# Patient Record
Sex: MALE | Race: ASIAN | NOT HISPANIC OR LATINO
[De-identification: names, ages, dates, MRNs, and addresses within clinical notes are randomized per-mention and may not be internally consistent; named-entity substitution may affect disease eponyms.]

---
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
## 2023-01-20 ENCOUNTER — OFFICE (OUTPATIENT)
Dept: URBAN - METROPOLITAN AREA CLINIC 109 | Facility: CLINIC | Age: 18
Setting detail: OPHTHALMOLOGY
End: 2023-01-20
Payer: COMMERCIAL

## 2023-01-20 DIAGNOSIS — H52.13: ICD-10-CM

## 2023-01-20 DIAGNOSIS — H52.213: ICD-10-CM

## 2023-01-20 DIAGNOSIS — H35.52: ICD-10-CM

## 2023-01-20 PROCEDURE — 92004 COMPRE OPH EXAM NEW PT 1/>: CPT | Performed by: OPHTHALMOLOGY

## 2023-01-20 PROCEDURE — 92015 DETERMINE REFRACTIVE STATE: CPT | Performed by: OPHTHALMOLOGY

## 2023-01-20 PROCEDURE — 92250 FUNDUS PHOTOGRAPHY W/I&R: CPT | Performed by: OPHTHALMOLOGY

## 2023-01-20 PROCEDURE — 92025 CPTRIZED CORNEAL TOPOGRAPHY: CPT | Performed by: OPHTHALMOLOGY

## 2023-01-20 ASSESSMENT — VISUAL ACUITY
OD_BCVA: 20/25-2
OS_BCVA: 20/70

## 2023-01-20 ASSESSMENT — REFRACTION_MANIFEST
OD_AXIS: 6
OD_CYLINDER: -2.25
OS_AXIS: 156
OS_SPHERE: -0.50
OD_SPHERE: -3.50
OS_VA1: 20/25-
OS_CYLINDER: -2.50
OD_VA1: 20/25-

## 2023-01-20 ASSESSMENT — CONFRONTATIONAL VISUAL FIELD TEST (CVF)
OD_FINDINGS: FULL
OS_FINDINGS: FULL

## 2023-01-20 ASSESSMENT — SPHEQUIV_DERIVED
OS_SPHEQUIV: -1.75
OD_SPHEQUIV: -7
OD_SPHEQUIV: -4.625
OS_SPHEQUIV: -3.75

## 2023-01-20 ASSESSMENT — REFRACTION_AUTOREFRACTION
OD_SPHERE: -5.75
OS_SPHERE: -2.50
OS_CYLINDER: -2.50
OD_CYLINDER: -2.50
OS_AXIS: 156
OD_AXIS: 006

## 2023-01-20 ASSESSMENT — TONOMETRY
OD_IOP_MMHG: 16
OS_IOP_MMHG: 16

## 2023-01-24 ENCOUNTER — INPATIENT (INPATIENT)
Facility: HOSPITAL | Age: 18
LOS: 5 days | Discharge: HOME | DRG: 479 | End: 2023-01-30
Attending: FAMILY MEDICINE | Admitting: FAMILY MEDICINE
Payer: MEDICAID

## 2023-01-24 VITALS
TEMPERATURE: 100 F | HEART RATE: 118 BPM | DIASTOLIC BLOOD PRESSURE: 66 MMHG | SYSTOLIC BLOOD PRESSURE: 118 MMHG | WEIGHT: 119.93 LBS | OXYGEN SATURATION: 99 % | HEIGHT: 69 IN | RESPIRATION RATE: 18 BRPM

## 2023-01-24 DIAGNOSIS — Z29.9 ENCOUNTER FOR PROPHYLACTIC MEASURES, UNSPECIFIED: ICD-10-CM

## 2023-01-24 DIAGNOSIS — L03.031 CELLULITIS OF RIGHT TOE: ICD-10-CM

## 2023-01-24 DIAGNOSIS — L03.90 CELLULITIS, UNSPECIFIED: ICD-10-CM

## 2023-01-24 DIAGNOSIS — M86.9 OSTEOMYELITIS, UNSPECIFIED: ICD-10-CM

## 2023-01-24 LAB
ALBUMIN SERPL ELPH-MCNC: 3.8 G/DL — SIGNIFICANT CHANGE UP (ref 3.3–5)
ALP SERPL-CCNC: 165 U/L — HIGH (ref 40–120)
ALT FLD-CCNC: 23 U/L — SIGNIFICANT CHANGE UP (ref 12–78)
ANION GAP SERPL CALC-SCNC: 4 MMOL/L — LOW (ref 5–17)
APTT BLD: 30.9 SEC — SIGNIFICANT CHANGE UP (ref 27.5–35.5)
AST SERPL-CCNC: 18 U/L — SIGNIFICANT CHANGE UP (ref 15–37)
BASOPHILS # BLD AUTO: 0.02 K/UL — SIGNIFICANT CHANGE UP (ref 0–0.2)
BASOPHILS NFR BLD AUTO: 0.3 % — SIGNIFICANT CHANGE UP (ref 0–2)
BILIRUB SERPL-MCNC: 0.4 MG/DL — SIGNIFICANT CHANGE UP (ref 0.2–1.2)
BUN SERPL-MCNC: 17 MG/DL — SIGNIFICANT CHANGE UP (ref 7–23)
CALCIUM SERPL-MCNC: 9.4 MG/DL — SIGNIFICANT CHANGE UP (ref 8.5–10.1)
CHLORIDE SERPL-SCNC: 107 MMOL/L — SIGNIFICANT CHANGE UP (ref 96–108)
CO2 SERPL-SCNC: 29 MMOL/L — SIGNIFICANT CHANGE UP (ref 22–31)
CREAT SERPL-MCNC: 0.8 MG/DL — SIGNIFICANT CHANGE UP (ref 0.5–1.3)
EGFR: 131 ML/MIN/1.73M2 — SIGNIFICANT CHANGE UP
EOSINOPHIL # BLD AUTO: 0.08 K/UL — SIGNIFICANT CHANGE UP (ref 0–0.5)
EOSINOPHIL NFR BLD AUTO: 1.2 % — SIGNIFICANT CHANGE UP (ref 0–6)
ERYTHROCYTE [SEDIMENTATION RATE] IN BLOOD: 13 MM/HR — SIGNIFICANT CHANGE UP (ref 0–15)
GLUCOSE SERPL-MCNC: 83 MG/DL — SIGNIFICANT CHANGE UP (ref 70–99)
HCT VFR BLD CALC: 47.4 % — SIGNIFICANT CHANGE UP (ref 39–50)
HGB BLD-MCNC: 15.6 G/DL — SIGNIFICANT CHANGE UP (ref 13–17)
IMM GRANULOCYTES NFR BLD AUTO: 0.6 % — SIGNIFICANT CHANGE UP (ref 0–0.9)
INR BLD: 1.04 RATIO — SIGNIFICANT CHANGE UP (ref 0.88–1.16)
LACTATE SERPL-SCNC: 1.3 MMOL/L — SIGNIFICANT CHANGE UP (ref 0.7–2)
LYMPHOCYTES # BLD AUTO: 1.14 K/UL — SIGNIFICANT CHANGE UP (ref 1–3.3)
LYMPHOCYTES # BLD AUTO: 17.8 % — SIGNIFICANT CHANGE UP (ref 13–44)
MCHC RBC-ENTMCNC: 29.4 PG — SIGNIFICANT CHANGE UP (ref 27–34)
MCHC RBC-ENTMCNC: 32.9 GM/DL — SIGNIFICANT CHANGE UP (ref 32–36)
MCV RBC AUTO: 89.4 FL — SIGNIFICANT CHANGE UP (ref 80–100)
MONOCYTES # BLD AUTO: 0.56 K/UL — SIGNIFICANT CHANGE UP (ref 0–0.9)
MONOCYTES NFR BLD AUTO: 8.7 % — SIGNIFICANT CHANGE UP (ref 2–14)
NEUTROPHILS # BLD AUTO: 4.57 K/UL — SIGNIFICANT CHANGE UP (ref 1.8–7.4)
NEUTROPHILS NFR BLD AUTO: 71.4 % — SIGNIFICANT CHANGE UP (ref 43–77)
NRBC # BLD: 0 /100 WBCS — SIGNIFICANT CHANGE UP (ref 0–0)
PLATELET # BLD AUTO: 318 K/UL — SIGNIFICANT CHANGE UP (ref 150–400)
POTASSIUM SERPL-MCNC: 3.9 MMOL/L — SIGNIFICANT CHANGE UP (ref 3.5–5.3)
POTASSIUM SERPL-SCNC: 3.9 MMOL/L — SIGNIFICANT CHANGE UP (ref 3.5–5.3)
PROCALCITONIN SERPL-MCNC: 0.06 NG/ML — SIGNIFICANT CHANGE UP
PROT SERPL-MCNC: 8.3 G/DL — SIGNIFICANT CHANGE UP (ref 6–8.3)
PROTHROM AB SERPL-ACNC: 12.2 SEC — SIGNIFICANT CHANGE UP (ref 10.5–13.4)
RBC # BLD: 5.3 M/UL — SIGNIFICANT CHANGE UP (ref 4.2–5.8)
RBC # FLD: 13.1 % — SIGNIFICANT CHANGE UP (ref 10.3–14.5)
SARS-COV-2 RNA SPEC QL NAA+PROBE: SIGNIFICANT CHANGE UP
SODIUM SERPL-SCNC: 140 MMOL/L — SIGNIFICANT CHANGE UP (ref 135–145)
WBC # BLD: 6.41 K/UL — SIGNIFICANT CHANGE UP (ref 3.8–10.5)
WBC # FLD AUTO: 6.41 K/UL — SIGNIFICANT CHANGE UP (ref 3.8–10.5)

## 2023-01-24 PROCEDURE — 93010 ELECTROCARDIOGRAM REPORT: CPT

## 2023-01-24 PROCEDURE — 99221 1ST HOSP IP/OBS SF/LOW 40: CPT

## 2023-01-24 PROCEDURE — 93926 LOWER EXTREMITY STUDY: CPT | Mod: 26,RT

## 2023-01-24 PROCEDURE — 73630 X-RAY EXAM OF FOOT: CPT | Mod: 26,RT

## 2023-01-24 PROCEDURE — 99285 EMERGENCY DEPT VISIT HI MDM: CPT

## 2023-01-24 PROCEDURE — 71045 X-RAY EXAM CHEST 1 VIEW: CPT | Mod: 26

## 2023-01-24 RX ORDER — ACETAMINOPHEN 500 MG
650 TABLET ORAL ONCE
Refills: 0 | Status: COMPLETED | OUTPATIENT
Start: 2023-01-24 | End: 2023-01-24

## 2023-01-24 RX ORDER — VANCOMYCIN HCL 1 G
750 VIAL (EA) INTRAVENOUS ONCE
Refills: 0 | Status: COMPLETED | OUTPATIENT
Start: 2023-01-24 | End: 2023-01-24

## 2023-01-24 RX ORDER — LACTOBACILLUS ACIDOPHILUS 100MM CELL
1 CAPSULE ORAL DAILY
Refills: 0 | Status: DISCONTINUED | OUTPATIENT
Start: 2023-01-24 | End: 2023-01-26

## 2023-01-24 RX ORDER — PIPERACILLIN AND TAZOBACTAM 4; .5 G/20ML; G/20ML
3.38 INJECTION, POWDER, LYOPHILIZED, FOR SOLUTION INTRAVENOUS ONCE
Refills: 0 | Status: COMPLETED | OUTPATIENT
Start: 2023-01-24 | End: 2023-01-24

## 2023-01-24 RX ORDER — VANCOMYCIN HCL 1 G
750 VIAL (EA) INTRAVENOUS ONCE
Refills: 0 | Status: COMPLETED | OUTPATIENT
Start: 2023-01-25 | End: 2023-01-25

## 2023-01-24 RX ORDER — ACETAMINOPHEN 500 MG
650 TABLET ORAL EVERY 6 HOURS
Refills: 0 | Status: DISCONTINUED | OUTPATIENT
Start: 2023-01-24 | End: 2023-01-26

## 2023-01-24 RX ORDER — VANCOMYCIN HCL 1 G
VIAL (EA) INTRAVENOUS
Refills: 0 | Status: COMPLETED | OUTPATIENT
Start: 2023-01-24 | End: 2023-01-25

## 2023-01-24 RX ORDER — SODIUM CHLORIDE 9 MG/ML
1700 INJECTION INTRAMUSCULAR; INTRAVENOUS; SUBCUTANEOUS ONCE
Refills: 0 | Status: COMPLETED | OUTPATIENT
Start: 2023-01-24 | End: 2023-01-24

## 2023-01-24 RX ADMIN — SODIUM CHLORIDE 1700 MILLILITER(S): 9 INJECTION INTRAMUSCULAR; INTRAVENOUS; SUBCUTANEOUS at 15:34

## 2023-01-24 RX ADMIN — Medication 250 MILLIGRAM(S): at 15:44

## 2023-01-24 RX ADMIN — Medication 650 MILLIGRAM(S): at 14:18

## 2023-01-24 RX ADMIN — Medication 750 MILLIGRAM(S): at 17:00

## 2023-01-24 RX ADMIN — PIPERACILLIN AND TAZOBACTAM 200 GRAM(S): 4; .5 INJECTION, POWDER, LYOPHILIZED, FOR SOLUTION INTRAVENOUS at 14:18

## 2023-01-24 RX ADMIN — PIPERACILLIN AND TAZOBACTAM 25 GRAM(S): 4; .5 INJECTION, POWDER, LYOPHILIZED, FOR SOLUTION INTRAVENOUS at 19:21

## 2023-01-24 RX ADMIN — PIPERACILLIN AND TAZOBACTAM 3.38 GRAM(S): 4; .5 INJECTION, POWDER, LYOPHILIZED, FOR SOLUTION INTRAVENOUS at 14:48

## 2023-01-24 RX ADMIN — Medication 650 MILLIGRAM(S): at 14:48

## 2023-01-24 NOTE — CONSULT NOTE ADULT - SUBJECTIVE AND OBJECTIVE BOX
17y year old Male seen at [facility] 06 for right great toe wound. Patient is accompanied by his mother bed side. Patient states he does not recollect the length of time the as to when he first noticed the wound. Patient's mother states she noticed the wound about 2 months ago and was being treated with serial debridements and was advised to get an MRI, but due to insurance problems were unable to obtain an MRI until about 2 weeks ago and after visiting their podiatrist today were referred to the ER for bone infection to the right great toe. Patient also recently has had spinal surgery for scoliosis. Per mother patient has had multiple health issues more significant with delayed milestones in early years of development and also had history of frequent falls. Patient   Denies any fever, chills, nausea, vomiting, chest pain, shortness of breath, or calf pain at this time.    REVIEW OF SYSTEMS    PAST MEDICAL & SURGICAL HISTORY:  Scoliosis      Food allergy  egg, peanuts      No significant past surgical history          Allergies    eggs (Anaphylaxis)  No Known Drug Allergies  peanuts (Anaphylaxis)    Intolerances        MEDICATIONS  (STANDING):  lactobacillus acidophilus 1 Tablet(s) Oral daily  multivitamin 1 Tablet(s) Oral daily  piperacillin/tazobactam IVPB.. 3.375 Gram(s) IV Intermittent once    MEDICATIONS  (PRN):  acetaminophen     Tablet .. 650 milliGRAM(s) Oral every 6 hours PRN Temp greater or equal to 38C (100.4F)      Social History:  lives at home with family (24 Jan 2023 17:03)      FAMILY HISTORY:      Vital Signs Last 24 Hrs  T(C): 37.5 (24 Jan 2023 13:33), Max: 37.5 (24 Jan 2023 13:33)  T(F): 99.5 (24 Jan 2023 13:33), Max: 99.5 (24 Jan 2023 13:33)  HR: 118 (24 Jan 2023 13:33) (118 - 118)  BP: 118/66 (24 Jan 2023 13:33) (118/66 - 118/66)  BP(mean): --  RR: 18 (24 Jan 2023 13:33) (18 - 18)  SpO2: 99% (24 Jan 2023 13:33) (99% - 99%)    Parameters below as of 24 Jan 2023 13:33  Patient On (Oxygen Delivery Method): room air        PHYSICAL EXAM:  Vascular: DP & PT palpable bilaterally, Capillary refill 3 seconds, Digital hair present bilaterally  Neurological: Light touch sensation intact bilaterally  Musculoskeletal: 5/5 strength in all quadrants bilaterally, AJ & STJ ROM intact  Dermatological: 2.5cm x 1.0cm x (+) probe to bone ulceration noted  to the plantar distal hallux, granular wound bed, no periwound erythema, no fluctuance, no malodor, no proximal streaking at this time, hyperkeratotic tissue noted around the hallux, with onychomycotic nails to bilateral hallux with subungal debri and discoloration     CBC Full  -  ( 24 Jan 2023 14:10 )  WBC Count : 6.41 K/uL  RBC Count : 5.30 M/uL  Hemoglobin : 15.6 g/dL  Hematocrit : 47.4 %  Platelet Count - Automated : 318 K/uL  Mean Cell Volume : 89.4 fl  Mean Cell Hemoglobin : 29.4 pg  Mean Cell Hemoglobin Concentration : 32.9 gm/dL  Auto Neutrophil # : 4.57 K/uL  Auto Lymphocyte # : 1.14 K/uL  Auto Monocyte # : 0.56 K/uL  Auto Eosinophil # : 0.08 K/uL  Auto Basophil # : 0.02 K/uL  Auto Neutrophil % : 71.4 %  Auto Lymphocyte % : 17.8 %  Auto Monocyte % : 8.7 %  Auto Eosinophil % : 1.2 %  Auto Basophil % : 0.3 %      01-24    140  |  107  |  17  ----------------------------<  83  3.9   |  29  |  0.80    Ca    9.4      24 Jan 2023 14:10    TPro  8.3  /  Alb  3.8  /  TBili  0.4  /  DBili  x   /  AST  18  /  ALT  23  /  AlkPhos  165<H>  01-24                          15.6   6.41  )-----------( 318      ( 24 Jan 2023 14:10 )             47.4       PT/INR - ( 24 Jan 2023 14:10 )   PT: 12.2 sec;   INR: 1.04 ratio         PTT - ( 24 Jan 2023 14:10 )  PTT:30.9 sec        Imaging: ----------  Pending 
[facility]  INFECTIOUS DISEASES - [address], [place] [address]  Tel: [phone]     Fax: [phone]  =======================================================    [facility]-[id number]  YARITZA MERRILL     CC: Patient is a 17y old  Male who presents with a chief complaint of toe OM (24 Jan 2023 17:43)    HPI:  17 M with hx of scoliosis, who was sent to ED for osteomyelitis of distal phalanx of right first toe. Patient's mother at bedside provided additional information. She says she first noted bleeding on patient's socks 2 months ago, prompting her to bring him to urgent care. Since then he has been following Podiatry outpatient. He had a recent MRI showing evidence of osteomyelitis. Denies any prior antibiotic use. Mother says patient has not been compliant with boots that Podiatrist recommended. Patient denies any fevers, chills or pain to the foot. Does not recall any specific trauma. He is a student and also works part time at [employer].      PAST MEDICAL & SURGICAL HISTORY:  Scoliosis      Food allergy  egg, peanuts      No significant past surgical history          Social Hx:     FAMILY HISTORY:      Allergies    eggs (Anaphylaxis)  No Known Drug Allergies  peanuts (Anaphylaxis)    Intolerances        Antibiotics:  MEDICATIONS  (STANDING):  lactobacillus acidophilus 1 Tablet(s) Oral daily  multivitamin 1 Tablet(s) Oral daily  piperacillin/tazobactam IVPB.. 3.375 Gram(s) IV Intermittent once    MEDICATIONS  (PRN):  acetaminophen     Tablet .. 650 milliGRAM(s) Oral every 6 hours PRN Temp greater or equal to 38C (100.4F)       REVIEW OF SYSTEMS:  CONSTITUTIONAL:  No Fever or chills  HEENT:  No sore throat or runny nose.  CARDIOVASCULAR:  No chest pain or SOB.  RESPIRATORY:  No cough, shortness of breath  GASTROINTESTINAL:  No nausea, vomiting or diarrhea.  GENITOURINARY:  No dysuria, frequency or urgency  MUSCULOSKELETAL:  no joint aches, no muscle pain  SKIN: see history  NEUROLOGIC:  No headache, no dizziness  PSYCHIATRIC:  No disorder of thought or mood.      Physical Exam:  Vital Signs Last 24 Hrs  T(C): 37.5 (24 Jan 2023 13:33), Max: 37.5 (24 Jan 2023 13:33)  T(F): 99.5 (24 Jan 2023 13:33), Max: 99.5 (24 Jan 2023 13:33)  HR: 102 (24 Jan 2023 17:00) (102 - 118)  BP: 114/64 (24 Jan 2023 17:00) (114/64 - 118/66)  BP(mean): --  RR: 18 (24 Jan 2023 17:00) (18 - 18)  SpO2: 99% (24 Jan 2023 17:00) (99% - 99%)    Parameters below as of 24 Jan 2023 17:00  Patient On (Oxygen Delivery Method): room air      Height (cm): 175.3 (01-24 @ 13:33)  Weight (kg): 54.4 (01-24 @ 13:33)  BMI (kg/m2): 17.7 (01-24 @ 13:33)  BSA (m2): 1.66 (01-24 @ 13:33)  GEN: NAD  HEENT: normocephalic and atraumatic.    NECK: Supple.   LUNGS: Clear to auscultation.  HEART: Regular rate and rhythm  ABDOMEN: Soft, nontender, and nondistended.    EXTREMITIES: No leg edema. mild R hallux swelling and warmth  NEUROLOGIC: grossly intact.  PSYCHIATRIC: Appropriate affect .  SKIN: (+) probe to bone ulceration noted  to the plantar distal hallux, granular wound bed, no periwound erythema, no fluctuance, no malodor, darkended hyperkeratotic tissue noted around the hallux, with onychomycotic nails to bilateral hallux,     Labs:  01-24    140  |  107  |  17  ----------------------------<  83  3.9   |  29  |  0.80    Ca    9.4      24 Jan 2023 14:10    TPro  8.3  /  Alb  3.8  /  TBili  0.4  /  DBili  x   /  AST  18  /  ALT  23  /  AlkPhos  165<H>  01-24                          15.6   6.41  )-----------( 318      ( 24 Jan 2023 14:10 )             47.4     PT/INR - ( 24 Jan 2023 14:10 )   PT: 12.2 sec;   INR: 1.04 ratio         PTT - ( 24 Jan 2023 14:10 )  PTT:30.9 sec    LIVER FUNCTIONS - ( 24 Jan 2023 14:10 )  Alb: 3.8 g/dL / Pro: 8.3 g/dL / ALK PHOS: 165 U/L / ALT: 23 U/L / AST: 18 U/L / GGT: x                       COVID-19 PCR: NotDetec (01-24-23 @ 14:10)      RECENT CULTURES:        All imaging and other data have been reviewed.    CXR:  FINDINGS:  Heart/Vascular: The heart size, mediastinum, hilum and aorta are within   normal limits for projection.  Pulmonary: Midline trachea. There is no focal infiltrate, congestion or   effusion.    Bones: There is no fracture. The visualized spinal hardware is intact.  Lines and catheter: None    Impression:    No acute pulmonary disease.

## 2023-01-24 NOTE — H&P ADULT - NSHPPHYSICALEXAM_GEN_ALL_CORE
· CONSTITUTIONAL: Well appearing, awake, alert, oriented to person, place, time/situation and in no apparent distress.  · CARDIAC: - - -  · CARDIAC RHYTHM: regular  · CARDIAC RATE: TACHYCARDIC  · RESPIRATORY: Breath sounds clear and equal bilaterally.  · GASTROINTESTINAL: Abdomen soft, non-tender, no guarding.  · MUSCULOSKELETAL: from x 4  · NEUROLOGICAL: nonfocal  · SKIN: ulceration to palmar aspect of distal right great toe with cellulitis, +NVI, no drainage, no streaking

## 2023-01-24 NOTE — CONSULT NOTE ADULT - PROBLEM SELECTOR RECOMMENDATION 9
Patient was seen and evaluated   Applied silver alginate and sterile dressing   Ordered Right foot x- rays and MRI to assess the depth of the OM and any other osseous or soft tissue pathology   Recommend ID consult for IV antibiotics   Patient to stay partial weight bearing to the right foot in surgical shoe   Discussed with patient and family for surgical wound debridement with bone biopsy and possible long term IV antibiotics for the right hallux OM   Will schedule patient for surgery on Thursday 01/26/22.   Will follow patient

## 2023-01-24 NOTE — ED PROVIDER NOTE - CLINICAL SUMMARY MEDICAL DECISION MAKING FREE TEXT BOX
osteomyelitis of right 1st toe. nonhealing as outpatient, being followed by podiatry with tachycardia and low-grade fever. for admisison. labs, IV abx.

## 2023-01-24 NOTE — H&P ADULT - NSHPREVIEWOFSYSTEMS_GEN_ALL_CORE
· CONSTITUTIONAL: no fever and no chills.  · CARDIOVASCULAR: - - -  · Cardiovascular [-]: no chest pain  · RESPIRATORY: - - -  · Respiratory [-]: no shortness of breath  · GASTROINTESTINAL: - - -  · Gastrointestinal [-]: no nausea, no vomiting  · MUSCULOSKELETAL: - - -  · Musculoskeletal [+]: scoliosis  · SKIN: - - -  · Skin [+]: toe wound  · NEUROLOGICAL: - - -  · ROS STATEMENT: all other ROS negative except as per HPI

## 2023-01-24 NOTE — CONSULT NOTE ADULT - ASSESSMENT
17 M with hx of scoliosis, who was sent to ED for osteomyelitis of distal phalanx of right first toe. He has had nonhealing wound for at least 2 months now, with recent MRI showing evidence of osteomyelitis. Otherwise denies any fevers. No leukocytosis noted. Plan for debridement and bone biopsy on 1/26.    -continue Zosyn  -baseline ESR and CRP  -follow up R foot xray and MRI  -send bacterial, fungal and AFB cultures when he goes for Surgery  -consider Vascular evaluation  -discussed with mother at bedside    Thank you for courtesy of this consult.     I will be covered by Dr. Combs tomorrow, 1/25/23.  Discussed with the primary team.     Kadie Ospina MD  Division of Infectious Diseases   Cell [phone] between 8am and 6pm   After 6pm and weekends please call ID service at [phone].

## 2023-01-24 NOTE — H&P ADULT - HISTORY OF PRESENT ILLNESS
17 M sent to ED for osteomyelitis of distal phalanx of right first toe. Has been following with podiatrist Sunday Paulson for the past few months due to nonhealing wound to distal toe. Last week had MRI and today was told there is an infection in the bone and he needs to go to hospital for IV abx. No prior antibiotics for this wound. Denies f/c, cp, sob, vomiting, pain.    	MRI showed ulceration over the distal phalanx of the first toe with osteomyelitis cellulitis and no abscess. Mild arthrosis interphalangeal joint with no septic changes at the joint.  patient is being admitted for further work up and treatment

## 2023-01-24 NOTE — H&P ADULT - NSHPLABSRESULTS_GEN_ALL_CORE
01-24    140  |  107  |  17  ----------------------------<  83  3.9   |  29  |  0.80    Ca    9.4      24 Jan 2023 14:10    TPro  8.3  /  Alb  3.8  /  TBili  0.4  /  DBili  x   /  AST  18  /  ALT  23  /  AlkPhos  165<H>  01-24                            15.6   6.41  )-----------( 318      ( 24 Jan 2023 14:10 )             47.4             LIVER FUNCTIONS - ( 24 Jan 2023 14:10 )  Alb: 3.8 g/dL / Pro: 8.3 g/dL / ALK PHOS: 165 U/L / ALT: 23 U/L / AST: 18 U/L / GGT: x             PT/INR - ( 24 Jan 2023 14:10 )   PT: 12.2 sec;   INR: 1.04 ratio         PTT - ( 24 Jan 2023 14:10 )  PTT:30.9 sec

## 2023-01-24 NOTE — ED PROVIDER NOTE - ATTENDING APP SHARED VISIT CONTRIBUTION OF CARE
Patient came into the ED sent by podiatry for evaluation of osteomyelitis of right 1st toe. patient is being followed by podiatry for the last 3 months and has had multiple scrapings of the wound in the office. infection initially started as a small scratch. has not been on abx. no fever.     A&Ox3, NAD. good dorsalis pedal pulse. +swelling/erythema to right 1st toe. +ulceration dorsum of distal toe with surrounding discolored soft-tissue of distal phalynx on dorsum. decreased sensation at distal toe. FROM toe and foot.

## 2023-01-24 NOTE — ED ADULT TRIAGE NOTE - CHIEF COMPLAINT QUOTE
Patient A/Ox4 with clear speech and a steady gait. Accompanied by his mother. Comes in for IV ABX for right first toe infection. Patient states that he has had a non-healing cut on his foot. Podiatrist took MRI showing bone infection and told patient to go to ED.

## 2023-01-24 NOTE — ED PROVIDER NOTE - OBJECTIVE STATEMENT
17 M sent to ED for osteomyelitis of distal phalanx of right first toe. Has been following with podiatrist Sunday Paulson for the past few months due to nonhealing wound to distal toe. Last week had MRI and today was told there is an infection in the bone and he needs to go to hospital for IV abx. No prior antibiotics for this wound. Denies f/c, cp, sob, vomiting, pain.    MRI showed ulceration over the distal phalanx of the first toe with osteomyelitis cellulitis and no abscess. Mild arthrosis interphalangeal joint with no septic changes at the joint    pmd elpidio ramírez

## 2023-01-25 ENCOUNTER — TRANSCRIPTION ENCOUNTER (OUTPATIENT)
Age: 18
End: 2023-01-25

## 2023-01-25 LAB
ALBUMIN SERPL ELPH-MCNC: 2.9 G/DL — LOW (ref 3.3–5)
ALP SERPL-CCNC: 122 U/L — HIGH (ref 40–120)
ALT FLD-CCNC: 20 U/L — SIGNIFICANT CHANGE UP (ref 12–78)
ANION GAP SERPL CALC-SCNC: 3 MMOL/L — LOW (ref 5–17)
AST SERPL-CCNC: 13 U/L — LOW (ref 15–37)
BILIRUB SERPL-MCNC: 0.5 MG/DL — SIGNIFICANT CHANGE UP (ref 0.2–1.2)
BUN SERPL-MCNC: 12 MG/DL — SIGNIFICANT CHANGE UP (ref 7–23)
CALCIUM SERPL-MCNC: 8.5 MG/DL — SIGNIFICANT CHANGE UP (ref 8.5–10.1)
CHLORIDE SERPL-SCNC: 107 MMOL/L — SIGNIFICANT CHANGE UP (ref 96–108)
CO2 SERPL-SCNC: 29 MMOL/L — SIGNIFICANT CHANGE UP (ref 22–31)
CREAT SERPL-MCNC: 0.64 MG/DL — SIGNIFICANT CHANGE UP (ref 0.5–1.3)
CRP SERPL-MCNC: 8 MG/L — HIGH
EGFR: 140 ML/MIN/1.73M2 — SIGNIFICANT CHANGE UP
GLUCOSE SERPL-MCNC: 97 MG/DL — SIGNIFICANT CHANGE UP (ref 70–99)
POTASSIUM SERPL-MCNC: 3.4 MMOL/L — LOW (ref 3.5–5.3)
POTASSIUM SERPL-SCNC: 3.4 MMOL/L — LOW (ref 3.5–5.3)
PROT SERPL-MCNC: 6.4 G/DL — SIGNIFICANT CHANGE UP (ref 6–8.3)
SODIUM SERPL-SCNC: 139 MMOL/L — SIGNIFICANT CHANGE UP (ref 135–145)

## 2023-01-25 PROCEDURE — 99232 SBSQ HOSP IP/OBS MODERATE 35: CPT | Mod: 57

## 2023-01-25 PROCEDURE — 73718 MRI LOWER EXTREMITY W/O DYE: CPT | Mod: 26,RT

## 2023-01-25 RX ORDER — PIPERACILLIN AND TAZOBACTAM 4; .5 G/20ML; G/20ML
3.38 INJECTION, POWDER, LYOPHILIZED, FOR SOLUTION INTRAVENOUS EVERY 8 HOURS
Refills: 0 | Status: DISCONTINUED | OUTPATIENT
Start: 2023-01-25 | End: 2023-01-26

## 2023-01-25 RX ORDER — POTASSIUM CHLORIDE 20 MEQ
20 PACKET (EA) ORAL
Refills: 0 | Status: COMPLETED | OUTPATIENT
Start: 2023-01-25 | End: 2023-01-25

## 2023-01-25 RX ADMIN — Medication 20 MILLIEQUIVALENT(S): at 20:48

## 2023-01-25 RX ADMIN — Medication 250 MILLIGRAM(S): at 04:14

## 2023-01-25 RX ADMIN — Medication 20 MILLIEQUIVALENT(S): at 17:58

## 2023-01-25 RX ADMIN — PIPERACILLIN AND TAZOBACTAM 25 GRAM(S): 4; .5 INJECTION, POWDER, LYOPHILIZED, FOR SOLUTION INTRAVENOUS at 21:11

## 2023-01-25 RX ADMIN — Medication 1 TABLET(S): at 11:32

## 2023-01-25 RX ADMIN — PIPERACILLIN AND TAZOBACTAM 25 GRAM(S): 4; .5 INJECTION, POWDER, LYOPHILIZED, FOR SOLUTION INTRAVENOUS at 16:14

## 2023-01-25 NOTE — PATIENT PROFILE ADULT - FALL HARM RISK - UNIVERSAL INTERVENTIONS
Bed in lowest position, wheels locked, appropriate side rails in place/Call bell, personal items and telephone in reach/Instruct patient to call for assistance before getting out of bed or chair/Non-slip footwear when patient is out of bed/[place] to call system/Physically safe environment - no spills, clutter or unnecessary equipment/Purposeful Proactive Rounding/Room/bathroom lighting operational, light cord in reach

## 2023-01-25 NOTE — ASSESSMENT
Contacted Chris (patients mom and guardian) to discuss post op care for Shola. She stated he is doing well after surgery yesterday. I let her know that [facility] has accepted him and will be reaching out to schedule visits. He will see Rosa Maria Melton NP on Monday 1/30.       [FreeTextEntry1] : YARITZA MERRILL is a 14 year old male patient who presents to the clinic today with his mother s/p posterior spinal fusion at 4 weeks (July 17th, 2020). Patient is well balanced and able to bend forward/backward/laterally without pain or discomfort. Able to jump/squat and maintain tip toe/heel stand stance without pain or discomfort. At this time I recommend pt and home exercises. I am recommending daily back and core strengthening exercises. Home exercise regimen recommended, exercises demonstrated and reviewed in office, and patient and parents provided with a handout demonstrating the exercises. Patient should do additional exercises for back and core strengthening, such as Yoga, swimming, Pilates, planks, pull ups, etc. We have provided him with a prescription for physical therapy. They can continue activities as tolerated, with no restrictions. We have provided him a note for school, indicating that he will need to do remote learning for the first 2 months. Explained that he may have soreness for some time, and it is normal following surgery. Patient is doing well overall. All questions answered, patient and parents understand and agree to plan of care. Patient may follow up with x-rays in 4 months. \par \ketty MCKENNA, Chantelle Topete, have acted as a scribe and documented the above information for Dr. Harris on 08/20/2020.

## 2023-01-26 LAB
ANION GAP SERPL CALC-SCNC: 4 MMOL/L — LOW (ref 5–17)
BUN SERPL-MCNC: 16 MG/DL — SIGNIFICANT CHANGE UP (ref 7–23)
CALCIUM SERPL-MCNC: 8.9 MG/DL — SIGNIFICANT CHANGE UP (ref 8.5–10.1)
CHLORIDE SERPL-SCNC: 108 MMOL/L — SIGNIFICANT CHANGE UP (ref 96–108)
CO2 SERPL-SCNC: 28 MMOL/L — SIGNIFICANT CHANGE UP (ref 22–31)
CREAT SERPL-MCNC: 0.78 MG/DL — SIGNIFICANT CHANGE UP (ref 0.5–1.3)
EGFR: 132 ML/MIN/1.73M2 — SIGNIFICANT CHANGE UP
GLUCOSE SERPL-MCNC: 95 MG/DL — SIGNIFICANT CHANGE UP (ref 70–99)
HCT VFR BLD CALC: 46.5 % — SIGNIFICANT CHANGE UP (ref 39–50)
HGB BLD-MCNC: 15.3 G/DL — SIGNIFICANT CHANGE UP (ref 13–17)
MCHC RBC-ENTMCNC: 29.5 PG — SIGNIFICANT CHANGE UP (ref 27–34)
MCHC RBC-ENTMCNC: 32.9 GM/DL — SIGNIFICANT CHANGE UP (ref 32–36)
MCV RBC AUTO: 89.8 FL — SIGNIFICANT CHANGE UP (ref 80–100)
NRBC # BLD: 0 /100 WBCS — SIGNIFICANT CHANGE UP (ref 0–0)
PLATELET # BLD AUTO: 273 K/UL — SIGNIFICANT CHANGE UP (ref 150–400)
POTASSIUM SERPL-MCNC: 3.8 MMOL/L — SIGNIFICANT CHANGE UP (ref 3.5–5.3)
POTASSIUM SERPL-SCNC: 3.8 MMOL/L — SIGNIFICANT CHANGE UP (ref 3.5–5.3)
RBC # BLD: 5.18 M/UL — SIGNIFICANT CHANGE UP (ref 4.2–5.8)
RBC # FLD: 13.1 % — SIGNIFICANT CHANGE UP (ref 10.3–14.5)
SODIUM SERPL-SCNC: 140 MMOL/L — SIGNIFICANT CHANGE UP (ref 135–145)
WBC # BLD: 5.18 K/UL — SIGNIFICANT CHANGE UP (ref 3.8–10.5)
WBC # FLD AUTO: 5.18 K/UL — SIGNIFICANT CHANGE UP (ref 3.8–10.5)

## 2023-01-26 PROCEDURE — 88311 DECALCIFY TISSUE: CPT | Mod: 26

## 2023-01-26 PROCEDURE — 76937 US GUIDE VASCULAR ACCESS: CPT | Mod: 26

## 2023-01-26 PROCEDURE — 11044 DBRDMT BONE 1ST 20 SQ CM/<: CPT | Mod: T5

## 2023-01-26 PROCEDURE — 73620 X-RAY EXAM OF FOOT: CPT | Mod: 26,RT

## 2023-01-26 PROCEDURE — 36573 INSJ PICC RS&I 5 YR+: CPT

## 2023-01-26 PROCEDURE — 99233 SBSQ HOSP IP/OBS HIGH 50: CPT

## 2023-01-26 PROCEDURE — 88304 TISSUE EXAM BY PATHOLOGIST: CPT | Mod: 26

## 2023-01-26 DEVICE — SURGICEL NU-KNIT 6 X 9": Type: IMPLANTABLE DEVICE | Site: RIGHT | Status: FUNCTIONAL

## 2023-01-26 RX ORDER — HYDROMORPHONE HYDROCHLORIDE 2 MG/ML
0.5 INJECTION INTRAMUSCULAR; INTRAVENOUS; SUBCUTANEOUS
Refills: 0 | Status: DISCONTINUED | OUTPATIENT
Start: 2023-01-26 | End: 2023-01-26

## 2023-01-26 RX ORDER — LACTOBACILLUS ACIDOPHILUS 100MM CELL
1 CAPSULE ORAL DAILY
Refills: 0 | Status: DISCONTINUED | OUTPATIENT
Start: 2023-01-26 | End: 2023-01-30

## 2023-01-26 RX ORDER — ACETAMINOPHEN 500 MG
650 TABLET ORAL EVERY 6 HOURS
Refills: 0 | Status: DISCONTINUED | OUTPATIENT
Start: 2023-01-26 | End: 2023-01-30

## 2023-01-26 RX ORDER — OXYCODONE HYDROCHLORIDE 5 MG/1
5 TABLET ORAL ONCE
Refills: 0 | Status: DISCONTINUED | OUTPATIENT
Start: 2023-01-26 | End: 2023-01-26

## 2023-01-26 RX ORDER — CHLORHEXIDINE GLUCONATE 213 G/1000ML
1 SOLUTION TOPICAL
Refills: 0 | Status: DISCONTINUED | OUTPATIENT
Start: 2023-01-26 | End: 2023-01-26

## 2023-01-26 RX ORDER — SODIUM CHLORIDE 9 MG/ML
1000 INJECTION INTRAMUSCULAR; INTRAVENOUS; SUBCUTANEOUS
Refills: 0 | Status: DISCONTINUED | OUTPATIENT
Start: 2023-01-26 | End: 2023-01-30

## 2023-01-26 RX ORDER — PIPERACILLIN AND TAZOBACTAM 4; .5 G/20ML; G/20ML
3.38 INJECTION, POWDER, LYOPHILIZED, FOR SOLUTION INTRAVENOUS EVERY 8 HOURS
Refills: 0 | Status: DISCONTINUED | OUTPATIENT
Start: 2023-01-24 | End: 2023-01-30

## 2023-01-26 RX ORDER — SODIUM CHLORIDE 9 MG/ML
10 INJECTION INTRAMUSCULAR; INTRAVENOUS; SUBCUTANEOUS
Refills: 0 | Status: DISCONTINUED | OUTPATIENT
Start: 2023-01-26 | End: 2023-01-26

## 2023-01-26 RX ORDER — SODIUM CHLORIDE 9 MG/ML
1000 INJECTION, SOLUTION INTRAVENOUS
Refills: 0 | Status: DISCONTINUED | OUTPATIENT
Start: 2023-01-26 | End: 2023-01-26

## 2023-01-26 RX ORDER — ONDANSETRON 8 MG/1
4 TABLET, FILM COATED ORAL ONCE
Refills: 0 | Status: DISCONTINUED | OUTPATIENT
Start: 2023-01-26 | End: 2023-01-26

## 2023-01-26 RX ADMIN — PIPERACILLIN AND TAZOBACTAM 25 GRAM(S): 4; .5 INJECTION, POWDER, LYOPHILIZED, FOR SOLUTION INTRAVENOUS at 22:17

## 2023-01-26 RX ADMIN — Medication 1 TABLET(S): at 11:16

## 2023-01-26 RX ADMIN — SODIUM CHLORIDE 75 MILLILITER(S): 9 INJECTION, SOLUTION INTRAVENOUS at 20:00

## 2023-01-26 RX ADMIN — PIPERACILLIN AND TAZOBACTAM 25 GRAM(S): 4; .5 INJECTION, POWDER, LYOPHILIZED, FOR SOLUTION INTRAVENOUS at 13:42

## 2023-01-26 RX ADMIN — PIPERACILLIN AND TAZOBACTAM 25 GRAM(S): 4; .5 INJECTION, POWDER, LYOPHILIZED, FOR SOLUTION INTRAVENOUS at 05:53

## 2023-01-26 NOTE — CARE COORDINATION ASSESSMENT. - OTHER PERTINENT REFERRAL INFORMATION
Patient from home with OM of the toe. The patient will need a bone biopsy. The patient may need IV abx and PICC line for DC. Discussed home DC. CM role explained.

## 2023-01-26 NOTE — CARE COORDINATION ASSESSMENT. - NSCAREPROVIDERS_GEN_ALL_CORE_FT
CARE PROVIDERS:  Accepting Physician: Alexis Hahn  Access Services: [address]  Administration: Nayana Del Cid  Administration: Arpan Zamarripa  Admitting: Alexis Hahn  Attending: Alexis Hahn  Case Management: Aye Rivera  Consultant: Kadie Ospina  Consultant: Jennifer Irwin  Consultant: Louis Rand Team: Perlman, Daryl  ED ACP: Paulette Mariano  ED Attending: Crystal Gregorio ED Nurse: Radha Feliciano  Nurse: Odessa Guidry  Nurse: Adolfo Bray  Nurse: Deisy Fowler  Nurse: Brittany Moralez  Nurse: Dawn Haile  Nurse: Adolfo Marie  Nurse: Crystal Wills  Nurse: Agnes Zarate  Nurse: Prema Monaco  Ordered: ADM, User  Outpatient Provider: Candie Saldana  Override: Adolph Robertson  Override: Iggy Crow  PCA/Nursing Assistant: Quang Mcclellan  Primary Team: John Mendoza  Registered Dietitian: Nena Patel  Respiratory Therapy: Yonis Pastor  UR// Supp. Assoc.: [facility] [place]

## 2023-01-26 NOTE — CARE COORDINATION ASSESSMENT. - NSPASTMEDSURGHISTORY_GEN_ALL_CORE_FT
PAST MEDICAL & SURGICAL HISTORY:  Food allergy  egg, peanuts      Scoliosis      No significant past surgical history

## 2023-01-26 NOTE — BRIEF OPERATIVE NOTE - OPERATION/FINDINGS
Bone of distal phalanx was soft and the wound was down to the level of bone, discoloration noted to the distal aspect of the right hallux

## 2023-01-26 NOTE — BRIEF OPERATIVE NOTE - NSICDXBRIEFPROCEDURE_GEN_ALL_CORE_FT
PROCEDURES:  Open biopsy, bone, foot 26-Jan-2023 19:35:27  Jennifer Irwin  Debridement, bone 26-Jan-2023 19:36:39  Jennifer Irwin

## 2023-01-26 NOTE — BRIEF OPERATIVE NOTE - SPECIMENS
Right foot bone culture and pathology of proximal and distal phalanx, and fungal bone culture and microbacterial culture

## 2023-01-27 LAB
GRAM STN FLD: SIGNIFICANT CHANGE UP
SPECIMEN SOURCE: SIGNIFICANT CHANGE UP

## 2023-01-27 PROCEDURE — 99232 SBSQ HOSP IP/OBS MODERATE 35: CPT

## 2023-01-27 PROCEDURE — 99233 SBSQ HOSP IP/OBS HIGH 50: CPT

## 2023-01-27 RX ADMIN — PIPERACILLIN AND TAZOBACTAM 25 GRAM(S): 4; .5 INJECTION, POWDER, LYOPHILIZED, FOR SOLUTION INTRAVENOUS at 21:42

## 2023-01-27 RX ADMIN — PIPERACILLIN AND TAZOBACTAM 25 GRAM(S): 4; .5 INJECTION, POWDER, LYOPHILIZED, FOR SOLUTION INTRAVENOUS at 17:17

## 2023-01-27 RX ADMIN — Medication 1 TABLET(S): at 12:08

## 2023-01-27 RX ADMIN — PIPERACILLIN AND TAZOBACTAM 25 GRAM(S): 4; .5 INJECTION, POWDER, LYOPHILIZED, FOR SOLUTION INTRAVENOUS at 05:48

## 2023-01-27 NOTE — CASE MANAGEMENT PROGRESS NOTE - NSCMPROGRESSNOTE_GEN_ALL_CORE
Patient is s/p bone bx yesterday. PICC placed yest. HC infusion referral initiated and sent to [employer] to run insurance.  Anticipate DC on Mon after path resulted.  Currently on Zosyn IV Q8. If remains on Zosyn, pt will probably go home after 2pm dose on Mon as per father who will then give PM dose with HC VN.  Met with pt and dad at bedside who verb understanding . CM to follow on Mon.

## 2023-01-27 NOTE — ED ADULT NURSE NOTE - NSICDXPASTSURGICALHX_GEN_ALL_CORE_FT
[Joint Pain] : joint pain [Negative] : Heme/Lymph PAST SURGICAL HISTORY:  No significant past surgical history

## 2023-01-28 PROCEDURE — 99232 SBSQ HOSP IP/OBS MODERATE 35: CPT

## 2023-01-28 RX ADMIN — Medication 1 TABLET(S): at 12:39

## 2023-01-28 RX ADMIN — PIPERACILLIN AND TAZOBACTAM 25 GRAM(S): 4; .5 INJECTION, POWDER, LYOPHILIZED, FOR SOLUTION INTRAVENOUS at 14:55

## 2023-01-28 RX ADMIN — PIPERACILLIN AND TAZOBACTAM 25 GRAM(S): 4; .5 INJECTION, POWDER, LYOPHILIZED, FOR SOLUTION INTRAVENOUS at 21:20

## 2023-01-28 RX ADMIN — PIPERACILLIN AND TAZOBACTAM 25 GRAM(S): 4; .5 INJECTION, POWDER, LYOPHILIZED, FOR SOLUTION INTRAVENOUS at 05:25

## 2023-01-28 NOTE — CASE MANAGEMENT PROGRESS NOTE - NSCMPROGRESSNOTE_GEN_ALL_CORE
CM referral noted, home care referral sent to [facility] [phone]/ fax [phone] for d/c Monday.

## 2023-01-29 LAB
-  AMPICILLIN/SULBACTAM: SIGNIFICANT CHANGE UP
-  CEFAZOLIN: SIGNIFICANT CHANGE UP
-  CLINDAMYCIN: SIGNIFICANT CHANGE UP
-  ERYTHROMYCIN: SIGNIFICANT CHANGE UP
-  GENTAMICIN: SIGNIFICANT CHANGE UP
-  OXACILLIN: SIGNIFICANT CHANGE UP
-  PENICILLIN: SIGNIFICANT CHANGE UP
-  RIFAMPIN: SIGNIFICANT CHANGE UP
-  TETRACYCLINE: SIGNIFICANT CHANGE UP
-  TRIMETHOPRIM/SULFAMETHOXAZOLE: SIGNIFICANT CHANGE UP
-  VANCOMYCIN: SIGNIFICANT CHANGE UP
CULTURE RESULTS: SIGNIFICANT CHANGE UP
METHOD TYPE: SIGNIFICANT CHANGE UP
MRSA PCR RESULT.: SIGNIFICANT CHANGE UP
ORGANISM # SPEC MICROSCOPIC CNT: SIGNIFICANT CHANGE UP
ORGANISM # SPEC MICROSCOPIC CNT: SIGNIFICANT CHANGE UP
S AUREUS DNA NOSE QL NAA+PROBE: SIGNIFICANT CHANGE UP
SPECIMEN SOURCE: SIGNIFICANT CHANGE UP

## 2023-01-29 RX ADMIN — Medication 1 TABLET(S): at 11:06

## 2023-01-29 RX ADMIN — PIPERACILLIN AND TAZOBACTAM 25 GRAM(S): 4; .5 INJECTION, POWDER, LYOPHILIZED, FOR SOLUTION INTRAVENOUS at 05:54

## 2023-01-29 RX ADMIN — PIPERACILLIN AND TAZOBACTAM 25 GRAM(S): 4; .5 INJECTION, POWDER, LYOPHILIZED, FOR SOLUTION INTRAVENOUS at 14:31

## 2023-01-29 RX ADMIN — PIPERACILLIN AND TAZOBACTAM 25 GRAM(S): 4; .5 INJECTION, POWDER, LYOPHILIZED, FOR SOLUTION INTRAVENOUS at 21:40

## 2023-01-29 NOTE — DIETITIAN INITIAL EVALUATION ADULT - OTHER INFO
16 y/o male adm with osteomyelitis (toe). s/p OR 1/26 right great toe wound debridement with bone biopsy. PMH scoliosis (surgery), food allergies (egg/peanut). Pt visited at bedside this am with pt's mom present. Pt eating well, tolerating Regular diet. Menu system reviewed. Understanding verbalized. Pt offers no food preferences at this time. Noted to have egg and peanut allergy. Pt was always underwt and was given pediasure most of his life. As per pt's mom she was told that pt just had a "fast metabolism". Pt does not want any other liquid supplements. Since scoliosis surgery, pt's po intake and wt has improved. Pt's UBW at this time is 120#. Pt appears thin. Wt of 129.8# noted on 1/29 (?accurate).

## 2023-01-29 NOTE — PROGRESS NOTE ADULT - PROBLEM SELECTOR PLAN 3
SCDs for DVT prevention

## 2023-01-29 NOTE — PROGRESS NOTE ADULT - PROBLEM SELECTOR PROBLEM 2
Osteomyelitis

## 2023-01-29 NOTE — PROGRESS NOTE ADULT - PROBLEM SELECTOR PLAN 2
IV ABX - zosyn per ID for 4 to 6 weeks  ID eval appreciated- cont zosyn  S/P biopsy of right big toe
Patient examined and evaluated.  Surgical site dressed with adaptic, Aquacel and dry, sterile dressing.  Patient is to be partial weight bearing to the right  heel of the surgical lower extremity as tolerated in a surgical shoe.  Surgical pathology and cultures pending at this time.  Antibiotics as per ID recommendations.    Wound Care Instructions:  -Keep dressing clean, dry, and intact to the right foot  -Change dressing to the right foot every other day  - Apply silver alginate/ Aquacel and cover with sterile gauze, abd pad and shira   -Patient must remain partial weightbearing to the right heel in surgical shoe   -Monitor for any signs of infection including redness, swelling in the leg above the bandage, nausea/vomiting/fever/chills/chest pain/shortness of breath, if any are present patient must report to the emergency department immediately  -Patient is to follow up with Dr. Campos/Dr. Rashid/ Dr. Irwin  within 5 days after discharge at [facility] [place]. Please call to make an appointment [phone]
IV ABX - zosyn per ID for 4 to 6 weeks  ID eval appreciated- cont zosyn  S/P biopsy of right big toe  S/P PICC
IV ABX - zosyn  ID eval appreciated- cont zosyn  there are no medical contraindications to proposed surgical intervention
Patient examined and evaluated.  Surgical site dressed with adaptic, Aquacel and dry, sterile dressing.  Patient is to be partial weight bearing to the right  heel of the surgical lower extremity as tolerated in a surgical shoe.  Surgical pathology and cultures pending at this time.  Antibiotics as per ID recommendations.    Wound Care Instructions:  -Keep dressing clean, dry, and intact to the right foot  -Change dressing to the right foot every other aday  - Apply silver alginate/ Aquacel and cover with sterilce gauze, abd pad and shira   -Patient must remain partial weightbearing to the right heel in surgical shoe   -Monitor for any signs of infection including redness, swelling in the leg above the bandage, nausea/vomiting/fever/chills/chest pain/shortness of breath, if any are present patient must report to the emergency department immediately  -Patient is to follow up with Dr. Campos/Dr. Rashid/ Dr. Irwin  within 5 days after discharge at [facility] [place]. Please call to make an appointment [phone]
IV ABX - zosyn per ID  ID eval appreciated- cont zosyn  S/P biopsy of right big toe
Continue with Antibiotics per Infectious disease       Discussed MRI results with patient's mother (+) OM in the distal phalanx and head of the proximal phalanx, Discussed surgical intervention - bone biopsy with wound debridement, followed by possible long term IV antibiotics. Discussed with patient and family that there is still possibility of patient to undergo possible partial vs hallux amputation in the future. Also discussed that patient is at risk for developing more acute infections, sepsis, loss of limb and loss of life.   Procedure discussed at length with patient and family regarding risks, complications, benefits, as well as pre/intra/post-operative expectations. Patient's mother  verbally consents to procedure and understood discussion regarding procedure and all questions were answered to their satisfaction at this time.
IV ABX  ID eval appreciated- cont zosyn  there are no medical contraindications to proposed surgical intervention

## 2023-01-29 NOTE — DIETITIAN INITIAL EVALUATION ADULT - PHYSCIAL ASSESSMENT
thin young male/other (specify) pt appears thin  has since gained wt and improved po intake since scoliosis surgery.

## 2023-01-29 NOTE — DIETITIAN INITIAL EVALUATION ADULT - REASON
deferred  pt has always been thin; has been on supplements in the past (currently refusing); wt has been improving, eating well.

## 2023-01-29 NOTE — PROGRESS NOTE ADULT - PROBLEM SELECTOR PLAN 1
Continue IV antibiotics per Infectious disease  Applied silver alginate and DSD   Patient scheduled for wound debridement with bone biopsy for tomorrow 01/26/22.   Medical clearance appreciated
IV ABX   ID eval
IV ABX - zosyn per ID  ID eval with Dr. HAYWARD etal appreciated  improved
IV ABX - zosyn per ID  ID eval with Dr. YESY doll appreciated
Patient seen and evaluated   Continue with IV antibiotics per infectious disease
Patient seen and evaluated   Continue with IV antibiotics per infectious disease
IV ABX - zosyn  ID dante with Dr. CHO
IV ABX - zosyn per ID  ID eval with Dr. YESY hendricksl

## 2023-01-29 NOTE — PROGRESS NOTE ADULT - PROBLEM SELECTOR PROBLEM 1
Cellulitis
Cellulitis of great toe, right
Cellulitis
Cellulitis of great toe, right
Cellulitis of great toe, right
Cellulitis

## 2023-01-29 NOTE — PROGRESS NOTE ADULT - NUTRITIONAL ASSESSMENT
This patient has been assessed with a concern for Malnutrition and has been determined to have a diagnosis/diagnoses of Underweight (BMI < 19).    This patient is being managed with:   Diet Regular-  Entered: Jan 26 2023  7:32PM

## 2023-01-29 NOTE — DIETITIAN INITIAL EVALUATION ADULT - PERTINENT MEDS FT
MEDICATIONS  (STANDING):  lactobacillus acidophilus 1 Tablet(s) Oral daily  multivitamin 1 Tablet(s) Oral daily  piperacillin/tazobactam IVPB.. 3.375 Gram(s) IV Intermittent every 8 hours  sodium chloride 0.9%. 1000 milliLiter(s) (75 mL/Hr) IV Continuous <Continuous>    MEDICATIONS  (PRN):  acetaminophen     Tablet .. 650 milliGRAM(s) Oral every 6 hours PRN Temp greater or equal to 38C (100.4F)

## 2023-01-30 ENCOUNTER — TRANSCRIPTION ENCOUNTER (OUTPATIENT)
Age: 18
End: 2023-01-30

## 2023-01-30 VITALS
SYSTOLIC BLOOD PRESSURE: 109 MMHG | HEART RATE: 95 BPM | DIASTOLIC BLOOD PRESSURE: 74 MMHG | OXYGEN SATURATION: 96 % | RESPIRATION RATE: 18 BRPM | TEMPERATURE: 98 F

## 2023-01-30 LAB
CULTURE RESULTS: SIGNIFICANT CHANGE UP
CULTURE RESULTS: SIGNIFICANT CHANGE UP
SPECIMEN SOURCE: SIGNIFICANT CHANGE UP
SPECIMEN SOURCE: SIGNIFICANT CHANGE UP

## 2023-01-30 PROCEDURE — 87070 CULTURE OTHR SPECIMN AEROBIC: CPT

## 2023-01-30 PROCEDURE — 87102 FUNGUS ISOLATION CULTURE: CPT

## 2023-01-30 PROCEDURE — 80048 BASIC METABOLIC PNL TOTAL CA: CPT

## 2023-01-30 PROCEDURE — 87077 CULTURE AEROBIC IDENTIFY: CPT

## 2023-01-30 PROCEDURE — U0005: CPT

## 2023-01-30 PROCEDURE — 36415 COLL VENOUS BLD VENIPUNCTURE: CPT

## 2023-01-30 PROCEDURE — 84145 PROCALCITONIN (PCT): CPT

## 2023-01-30 PROCEDURE — 99285 EMERGENCY DEPT VISIT HI MDM: CPT

## 2023-01-30 PROCEDURE — 85610 PROTHROMBIN TIME: CPT

## 2023-01-30 PROCEDURE — 87040 BLOOD CULTURE FOR BACTERIA: CPT

## 2023-01-30 PROCEDURE — 85027 COMPLETE CBC AUTOMATED: CPT

## 2023-01-30 PROCEDURE — C1751: CPT

## 2023-01-30 PROCEDURE — 93005 ELECTROCARDIOGRAM TRACING: CPT

## 2023-01-30 PROCEDURE — 85730 THROMBOPLASTIN TIME PARTIAL: CPT

## 2023-01-30 PROCEDURE — 86140 C-REACTIVE PROTEIN: CPT

## 2023-01-30 PROCEDURE — 88304 TISSUE EXAM BY PATHOLOGIST: CPT

## 2023-01-30 PROCEDURE — 88311 DECALCIFY TISSUE: CPT

## 2023-01-30 PROCEDURE — 83605 ASSAY OF LACTIC ACID: CPT

## 2023-01-30 PROCEDURE — U0003: CPT

## 2023-01-30 PROCEDURE — 87640 STAPH A DNA AMP PROBE: CPT

## 2023-01-30 PROCEDURE — 85025 COMPLETE CBC W/AUTO DIFF WBC: CPT

## 2023-01-30 PROCEDURE — 73718 MRI LOWER EXTREMITY W/O DYE: CPT

## 2023-01-30 PROCEDURE — 85652 RBC SED RATE AUTOMATED: CPT

## 2023-01-30 PROCEDURE — 76937 US GUIDE VASCULAR ACCESS: CPT

## 2023-01-30 PROCEDURE — 96365 THER/PROPH/DIAG IV INF INIT: CPT

## 2023-01-30 PROCEDURE — 36573 INSJ PICC RS&I 5 YR+: CPT

## 2023-01-30 PROCEDURE — 87075 CULTR BACTERIA EXCEPT BLOOD: CPT

## 2023-01-30 PROCEDURE — 99233 SBSQ HOSP IP/OBS HIGH 50: CPT

## 2023-01-30 PROCEDURE — 73620 X-RAY EXAM OF FOOT: CPT

## 2023-01-30 PROCEDURE — 87641 MR-STAPH DNA AMP PROBE: CPT

## 2023-01-30 PROCEDURE — 73630 X-RAY EXAM OF FOOT: CPT

## 2023-01-30 PROCEDURE — 77001 FLUOROGUIDE FOR VEIN DEVICE: CPT

## 2023-01-30 PROCEDURE — 71045 X-RAY EXAM CHEST 1 VIEW: CPT

## 2023-01-30 PROCEDURE — 87186 SC STD MICRODIL/AGAR DIL: CPT

## 2023-01-30 PROCEDURE — 80053 COMPREHEN METABOLIC PANEL: CPT

## 2023-01-30 PROCEDURE — 93926 LOWER EXTREMITY STUDY: CPT

## 2023-01-30 RX ORDER — LACTOBACILLUS ACIDOPHILUS 100MM CELL
0 CAPSULE ORAL
Qty: 0 | Refills: 0 | DISCHARGE
Start: 2023-01-30

## 2023-01-30 RX ORDER — METRONIDAZOLE 500 MG
1 TABLET ORAL
Qty: 84 | Refills: 0
Start: 2023-01-30 | End: 2023-02-26

## 2023-01-30 RX ORDER — CEFAZOLIN SODIUM 1 G
2000 VIAL (EA) INJECTION ONCE
Refills: 0 | Status: COMPLETED | OUTPATIENT
Start: 2023-01-30 | End: 2023-01-30

## 2023-01-30 RX ORDER — CEFAZOLIN SODIUM 1 G
VIAL (EA) INJECTION
Refills: 0 | Status: DISCONTINUED | OUTPATIENT
Start: 2023-01-30 | End: 2023-01-30

## 2023-01-30 RX ORDER — ACETAMINOPHEN 500 MG
2 TABLET ORAL
Qty: 0 | Refills: 0 | DISCHARGE
Start: 2023-01-30

## 2023-01-30 RX ORDER — CEFAZOLIN SODIUM 1 G
0 VIAL (EA) INJECTION
Qty: 0 | Refills: 0 | DISCHARGE
Start: 2023-01-30

## 2023-01-30 RX ORDER — CEFAZOLIN SODIUM 1 G
2000 VIAL (EA) INJECTION EVERY 8 HOURS
Refills: 0 | Status: DISCONTINUED | OUTPATIENT
Start: 2023-01-30 | End: 2023-01-30

## 2023-01-30 RX ADMIN — PIPERACILLIN AND TAZOBACTAM 25 GRAM(S): 4; .5 INJECTION, POWDER, LYOPHILIZED, FOR SOLUTION INTRAVENOUS at 07:26

## 2023-01-30 RX ADMIN — Medication 1 TABLET(S): at 11:47

## 2023-01-30 RX ADMIN — Medication 100 MILLIGRAM(S): at 12:58

## 2023-01-30 RX ADMIN — Medication 100 MILLIGRAM(S): at 20:22

## 2023-01-30 NOTE — PROGRESS NOTE ADULT - ASSESSMENT
17 M with hx of scoliosis, who was sent to ED for nonhealing wound on distal phalanx of right first toe. MRI showed osteomyelitis of the first distal phalanx and head of the first proximal phalanx. Also with trace effusion at the first interphalangeal joint. Plan for debridement and bone biopsy today.     Patient will need PICC and IV antibiotics at least 6 weeks, although there is still chance he might need possible partial vs hallux amputation in the future. Otherwise no fever or leukocytosis. Blood cultures currently no growth.    -continue Zosyn pending OR cultures  -follow up OR cultures, path  -suggest PICC line placement, will also need to be set up for home infusion of IV antibiotics  -discussed with mother at bedside    Kadie Ospina MD  Division of Infectious Diseases   Cell [phone] between 8am and 6pm   After 6pm and weekends please call ID service at [phone].     
17 M with hx of scoliosis, who was sent to ED for nonhealing wound on distal phalanx of right first toe. MRI showed osteomyelitis of the first distal phalanx and head of the first proximal phalanx. Also with trace effusion at the first interphalangeal joint. S/p debridement and bone biopsy on 1/26.    Plan for IV antibiotics for at least 6 weeks. PICC has been placed. He has no fever or leukocytosis. Blood cultures remain no growth.    -continue Zosyn pending OR cultures  -follow up OR cultures, path  -discussed with father at bedside  -I will be covered by Dr. Nasreen Trent this weekend, 1/28-1/29/23    Kadie Ospina MD  Division of Infectious Diseases   Cell [phone] between 8am and 6pm   After 6pm and weekends please call ID service at [phone].       
17 M with hx of scoliosis, who was sent to ED for nonhealing wound on distal phalanx of right first toe. MRI showed osteomyelitis of the first distal phalanx and head of the first proximal phalanx. Also with trace effusion at the first interphalangeal joint. S/p debridement and bone biopsy on 1/26.     Bone culture growing staph lugdunensis and finegoldia magna. Sensitivities reviewed. 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 He has no fever and blood cultures no growth. He will need close follow up as there is possibility that he might need more surgical intervention in the future.    -suggest cefazolin 2g IV q8h until March 7, 2023  -would also add metronidazole 500mg PO q12h x 4 weeks  -discontinue Zosyn  -weekly CBC with diff, CMP, ESR, CRP  -needs outpatient ID follow up--follow [facility]  -discussed with father at bedside    Kadie Ospina MD  Division of Infectious Diseases   Cell [phone] between 8am and 6pm   After 6pm and weekends please call ID service at [phone].

## 2023-01-30 NOTE — DISCHARGE NOTE PROVIDER - NSDCCPCAREPLAN_GEN_ALL_CORE_FT
PRINCIPAL DISCHARGE DIAGNOSIS  Diagnosis: Toe osteomyelitis  Assessment and Plan of Treatment: follow up with podiatrist Dr. Irwin

## 2023-01-30 NOTE — DISCHARGE NOTE PROVIDER - HOSPITAL COURSE
admitted for Right big toe OM  ABX per ID  underwent biopsy of right big toe  had PICC line placed  DC after ID and podiatry clearance

## 2023-01-30 NOTE — DISCHARGE NOTE PROVIDER - DETAILS OF MALNUTRITION DIAGNOSIS/DIAGNOSES
This patient has been assessed with a concern for Malnutrition and was treated during this hospitalization for the following Nutrition diagnosis/diagnoses:     -  01/29/2023: Underweight (BMI < 19)

## 2023-01-30 NOTE — DISCHARGE NOTE PROVIDER - NSDCMRMEDTOKEN_GEN_ALL_CORE_FT
acetaminophen 325 mg oral tablet: 2 tab(s) orally every 6 hours, As needed, Temp greater or equal to 38C (100.4F)  ceFAZolin 1 g/50 mL-iso-osmotic dextrose intravenous solution:  intravenous every 8 hours  until 3/7/2023  lactobacillus acidophilus oral capsule: orally once a day  metroNIDAZOLE 500 mg oral tablet: 1 tab(s) orally 3 times a day   Multiple Vitamins oral tablet: 1 tab(s) orally once a day

## 2023-01-30 NOTE — PROGRESS NOTE ADULT - SUBJECTIVE AND OBJECTIVE BOX
17y year old Male seen at [facility] 2EAS 213 W1 s/p right great toe wound debridement with bone biopsy (DOS: 01/26/23). Patient was resting in bed with family bed side. Patient states he has been getting out of bed and has been putting partial weight to the right foot in surgical shoe.  Patient denies any pain to the right foot wound. Denies any fever, chills, nausea, vomiting, chest pain, shortness of breath, or calf pain at this time.    Allergies    eggs (Anaphylaxis)  No Known Drug Allergies  peanuts (Anaphylaxis)    Intolerances        MEDICATIONS  (STANDING):  lactobacillus acidophilus 1 Tablet(s) Oral daily  multivitamin 1 Tablet(s) Oral daily  piperacillin/tazobactam IVPB.. 3.375 Gram(s) IV Intermittent every 8 hours  sodium chloride 0.9%. 1000 milliLiter(s) (75 mL/Hr) IV Continuous <Continuous>    MEDICATIONS  (PRN):  acetaminophen     Tablet .. 650 milliGRAM(s) Oral every 6 hours PRN Temp greater or equal to 38C (100.4F)      Vital Signs Last 24 Hrs  T(C): 36.4 (28 Jan 2023 12:12), Max: 36.8 (27 Jan 2023 20:24)  T(F): 97.5 (28 Jan 2023 12:12), Max: 98.2 (27 Jan 2023 20:24)  HR: 85 (28 Jan 2023 12:12) (72 - 85)  BP: 98/67 (28 Jan 2023 12:12) (98/67 - 117/73)  BP(mean): --  RR: 17 (28 Jan 2023 12:12) (17 - 18)  SpO2: 97% (28 Jan 2023 12:12) (95% - 97%)    Parameters below as of 28 Jan 2023 12:12  Patient On (Oxygen Delivery Method): room air        PHYSICAL EXAM:  Vascular: DP & PT palpable bilaterally, Capillary refill 3 seconds, Digital hair present bilaterally  Neurological: Light touch sensation intact bilaterally  Musculoskeletal: 5/5 strength in all quadrants bilaterally, AJ & STJ ROM intact  Dermatological: Sutures to the biopsy site at the medial hallux and the retention sutures to the plantar hallux at the site intact with no dehiscence,  mild serosanguinous drainage noted on the post op dressing onychomycotic nails to bilateral hallux with subungal debri and discoloration     CBC Full  -  ( 26 Jan 2023 06:48 )  WBC Count : 5.18 K/uL  RBC Count : 5.18 M/uL  Hemoglobin : 15.3 g/dL  Hematocrit : 46.5 %  Platelet Count - Automated : 273 K/uL  Mean Cell Volume : 89.8 fl  Mean Cell Hemoglobin : 29.5 pg  Mean Cell Hemoglobin Concentration : 32.9 gm/dL  Auto Neutrophil # : x  Auto Lymphocyte # : x  Auto Monocyte # : x  Auto Eosinophil # : x  Auto Basophil # : x  Auto Neutrophil % : x  Auto Lymphocyte % : x  Auto Monocyte % : x  Auto Eosinophil % : x  Auto Basophil % : x                          15.3   5.18  )-----------( 273      ( 26 Jan 2023 06:48 )             46.5   01-26    140  |  108  |  16  ----------------------------<  95  3.8   |  28  |  0.78    Ca    8.9      26 Jan 2023 06:48          Culture - Tissue with Gram Stain (collected 26 Jan 2023 18:53)  Source: .Tissue Other, bone right great toe for micro  Gram Stain (27 Jan 2023 02:28):    Rare polymorphonuclear leukocytes seen per low power field    No organisms seen per oil power field    Culture - Tissue with Gram Stain (collected 26 Jan 2023 18:53)  Source: .Tissue Other, bone right foot proximal phaly  Gram Stain (27 Jan 2023 02:29):    Rare polymorphonuclear leukocytes seen per low power field    No organisms seen per oil power field    Culture - Tissue with Gram Stain (collected 26 Jan 2023 18:53)  Source: .Tissue Other, bone right foot distal phalynx  Gram Stain (27 Jan 2023 02:16):    No polymorphonuclear cells seen per low power field    No organisms seen per oil power field    Culture - Blood (collected 24 Jan 2023 18:40)  Source: .Blood Blood  Preliminary Report (26 Jan 2023 01:02):    No growth to date.    Culture - Blood (collected 24 Jan 2023 18:35)  Source: .Blood Blood  Preliminary Report (26 Jan 2023 01:02):    No growth to date.        Imaging: ----------  1 / 1 Parviz Juarez   Report date: 1/27/2023   View Order   (Report matches study selected on Patient History pane)           ACC: [id number] EXAM: XR FOOT 2 VIEWS RT ORDERED BY: SYDNIE CHAVEZ    PROCEDURE DATE: 01/26/2023        INTERPRETATION: XR FOOT 2 VIEWS RIGHT    Clinical History: Status post bone biopsy proximal and distal phalanx of first toe    FINDINGS: AP, lateral and oblique view of the right foot compared 1/24/2023      FINDINGS:    Swelling of the first toe. Ulceration again noted. Wedge-shaped deformity is seen distal portion of proximal phalanx of the first toe likely the biopsy site. No acute fracture, dislocation or radiopaque foreign body    IMPRESSION:  1. Post procedure changes first toe.    --- End of Report ---      
17y year old Male seen at [facility] 2EAS 213 W1 for right great to chronic non healing wound with underlying osteomyelitis on  MRI. Patient was resting bed comfortably and has mother bed side. Patient denies any pain to the right great toe.   Denies any fever, chills, nausea, vomiting, chest pain, shortness of breath, or calf pain at this time.    Allergies    eggs (Anaphylaxis)  No Known Drug Allergies  peanuts (Anaphylaxis)    Intolerances        MEDICATIONS  (STANDING):  lactobacillus acidophilus 1 Tablet(s) Oral daily  multivitamin 1 Tablet(s) Oral daily  piperacillin/tazobactam IVPB.. 3.375 Gram(s) IV Intermittent every 8 hours    MEDICATIONS  (PRN):  acetaminophen     Tablet .. 650 milliGRAM(s) Oral every 6 hours PRN Temp greater or equal to 38C (100.4F)      Vital Signs Last 24 Hrs  T(C): 37 (25 Jan 2023 20:58), Max: 37 (25 Jan 2023 20:58)  T(F): 98.6 (25 Jan 2023 20:58), Max: 98.6 (25 Jan 2023 20:58)  HR: 94 (25 Jan 2023 20:58) (83 - 94)  BP: 122/75 (25 Jan 2023 20:58) (106/71 - 122/75)  BP(mean): --  RR: 17 (25 Jan 2023 20:58) (16 - 17)  SpO2: 94% (25 Jan 2023 20:58) (94% - 96%)    Parameters below as of 25 Jan 2023 20:58  Patient On (Oxygen Delivery Method): room air      SICAL EXAM:  Vascular: DP & PT palpable bilaterally, Capillary refill 3 seconds, Digital hair present bilaterally  Neurological: Light touch sensation intact bilaterally  Musculoskeletal: 5/5 strength in all quadrants bilaterally, AJ & STJ ROM intact  Dermatological: 2.5cm x 1.0cm x (+) probe to bone ulceration noted  to the plantar distal hallux, granular wound bed, no periwound erythema, no fluctuance, no malodor, no proximal streaking at this time, hyperkeratotic tissue noted around the hallux, with onychomycotic nails to bilateral hallux with subungal debri and discoloration     CBC Full  -  ( 24 Jan 2023 14:10 )  WBC Count : 6.41 K/uL  RBC Count : 5.30 M/uL  Hemoglobin : 15.6 g/dL  Hematocrit : 47.4 %  Platelet Count - Automated : 318 K/uL  Mean Cell Volume : 89.4 fl  Mean Cell Hemoglobin : 29.4 pg  Mean Cell Hemoglobin Concentration : 32.9 gm/dL  Auto Neutrophil # : 4.57 K/uL  Auto Lymphocyte # : 1.14 K/uL  Auto Monocyte # : 0.56 K/uL  Auto Eosinophil # : 0.08 K/uL  Auto Basophil # : 0.02 K/uL  Auto Neutrophil % : 71.4 %  Auto Lymphocyte % : 17.8 %  Auto Monocyte % : 8.7 %  Auto Eosinophil % : 1.2 %  Auto Basophil % : 0.3 %    01-25    139  |  107  |  12  ----------------------------<  97  3.4<L>   |  29  |  0.64    Ca    8.5      25 Jan 2023 04:56    TPro  6.4  /  Alb  2.9<L>  /  TBili  0.5  /  DBili  x   /  AST  13<L>  /  ALT  20  /  AlkPhos  122<H>  01-25                          15.6   6.41  )-----------( 318      ( 24 Jan 2023 14:10 )             47.4       PT/INR - ( 24 Jan 2023 14:10 )   PT: 12.2 sec;   INR: 1.04 ratio         PTT - ( 24 Jan 2023 14:10 )  PTT:30.9 sec      Culture - Blood (collected 24 Jan 2023 14:05)  Source: .Blood Blood-Peripheral  Preliminary Report (25 Jan 2023 18:02):    No growth to date.    Culture - Blood (collected 24 Jan 2023 13:55)  Source: .Blood Blood-Peripheral  Preliminary Report (25 Jan 2023 18:02):    No growth to date.        Imaging: ----------  1 / 1 Gene Deutsch   Report date: 1/25/2023   View Order   (Report matches study selected on Patient History pane)           ACC: [id number] EXAM: MR FOOT RT ORDERED BY: SYDNIE CHAVEZ    PROCEDURE DATE: 01/25/2023        INTERPRETATION: RIGHT FOOT MRI    CLINICAL INFORMATION: Nonhealing plantar great toe wound, concern for osteomyelitis.  TECHNIQUE: Multiplanar, multisequence MRI was obtained of the RIGHT foot.  COMPARISON: Right foot radiographs dated 1/24/2023.    FINDINGS:    LIGAMENTS AND CAPSULAR STRUCTURES: Intact.  MUSCLES AND TENDONS: Visualized muscles and tendons of the foot and toes are intact.  CARTILAGE AND SUBCHONDRAL BONE: Intact.  SYNOVIUM/ JOINT FLUID: Trace effusion at the interphalangeal joint of the great toe.  OSSEOUS ALIGNMENT: Within normal limits.  BONE MARROW: Decreased T1 signal in the first distal phalanx and head of the first proximal phalanx with associated marrow edema in these areas consistent with acute osteomyelitis. Increased STIR signal without loss of T1 hyperintense signal in the medial cuneiform, incompletely evaluated on current study. No additional areas of marrow signal alteration.  WEB SPACES: Within normal limits.  PERIPHERAL SOFT TISSUES: A soft tissue defect is seen along the plantar aspect of the great toe with associated skin thickening and subcutaneous edema. No associated fluid collection or abscess.    IMPRESSION:  1. Soft tissue defect of the great toe with associated osteomyelitis of the first distal phalanx and head of the first proximal phalanx.  2. Trace effusion at the first interphalangeal joint may represent associated septic arthritis.  3. Incompletely evaluated marrow edema without loss of T1 hyperintense signal in the medial cuneiform. This may be reactive. Consider MR of the ankle for further evaluation.    --- End of Report ---          NAYE RAO MD; Resident Radiologist  This document has been electronically signed.  GENE DEUTSCH MD; Attending Radiologist  This document has been electronically signed. Jan 25 2023 11:02AM  
17y year old [facility] 2EAS 213 W1 s/p right great toe wound debridement with bone biopsy (DOS: 01/26/23). Patient was resting in bed with family bed side. Patient's mother states patient has been tolerating diet and also has had a bowel movement. Patient denies any pain to the right foot wound. Denies any fever, chills, nausea, vomiting, chest pain, shortness of breath, or calf pain at this time.    Allergies    eggs (Anaphylaxis)  No Known Drug Allergies  peanuts (Anaphylaxis)    Intolerances        MEDICATIONS  (STANDING):  lactobacillus acidophilus 1 Tablet(s) Oral daily  multivitamin 1 Tablet(s) Oral daily  piperacillin/tazobactam IVPB.. 3.375 Gram(s) IV Intermittent every 8 hours  sodium chloride 0.9%. 1000 milliLiter(s) (75 mL/Hr) IV Continuous <Continuous>    MEDICATIONS  (PRN):  acetaminophen     Tablet .. 650 milliGRAM(s) Oral every 6 hours PRN Temp greater or equal to 38C (100.4F)      Vital Signs Last 24 Hrs  T(C): 36.5 (27 Jan 2023 05:06), Max: 36.7 (26 Jan 2023 19:15)  T(F): 97.7 (27 Jan 2023 05:06), Max: 98.1 (26 Jan 2023 19:15)  HR: 77 (27 Jan 2023 05:06) (71 - 94)  BP: 108/67 (27 Jan 2023 05:06) (102/54 - 120/64)  BP(mean): --  RR: 18 (27 Jan 2023 05:06) (17 - 24)  SpO2: 95% (27 Jan 2023 05:06) (94% - 100%)    Parameters below as of 27 Jan 2023 05:06  Patient On (Oxygen Delivery Method): room air    PHYSICAL EXAM:  Vascular: DP & PT palpable bilaterally, Capillary refill 3 seconds, Digital hair present bilaterally  Neurological: Light touch sensation intact bilaterally  Musculoskeletal: 5/5 strength in all quadrants bilaterally, AJ & STJ ROM intact  Dermatological: Sutures to the biopsy site at the medial hallux and the retention sutures to the plantar hallux at the site intact with no dehiscence,  mild serosanguinous drainage noted on the post op dressing onychomycotic nails to bilateral hallux with subungal debri and discoloration     CBC Full  -  ( 26 Jan 2023 06:48 )  WBC Count : 5.18 K/uL  RBC Count : 5.18 M/uL  Hemoglobin : 15.3 g/dL  Hematocrit : 46.5 %  Platelet Count - Automated : 273 K/uL  Mean Cell Volume : 89.8 fl  Mean Cell Hemoglobin : 29.5 pg  Mean Cell Hemoglobin Concentration : 32.9 gm/dL  Auto Neutrophil # : x  Auto Lymphocyte # : x  Auto Monocyte # : x  Auto Eosinophil # : x  Auto Basophil # : x  Auto Neutrophil % : x  Auto Lymphocyte % : x  Auto Monocyte % : x  Auto Eosinophil % : x  Auto Basophil % : x                          15.3   5.18  )-----------( 273      ( 26 Jan 2023 06:48 )             46.5   01-26    140  |  108  |  16  ----------------------------<  95  3.8   |  28  |  0.78    Ca    8.9      26 Jan 2023 06:48          Culture - Tissue with Gram Stain (collected 26 Jan 2023 18:53)  Source: .Tissue Other, bone right great toe for micro  Gram Stain (27 Jan 2023 02:28):    Rare polymorphonuclear leukocytes seen per low power field    No organisms seen per oil power field    Culture - Tissue with Gram Stain (collected 26 Jan 2023 18:53)  Source: .Tissue Other, bone right foot proximal phaly  Gram Stain (27 Jan 2023 02:29):    Rare polymorphonuclear leukocytes seen per low power field    No organisms seen per oil power field    Culture - Tissue with Gram Stain (collected 26 Jan 2023 18:53)  Source: .Tissue Other, bone right foot distal phalynx  Gram Stain (27 Jan 2023 02:16):    No polymorphonuclear cells seen per low power field    No organisms seen per oil power field    Culture - Blood (collected 24 Jan 2023 18:40)  Source: .Blood Blood  Preliminary Report (26 Jan 2023 01:02):    No growth to date.    Culture - Blood (collected 24 Jan 2023 18:35)  Source: .Blood Blood  Preliminary Report (26 Jan 2023 01:02):    No growth to date.        Imaging: ----------  1 / 1 Parviz Juarez   Report date: 1/27/2023   View Order   (Report matches study selected on Patient History pane)           ACC: [id number] EXAM: XR FOOT 2 VIEWS RT ORDERED BY: SYDNIE CHAVEZ    PROCEDURE DATE: 01/26/2023        INTERPRETATION: XR FOOT 2 VIEWS RIGHT    Clinical History: Status post bone biopsy proximal and distal phalanx of first toe    FINDINGS: AP, lateral and oblique view of the right foot compared 1/24/2023      FINDINGS:    Swelling of the first toe. Ulceration again noted. Wedge-shaped deformity is seen distal portion of proximal phalanx of the first toe likely the biopsy site. No acute fracture, dislocation or radiopaque foreign body    IMPRESSION:  1. Post procedure changes first toe.    --- End of Report ---      
[facility]  INFECTIOUS DISEASES - [address], [place] [address]  Tel: [phone]     Fax: [phone]  =======================================================    YARITZA MERRILL [id number]    Follow up: No fevers. Denies any pain or new complaints.    Allergies:  eggs (Anaphylaxis)  No Known Drug Allergies  peanuts (Anaphylaxis)      Antibiotics:  acetaminophen     Tablet .. 650 milliGRAM(s) Oral every 6 hours PRN  chlorhexidine 4% Liquid 1 Application(s) Topical <User Schedule>  lactobacillus acidophilus 1 Tablet(s) Oral daily  multivitamin 1 Tablet(s) Oral daily  piperacillin/tazobactam IVPB.. 3.375 Gram(s) IV Intermittent every 8 hours  sodium chloride 0.9% lock flush 10 milliLiter(s) IV Push every 1 hour PRN       REVIEW OF SYSTEMS:  CONSTITUTIONAL:  No Fever or chills  HEENT:  No sore throat or runny nose.  CARDIOVASCULAR:  No chest pain or SOB.  RESPIRATORY:  No cough, shortness of breath  GASTROINTESTINAL:  No nausea, vomiting or diarrhea.  GENITOURINARY:  No dysuria, frequency or urgency  NEUROLOGIC:  No headache, no dizziness  PSYCHIATRIC:  No disorder of thought or mood.     Physical Exam:  ICU Vital Signs Last 24 Hrs  T(C): 36.9 (26 Jan 2023 15:00), Max: 37 (25 Jan 2023 20:58)  T(F): 98.4 (26 Jan 2023 15:00), Max: 98.6 (25 Jan 2023 20:58)  HR: 84 (26 Jan 2023 15:00) (78 - 94)  BP: 125/75 (26 Jan 2023 15:00) (111/73 - 152/74)  BP(mean): --  ABP: --  ABP(mean): --  RR: 16 (26 Jan 2023 15:00) (16 - 18)  SpO2: 98% (26 Jan 2023 15:00) (94% - 98%)    O2 Parameters below as of 26 Jan 2023 15:00  Patient On (Oxygen Delivery Method): room air    GEN: NAD  HEENT: normocephalic and atraumatic.    NECK: Supple.   LUNGS: Normal respiratory effort  HEART: Regular rate and rhythm  ABDOMEN: Soft, nontender, and nondistended.    EXTREMITIES: No leg edema.   NEUROLOGIC: grossly intact.  PSYCHIATRIC: Appropriate affect .    Labs:  01-26    140  |  108  |  16  ----------------------------<  95  3.8   |  28  |  0.78    Ca    8.9      26 Jan 2023 06:48    TPro  6.4  /  Alb  2.9<L>  /  TBili  0.5  /  DBili  x   /  AST  13<L>  /  ALT  20  /  AlkPhos  122<H>  01-25                          15.3   5.18  )-----------( 273      ( 26 Jan 2023 06:48 )             46.5         LIVER FUNCTIONS - ( 25 Jan 2023 04:56 )  Alb: 2.9 g/dL / Pro: 6.4 g/dL / ALK PHOS: 122 U/L / ALT: 20 U/L / AST: 13 U/L / GGT: x             RECENT CULTURES:  01-24 @ 18:40 .Blood Blood     No growth to date.        01-24 @ 18:35 .Blood Blood     No growth to date.        01-24 @ 14:05 .Blood Blood-Peripheral     No growth to date.        01-24 @ 13:55 .Blood Blood-Peripheral     No growth to date.              All imaging and data are reviewed.     MRI R foot:  FINDINGS:    LIGAMENTS AND CAPSULAR STRUCTURES: Intact.  MUSCLES AND TENDONS: Visualized muscles and tendons of the foot and toes   are intact.  CARTILAGE AND SUBCHONDRAL BONE: Intact.  SYNOVIUM/ JOINT FLUID: Trace effusion at the interphalangeal joint of the   great toe.  OSSEOUS ALIGNMENT: Within normal limits.  BONE MARROW: Decreased T1 signal in the first distal phalanx and head of   the first proximal phalanx with associated marrow edema in these areas   consistent with acute osteomyelitis. Increased STIR signal without loss   of T1 hyperintense signal in the medial cuneiform, incompletely evaluated   on current study. No additional areas of marrow signal alteration.  WEB SPACES: Within normal limits.  PERIPHERAL SOFT TISSUES: A soft tissue defect is seen along the plantar   aspect of the great toe with associated skin thickening and subcutaneous   edema. No associated fluid collection or abscess.    IMPRESSION:  1.  Soft tissue defect of the great toe with associated osteomyelitis of   the first distal phalanx and head of the first proximal phalanx.  2.  Trace effusion at the first interphalangeal joint may represent   associated septic arthritis.  3.  Incompletely evaluated marrow edema without loss of T1 hyperintense   signal in the medial cuneiform. This may be reactive. Consider MR of the   ankle for further evaluation.  
[facility]  INFECTIOUS DISEASES - [address], [address], [place] [address]  Tel: [phone]     Fax: [phone]  =======================================================    YARTIZA MERRILL [id number]    Follow up: No fevers. Seen earlier today. Denies any pain or new complaints. Father at bedside says the wound on toe started about 2.5 years ago, and patient has not been on antibiotics previously. He thinks wound worsened over the past 6 months.    Allergies:  eggs (Anaphylaxis)  No Known Drug Allergies  peanuts (Anaphylaxis)      Antibiotics:  acetaminophen     Tablet .. 650 milliGRAM(s) Oral every 6 hours PRN  lactobacillus acidophilus 1 Tablet(s) Oral daily  multivitamin 1 Tablet(s) Oral daily  piperacillin/tazobactam IVPB.. 3.375 Gram(s) IV Intermittent every 8 hours  sodium chloride 0.9%. 1000 milliLiter(s) IV Continuous <Continuous>       REVIEW OF SYSTEMS:  CONSTITUTIONAL:  No Fever or chills  HEENT:  No sore throat or runny nose.  CARDIOVASCULAR:  No chest pain or SOB.  RESPIRATORY:  No cough, shortness of breath  GASTROINTESTINAL:  No nausea, vomiting or diarrhea.  GENITOURINARY:  No dysuria, frequency or urgency  NEUROLOGIC:  No headache, no dizziness  PSYCHIATRIC:  No disorder of thought or mood.       Physical Exam:  ICU Vital Signs Last 24 Hrs  T(C): 36.8 (27 Jan 2023 20:24), Max: 36.8 (27 Jan 2023 20:24)  T(F): 98.2 (27 Jan 2023 20:24), Max: 98.2 (27 Jan 2023 20:24)  HR: 80 (27 Jan 2023 20:24) (77 - 94)  BP: 117/73 (27 Jan 2023 20:24) (108/67 - 118/67)  BP(mean): --  ABP: --  ABP(mean): --  RR: 18 (27 Jan 2023 20:24) (18 - 18)  SpO2: 95% (27 Jan 2023 20:24) (94% - 95%)    O2 Parameters below as of 27 Jan 2023 20:24  Patient On (Oxygen Delivery Method): room air    GEN: NAD  HEENT: normocephalic and atraumatic.    NECK: Supple.   LUNGS: Normal respiratory effort  HEART: Regular rate and rhythm  ABDOMEN: Soft, nontender, and nondistended.    EXTREMITIES: No leg edema.   NEUROLOGIC: grossly intact.  PSYCHIATRIC: Appropriate affect .    Labs:  01-26    140  |  108  |  16  ----------------------------<  95  3.8   |  28  |  0.78    Ca    8.9      26 Jan 2023 06:48                            15.3   5.18  )-----------( 273      ( 26 Jan 2023 06:48 )             46.5             RECENT CULTURES:  01-26 @ 18:53 .Tissue Other, bone right foot distal phalynx     No growth    No polymorphonuclear cells seen per low power field  No organisms seen per oil power field      01-24 @ 18:40 .Blood Blood     No growth to date.        01-24 @ 18:35 .Blood Blood     No growth to date.        01-24 @ 14:05 .Blood Blood-Peripheral     No growth to date.        01-24 @ 13:55 .Blood Blood-Peripheral     No growth to date.              All imaging and data are reviewed.     Assessment and Plan:       Kadie Ospina MD  Division of infectious Diseases  Cell [phone] between 8am and 6pm  After 6pm and over the weekends please call ID service line at [phone].   
Patient is a 17y old  Male who presents with a chief complaint of Osteomyelitis     (29 Jan 2023 11:51)      INTERVAL /OVERNIGHT EVENTS: path pending    MEDICATIONS  (STANDING):  lactobacillus acidophilus 1 Tablet(s) Oral daily  multivitamin 1 Tablet(s) Oral daily  piperacillin/tazobactam IVPB.. 3.375 Gram(s) IV Intermittent every 8 hours  sodium chloride 0.9%. 1000 milliLiter(s) (75 mL/Hr) IV Continuous <Continuous>    MEDICATIONS  (PRN):  acetaminophen     Tablet .. 650 milliGRAM(s) Oral every 6 hours PRN Temp greater or equal to 38C (100.4F)      Allergies    eggs (Anaphylaxis)  No Known Drug Allergies  peanuts (Anaphylaxis)    Intolerances        REVIEW OF SYSTEMS:  CONSTITUTIONAL: No fever, weight loss, or fatigue  EYES: No eye pain, visual disturbances, or discharge  ENMT:  No difficulty hearing, tinnitus, vertigo; No sinus or throat pain  NECK: No pain or stiffness  RESPIRATORY: No cough, wheezing, chills or hemoptysis; No shortness of breath  CARDIOVASCULAR: No chest pain, palpitations, dizziness, or leg swelling  GASTROINTESTINAL: No abdominal or epigastric pain. No nausea, vomiting, or hematemesis; No diarrhea or constipation. No melena or hematochezia.  GENITOURINARY: No dysuria, frequency, hematuria, or incontinence  NEUROLOGICAL: No headaches, memory loss, loss of strength, numbness, or tremors  SKIN: No itching, burning, rashes, or lesions   LYMPH NODES: No enlarged glands  ENDOCRINE: No heat or cold intolerance; No hair loss; No polydipsia or polyuria  MUSCULOSKELETAL: No joint pain or swelling; No muscle, back, or extremity pain  PSYCHIATRIC: No depression, anxiety, mood swings, or difficulty sleeping  HEME/LYMPH: No easy bruising, or bleeding gums  ALLERGY AND IMMUNOLOGIC: No hives or eczema    Vital Signs Last 24 Hrs  T(C): 36.3 (29 Jan 2023 13:54), Max: 36.9 (28 Jan 2023 20:22)  T(F): 97.4 (29 Jan 2023 13:54), Max: 98.5 (28 Jan 2023 20:22)  HR: 82 (29 Jan 2023 13:54) (70 - 95)  BP: 113/78 (29 Jan 2023 13:54) (96/63 - 116/79)  BP(mean): --  RR: 17 (29 Jan 2023 13:54) (17 - 17)  SpO2: 98% (29 Jan 2023 13:54) (97% - 98%)    Parameters below as of 29 Jan 2023 13:54  Patient On (Oxygen Delivery Method): room air        PHYSICAL EXAM:  GENERAL: NAD, well-groomed, well-developed  HEAD:  Atraumatic, Normocephalic  EYES: EOMI, PERRLA, conjunctiva and sclera clear  ENMT: No tonsillar erythema, exudates, or enlargement; Moist mucous membranes, Good dentition, No lesions  NECK: Supple, No JVD, Normal thyroid  NERVOUS SYSTEM:  Alert & Oriented X3, Good concentration; Motor Strength 5/5 B/L upper and lower extremities; DTRs 2+ intact and symmetric  CHEST/LUNG: Clear to auscultation bilaterally; No rales, rhonchi, wheezing, or rubs  HEART: Regular rate and rhythm; No murmurs, rubs, or gallops  ABDOMEN: Soft, Nontender, Nondistended; Bowel sounds present  EXTREMITIES:  2+ Peripheral Pulses, No clubbing, cyanosis, or edema  LYMPH: No lymphadenopathy noted  SKIN: No rashes or lesions    LABS:              CAPILLARY BLOOD GLUCOSE          RADIOLOGY & ADDITIONAL TESTS:    Notes Reviewed:  [x ] YES  [ ] NO    Care Discussed with Consultants/Other Providers [x ] YES  [ ] NO
Patient is a 17y old  Male who presents with a chief complaint of toe OM (28 Jan 2023 13:04)      INTERVAL /OVERNIGHT EVENTS: path and micro pending    MEDICATIONS  (STANDING):  lactobacillus acidophilus 1 Tablet(s) Oral daily  multivitamin 1 Tablet(s) Oral daily  piperacillin/tazobactam IVPB.. 3.375 Gram(s) IV Intermittent every 8 hours  sodium chloride 0.9%. 1000 milliLiter(s) (75 mL/Hr) IV Continuous <Continuous>    MEDICATIONS  (PRN):  acetaminophen     Tablet .. 650 milliGRAM(s) Oral every 6 hours PRN Temp greater or equal to 38C (100.4F)      Allergies    eggs (Anaphylaxis)  No Known Drug Allergies  peanuts (Anaphylaxis)    Intolerances        REVIEW OF SYSTEMS:  CONSTITUTIONAL: No fever, weight loss, or fatigue  EYES: No eye pain, visual disturbances, or discharge  ENMT:  No difficulty hearing, tinnitus, vertigo; No sinus or throat pain  NECK: No pain or stiffness  RESPIRATORY: No cough, wheezing, chills or hemoptysis; No shortness of breath  CARDIOVASCULAR: No chest pain, palpitations, dizziness, or leg swelling  GASTROINTESTINAL: No abdominal or epigastric pain. No nausea, vomiting, or hematemesis; No diarrhea or constipation. No melena or hematochezia.  GENITOURINARY: No dysuria, frequency, hematuria, or incontinence  NEUROLOGICAL: No headaches, memory loss, loss of strength, numbness, or tremors  SKIN: No itching, burning, rashes, or lesions   LYMPH NODES: No enlarged glands  ENDOCRINE: No heat or cold intolerance; No hair loss; No polydipsia or polyuria  MUSCULOSKELETAL: No joint pain or swelling; No muscle, back, or extremity pain  PSYCHIATRIC: No depression, anxiety, mood swings, or difficulty sleeping  HEME/LYMPH: No easy bruising, or bleeding gums  ALLERGY AND IMMUNOLOGIC: No hives or eczema    Vital Signs Last 24 Hrs  T(C): 36.4 (28 Jan 2023 12:12), Max: 36.8 (27 Jan 2023 20:24)  T(F): 97.5 (28 Jan 2023 12:12), Max: 98.2 (27 Jan 2023 20:24)  HR: 85 (28 Jan 2023 12:12) (72 - 85)  BP: 98/67 (28 Jan 2023 12:12) (98/67 - 117/73)  BP(mean): --  RR: 17 (28 Jan 2023 12:12) (17 - 18)  SpO2: 97% (28 Jan 2023 12:12) (95% - 97%)    Parameters below as of 28 Jan 2023 12:12  Patient On (Oxygen Delivery Method): room air        PHYSICAL EXAM:  GENERAL: NAD, well-groomed, well-developed  HEAD:  Atraumatic, Normocephalic  EYES: EOMI, PERRLA, conjunctiva and sclera clear  ENMT: No tonsillar erythema, exudates, or enlargement; Moist mucous membranes, Good dentition, No lesions  NECK: Supple, No JVD, Normal thyroid  NERVOUS SYSTEM:  Alert & Oriented X3, Good concentration; Motor Strength 5/5 B/L upper and lower extremities; DTRs 2+ intact and symmetric  CHEST/LUNG: Clear to auscultation bilaterally; No rales, rhonchi, wheezing, or rubs  HEART: Regular rate and rhythm; No murmurs, rubs, or gallops  ABDOMEN: Soft, Nontender, Nondistended; Bowel sounds present  EXTREMITIES:  2+ Peripheral Pulses, No clubbing, cyanosis, or edema  LYMPH: No lymphadenopathy noted  SKIN: No rashes or lesions    LABS:              CAPILLARY BLOOD GLUCOSE          RADIOLOGY & ADDITIONAL TESTS:    Notes Reviewed:  [x ] YES  [ ] NO    Care Discussed with Consultants/Other Providers [x ] YES  [ ] NO
[facility]  INFECTIOUS DISEASES - [address], [place] [address]  Tel: [phone]     Fax: [phone]  =======================================================    YARITZA MERRILL [id number]    Follow up: No fevers. Denies any pain or new complaints.    Allergies:  eggs (Anaphylaxis)  No Known Drug Allergies  peanuts (Anaphylaxis)      Antibiotics:  acetaminophen     Tablet .. 650 milliGRAM(s) Oral every 6 hours PRN  ceFAZolin   IVPB      ceFAZolin   IVPB 2000 milliGRAM(s) IV Intermittent every 8 hours  lactobacillus acidophilus 1 Tablet(s) Oral daily  multivitamin 1 Tablet(s) Oral daily       REVIEW OF SYSTEMS:  CONSTITUTIONAL:  No Fever or chills  HEENT:  No sore throat or runny nose.  CARDIOVASCULAR:  No chest pain or SOB.  RESPIRATORY:  No cough, shortness of breath  GASTROINTESTINAL:  No nausea, vomiting or diarrhea.  GENITOURINARY:  No dysuria, frequency or urgency  NEUROLOGIC:  No headache, no dizziness  PSYCHIATRIC:  No disorder of thought or mood.     Physical Exam:  ICU Vital Signs Last 24 Hrs  T(C): 36.7 (30 Jan 2023 12:08), Max: 36.8 (29 Jan 2023 20:14)  T(F): 98 (30 Jan 2023 12:08), Max: 98.3 (29 Jan 2023 20:14)  HR: 87 (30 Jan 2023 12:08) (79 - 87)  BP: 104/67 (30 Jan 2023 12:08) (104/67 - 110/73)  BP(mean): --  ABP: --  ABP(mean): --  RR: 20 (30 Jan 2023 12:08) (18 - 20)  SpO2: 96% (30 Jan 2023 12:08) (96% - 98%)    O2 Parameters below as of 30 Jan 2023 05:13  Patient On (Oxygen Delivery Method): room air      GEN: NAD  HEENT: normocephalic and atraumatic.    NECK: Supple.   LUNGS: Normal respiratory effort  HEART: Regular rate and rhythm  ABDOMEN: Soft, nontender, and nondistended.    EXTREMITIES: No leg edema.   NEUROLOGIC: grossly intact.  PSYCHIATRIC: Appropriate affect .      Labs:          RECENT CULTURES:  01-26 @ 18:53 .Tissue Other, bone right foot distal phalynx Staphylococcus lugdunensis    No growth    No polymorphonuclear cells seen per low power field  No organisms seen per oil power field      01-24 @ 18:40 .Blood Blood     No Growth Final        01-24 @ 18:35 .Blood Blood     No Growth Final        01-24 @ 14:05 .Blood Blood-Peripheral     No Growth Final        01-24 @ 13:55 .Blood Blood-Peripheral     No Growth Final              All imaging and data are reviewed.     Assessment and Plan:       Kadie Ospina MD  Division of infectious Diseases  Cell [phone] between 8am and 6pm  After 6pm and over the weekends please call ID service line at [phone].   
Patient is a 17y old  Male who presents with a chief complaint of toe OM (26 Jan 2023 15:09)      INTERVAL /OVERNIGHT EVENTS: denies pain    MEDICATIONS  (STANDING):  lactobacillus acidophilus 1 Tablet(s) Oral daily  multivitamin 1 Tablet(s) Oral daily  piperacillin/tazobactam IVPB.. 3.375 Gram(s) IV Intermittent every 8 hours  sodium chloride 0.9%. 1000 milliLiter(s) (75 mL/Hr) IV Continuous <Continuous>    MEDICATIONS  (PRN):  acetaminophen     Tablet .. 650 milliGRAM(s) Oral every 6 hours PRN Temp greater or equal to 38C (100.4F)      Allergies    eggs (Anaphylaxis)  No Known Drug Allergies  peanuts (Anaphylaxis)    Intolerances        REVIEW OF SYSTEMS:  CONSTITUTIONAL: No fever, weight loss, or fatigue  EYES: No eye pain, visual disturbances, or discharge  ENMT:  No difficulty hearing, tinnitus, vertigo; No sinus or throat pain  NECK: No pain or stiffness  RESPIRATORY: No cough, wheezing, chills or hemoptysis; No shortness of breath  CARDIOVASCULAR: No chest pain, palpitations, dizziness, or leg swelling  GASTROINTESTINAL: No abdominal or epigastric pain. No nausea, vomiting, or hematemesis; No diarrhea or constipation. No melena or hematochezia.  GENITOURINARY: No dysuria, frequency, hematuria, or incontinence  NEUROLOGICAL: No headaches, memory loss, loss of strength, numbness, or tremors  SKIN: No itching, burning, rashes, or lesions   LYMPH NODES: No enlarged glands  ENDOCRINE: No heat or cold intolerance; No hair loss; No polydipsia or polyuria  MUSCULOSKELETAL: No joint pain or swelling; No muscle, back, or extremity pain  PSYCHIATRIC: No depression, anxiety, mood swings, or difficulty sleeping  HEME/LYMPH: No easy bruising, or bleeding gums  ALLERGY AND IMMUNOLOGIC: No hives or eczema    Vital Signs Last 24 Hrs  T(C): 36.5 (27 Jan 2023 05:06), Max: 36.9 (26 Jan 2023 15:00)  T(F): 97.7 (27 Jan 2023 05:06), Max: 98.4 (26 Jan 2023 15:00)  HR: 77 (27 Jan 2023 05:06) (71 - 94)  BP: 108/67 (27 Jan 2023 05:06) (102/54 - 125/75)  BP(mean): --  RR: 18 (27 Jan 2023 05:06) (16 - 24)  SpO2: 95% (27 Jan 2023 05:06) (94% - 100%)    Parameters below as of 27 Jan 2023 05:06  Patient On (Oxygen Delivery Method): room air        PHYSICAL EXAM:  GENERAL: NAD, well-groomed, well-developed  HEAD:  Atraumatic, Normocephalic  EYES: EOMI, PERRLA, conjunctiva and sclera clear  ENMT: No tonsillar erythema, exudates, or enlargement; Moist mucous membranes, Good dentition, No lesions  NECK: Supple, No JVD, Normal thyroid  NERVOUS SYSTEM:  Alert & Oriented X3, Good concentration; Motor Strength 5/5 B/L upper and lower extremities; DTRs 2+ intact and symmetric  CHEST/LUNG: Clear to auscultation bilaterally; No rales, rhonchi, wheezing, or rubs  HEART: Regular rate and rhythm; No murmurs, rubs, or gallops  ABDOMEN: Soft, Nontender, Nondistended; Bowel sounds present  EXTREMITIES:  2+ Peripheral Pulses, No clubbing, cyanosis, or edema  LYMPH: No lymphadenopathy noted  SKIN: right big toe discolred    LABS:      Ca    8.9        26 Jan 2023 06:48          CAPILLARY BLOOD GLUCOSE          RADIOLOGY & ADDITIONAL TESTS:    Notes Reviewed:  [x ] YES  [ ] NO    Care Discussed with Consultants/Other Providers [x ] YES  [ ] NO
Patient is a 17y old  Male who presents with a chief complaint of toe OM (25 Jan 2023 23:07)      INTERVAL /OVERNIGHT EVENTS: denies pain    MEDICATIONS  (STANDING):  lactobacillus acidophilus 1 Tablet(s) Oral daily  multivitamin 1 Tablet(s) Oral daily  piperacillin/tazobactam IVPB.. 3.375 Gram(s) IV Intermittent every 8 hours    MEDICATIONS  (PRN):  acetaminophen     Tablet .. 650 milliGRAM(s) Oral every 6 hours PRN Temp greater or equal to 38C (100.4F)      Allergies    eggs (Anaphylaxis)  No Known Drug Allergies  peanuts (Anaphylaxis)    Intolerances        REVIEW OF SYSTEMS:  CONSTITUTIONAL: No fever, weight loss, or fatigue  EYES: No eye pain, visual disturbances, or discharge  ENMT:  No difficulty hearing, tinnitus, vertigo; No sinus or throat pain  NECK: No pain or stiffness  RESPIRATORY: No cough, wheezing, chills or hemoptysis; No shortness of breath  CARDIOVASCULAR: No chest pain, palpitations, dizziness, or leg swelling  GASTROINTESTINAL: No abdominal or epigastric pain. No nausea, vomiting, or hematemesis; No diarrhea or constipation. No melena or hematochezia.  GENITOURINARY: No dysuria, frequency, hematuria, or incontinence  NEUROLOGICAL: No headaches, memory loss, loss of strength, numbness, or tremors  SKIN: No itching, burning, rashes, or lesions   LYMPH NODES: No enlarged glands  ENDOCRINE: No heat or cold intolerance; No hair loss; No polydipsia or polyuria  MUSCULOSKELETAL: No joint pain or swelling; No muscle, back, or extremity pain  PSYCHIATRIC: No depression, anxiety, mood swings, or difficulty sleeping  HEME/LYMPH: No easy bruising, or bleeding gums  ALLERGY AND IMMUNOLOGIC: No hives or eczema    Vital Signs Last 24 Hrs  T(C): 36.3 (26 Jan 2023 05:18), Max: 37 (25 Jan 2023 20:58)  T(F): 97.4 (26 Jan 2023 05:18), Max: 98.6 (25 Jan 2023 20:58)  HR: 78 (26 Jan 2023 05:18) (78 - 94)  BP: 152/74 (26 Jan 2023 05:18) (122/75 - 152/74)  BP(mean): --  RR: 18 (26 Jan 2023 05:18) (17 - 18)  SpO2: 97% (26 Jan 2023 05:18) (94% - 97%)    Parameters below as of 26 Jan 2023 05:18  Patient On (Oxygen Delivery Method): room air        PHYSICAL EXAM:  GENERAL: NAD, well-groomed, well-developed  HEAD:  Atraumatic, Normocephalic  EYES: EOMI, PERRLA, conjunctiva and sclera clear  ENMT: No tonsillar erythema, exudates, or enlargement; Moist mucous membranes, Good dentition, No lesions  NECK: Supple, No JVD, Normal thyroid  NERVOUS SYSTEM:  Alert & Oriented X3, Good concentration; Motor Strength 5/5 B/L upper and lower extremities; DTRs 2+ intact and symmetric  CHEST/LUNG: Clear to auscultation bilaterally; No rales, rhonchi, wheezing, or rubs  HEART: Regular rate and rhythm; No murmurs, rubs, or gallops  ABDOMEN: Soft, Nontender, Nondistended; Bowel sounds present  EXTREMITIES:  2+ Peripheral Pulses, No clubbing, cyanosis, or edema  LYMPH: No lymphadenopathy noted  SKIN: right big toe ulcerated    LABS:                        15.3   5.18  )-----------( 273      ( 26 Jan 2023 06:48 )             46.5     26 Jan 2023 06:48    140    |  108    |  16     ----------------------------<  95     3.8     |  28     |  0.78     Ca    8.9        26 Jan 2023 06:48      PT/INR - ( 24 Jan 2023 14:10 )   PT: 12.2 sec;   INR: 1.04 ratio         PTT - ( 24 Jan 2023 14:10 )  PTT:30.9 sec    CAPILLARY BLOOD GLUCOSE          RADIOLOGY & ADDITIONAL TESTS:    Notes Reviewed:  [x ] YES  [ ] NO    Care Discussed with Consultants/Other Providers [ x] YES  [ ] NO
Patient is a 17y old  Male who presents with a chief complaint of toe OM (24 Jan 2023 19:14)  feels well, without complaints  no distress, mom at bedside      INTERVAL HPI/OVERNIGHT EVENTS:  T(C): 36.4 (01-25-23 @ 12:16), Max: 36.9 (01-24-23 @ 21:23)  HR: 83 (01-25-23 @ 12:16) (83 - 102)  BP: 106/71 (01-25-23 @ 12:16) (106/71 - 122/76)  RR: 16 (01-25-23 @ 12:16) (16 - 18)  SpO2: 96% (01-25-23 @ 12:16) (96% - 99%)  Wt(kg): --  I&O's Summary      LABS:                        15.6   6.41  )-----------( 318      ( 24 Jan 2023 14:10 )             47.4     01-25    139  |  107  |  12  ----------------------------<  97  3.4<L>   |  29  |  0.64    Ca    8.5      25 Jan 2023 04:56    TPro  6.4  /  Alb  2.9<L>  /  TBili  0.5  /  DBili  x   /  AST  13<L>  /  ALT  20  /  AlkPhos  122<H>  01-25    PT/INR - ( 24 Jan 2023 14:10 )   PT: 12.2 sec;   INR: 1.04 ratio         PTT - ( 24 Jan 2023 14:10 )  PTT:30.9 sec    CAPILLARY BLOOD GLUCOSE                MEDICATIONS  (STANDING):  lactobacillus acidophilus 1 Tablet(s) Oral daily  multivitamin 1 Tablet(s) Oral daily  piperacillin/tazobactam IVPB.. 3.375 Gram(s) IV Intermittent every 8 hours  potassium chloride    Tablet ER 20 milliEquivalent(s) Oral every 2 hours    MEDICATIONS  (PRN):  acetaminophen     Tablet .. 650 milliGRAM(s) Oral every 6 hours PRN Temp greater or equal to 38C (100.4F)      REVIEW OF SYSTEMS:  CONSTITUTIONAL: No fever, weight loss, or fatigue  EYES: No eye pain, visual disturbances, or discharge  ENMT:  No difficulty hearing, tinnitus, vertigo; No sinus or throat pain  NECK: No pain or stiffness  RESPIRATORY: No cough, wheezing, chills or hemoptysis; No shortness of breath  CARDIOVASCULAR: No chest pain, palpitations, dizziness, or leg swelling  GASTROINTESTINAL: No abdominal or epigastric pain. No nausea, vomiting, or hematemesis; No diarrhea or constipation. No melena or hematochezia.  GENITOURINARY: No dysuria, frequency, hematuria, or incontinence  NEUROLOGICAL: No headaches, memory loss, loss of strength, numbness, or tremors  SKIN: No itching, burning, rashes, or lesions   LYMPH NODES: No enlarged glands  ENDOCRINE: No heat or cold intolerance; No hair loss  MUSCULOSKELETAL: No joint pain or swelling; No muscle, back, or extremity pain  PSYCHIATRIC: No depression, anxiety, mood swings, or difficulty sleeping  HEME/LYMPH: No easy bruising, or bleeding gums  ALLERY AND IMMUNOLOGIC: No hives or eczema    RADIOLOGY & ADDITIONAL TESTS:    Imaging Personally Reviewed:  [ x] YES  [ ] NO    Consultant(s) Notes Reviewed:  [x ] YES  [ ] NO    PHYSICAL EXAM:  GENERAL: NAD, well-groomed, well-developed  HEAD:  Atraumatic, Normocephalic  EYES: EOMI, PERRLA, conjunctiva and sclera clear  ENMT: No tonsillar erythema, exudates, or enlargement; Moist mucous membranes, Good dentition, No lesions  NECK: Supple, No JVD, Normal thyroid  NERVOUS SYSTEM:  Alert & Oriented X3, Good concentration; Motor Strength 5/5 B/L upper and lower extremities; DTRs 2+ intact and symmetric  CHEST/LUNG: Clear to percussion bilaterally; No rales, rhonchi, wheezing, or rubs  HEART: Regular rate and rhythm; No murmurs, rubs, or gallops  ABDOMEN: Soft, Nontender, Nondistended; Bowel sounds present  EXTREMITIES:  2+ Peripheral Pulses, No clubbing, cyanosis, or edema, r 1st toe dressing cdi  LYMPH: No lymphadenopathy noted  SKIN: No rashes or lesions    Care Discussed with Consultants/Other Providers [x ] YES  [ ] NO    advance care planning and advance directives discussed, including but not limited to long term care planning, and all forms reviewed [x]YES

## 2023-01-30 NOTE — PROGRESS NOTE ADULT - PROVIDER SPECIALTY LIST ADULT
Family Medicine
Infectious Disease
Podiatry
Family Medicine
Podiatry
Podiatry
Hospitalist
Family Medicine
Family Medicine

## 2023-01-30 NOTE — DISCHARGE NOTE PROVIDER - CARE PROVIDER_API CALL
Candie Saldana)  Internal Medicine  [address] [place] [address]  Phone: [phone]  Fax: [phone]  Follow Up Time:     Kadie Ospina)  Infectious Disease; Internal Medicine  [address] [place] [address]  Phone: [phone]  Fax: [phone]  Follow Up Time:     Jennifer Irwin (ANIA)  [place] [address]  Phone: [phone]  Fax: [phone]  Follow Up Time:

## 2023-01-30 NOTE — DISCHARGE NOTE PROVIDER - PROVIDER TOKENS
PROVIDER:[TOKEN:[[id number]]],PROVIDER:[TOKEN:[[id number]]],PROVIDER:[TOKEN:[[id number]]]

## 2023-01-30 NOTE — CASE MANAGEMENT PROGRESS NOTE - NSCMPROGRESSNOTE_GEN_ALL_CORE
Discussed paltient with Dr. Ospina (ID)patients bx resulted and ID changed antbx to Ancef 2gm Q8hrs which will be for 6 weeks at home. [facility]. Pt will receive first 2 doses of ancef in hospital today at 12noon and 8pm. Patient will then go home tonight and Shu WEST will be in home between 7:30a-8a for first dose in home. [facility] JENNA spoke with pt's father over phone to confirm arrangements. . BENJAMIN called and spoke with pt's father Klever @[phone].Patient and his parents verbalize understanding and agree with DC plan. Confirmed with Lion at [facility] for SOC 8am tomorrow. CM will remain available.

## 2023-01-30 NOTE — DISCHARGE NOTE NURSING/CASE MANAGEMENT/SOCIAL WORK - NSDCPEFALRISK_GEN_ALL_CORE
For information on Fall & Injury Prevention, visit: [email or web address] OR  [email or web address] OR  https://www.cdc.gov/steadi/patient.html

## 2023-01-30 NOTE — DISCHARGE NOTE NURSING/CASE MANAGEMENT/SOCIAL WORK - PATIENT PORTAL LINK FT
You can access the FollowMyHealth Patient Portal offered by [facility] by registering at the following website: [email or web address]. By joining [employer]’s FollowMyHealth portal, you will also be able to view your health information using other applications (apps) compatible with our system. decreased ability to use legs for bridging/pushing/decreased ability to use arms for pushing/pulling

## 2023-02-01 ENCOUNTER — NON-APPOINTMENT (OUTPATIENT)
Age: 18
End: 2023-02-01

## 2023-02-02 ENCOUNTER — APPOINTMENT (OUTPATIENT)
Dept: WOUND CARE | Facility: HOSPITAL | Age: 18
End: 2023-02-02
Payer: MEDICAID

## 2023-02-02 ENCOUNTER — OUTPATIENT (OUTPATIENT)
Dept: OUTPATIENT SERVICES | Facility: HOSPITAL | Age: 18
LOS: 1 days | Discharge: HOME | End: 2023-02-02
Payer: MEDICAID

## 2023-02-02 VITALS
HEIGHT: 68 IN | WEIGHT: 120 LBS | HEART RATE: 100 BPM | DIASTOLIC BLOOD PRESSURE: 77 MMHG | RESPIRATION RATE: 18 BRPM | TEMPERATURE: 98.1 F | SYSTOLIC BLOOD PRESSURE: 113 MMHG | OXYGEN SATURATION: 99 % | BODY MASS INDEX: 18.19 KG/M2

## 2023-02-02 DIAGNOSIS — K59.09 OTHER CONSTIPATION: ICD-10-CM

## 2023-02-02 DIAGNOSIS — Z78.9 OTHER SPECIFIED HEALTH STATUS: ICD-10-CM

## 2023-02-02 DIAGNOSIS — Z87.39 PERSONAL HISTORY OF OTHER DISEASES OF THE MUSCULOSKELETAL SYSTEM AND CONNECTIVE TISSUE: ICD-10-CM

## 2023-02-02 DIAGNOSIS — S91.309A UNSPECIFIED OPEN WOUND, UNSPECIFIED FOOT, INITIAL ENCOUNTER: ICD-10-CM

## 2023-02-02 DIAGNOSIS — Z83.49 FAMILY HISTORY OF OTHER ENDOCRINE, NUTRITIONAL AND METABOLIC DISEASES: ICD-10-CM

## 2023-02-02 DIAGNOSIS — L97.514 NON-PRESSURE CHRONIC ULCER OF OTHER PART OF RIGHT FOOT WITH NECROSIS OF BONE: ICD-10-CM

## 2023-02-02 DIAGNOSIS — Z79.899 OTHER LONG TERM (CURRENT) DRUG THERAPY: ICD-10-CM

## 2023-02-02 DIAGNOSIS — Z98.890 OTHER SPECIFIED POSTPROCEDURAL STATES: ICD-10-CM

## 2023-02-02 DIAGNOSIS — M86.671 OTHER CHRONIC OSTEOMYELITIS, RIGHT ANKLE AND FOOT: ICD-10-CM

## 2023-02-02 PROCEDURE — G0463: CPT

## 2023-02-02 PROCEDURE — 99213 OFFICE O/P EST LOW 20 MIN: CPT

## 2023-02-02 RX ORDER — BACILLUS COAGULANS/INULIN 1B-250 MG
CAPSULE ORAL
Refills: 0 | Status: ACTIVE | COMMUNITY

## 2023-02-02 RX ORDER — METRONIDAZOLE 500 MG/1
500 TABLET ORAL 3 TIMES DAILY
Refills: 0 | Status: ACTIVE | COMMUNITY

## 2023-02-02 RX ORDER — CEFAZOLIN SODIUM 1 G
1 INTRAVENOUS SOLUTION, PIGGYBACK (EA) INTRAVENOUS
Refills: 0 | Status: ACTIVE | COMMUNITY

## 2023-02-10 ENCOUNTER — OUTPATIENT (OUTPATIENT)
Dept: OUTPATIENT SERVICES | Facility: HOSPITAL | Age: 18
LOS: 1 days | Discharge: HOME | End: 2023-02-10
Payer: MEDICAID

## 2023-02-10 ENCOUNTER — TRANSCRIPTION ENCOUNTER (OUTPATIENT)
Age: 18
End: 2023-02-10

## 2023-02-10 ENCOUNTER — APPOINTMENT (OUTPATIENT)
Dept: WOUND CARE | Facility: HOSPITAL | Age: 18
End: 2023-02-10
Payer: MEDICAID

## 2023-02-10 VITALS
TEMPERATURE: 98 F | HEIGHT: 68 IN | BODY MASS INDEX: 18.19 KG/M2 | OXYGEN SATURATION: 98 % | RESPIRATION RATE: 20 BRPM | HEART RATE: 92 BPM | SYSTOLIC BLOOD PRESSURE: 114 MMHG | WEIGHT: 120 LBS | DIASTOLIC BLOOD PRESSURE: 77 MMHG

## 2023-02-10 DIAGNOSIS — S91.309D UNSPECIFIED OPEN WOUND, UNSPECIFIED FOOT, SUBSEQUENT ENCOUNTER: ICD-10-CM

## 2023-02-10 PROCEDURE — G0463: CPT

## 2023-02-10 PROCEDURE — 99213 OFFICE O/P EST LOW 20 MIN: CPT

## 2023-02-11 ENCOUNTER — NON-APPOINTMENT (OUTPATIENT)
Age: 18
End: 2023-02-11

## 2023-02-17 ENCOUNTER — APPOINTMENT (OUTPATIENT)
Dept: WOUND CARE | Facility: HOSPITAL | Age: 18
End: 2023-02-17
Payer: MEDICAID

## 2023-02-17 ENCOUNTER — OUTPATIENT (OUTPATIENT)
Dept: OUTPATIENT SERVICES | Facility: HOSPITAL | Age: 18
LOS: 1 days | Discharge: HOME | End: 2023-02-17
Payer: MEDICAID

## 2023-02-17 VITALS
HEIGHT: 68 IN | SYSTOLIC BLOOD PRESSURE: 120 MMHG | TEMPERATURE: 98 F | HEART RATE: 141 BPM | BODY MASS INDEX: 18.19 KG/M2 | RESPIRATION RATE: 20 BRPM | DIASTOLIC BLOOD PRESSURE: 76 MMHG | OXYGEN SATURATION: 98 % | WEIGHT: 120 LBS

## 2023-02-17 VITALS — HEART RATE: 124 BPM

## 2023-02-17 DIAGNOSIS — S91.309D UNSPECIFIED OPEN WOUND, UNSPECIFIED FOOT, SUBSEQUENT ENCOUNTER: ICD-10-CM

## 2023-02-17 DIAGNOSIS — M86.9 OSTEOMYELITIS, UNSPECIFIED: ICD-10-CM

## 2023-02-17 PROCEDURE — G0463: CPT

## 2023-02-17 PROCEDURE — 99213 OFFICE O/P EST LOW 20 MIN: CPT

## 2023-02-21 DIAGNOSIS — Z83.49 FAMILY HISTORY OF OTHER ENDOCRINE, NUTRITIONAL AND METABOLIC DISEASES: ICD-10-CM

## 2023-02-21 DIAGNOSIS — K59.09 OTHER CONSTIPATION: ICD-10-CM

## 2023-02-21 DIAGNOSIS — L97.514 NON-PRESSURE CHRONIC ULCER OF OTHER PART OF RIGHT FOOT WITH NECROSIS OF BONE: ICD-10-CM

## 2023-02-21 DIAGNOSIS — Z87.39 PERSONAL HISTORY OF OTHER DISEASES OF THE MUSCULOSKELETAL SYSTEM AND CONNECTIVE TISSUE: ICD-10-CM

## 2023-02-21 DIAGNOSIS — Z98.890 OTHER SPECIFIED POSTPROCEDURAL STATES: ICD-10-CM

## 2023-02-21 DIAGNOSIS — M86.671 OTHER CHRONIC OSTEOMYELITIS, RIGHT ANKLE AND FOOT: ICD-10-CM

## 2023-02-21 DIAGNOSIS — Z79.899 OTHER LONG TERM (CURRENT) DRUG THERAPY: ICD-10-CM

## 2023-02-21 NOTE — PHYSICAL EXAM
[2 x 2] : 2 x 2  [2+] : left 2+ [Ankle Swelling (On Exam)] : not present [Varicose Veins Of Lower Extremities] : not present [] : not present [Alert] : alert [Oriented to Person] : oriented to person [Oriented to Place] : oriented to place [de-identified] : calm [de-identified] : 5/5 muscle strength for all muscles and tendons crossing the foot and ankle joints, ankle joint and STJ ROM intact b/l. No pain with calf compression b/l. [de-identified] : Right plantar hallux wound down to bone (+)OM distal phalanx - removed retention sutures , wound noted with healing progress and granulation  [FreeTextEntry1] : Right Great Toe [de-identified] : Alginate Ag [de-identified] : Mechanically cleansed with sterile gauze and normal saline 0.9%\par Dry Dressing\par  [de-identified] : 3x Weekly [de-identified] : Primary Dressing

## 2023-02-21 NOTE — PLAN
[FreeTextEntry1] : Patient was seen and evaluated. Patient advised to be partial weight bearing to the right heel in surgical shoe and to limit ambulation. C/w PICC line antibiotics per ID. Continue with offloading and local wound care. Discussed with patient and mother that patient is still high risk for amputation of the toe or more proximal amputation, sepsis, loss of limb and loss of life. Spent 30 minutes for patient care and medical decision making.\par

## 2023-02-21 NOTE — ASSESSMENT
[Verbal] : Verbal [Demo] : Demo [Patient] : Patient [Other: ___] : [unfilled] [Good - alert, interested, motivated] : Good - alert, interested, motivated [Verbalizes knowledge/Understanding] : Verbalizes knowledge/understanding [Dressing changes] : dressing changes [Foot Care] : foot care [Skin Care] : skin care [Pressure relief] : pressure relief [Signs and symptoms of infection] : sign and symptoms of infection [How and When to Call] : how and when to call [Off-loading] : off-loading [Patient responsibility to plan of care] : patient responsibility to plan of care [] : Yes [Stable] : stable [Home] : Home [Ambulatory] : Ambulatory [Faxed - Long Term Care/Home Health Agency] : Long Term Care/Home Health Agency: Faxed [FreeTextEntry2] : Alteration in skin integrity- promote optimal skin integrity\par  [FreeTextEntry4] : Dr Irwin/ Photos taken\par IV antibiotics via PICC line in progress- Dr Irwin aware\par F/U to [facility] in 1 week\par  [FreeTextEntry1] : [facility] [place]

## 2023-02-21 NOTE — REVIEW OF SYSTEMS
[Fever] : no fever [Chills] : no chills [Eye Pain] : no eye pain [Red Eyes] : eyes not red [Earache] : no earache [Loss Of Hearing] : no hearing loss [Nosebleeds] : no nosebleeds [Chest Pain] : no chest pain [Shortness Of Breath] : no shortness of breath [Wheezing] : no wheezing [Cough] : no cough [Abdominal Pain] : no abdominal pain [Vomiting] : no vomiting [Diarrhea] : no diarrhea [Skin Wound] : skin wound [Confused] : no confusion [Dizziness] : no dizziness [de-identified] : Right great toe wound with retention sutures

## 2023-02-21 NOTE — HISTORY OF PRESENT ILLNESS
[FreeTextEntry1] : Patient is 17 year old male presenting for follow up s/p right great toe wound debridement and bone biopsy (+) OM on pathology. Patient on PICC line antibiotics and states has been ambulating in surgical shoe.Denies pain to the right foot wound \par

## 2023-02-21 NOTE — PHYSICAL EXAM
[2+] : left 2+ [Ankle Swelling (On Exam)] : not present [Varicose Veins Of Lower Extremities] : not present [] : not present [Alert] : alert [Oriented to Person] : oriented to person [Oriented to Place] : oriented to place [de-identified] : calm [de-identified] : 5/5 muscle strength for all muscles and tendons crossing the foot and ankle joints, ankle joint and STJ ROM intact b/l. No pain with calf compression b/l. [de-identified] : Right plantar hallux wound down to bone (+)OM distal phalanx - retention sutures intact  [FreeTextEntry1] : Right Plantar Hallux- s/p sx 01/26/23- Incision line with sutures in place & 2 sutures in place medial Hallux [de-identified] : Scant Serosanguineous noted on gauze [de-identified] : Intact [de-identified] : Cleansed with Normal saline\par  [de-identified] : Silver Alginate/ Dry Dressing & Kerlix [FreeTextEntry7] : Left Foot Dorsal 2nd Digit- Small open blister [FreeTextEntry8] : 0.5 [FreeTextEntry9] : 0.5 [de-identified] : 0.1 [de-identified] : Intact [de-identified] : Silver Alginate/ Dry Dressing  [de-identified] : Cleansed with Normal saline\par  [de-identified] : CIRCULATION\par Dorsalis Pedis: R palpable  L palpable\par Posterior Tibialis: R palpable L palpable\par Extremity Color: Pigmented\par Extremity Temperature: Warm\par Capillary Refill: < 3 seconds bilaterally\par Vascular studies & Vascular Consult not ordered by Dr Irwin related to palpable pulses\par \par \par  [de-identified] : None [de-identified] : None [de-identified] : None [de-identified] : None [de-identified] : 100% [de-identified] : No

## 2023-02-21 NOTE — ASSESSMENT
[Verbal] : Verbal [Written] : Written [Demo] : Demo [Patient] : Patient [Good - alert, interested, motivated] : Good - alert, interested, motivated [Demonstrates independently] : demonstrates independently [Dressing changes] : dressing changes [Foot Care] : foot care [Skin Care] : skin care [Signs and symptoms of infection] : sign and symptoms of infection [Nutrition] : nutrition [How and When to Call] : how and when to call [Off-loading] : off-loading [Patient responsibility to plan of care] : patient responsibility to plan of care [] : Yes [Stable] : stable [Home] : Home [Ambulatory] : Ambulatory [Not Applicable - Long Term Care/Home Health Agency] : Long Term Care/Home Health Agency: Not Applicable [FreeTextEntry2] : Infection prevention \par Wound care (dressing changes)\par Maintain optimal skin integrity to high pressure areas\par Nutrition and wound healing\par Offloading the stress on skin structures and decreasing potential pathologic biomechanical influences.\par IV abt therapy. [FreeTextEntry4] : Pt tolerating IV abt therapy well.\par Submitted ID Consult. Pt and Pt's mother aware to make next appt. before 3 PM\par F/u in 1 week

## 2023-02-21 NOTE — HISTORY OF PRESENT ILLNESS
[FreeTextEntry1] : Patient is 17 year old male presenting for follow up s/p right great toe wound debridement and bone biopsy (+) OM on pathology. Patient on PICC line antibiotics and states has been ambulating in surgical shoe.Denies pain to the right foot wound

## 2023-02-21 NOTE — HISTORY OF PRESENT ILLNESS
Constipation, unspecified constipation type Constipation, unspecified constipation type [FreeTextEntry1] : The wound is located on Right Great Toe- Pt's Mother states wound developed several months ago on bottom of Toe after stepping on a Leggo- wound would improve & then get worse over past several months- pt went to Urgent Care a few months ago & was instructed to go to Podiatrist- pt went to [facility] (Dr Paulson) several times- wound did not improve - xrays performed & then pt underwent MRI (+OM)- pt was hospitalized [facility] 01/24/23-01/30/23 for OM & underwent Foot surgery on 01/26/23 & was instructed to schedule appointment to [facility]\par  Constipation, unspecified constipation type

## 2023-02-21 NOTE — ASSESSMENT
[Verbal] : Verbal [Demo] : Demo [Patient] : Patient [Other: ___] : [unfilled] [Good - alert, interested, motivated] : Good - alert, interested, motivated [Verbalizes knowledge/Understanding] : Verbalizes knowledge/understanding [Dressing changes] : dressing changes [Foot Care] : foot care [Skin Care] : skin care [Pressure relief] : pressure relief [Signs and symptoms of infection] : sign and symptoms of infection [How and When to Call] : how and when to call [Off-loading] : off-loading [Patient responsibility to plan of care] : patient responsibility to plan of care [] : Yes [Stable] : stable [Home] : Home [Ambulatory] : Ambulatory [Faxed - Long Term Care/Home Health Agency] : Long Term Care/Home Health Agency: Faxed [FreeTextEntry2] : Alteration in skin integrity- promote optimal skin integrity\par  [FreeTextEntry4] : \par IV antibiotics via PICC line in progress- Dr Irwin aware\par F/U to [facility] in 1 week\par  [FreeTextEntry1] : [facility] [place]

## 2023-02-21 NOTE — REVIEW OF SYSTEMS
[Fever] : no fever [Chills] : no chills [Eye Pain] : no eye pain [Red Eyes] : eyes not red [Earache] : no earache [Loss Of Hearing] : no hearing loss [Nosebleeds] : no nosebleeds [Chest Pain] : no chest pain [Shortness Of Breath] : no shortness of breath [Wheezing] : no wheezing [Cough] : no cough [Abdominal Pain] : no abdominal pain [Vomiting] : no vomiting [Diarrhea] : no diarrhea [Skin Wound] : skin wound [Confused] : no confusion [Dizziness] : no dizziness [de-identified] : Right great toe wound with retention sutures

## 2023-02-21 NOTE — PLAN
[FreeTextEntry1] : Patient was seen and evaluated. Patient advised to be partial weight bearing to the right heel in surgical shoe and to limit ambulation. C/w PICC line antibiotics per ID. Requested ID consult. Continue with offloading and local wound care. Discussed with patient and mother that patient is still high risk for amputation of the toe or more proximal amputation, sepsis, loss of limb and loss of life. Spent 20 minutes for patient care and medical decision making.\par

## 2023-02-21 NOTE — REVIEW OF SYSTEMS
[Fever] : no fever [Chills] : no chills [Eye Pain] : no eye pain [Red Eyes] : eyes not red [Earache] : no earache [Loss Of Hearing] : no hearing loss [Nosebleeds] : no nosebleeds [Chest Pain] : no chest pain [Shortness Of Breath] : no shortness of breath [Wheezing] : no wheezing [Cough] : no cough [Abdominal Pain] : no abdominal pain [Vomiting] : no vomiting [Diarrhea] : no diarrhea [Skin Wound] : skin wound [Confused] : no confusion [Dizziness] : no dizziness [de-identified] : Right great toe wound with retention sutures

## 2023-02-21 NOTE — PHYSICAL EXAM
[2 x 2] : 2 x 2  [2+] : left 2+ [Ankle Swelling (On Exam)] : not present [Varicose Veins Of Lower Extremities] : not present [] : not present [Alert] : alert [Oriented to Person] : oriented to person [Oriented to Place] : oriented to place [de-identified] : calm [de-identified] : 5/5 muscle strength for all muscles and tendons crossing the foot and ankle joints, ankle joint and STJ ROM intact b/l. No pain with calf compression b/l. [de-identified] : Right plantar hallux wound down to bone (+)OM distal phalanx - removed retention sutures , wound noted with healing progress and granulation  [FreeTextEntry1] : Right Plantar Hallux- s/p sx 01/26/23- Incision line with sutures in place & 2 sutures in place medial Hallux [de-identified] : small serosanguineous [de-identified] : Intact and dry [de-identified] : 1- 25% [de-identified] : Silver Alginate/ Dry Dressing & Kerlix [de-identified] : Cleansed with Normal saline\par  [FreeTextEntry7] : Left Foot Dorsal 2nd Digit- Small open blister [FreeTextEntry8] : 0.2 [FreeTextEntry9] : 0.2 [de-identified] : 0.1 [de-identified] : Intact [de-identified] : Silver Alginate/ Dry Dressing  [de-identified] : Mechanically cleansed with 0.9% normal saline and sterile gauze applied.\par  [de-identified] : CIRCULATION\par Dorsalis Pedis: R palpable  L palpable\par Posterior Tibialis: R palpable L palpable\par Extremity Color: Pigmented\par Extremity Temperature: Warm\par Capillary Refill: < 3 seconds bilaterally\par Vascular studies & Vascular Consult not ordered by Dr Irwin related to palpable pulses\par \par \par  [TWNoteComboBox5] : No [TWNoteComboBox6] : Surgical [de-identified] : No [de-identified] : other [de-identified] : None [de-identified] : None [de-identified] : >75% [de-identified] : Yes [de-identified] : None [de-identified] : No [de-identified] : Pressure [de-identified] : No [de-identified] : Normal [de-identified] : None [de-identified] : None [de-identified] : 100% [de-identified] : No

## 2023-02-25 ENCOUNTER — NON-APPOINTMENT (OUTPATIENT)
Age: 18
End: 2023-02-25

## 2023-02-25 LAB
CULTURE RESULTS: SIGNIFICANT CHANGE UP
SPECIMEN SOURCE: SIGNIFICANT CHANGE UP

## 2023-02-26 ENCOUNTER — NON-APPOINTMENT (OUTPATIENT)
Age: 18
End: 2023-02-26

## 2023-02-26 DIAGNOSIS — M86.9 OSTEOMYELITIS, UNSPECIFIED: ICD-10-CM

## 2023-02-27 ENCOUNTER — OUTPATIENT (OUTPATIENT)
Dept: OUTPATIENT SERVICES | Facility: HOSPITAL | Age: 18
LOS: 1 days | Discharge: HOME | End: 2023-02-27
Payer: MEDICAID

## 2023-02-27 ENCOUNTER — APPOINTMENT (OUTPATIENT)
Dept: INFECTIOUS DISEASE | Facility: HOSPITAL | Age: 18
End: 2023-02-27
Payer: MEDICAID

## 2023-02-27 ENCOUNTER — APPOINTMENT (OUTPATIENT)
Dept: WOUND CARE | Facility: HOSPITAL | Age: 18
End: 2023-02-27
Payer: MEDICAID

## 2023-02-27 ENCOUNTER — RESULT REVIEW (OUTPATIENT)
Age: 18
End: 2023-02-27

## 2023-02-27 VITALS
SYSTOLIC BLOOD PRESSURE: 118 MMHG | HEIGHT: 68 IN | TEMPERATURE: 98.1 F | DIASTOLIC BLOOD PRESSURE: 78 MMHG | WEIGHT: 120 LBS | RESPIRATION RATE: 20 BRPM | OXYGEN SATURATION: 98 % | HEART RATE: 107 BPM | BODY MASS INDEX: 18.19 KG/M2

## 2023-02-27 DIAGNOSIS — S91.309D UNSPECIFIED OPEN WOUND, UNSPECIFIED FOOT, SUBSEQUENT ENCOUNTER: ICD-10-CM

## 2023-02-27 DIAGNOSIS — Z98.890 OTHER SPECIFIED POSTPROCEDURAL STATES: ICD-10-CM

## 2023-02-27 DIAGNOSIS — Z83.49 FAMILY HISTORY OF OTHER ENDOCRINE, NUTRITIONAL AND METABOLIC DISEASES: ICD-10-CM

## 2023-02-27 DIAGNOSIS — L97.514 NON-PRESSURE CHRONIC ULCER OF OTHER PART OF RIGHT FOOT WITH NECROSIS OF BONE: ICD-10-CM

## 2023-02-27 DIAGNOSIS — K59.09 OTHER CONSTIPATION: ICD-10-CM

## 2023-02-27 DIAGNOSIS — M86.671 OTHER CHRONIC OSTEOMYELITIS, RIGHT ANKLE AND FOOT: ICD-10-CM

## 2023-02-27 DIAGNOSIS — Z79.899 OTHER LONG TERM (CURRENT) DRUG THERAPY: ICD-10-CM

## 2023-02-27 DIAGNOSIS — L84 CORNS AND CALLOSITIES: ICD-10-CM

## 2023-02-27 DIAGNOSIS — Z87.39 PERSONAL HISTORY OF OTHER DISEASES OF THE MUSCULOSKELETAL SYSTEM AND CONNECTIVE TISSUE: ICD-10-CM

## 2023-02-27 PROCEDURE — G0463: CPT

## 2023-02-27 PROCEDURE — 99213 OFFICE O/P EST LOW 20 MIN: CPT

## 2023-02-28 PROBLEM — M86.9 OSTEOMYELITIS OF GREAT TOE OF RIGHT FOOT: Status: ACTIVE | Noted: 2023-02-28

## 2023-03-01 NOTE — PHYSICAL EXAM
[2+] : left 2+ [Alert] : alert [Oriented to Person] : oriented to person [Oriented to Place] : oriented to place [4 x 4] : 4 x 4  [Ankle Swelling (On Exam)] : not present [Varicose Veins Of Lower Extremities] : not present [] : not present [de-identified] : calm [de-identified] : 5/5 muscle strength for all muscles and tendons crossing the foot and ankle joints, ankle joint and STJ ROM intact b/l. No pain with calf compression b/l. [de-identified] : Right plantar hallux wound down to bone (+)OM distal phalanx - removed  , wound noted with healing progress and granulation  [FreeTextEntry1] : right great toe s/p bone biopsy (1/26/23) [FreeTextEntry2] : 0.8 [FreeTextEntry3] : 0.1 [FreeTextEntry4] : 0.1 [de-identified] : callus- shaved by HERNANDO [de-identified] : silver alginate [de-identified] : Mechanically cleansed with Sterile gauze and 0.9% Normal Saline\par  [TWNoteComboBox4] : None [TWNoteComboBox5] : No [TWNoteComboBox6] : Surgical [de-identified] : No [de-identified] : other [de-identified] : None [de-identified] : None [de-identified] : 100% [de-identified] : No [de-identified] : 3x Weekly [de-identified] : Primary Dressing

## 2023-03-01 NOTE — REVIEW OF SYSTEMS
[Joint Stiffness] : joint stiffness [Skin Wound] : skin wound [Negative] : Endocrine [Fever] : no fever [Chills] : no chills [Eye Pain] : no eye pain [Red Eyes] : eyes not red [Earache] : no earache [Loss Of Hearing] : no hearing loss [Nosebleeds] : no nosebleeds [Chest Pain] : no chest pain [Shortness Of Breath] : no shortness of breath [Wheezing] : no wheezing [Cough] : no cough [Abdominal Pain] : no abdominal pain [Vomiting] : no vomiting [Diarrhea] : no diarrhea [Confused] : no confusion [Dizziness] : no dizziness [Anxiety] : no anxiety [Easy Bleeding] : no tendency for easy bleeding [FreeTextEntry9] : hallux limitus  [de-identified] : Right great toe wound , plantar slight opening ,

## 2023-03-01 NOTE — ASSESSMENT
[Verbal] : Verbal [Demo] : Demo [Patient] : Patient [Family member] : Family member [Good - alert, interested, motivated] : Good - alert, interested, motivated [Demonstrates independently] : demonstrates independently [Dressing changes] : dressing changes [Skin Care] : skin care [Pressure relief] : pressure relief [Signs and symptoms of infection] : sign and symptoms of infection [Nutrition] : nutrition [How and When to Call] : how and when to call [Pain Management] : pain management [Off-loading] : off-loading [Home Health] : home health [Patient responsibility to plan of care] : patient responsibility to plan of care [Stable] : stable [Home] : Home [Ambulatory] : Ambulatory [Not Applicable - Long Term Care/Home Health Agency] : Long Term Care/Home Health Agency: Not Applicable [] : No [FreeTextEntry2] : infection prevention\par offloading/pressure relief\par promote skin integrity\par demonstrates use of both pharmacological and non pharmacological pain management interventions\par IV abx\par ID consult [FreeTextEntry3] : superficial ulcer noted under callus [FreeTextEntry4] : F/U 1 week for assessment\par Xray ordered by [facility], Rx provided to pt/mother. To be done prior to next visit\par ID consult done today w/ Dr. Ospina, pt completed IV abx therapy via PICC line on 3/7/23.. ID consult submitted for 2 weeks, see ID note

## 2023-03-01 NOTE — PLAN
[FreeTextEntry1] : continue wound care and off loading , x ray ordered , PTR 1 week  Spent 20 minutes for patient care and medical decision making.\par

## 2023-03-01 NOTE — HISTORY OF PRESENT ILLNESS
[FreeTextEntry1] : s/p biopsy right hallux + OM , patient on picc line antibiotics , no current sigh of infection , plantar wound slightly open

## 2023-03-03 ENCOUNTER — NON-APPOINTMENT (OUTPATIENT)
Age: 18
End: 2023-03-03

## 2023-03-06 ENCOUNTER — APPOINTMENT (OUTPATIENT)
Dept: WOUND CARE | Facility: HOSPITAL | Age: 18
End: 2023-03-06
Payer: MEDICAID

## 2023-03-06 ENCOUNTER — OUTPATIENT (OUTPATIENT)
Dept: OUTPATIENT SERVICES | Facility: HOSPITAL | Age: 18
LOS: 1 days | End: 2023-03-06
Payer: MEDICAID

## 2023-03-06 ENCOUNTER — OUTPATIENT (OUTPATIENT)
Dept: OUTPATIENT SERVICES | Facility: HOSPITAL | Age: 18
LOS: 1 days | Discharge: HOME | End: 2023-03-06
Payer: MEDICAID

## 2023-03-06 VITALS
TEMPERATURE: 98.2 F | SYSTOLIC BLOOD PRESSURE: 122 MMHG | HEART RATE: 120 BPM | HEIGHT: 68 IN | OXYGEN SATURATION: 98 % | WEIGHT: 120 LBS | DIASTOLIC BLOOD PRESSURE: 68 MMHG | RESPIRATION RATE: 20 BRPM | BODY MASS INDEX: 18.19 KG/M2

## 2023-03-06 DIAGNOSIS — S91.309D UNSPECIFIED OPEN WOUND, UNSPECIFIED FOOT, SUBSEQUENT ENCOUNTER: ICD-10-CM

## 2023-03-06 DIAGNOSIS — M86.671 OTHER CHRONIC OSTEOMYELITIS, RIGHT ANKLE AND FOOT: ICD-10-CM

## 2023-03-06 PROCEDURE — 73630 X-RAY EXAM OF FOOT: CPT

## 2023-03-06 PROCEDURE — G0463: CPT

## 2023-03-06 PROCEDURE — 73630 X-RAY EXAM OF FOOT: CPT | Mod: 26,LT,RT

## 2023-03-06 PROCEDURE — 99213 OFFICE O/P EST LOW 20 MIN: CPT

## 2023-03-07 ENCOUNTER — NON-APPOINTMENT (OUTPATIENT)
Age: 18
End: 2023-03-07

## 2023-03-13 ENCOUNTER — OUTPATIENT (OUTPATIENT)
Dept: OUTPATIENT SERVICES | Facility: HOSPITAL | Age: 18
LOS: 1 days | Discharge: HOME | End: 2023-03-13
Payer: MEDICAID

## 2023-03-13 ENCOUNTER — APPOINTMENT (OUTPATIENT)
Dept: WOUND CARE | Facility: HOSPITAL | Age: 18
End: 2023-03-13
Payer: MEDICAID

## 2023-03-13 VITALS
TEMPERATURE: 98.8 F | BODY MASS INDEX: 18.19 KG/M2 | WEIGHT: 120 LBS | HEART RATE: 110 BPM | SYSTOLIC BLOOD PRESSURE: 114 MMHG | OXYGEN SATURATION: 98 % | RESPIRATION RATE: 18 BRPM | DIASTOLIC BLOOD PRESSURE: 78 MMHG | HEIGHT: 68 IN

## 2023-03-13 DIAGNOSIS — S91.309D UNSPECIFIED OPEN WOUND, UNSPECIFIED FOOT, SUBSEQUENT ENCOUNTER: ICD-10-CM

## 2023-03-13 PROCEDURE — G0463: CPT

## 2023-03-13 PROCEDURE — 99213 OFFICE O/P EST LOW 20 MIN: CPT

## 2023-03-14 DIAGNOSIS — Z83.49 FAMILY HISTORY OF OTHER ENDOCRINE, NUTRITIONAL AND METABOLIC DISEASES: ICD-10-CM

## 2023-03-14 DIAGNOSIS — Z79.899 OTHER LONG TERM (CURRENT) DRUG THERAPY: ICD-10-CM

## 2023-03-14 DIAGNOSIS — L84 CORNS AND CALLOSITIES: ICD-10-CM

## 2023-03-14 DIAGNOSIS — K59.09 OTHER CONSTIPATION: ICD-10-CM

## 2023-03-14 DIAGNOSIS — L97.514 NON-PRESSURE CHRONIC ULCER OF OTHER PART OF RIGHT FOOT WITH NECROSIS OF BONE: ICD-10-CM

## 2023-03-14 DIAGNOSIS — Z98.890 OTHER SPECIFIED POSTPROCEDURAL STATES: ICD-10-CM

## 2023-03-14 DIAGNOSIS — M86.671 OTHER CHRONIC OSTEOMYELITIS, RIGHT ANKLE AND FOOT: ICD-10-CM

## 2023-03-14 DIAGNOSIS — Z87.39 PERSONAL HISTORY OF OTHER DISEASES OF THE MUSCULOSKELETAL SYSTEM AND CONNECTIVE TISSUE: ICD-10-CM

## 2023-03-14 NOTE — ASSESSMENT
[Verbal] : Verbal [Demo] : Demo [Patient] : Patient [Family member] : Family member [Good - alert, interested, motivated] : Good - alert, interested, motivated [Verbalizes knowledge/Understanding] : Verbalizes knowledge/understanding [Dressing changes] : dressing changes [Foot Care] : foot care [Skin Care] : skin care [Pressure relief] : pressure relief [Signs and symptoms of infection] : sign and symptoms of infection [Nutrition] : nutrition [How and When to Call] : how and when to call [Off-loading] : off-loading [Patient responsibility to plan of care] : patient responsibility to plan of care [Stable] : stable [Home] : Home [Ambulatory] : Ambulatory [Not Applicable - Long Term Care/Home Health Agency] : Long Term Care/Home Health Agency: Not Applicable [] : No [FreeTextEntry2] : Infection Prevention\par Maintain skin integrity\par Protect/ Guard wound site\par Off loading/ Low sodium diet and Lower Legs Elevation\par Proper Diet and Nutriton for wound healing\par Use of pharmacological and nonpharmacological techniques for pain prevention\par  [FreeTextEntry4] : F/U IN 1 WEEK\par Pt still on PO ABX

## 2023-03-14 NOTE — PHYSICAL EXAM
[4 x 4] : 4 x 4  [2+] : left 2+ [Ankle Swelling (On Exam)] : not present [Varicose Veins Of Lower Extremities] : not present [] : not present [Alert] : alert [Oriented to Person] : oriented to person [Oriented to Place] : oriented to place [de-identified] : calm [de-identified] : 5/5 muscle strength for all muscles and tendons crossing the foot and ankle joints, ankle joint and STJ ROM intact b/l. No pain with calf compression b/l. [de-identified] : Right plantar hallux wound down to fat, healing well no longer down to bone,  (+)OM distal phalanx - removed retention sutures , wound noted with healing progress and granulation  [FreeTextEntry1] : Right Great Toe [FreeTextEntry2] : 0.6 [FreeTextEntry3] : 0.2 [FreeTextEntry4] : <0.1 [de-identified] : Silver Alginate [de-identified] : Cleansed with normal saline, sterile gauze\par  \par DD, Kerlix [TWNoteComboBox4] : None [TWNoteComboBox5] : No [TWNoteComboBox6] : Other [de-identified] : No [de-identified] : Normal [de-identified] : None [de-identified] : None [de-identified] : 100% [de-identified] : No [de-identified] : Daily [de-identified] : Primary Dressing

## 2023-03-14 NOTE — ASSESSMENT
[Verbal] : Verbal [Written] : Written [Demo] : Demo [Patient] : Patient [Family member] : Family member [Good - alert, interested, motivated] : Good - alert, interested, motivated [Demonstrates independently] : demonstrates independently [Dressing changes] : dressing changes [Foot Care] : foot care [Skin Care] : skin care [Signs and symptoms of infection] : sign and symptoms of infection [Surgery] : surgery [Nutrition] : nutrition [How and When to Call] : how and when to call [Labs and Tests] : labs and tests [Off-loading] : off-loading [Patient responsibility to plan of care] : patient responsibility to plan of care [Stable] : stable [Home] : Home [Ambulatory] : Ambulatory [Not Applicable - Long Term Care/Home Health Agency] : Long Term Care/Home Health Agency: Not Applicable [] : No [FreeTextEntry2] : Infection prevention \par Wound care (dressing changes)\par Maintain optimal skin integrity to high pressure areas\par Nutrition and wound healing\par Offloading the stress on skin structures and decreasing potential pathologic biomechanical influences. [FreeTextEntry3] : right great toe wound worsened [FreeTextEntry4] : Pt's mother performs Pt's dressing changes\par DPM advised Pt and Pt's mother to add extra padding to the dressing change. Dressing change to be performed daily per DPM\par Pt states last day of IV Abx tomorrow, 3/7/23\par Xray reviewed by D[facility]\par F/u in 1 week

## 2023-03-14 NOTE — PLAN
[FreeTextEntry1] : Patient was seen and evaluated. Patient advised to be partial weight bearing to the right heel in surgical shoe and to limit ambulation.Patient c/w PICC line antibiotics. Continue with offloading and local wound care. Discussed with patient and mother that patient is still high risk for amputation of the toe or more proximal amputation, sepsis, loss of limb and loss of life. Spent 20 minutes for patient care and medical decision making.\par

## 2023-03-14 NOTE — REVIEW OF SYSTEMS
[Fever] : no fever [Chills] : no chills [Eye Pain] : no eye pain [Red Eyes] : eyes not red [Earache] : no earache [Loss Of Hearing] : no hearing loss [Nosebleeds] : no nosebleeds [Chest Pain] : no chest pain [Shortness Of Breath] : no shortness of breath [Wheezing] : no wheezing [Cough] : no cough [Abdominal Pain] : no abdominal pain [Vomiting] : no vomiting [Diarrhea] : no diarrhea [Skin Wound] : skin wound [Confused] : no confusion [Dizziness] : no dizziness [de-identified] : Right great toe wound down to fat

## 2023-03-14 NOTE — HISTORY OF PRESENT ILLNESS
[FreeTextEntry1] : Patient is 17 year old male presenting for follow up s/p right great toe wound debridement and bone biopsy (+) OM on pathology. Patient on PICC line antibiotics and states has been ambulating in surgical shoe.Patient is accompanied by his mother who states patient has been limiting ambulation since a week. Denies pain to the right foot wound \par

## 2023-03-14 NOTE — HISTORY OF PRESENT ILLNESS
[FreeTextEntry1] : Patient is 17 year old male presenting for follow up s/p right great toe wound debridement and bone biopsy (+) OM on pathology. Patient on PICC line antibiotics and states has been ambulating in surgical shoe.Patient is accompanied by his mother who states patient has  been walking more and is not limiting his ambulation. Denies pain to the right foot wound

## 2023-03-14 NOTE — PHYSICAL EXAM
[4 x 4] : 4 x 4  [2+] : left 2+ [Ankle Swelling (On Exam)] : not present [Varicose Veins Of Lower Extremities] : not present [] : not present [Alert] : alert [Oriented to Person] : oriented to person [Oriented to Place] : oriented to place [de-identified] : calm [de-identified] : 5/5 muscle strength for all muscles and tendons crossing the foot and ankle joints, ankle joint and STJ ROM intact b/l. No pain with calf compression b/l. [de-identified] : Right plantar hallux wound down to fat, no longer down to bone,  (+)OM distal phalanx - removed retention sutures , wound noted with healing progress and granulation  [FreeTextEntry1] : Right Great Toe (Plantar) [FreeTextEntry3] : 0.2 [FreeTextEntry2] : 0.9 [FreeTextEntry4] : to bone [de-identified] : small sanguinous [de-identified] : callused [de-identified] : none [de-identified] : 100% [de-identified] : none [de-identified] : Alginate Ag [de-identified] : Mechanically cleansed with sterile gauze and normal saline 0.9%\par Dry Dressing\par  [TWNoteComboBox5] : No [TWNoteComboBox6] : Other [de-identified] : No [de-identified] : Daily [de-identified] : Primary Dressing

## 2023-03-14 NOTE — PLAN
[FreeTextEntry1] : Patient was seen and evaluated. Patient advised to be partial weight bearing to the right heel in surgical shoe and to limit ambulation.Patient finished course of PICC line antibiotics.  Continue with offloading and local wound care. Discussed with patient and mother that patient is still high risk for amputation of the toe or more proximal amputation, sepsis, loss of limb and loss of life. Spent 20 minutes for patient care and medical decision making.\par

## 2023-03-14 NOTE — REVIEW OF SYSTEMS
[Fever] : no fever [Chills] : no chills [Eye Pain] : no eye pain [Red Eyes] : eyes not red [Earache] : no earache [Loss Of Hearing] : no hearing loss [Nosebleeds] : no nosebleeds [Chest Pain] : no chest pain [Shortness Of Breath] : no shortness of breath [Cough] : no cough [Wheezing] : no wheezing [Abdominal Pain] : no abdominal pain [Vomiting] : no vomiting [Diarrhea] : no diarrhea [Confused] : no confusion [Skin Wound] : skin wound [Dizziness] : no dizziness [de-identified] : Right great toe wound down to fat

## 2023-03-18 ENCOUNTER — NON-APPOINTMENT (OUTPATIENT)
Age: 18
End: 2023-03-18

## 2023-03-20 ENCOUNTER — APPOINTMENT (OUTPATIENT)
Dept: WOUND CARE | Facility: HOSPITAL | Age: 18
End: 2023-03-20
Payer: MEDICAID

## 2023-03-20 ENCOUNTER — OUTPATIENT (OUTPATIENT)
Dept: OUTPATIENT SERVICES | Facility: HOSPITAL | Age: 18
LOS: 1 days | Discharge: HOME | End: 2023-03-20
Payer: MEDICAID

## 2023-03-20 ENCOUNTER — APPOINTMENT (OUTPATIENT)
Dept: INFECTIOUS DISEASE | Facility: HOSPITAL | Age: 18
End: 2023-03-20
Payer: MEDICAID

## 2023-03-20 VITALS
TEMPERATURE: 97.8 F | RESPIRATION RATE: 18 BRPM | HEART RATE: 115 BPM | DIASTOLIC BLOOD PRESSURE: 75 MMHG | SYSTOLIC BLOOD PRESSURE: 113 MMHG | OXYGEN SATURATION: 98 % | HEIGHT: 68 IN | BODY MASS INDEX: 18.19 KG/M2 | WEIGHT: 120 LBS

## 2023-03-20 DIAGNOSIS — S91.309D UNSPECIFIED OPEN WOUND, UNSPECIFIED FOOT, SUBSEQUENT ENCOUNTER: ICD-10-CM

## 2023-03-20 LAB
ALBUMIN SERPL ELPH-MCNC: 4.3 G/DL — SIGNIFICANT CHANGE UP (ref 3.3–5)
ALP SERPL-CCNC: 182 U/L — HIGH (ref 40–120)
ALT FLD-CCNC: 194 U/L — HIGH (ref 12–78)
ANION GAP SERPL CALC-SCNC: 4 MMOL/L — LOW (ref 5–17)
AST SERPL-CCNC: 105 U/L — HIGH (ref 15–37)
BASOPHILS # BLD AUTO: 0.03 K/UL — SIGNIFICANT CHANGE UP (ref 0–0.2)
BASOPHILS NFR BLD AUTO: 0.8 % — SIGNIFICANT CHANGE UP (ref 0–2)
BILIRUB SERPL-MCNC: 0.9 MG/DL — SIGNIFICANT CHANGE UP (ref 0.2–1.2)
BUN SERPL-MCNC: 15 MG/DL — SIGNIFICANT CHANGE UP (ref 7–23)
CALCIUM SERPL-MCNC: 10.1 MG/DL — SIGNIFICANT CHANGE UP (ref 8.5–10.1)
CHLORIDE SERPL-SCNC: 103 MMOL/L — SIGNIFICANT CHANGE UP (ref 96–108)
CO2 SERPL-SCNC: 32 MMOL/L — HIGH (ref 22–31)
CREAT SERPL-MCNC: 0.78 MG/DL — SIGNIFICANT CHANGE UP (ref 0.5–1.3)
CRP SERPL-MCNC: <3 MG/L — SIGNIFICANT CHANGE UP
EGFR: 132 ML/MIN/1.73M2 — SIGNIFICANT CHANGE UP
EOSINOPHIL # BLD AUTO: 0.11 K/UL — SIGNIFICANT CHANGE UP (ref 0–0.5)
EOSINOPHIL NFR BLD AUTO: 3.1 % — SIGNIFICANT CHANGE UP (ref 0–6)
ERYTHROCYTE [SEDIMENTATION RATE] IN BLOOD: 2 MM/HR — SIGNIFICANT CHANGE UP (ref 0–15)
GLUCOSE SERPL-MCNC: 78 MG/DL — SIGNIFICANT CHANGE UP (ref 70–99)
HCT VFR BLD CALC: 48.6 % — SIGNIFICANT CHANGE UP (ref 39–50)
HGB BLD-MCNC: 16.1 G/DL — SIGNIFICANT CHANGE UP (ref 13–17)
IMM GRANULOCYTES NFR BLD AUTO: 0.6 % — SIGNIFICANT CHANGE UP (ref 0–0.9)
LYMPHOCYTES # BLD AUTO: 1.12 K/UL — SIGNIFICANT CHANGE UP (ref 1–3.3)
LYMPHOCYTES # BLD AUTO: 31.5 % — SIGNIFICANT CHANGE UP (ref 13–44)
MCHC RBC-ENTMCNC: 29.3 PG — SIGNIFICANT CHANGE UP (ref 27–34)
MCHC RBC-ENTMCNC: 33.1 GM/DL — SIGNIFICANT CHANGE UP (ref 32–36)
MCV RBC AUTO: 88.4 FL — SIGNIFICANT CHANGE UP (ref 80–100)
MONOCYTES # BLD AUTO: 0.51 K/UL — SIGNIFICANT CHANGE UP (ref 0–0.9)
MONOCYTES NFR BLD AUTO: 14.4 % — HIGH (ref 2–14)
NEUTROPHILS # BLD AUTO: 1.76 K/UL — LOW (ref 1.8–7.4)
NEUTROPHILS NFR BLD AUTO: 49.6 % — SIGNIFICANT CHANGE UP (ref 43–77)
NRBC # BLD: 0 /100 WBCS — SIGNIFICANT CHANGE UP (ref 0–0)
PLATELET # BLD AUTO: 257 K/UL — SIGNIFICANT CHANGE UP (ref 150–400)
POTASSIUM SERPL-MCNC: 4 MMOL/L — SIGNIFICANT CHANGE UP (ref 3.5–5.3)
POTASSIUM SERPL-SCNC: 4 MMOL/L — SIGNIFICANT CHANGE UP (ref 3.5–5.3)
PROT SERPL-MCNC: 8.3 G/DL — SIGNIFICANT CHANGE UP (ref 6–8.3)
RBC # BLD: 5.5 M/UL — SIGNIFICANT CHANGE UP (ref 4.2–5.8)
RBC # FLD: 14 % — SIGNIFICANT CHANGE UP (ref 10.3–14.5)
SODIUM SERPL-SCNC: 139 MMOL/L — SIGNIFICANT CHANGE UP (ref 135–145)
WBC # BLD: 3.55 K/UL — LOW (ref 3.8–10.5)
WBC # FLD AUTO: 3.55 K/UL — LOW (ref 3.8–10.5)

## 2023-03-20 PROCEDURE — 86140 C-REACTIVE PROTEIN: CPT

## 2023-03-20 PROCEDURE — 36415 COLL VENOUS BLD VENIPUNCTURE: CPT

## 2023-03-20 PROCEDURE — G0463: CPT

## 2023-03-20 PROCEDURE — 99214 OFFICE O/P EST MOD 30 MIN: CPT

## 2023-03-20 PROCEDURE — 85652 RBC SED RATE AUTOMATED: CPT

## 2023-03-20 PROCEDURE — 80053 COMPREHEN METABOLIC PANEL: CPT

## 2023-03-20 PROCEDURE — 85025 COMPLETE CBC W/AUTO DIFF WBC: CPT

## 2023-03-20 PROCEDURE — 99213 OFFICE O/P EST LOW 20 MIN: CPT

## 2023-03-20 NOTE — REVIEW OF SYSTEMS
[Fever] : no fever [Chills] : no chills [Eye Pain] : no eye pain [Red Eyes] : eyes not red [Earache] : no earache [Loss Of Hearing] : no hearing loss [Nosebleeds] : no nosebleeds [Chest Pain] : no chest pain [Shortness Of Breath] : no shortness of breath [Wheezing] : no wheezing [Cough] : no cough [Abdominal Pain] : no abdominal pain [Vomiting] : no vomiting [Diarrhea] : no diarrhea [Skin Wound] : skin wound [Confused] : no confusion [Dizziness] : no dizziness [Anxiety] : no anxiety [Easy Bleeding] : no tendency for easy bleeding [Negative] : Endocrine [de-identified] : Right great toe wound currently closed - soi

## 2023-03-20 NOTE — PHYSICAL EXAM
[2+] : left 2+ [Ankle Swelling (On Exam)] : not present [Varicose Veins Of Lower Extremities] : not present [] : not present [Purpura] : no purpura  [Petechiae] : no petechiae [Skin Ulcer] : no ulcer [Alert] : alert [Oriented to Person] : oriented to person [Oriented to Place] : oriented to place [Oriented to Time] : oriented to time [Calm] : calm [de-identified] : calm [de-identified] : 5/5 muscle strength for all muscles and tendons crossing the foot and ankle joints, ankle joint and STJ ROM intact b/l. No pain with calf compression b/l. [de-identified] : Right plantar hallux wound closed  (+)OM distal phalanx -   [FreeTextEntry1] : Right Plantar Hallux ; CLOSED [de-identified] : White cotton socks

## 2023-03-20 NOTE — ASSESSMENT
[] : Yes [Verbal] : Verbal [Written] : Written [Demo] : Demo [Patient] : Patient [Family member] : Family member [Good - alert, interested, motivated] : Good - alert, interested, motivated [Demonstrates independently] : demonstrates independently [Dressing changes] : dressing changes [Foot Care] : foot care [Skin Care] : skin care [Signs and symptoms of infection] : sign and symptoms of infection [Nutrition] : nutrition [How and When to Call] : how and when to call [Labs and Tests] : labs and tests [Off-loading] : off-loading [Patient responsibility to plan of care] : patient responsibility to plan of care [Stable] : stable [Other: ____] : [unfilled] [Ambulatory] : Ambulatory [Not Applicable - Long Term Care/Home Health Agency] : Long Term Care/Home Health Agency: Not Applicable [FreeTextEntry2] : Infection Prevention\par Foot & Nail care\par Oral abt therapy\par B/W [FreeTextEntry4] : Pt seen by ID Clinician Cho today. ID Clinician Bhavesh e-scribed an additional 2 weeks of oral abx. Allergies reviewed. Pharmacy confirmed. Pt and Pt's mother aware to  medication at pharmacy and take as directed UNTIL FINISHED.\par OK'd to shower and wash foot per DPM\par OK'd to return to work with no restrictions per DPM & ID Clinician \par Per DPM, Pt to wear white cotton socks & educated Pt to check feet DAILY\par ID Clinician Bhavesh ordered additional B/W for Pt. Pt discharged to [facility] for completion of same.\par F/u in 2 weeks

## 2023-03-20 NOTE — PLAN
[FreeTextEntry1] : continue observation and transition back to shoe gear and ADL , patient seen by DR Negrete also , oral antibiotics for the next 2 weeks , PTR 2 weeks  Spent 20 minutes for patient care and medical decision making.\par

## 2023-03-20 NOTE — HISTORY OF PRESENT ILLNESS
[FreeTextEntry1] : Osteomyelitis of the right great toe , currently closed with no soi , patient seen along with Dr Ospina , ID

## 2023-03-21 DIAGNOSIS — L84 CORNS AND CALLOSITIES: ICD-10-CM

## 2023-03-21 DIAGNOSIS — Z79.899 OTHER LONG TERM (CURRENT) DRUG THERAPY: ICD-10-CM

## 2023-03-21 DIAGNOSIS — K59.09 OTHER CONSTIPATION: ICD-10-CM

## 2023-03-21 DIAGNOSIS — M86.671 OTHER CHRONIC OSTEOMYELITIS, RIGHT ANKLE AND FOOT: ICD-10-CM

## 2023-03-21 DIAGNOSIS — Z83.49 FAMILY HISTORY OF OTHER ENDOCRINE, NUTRITIONAL AND METABOLIC DISEASES: ICD-10-CM

## 2023-03-21 DIAGNOSIS — Z87.39 PERSONAL HISTORY OF OTHER DISEASES OF THE MUSCULOSKELETAL SYSTEM AND CONNECTIVE TISSUE: ICD-10-CM

## 2023-03-21 DIAGNOSIS — Z98.890 OTHER SPECIFIED POSTPROCEDURAL STATES: ICD-10-CM

## 2023-03-21 DIAGNOSIS — L97.514 NON-PRESSURE CHRONIC ULCER OF OTHER PART OF RIGHT FOOT WITH NECROSIS OF BONE: ICD-10-CM

## 2023-03-27 ENCOUNTER — OUTPATIENT (OUTPATIENT)
Dept: OUTPATIENT SERVICES | Facility: HOSPITAL | Age: 18
LOS: 1 days | Discharge: HOME | End: 2023-03-27
Payer: MEDICAID

## 2023-03-27 ENCOUNTER — APPOINTMENT (OUTPATIENT)
Dept: WOUND CARE | Facility: HOSPITAL | Age: 18
End: 2023-03-27
Payer: MEDICAID

## 2023-03-27 VITALS
OXYGEN SATURATION: 98 % | BODY MASS INDEX: 18.19 KG/M2 | WEIGHT: 120 LBS | HEART RATE: 109 BPM | HEIGHT: 68 IN | DIASTOLIC BLOOD PRESSURE: 77 MMHG | TEMPERATURE: 98.3 F | RESPIRATION RATE: 18 BRPM | SYSTOLIC BLOOD PRESSURE: 111 MMHG

## 2023-03-27 DIAGNOSIS — S91.039D: ICD-10-CM

## 2023-03-27 PROCEDURE — G0463: CPT

## 2023-03-27 PROCEDURE — 99213 OFFICE O/P EST LOW 20 MIN: CPT

## 2023-04-03 ENCOUNTER — OUTPATIENT (OUTPATIENT)
Dept: OUTPATIENT SERVICES | Facility: HOSPITAL | Age: 18
LOS: 1 days | Discharge: HOME | End: 2023-04-03
Payer: MEDICAID

## 2023-04-03 ENCOUNTER — APPOINTMENT (OUTPATIENT)
Dept: WOUND CARE | Facility: HOSPITAL | Age: 18
End: 2023-04-03
Payer: MEDICAID

## 2023-04-03 DIAGNOSIS — K59.09 OTHER CONSTIPATION: ICD-10-CM

## 2023-04-03 DIAGNOSIS — M86.671 OTHER CHRONIC OSTEOMYELITIS, RIGHT ANKLE AND FOOT: ICD-10-CM

## 2023-04-03 DIAGNOSIS — Z79.899 OTHER LONG TERM (CURRENT) DRUG THERAPY: ICD-10-CM

## 2023-04-03 DIAGNOSIS — Z98.890 OTHER SPECIFIED POSTPROCEDURAL STATES: ICD-10-CM

## 2023-04-03 DIAGNOSIS — Z87.39 PERSONAL HISTORY OF OTHER DISEASES OF THE MUSCULOSKELETAL SYSTEM AND CONNECTIVE TISSUE: ICD-10-CM

## 2023-04-03 DIAGNOSIS — L97.514 NON-PRESSURE CHRONIC ULCER OF OTHER PART OF RIGHT FOOT WITH NECROSIS OF BONE: ICD-10-CM

## 2023-04-03 DIAGNOSIS — S91.309D UNSPECIFIED OPEN WOUND, UNSPECIFIED FOOT, SUBSEQUENT ENCOUNTER: ICD-10-CM

## 2023-04-03 DIAGNOSIS — Z83.49 FAMILY HISTORY OF OTHER ENDOCRINE, NUTRITIONAL AND METABOLIC DISEASES: ICD-10-CM

## 2023-04-03 DIAGNOSIS — L84 CORNS AND CALLOSITIES: ICD-10-CM

## 2023-04-03 PROCEDURE — 99213 OFFICE O/P EST LOW 20 MIN: CPT

## 2023-04-03 PROCEDURE — G0463: CPT

## 2023-04-03 NOTE — PHYSICAL EXAM
[2+] : left 2+ [Ankle Swelling (On Exam)] : not present [Varicose Veins Of Lower Extremities] : not present [] : not present [Purpura] : no purpura  [Petechiae] : no petechiae [Skin Ulcer] : no ulcer [Alert] : alert [Oriented to Person] : oriented to person [Oriented to Place] : oriented to place [Oriented to Time] : oriented to time [Calm] : calm [de-identified] : calm [de-identified] : 5/5 muscle strength for all muscles and tendons crossing the foot and ankle joints, ankle joint and STJ ROM intact b/l. No pain with calf compression b/l. [de-identified] : Right plantar hallux wound closed  (+)OM distal phalanx , periwound callus debrided  [FreeTextEntry1] : Right Plantar Hallux- Closed [de-identified] : No Dressing

## 2023-04-03 NOTE — REVIEW OF SYSTEMS
[Fever] : no fever [Chills] : no chills [Eye Pain] : no eye pain [Red Eyes] : eyes not red [Earache] : no earache [Loss Of Hearing] : no hearing loss [Nosebleeds] : no nosebleeds [Chest Pain] : no chest pain [Shortness Of Breath] : no shortness of breath [Wheezing] : no wheezing [Cough] : no cough [Abdominal Pain] : no abdominal pain [Vomiting] : no vomiting [Diarrhea] : no diarrhea [Skin Wound] : skin wound [Confused] : no confusion [Dizziness] : no dizziness [Anxiety] : no anxiety [Easy Bleeding] : no tendency for easy bleeding [Negative] : Endocrine [de-identified] : Right great toe wound currently closed - soi

## 2023-04-03 NOTE — ASSESSMENT
[Verbal] : Verbal [Demo] : Demo [Patient] : Patient [Other: ___] : [unfilled] [Good - alert, interested, motivated] : Good - alert, interested, motivated [Verbalizes knowledge/Understanding] : Verbalizes knowledge/understanding [Foot Care] : foot care [Skin Care] : skin care [Pressure relief] : pressure relief [Signs and symptoms of infection] : sign and symptoms of infection [How and When to Call] : how and when to call [Off-loading] : off-loading [Patient responsibility to plan of care] : patient responsibility to plan of care [Stable] : stable [Home] : Home [Ambulatory] : Ambulatory [] : No [FreeTextEntry2] : Maintain optimal skin integrity\par  [FreeTextEntry4] : Dr Irwin\par F/U to [facility] in 2 weeks

## 2023-04-03 NOTE — PLAN
[FreeTextEntry1] : Patient was seen and evaluated. Continue observation and transition back to regular shoe gear and ADL,  patient seen by Dr. Ospina at the last visit and is on oral antibiotics for the next 2 weeks. RTC one week. Spent 20 minutes for patient care and medical decision making.\par

## 2023-04-03 NOTE — HISTORY OF PRESENT ILLNESS
[FreeTextEntry1] : Patient is 17 year old male presenting for follow up s/p right great toe wound debridement and bone biopsy (+) OM on pathology, wound healing well.  Patient finished PICC line antibiotics and states has been ambulating in surgical shoe. Denies pain to the right foot wound \par

## 2023-04-11 ENCOUNTER — OUTPATIENT (OUTPATIENT)
Dept: OUTPATIENT SERVICES | Facility: HOSPITAL | Age: 18
LOS: 1 days | Discharge: HOME | End: 2023-04-11
Payer: MEDICAID

## 2023-04-11 ENCOUNTER — APPOINTMENT (OUTPATIENT)
Dept: WOUND CARE | Facility: HOSPITAL | Age: 18
End: 2023-04-11
Payer: MEDICAID

## 2023-04-11 VITALS
RESPIRATION RATE: 18 BRPM | SYSTOLIC BLOOD PRESSURE: 134 MMHG | HEART RATE: 115 BPM | TEMPERATURE: 97.8 F | DIASTOLIC BLOOD PRESSURE: 84 MMHG | OXYGEN SATURATION: 98 % | HEIGHT: 68 IN | BODY MASS INDEX: 18.19 KG/M2 | WEIGHT: 120 LBS

## 2023-04-11 DIAGNOSIS — S91.309D UNSPECIFIED OPEN WOUND, UNSPECIFIED FOOT, SUBSEQUENT ENCOUNTER: ICD-10-CM

## 2023-04-11 PROCEDURE — G0463: CPT

## 2023-04-11 PROCEDURE — 99213 OFFICE O/P EST LOW 20 MIN: CPT

## 2023-04-16 DIAGNOSIS — Z98.890 OTHER SPECIFIED POSTPROCEDURAL STATES: ICD-10-CM

## 2023-04-16 DIAGNOSIS — Z83.49 FAMILY HISTORY OF OTHER ENDOCRINE, NUTRITIONAL AND METABOLIC DISEASES: ICD-10-CM

## 2023-04-16 DIAGNOSIS — Z79.899 OTHER LONG TERM (CURRENT) DRUG THERAPY: ICD-10-CM

## 2023-04-16 DIAGNOSIS — L97.514 NON-PRESSURE CHRONIC ULCER OF OTHER PART OF RIGHT FOOT WITH NECROSIS OF BONE: ICD-10-CM

## 2023-04-16 DIAGNOSIS — M86.671 OTHER CHRONIC OSTEOMYELITIS, RIGHT ANKLE AND FOOT: ICD-10-CM

## 2023-04-16 DIAGNOSIS — Z87.39 PERSONAL HISTORY OF OTHER DISEASES OF THE MUSCULOSKELETAL SYSTEM AND CONNECTIVE TISSUE: ICD-10-CM

## 2023-04-16 DIAGNOSIS — K59.09 OTHER CONSTIPATION: ICD-10-CM

## 2023-04-16 DIAGNOSIS — L84 CORNS AND CALLOSITIES: ICD-10-CM

## 2023-04-16 NOTE — PLAN
[FreeTextEntry1] : Patient was seen and evaluated. Continue observation and transition back to regular shoe gear and ADL. Patient' wound had healed. Patient can return to work with limited activity.  Patient will benefit from custom foot orthotics for arch support and to stabilize and assist with gait and to offload the right hallux Patient given referral for orthotist. RTC in 2 weeks. \par Spent 20 minutes for patient care and medical decision making.\par

## 2023-04-16 NOTE — PHYSICAL EXAM
[2+] : left 2+ [Ankle Swelling (On Exam)] : not present [Varicose Veins Of Lower Extremities] : not present [] : not present [Purpura] : no purpura  [Petechiae] : no petechiae [Skin Ulcer] : no ulcer [Alert] : alert [Oriented to Person] : oriented to person [Oriented to Time] : oriented to time [Oriented to Place] : oriented to place [Calm] : calm [de-identified] : calm [de-identified] : 5/5 muscle strength for all muscles and tendons crossing the foot and ankle joints, ankle joint and STJ ROM intact b/l. No pain with calf compression b/l. [FreeTextEntry1] : Right Plantar Hallux- Closed [de-identified] : Right plantar hallux wound closed  (+)OM distal phalanx  - healed , periwound callus debrided  [de-identified] : No Dressing

## 2023-04-16 NOTE — PLAN
[FreeTextEntry1] : Patient was seen and evaluated. Continue observation and transition back to surgical shoe. Patient' wound has re- opened. Patient will benefit from custom foot orthotics for arch support and to stabilize and assist with gait and to offload the right hallux.  Patient given referral for orthotist. RTC in 1 week. Will continue to observe, will continue local wound care and offlodaing. \par Spent 20 minutes for patient care and medical decision making.\par

## 2023-04-16 NOTE — REVIEW OF SYSTEMS
[Fever] : no fever [Chills] : no chills [Eye Pain] : no eye pain [Red Eyes] : eyes not red [Earache] : no earache [Loss Of Hearing] : no hearing loss [Nosebleeds] : no nosebleeds [Chest Pain] : no chest pain [Shortness Of Breath] : no shortness of breath [Wheezing] : no wheezing [Cough] : no cough [Abdominal Pain] : no abdominal pain [Vomiting] : no vomiting [Diarrhea] : no diarrhea [Skin Wound] : skin wound [Confused] : no confusion [Dizziness] : no dizziness [Anxiety] : no anxiety [Easy Bleeding] : no tendency for easy bleeding [Negative] : Endocrine [de-identified] : Right great toe wound currently closed - soi

## 2023-04-16 NOTE — PHYSICAL EXAM
[2+] : left 2+ [Ankle Swelling (On Exam)] : not present [Varicose Veins Of Lower Extremities] : not present [] : not present [Purpura] : no purpura  [Petechiae] : no petechiae [Skin Ulcer] : no ulcer [Alert] : alert [Oriented to Person] : oriented to person [Oriented to Place] : oriented to place [Oriented to Time] : oriented to time [Calm] : calm [de-identified] : calm [de-identified] : 5/5 muscle strength for all muscles and tendons crossing the foot and ankle joints, ankle joint and STJ ROM intact b/l. No pain with calf compression b/l. [de-identified] : Right plantar hallux wound closed  (+)OM distal phalanx  - healed , periwound callus debrided  [FreeTextEntry1] : Right Plantar Hallux- Closed [de-identified] : No Dressing

## 2023-04-16 NOTE — REVIEW OF SYSTEMS
[Fever] : no fever [Chills] : no chills [Eye Pain] : no eye pain [Red Eyes] : eyes not red [Earache] : no earache [Loss Of Hearing] : no hearing loss [Nosebleeds] : no nosebleeds [Chest Pain] : no chest pain [Shortness Of Breath] : no shortness of breath [Wheezing] : no wheezing [Cough] : no cough [Abdominal Pain] : no abdominal pain [Vomiting] : no vomiting [Diarrhea] : no diarrhea [Skin Wound] : skin wound [Confused] : no confusion [Dizziness] : no dizziness [Anxiety] : no anxiety [Easy Bleeding] : no tendency for easy bleeding [Negative] : Endocrine [de-identified] : Right great toe wound - re-opened in the center down to subcutaneous tissue with no signs of infection

## 2023-04-16 NOTE — HISTORY OF PRESENT ILLNESS
[FreeTextEntry1] : Patient is 17 year old male presenting for follow up s/p right great toe wound debridement and bone biopsy (+) OM on pathology, wound had healed and patient was to return to clinic in 2 weeks. Patient's mother states patient had started wearing regular sneakers and noticed some drainage and was concerned scheduled to return earlier. \par \par

## 2023-04-16 NOTE — ASSESSMENT
[Verbal] : Verbal [Demo] : Demo [Patient] : Patient [Good - alert, interested, motivated] : Good - alert, interested, motivated [Demonstrates independently] : demonstrates independently [Skin Care] : skin care [Foot Care] : foot care [Signs and symptoms of infection] : sign and symptoms of infection [Nutrition] : nutrition [How and When to Call] : how and when to call [Off-loading] : off-loading [Patient responsibility to plan of care] : patient responsibility to plan of care [Glycemic Control] : glycemic control [Stable] : stable [Home] : Home [Ambulatory] : Ambulatory [] : No [FreeTextEntry2] : Infection Prevention\par Foot &  nail care [FreeTextEntry4] : Pt provided with return to work letter\par F/u in 2 weeks

## 2023-04-16 NOTE — ASSESSMENT
[Verbal] : Verbal [Demo] : Demo [Patient] : Patient [Good - alert, interested, motivated] : Good - alert, interested, motivated [Demonstrates independently] : demonstrates independently [Foot Care] : foot care [Skin Care] : skin care [Signs and symptoms of infection] : sign and symptoms of infection [Nutrition] : nutrition [How and When to Call] : how and when to call [Off-loading] : off-loading [Patient responsibility to plan of care] : patient responsibility to plan of care [Glycemic Control] : glycemic control [Stable] : stable [Home] : Home [Ambulatory] : Ambulatory [] : No [FreeTextEntry2] : Infection Prevention\par Foot &  nail care [FreeTextEntry4] : Pt provided with return to work letter\par F/u in 2 weeks

## 2023-04-16 NOTE — PHYSICAL EXAM
[4 x 4] : 4 x 4  [2+] : left 2+ [Ankle Swelling (On Exam)] : not present [Varicose Veins Of Lower Extremities] : not present [] : not present [Purpura] : no purpura  [Petechiae] : no petechiae [Skin Ulcer] : no ulcer [Alert] : alert [Oriented to Person] : oriented to person [Oriented to Place] : oriented to place [Oriented to Time] : oriented to time [Calm] : calm [de-identified] : calm [de-identified] : Right plantar hallux wound closed  (+)OM distal phalanx  - re- opened down to subcutaneous tissue with no signs of infection from the wound  [de-identified] : 5/5 muscle strength for all muscles and tendons crossing the foot and ankle joints, ankle joint and STJ ROM intact b/l. No pain with calf compression b/l. [de-identified] : Callus shaved by DPM.  [FreeTextEntry1] : Right Plantar Hallux- Re-Opened [FreeTextEntry2] : 1.0 [FreeTextEntry3] : 0.1 [FreeTextEntry4] : 0.1 [de-identified] : serosanguineous  [de-identified] : callus [de-identified] : Silver Alginate  [de-identified] : Mechanically cleansed with  0.9% normal saline and 4 x 4 sterile gauze.\par \par Kerlix [TWNoteComboBox5] : No [TWNoteComboBox4] : Small [TWNoteComboBox6] : Other [de-identified] : No [de-identified] : other [de-identified] : None [de-identified] : None [de-identified] : 100% [de-identified] : No [de-identified] : Daily [de-identified] : Primary Dressing

## 2023-04-16 NOTE — REVIEW OF SYSTEMS
[Fever] : no fever [Chills] : no chills [Eye Pain] : no eye pain [Red Eyes] : eyes not red [Earache] : no earache [Loss Of Hearing] : no hearing loss [Nosebleeds] : no nosebleeds [Chest Pain] : no chest pain [Shortness Of Breath] : no shortness of breath [Wheezing] : no wheezing [Cough] : no cough [Abdominal Pain] : no abdominal pain [Vomiting] : no vomiting [Diarrhea] : no diarrhea [Skin Wound] : skin wound [Confused] : no confusion [Dizziness] : no dizziness [Anxiety] : no anxiety [Easy Bleeding] : no tendency for easy bleeding [Negative] : Endocrine [de-identified] : Right great toe wound currently closed - soi

## 2023-04-16 NOTE — ASSESSMENT
[Verbal] : Verbal [Demo] : Demo [Patient] : Patient [Good - alert, interested, motivated] : Good - alert, interested, motivated [Demonstrates independently] : demonstrates independently [Dressing changes] : dressing changes [Foot Care] : foot care [Skin Care] : skin care [Signs and symptoms of infection] : sign and symptoms of infection [Nutrition] : nutrition [How and When to Call] : how and when to call [Off-loading] : off-loading [Patient responsibility to plan of care] : patient responsibility to plan of care [Glycemic Control] : glycemic control [Stable] : stable [Home] : Home [Ambulatory] : Ambulatory [Not Applicable - Long Term Care/Home Health Agency] : Long Term Care/Home Health Agency: Not Applicable [] : No [FreeTextEntry2] : Infection Prevention\par Restore Skin Integrity\par  [FreeTextEntry3] : Wound re-opened [FreeTextEntry4] : DPM discussed possibility of having another MRI done; will discuss further at next visit to see how wound is healing; no auth submitted as of yet per DPM.\par DPM discussed with pt importance of resting throughout the day and wearing wide-boxed shoes; pt to wear sx shoe until he obtains shoes discussed per DPM.\par Pt to continue with PO abx. \par Pt not able to return to work per DPM.\par F/U 1 week.

## 2023-04-17 ENCOUNTER — APPOINTMENT (OUTPATIENT)
Dept: WOUND CARE | Facility: HOSPITAL | Age: 18
End: 2023-04-17
Payer: MEDICAID

## 2023-04-17 ENCOUNTER — OUTPATIENT (OUTPATIENT)
Dept: OUTPATIENT SERVICES | Facility: HOSPITAL | Age: 18
LOS: 1 days | Discharge: HOME | End: 2023-04-17
Payer: MEDICAID

## 2023-04-17 VITALS
TEMPERATURE: 97.9 F | SYSTOLIC BLOOD PRESSURE: 122 MMHG | HEART RATE: 115 BPM | RESPIRATION RATE: 18 BRPM | DIASTOLIC BLOOD PRESSURE: 85 MMHG | HEIGHT: 68 IN | OXYGEN SATURATION: 98 % | WEIGHT: 120 LBS | BODY MASS INDEX: 18.19 KG/M2

## 2023-04-17 DIAGNOSIS — S91.309D UNSPECIFIED OPEN WOUND, UNSPECIFIED FOOT, SUBSEQUENT ENCOUNTER: ICD-10-CM

## 2023-04-17 PROCEDURE — G0463: CPT

## 2023-04-17 PROCEDURE — 99213 OFFICE O/P EST LOW 20 MIN: CPT

## 2023-04-24 ENCOUNTER — APPOINTMENT (OUTPATIENT)
Dept: WOUND CARE | Facility: HOSPITAL | Age: 18
End: 2023-04-24
Payer: MEDICAID

## 2023-04-24 ENCOUNTER — OUTPATIENT (OUTPATIENT)
Dept: OUTPATIENT SERVICES | Facility: HOSPITAL | Age: 18
LOS: 1 days | Discharge: HOME | End: 2023-04-24
Payer: MEDICAID

## 2023-04-24 VITALS
HEIGHT: 68 IN | HEART RATE: 135 BPM | SYSTOLIC BLOOD PRESSURE: 118 MMHG | DIASTOLIC BLOOD PRESSURE: 89 MMHG | RESPIRATION RATE: 16 BRPM | BODY MASS INDEX: 18.19 KG/M2 | WEIGHT: 120 LBS | OXYGEN SATURATION: 99 % | TEMPERATURE: 97.6 F

## 2023-04-24 VITALS — HEART RATE: 122 BPM

## 2023-04-24 DIAGNOSIS — S91.309D UNSPECIFIED OPEN WOUND, UNSPECIFIED FOOT, SUBSEQUENT ENCOUNTER: ICD-10-CM

## 2023-04-24 PROCEDURE — 99213 OFFICE O/P EST LOW 20 MIN: CPT

## 2023-04-24 PROCEDURE — G0463: CPT

## 2023-04-24 NOTE — HISTORY OF PRESENT ILLNESS
[FreeTextEntry1] : 4/11/23 Patient is 17 year old male presenting for follow up s/p right great toe wound debridement and bone biopsy (+) OM on pathology, wound had healed and patient was to return to clinic in 2 weeks. Patient's mother states patient had started wearing regular sneakers and noticed some drainage and was concerned scheduled to return earlier. \par \par 4/17/23 Patient has transitioned back into surgical shoe and is limiting ambulation. Patient denies any pain to the right great toe \par

## 2023-04-24 NOTE — PHYSICAL EXAM
[4 x 4] : 4 x 4  [2+] : left 2+ [Ankle Swelling (On Exam)] : not present [Varicose Veins Of Lower Extremities] : not present [] : not present [Purpura] : no purpura  [Petechiae] : no petechiae [Skin Ulcer] : no ulcer [Alert] : alert [Oriented to Person] : oriented to person [Oriented to Place] : oriented to place [Oriented to Time] : oriented to time [Calm] : calm [de-identified] : calm [de-identified] : 5/5 muscle strength for all muscles and tendons crossing the foot and ankle joints, ankle joint and STJ ROM intact b/l. No pain with calf compression b/l. [de-identified] : Right plantar hallux wound closed  (+)OM distal phalanx  - re- opened down to subcutaneous tissue with no signs of infection from the wound  [de-identified] : Callus shaved by DPM.  [FreeTextEntry1] : Right Plantar Hallux - Closed [de-identified] : NONE [de-identified] : callus [de-identified] : Alginate Ag [de-identified] : Mechanically cleansed with sterile gauze and normal saline 0.9%\par Paper tape [TWNoteComboBox4] : Small [TWNoteComboBox5] : No [TWNoteComboBox6] : Other [de-identified] : No [de-identified] : other [de-identified] : None [de-identified] : None [de-identified] : 100% [de-identified] : No [de-identified] : Daily [de-identified] : Primary Dressing

## 2023-04-24 NOTE — PLAN
[FreeTextEntry1] : Patient was seen and evaluated. Continue to closely monitor the wound- healed at this time. Patient' wound had re- opened. Patient will benefit from custom foot orthotics for arch support and to stabilize and assist with gait and to offload the right hallux.  Patient seen by orthotist today and casted for orthotics.. RTC in 1 week. Will continue to observe, will continue local wound care and offloading.\par Spent 20 minutes for patient care and medical decision making.\par

## 2023-04-24 NOTE — ASSESSMENT
[Verbal] : Verbal [Written] : Written [Demo] : Demo [Patient] : Patient [Family member] : Family member [Good - alert, interested, motivated] : Good - alert, interested, motivated [Verbalizes knowledge/Understanding] : Verbalizes knowledge/understanding [Dressing changes] : dressing changes [Foot Care] : foot care [Skin Care] : skin care [Signs and symptoms of infection] : sign and symptoms of infection [Nutrition] : nutrition [How and When to Call] : how and when to call [Pain Management] : pain management [Off-loading] : off-loading [] : Yes [Stable] : stable [Home] : Home [Ambulatory] : Ambulatory [Not Applicable - Long Term Care/Home Health Agency] : Long Term Care/Home Health Agency: Not Applicable [FreeTextEntry2] : Infection prevention\par Remain pain free regarding wound\par Offloading\par  [FreeTextEntry4] : IMS consult pending for shoe inserts\par Follow up 1 week

## 2023-04-24 NOTE — REVIEW OF SYSTEMS
[Fever] : no fever [Chills] : no chills [Eye Pain] : no eye pain [Red Eyes] : eyes not red [Earache] : no earache [Loss Of Hearing] : no hearing loss [Nosebleeds] : no nosebleeds [Chest Pain] : no chest pain [Shortness Of Breath] : no shortness of breath [Wheezing] : no wheezing [Cough] : no cough [Abdominal Pain] : no abdominal pain [Vomiting] : no vomiting [Diarrhea] : no diarrhea [Skin Wound] : skin wound [Confused] : no confusion [Dizziness] : no dizziness [Anxiety] : no anxiety [Easy Bleeding] : no tendency for easy bleeding [Negative] : Endocrine [de-identified] : Right great toe wound - re-opened in the center down to subcutaneous tissue with no signs of infection -

## 2023-04-24 NOTE — ASSESSMENT
[Verbal] : Verbal [Demo] : Demo [Patient] : Patient [Good - alert, interested, motivated] : Good - alert, interested, motivated [Demonstrates independently] : demonstrates independently [Dressing changes] : dressing changes [Foot Care] : foot care [Skin Care] : skin care [Signs and symptoms of infection] : sign and symptoms of infection [Nutrition] : nutrition [How and When to Call] : how and when to call [Off-loading] : off-loading [Patient responsibility to plan of care] : patient responsibility to plan of care [Glycemic Control] : glycemic control [Stable] : stable [Home] : Home [Ambulatory] : Ambulatory [Not Applicable - Long Term Care/Home Health Agency] : Long Term Care/Home Health Agency: Not Applicable [] : No [FreeTextEntry2] : Infection Prevention\par Foot & nail care\par Orthotist consult  [FreeTextEntry4] : Orthotist consult submitted\par F/u 1 week

## 2023-04-24 NOTE — PLAN
[FreeTextEntry1] : Patient was seen and evaluated. Continue observation and transition back to surgical shoe. Patient' wound has re- opened. Patient will benefit from custom foot orthotics for arch support and to stabilize and assist with gait and to offload the right hallux.  Patient given referral for orthotist. RTC in 1 week. Will continue to observe, will continue local wound care and offloading. Discussed with patient and mother that patient is still high risk for possible amputation, infection, sepsis, loss of limb and loss of life.\par Spent 20 minutes for patient care and medical decision making.\par

## 2023-04-24 NOTE — HISTORY OF PRESENT ILLNESS
[FreeTextEntry1] : 4/11/23 Patient is 17 year old male presenting for follow up s/p right great toe wound debridement and bone biopsy (+) OM on pathology, wound had healed and patient was to return to clinic in 2 weeks. Patient's mother states patient had started wearing regular sneakers and noticed some drainage and was concerned scheduled to return earlier. \par \par 4/17/23 Patient has transitioned back into surgical shoe and is limiting ambulation. Patient denies any pain to the right great toe \par \par 4/24/23 Patient has been changing the dressing to the right great toe as advise. Plantar hallux wound now healed.

## 2023-04-24 NOTE — PHYSICAL EXAM
[2 x 2] : 2 x 2  [2+] : left 2+ [Ankle Swelling (On Exam)] : not present [Varicose Veins Of Lower Extremities] : not present [] : not present [Purpura] : no purpura  [Petechiae] : no petechiae [Skin Ulcer] : no ulcer [Alert] : alert [Oriented to Person] : oriented to person [Oriented to Place] : oriented to place [Oriented to Time] : oriented to time [Calm] : calm [de-identified] : calm [de-identified] : 5/5 muscle strength for all muscles and tendons crossing the foot and ankle joints, ankle joint and STJ ROM intact b/l. No pain with calf compression b/l. [de-identified] : Right plantar hallux wound closed  (+)OM distal phalanx  - re- opened down to subcutaneous tissue with no signs of infection from the wound - healed  [FreeTextEntry1] : Right Plantar Hallux- Closed [de-identified] : t [de-identified] : callus [de-identified] : dry dressing [de-identified] : Cleansed with normal saline, cloth tape \par \par *callus shaved by DPM [TWNoteComboBox4] : None [TWNoteComboBox6] : Traumatic [de-identified] : other [de-identified] : None [de-identified] : None [de-identified] : No [de-identified] : Daily [de-identified] : Secondary Dressing

## 2023-04-24 NOTE — REVIEW OF SYSTEMS
[Fever] : no fever [Chills] : no chills [Eye Pain] : no eye pain [Red Eyes] : eyes not red [Earache] : no earache [Loss Of Hearing] : no hearing loss [Nosebleeds] : no nosebleeds [Chest Pain] : no chest pain [Shortness Of Breath] : no shortness of breath [Wheezing] : no wheezing [Cough] : no cough [Abdominal Pain] : no abdominal pain [Vomiting] : no vomiting [Diarrhea] : no diarrhea [Skin Wound] : skin wound [Confused] : no confusion [Dizziness] : no dizziness [Anxiety] : no anxiety [Easy Bleeding] : no tendency for easy bleeding [Negative] : Endocrine [de-identified] : Right great toe wound - re-opened in the center down to subcutaneous tissue with no signs of infection

## 2023-04-25 DIAGNOSIS — L84 CORNS AND CALLOSITIES: ICD-10-CM

## 2023-04-25 DIAGNOSIS — Z87.39 PERSONAL HISTORY OF OTHER DISEASES OF THE MUSCULOSKELETAL SYSTEM AND CONNECTIVE TISSUE: ICD-10-CM

## 2023-04-25 DIAGNOSIS — L97.514 NON-PRESSURE CHRONIC ULCER OF OTHER PART OF RIGHT FOOT WITH NECROSIS OF BONE: ICD-10-CM

## 2023-04-25 DIAGNOSIS — Z83.49 FAMILY HISTORY OF OTHER ENDOCRINE, NUTRITIONAL AND METABOLIC DISEASES: ICD-10-CM

## 2023-04-25 DIAGNOSIS — Z79.899 OTHER LONG TERM (CURRENT) DRUG THERAPY: ICD-10-CM

## 2023-04-25 DIAGNOSIS — Z91.012 ALLERGY TO EGGS: ICD-10-CM

## 2023-04-25 DIAGNOSIS — K59.09 OTHER CONSTIPATION: ICD-10-CM

## 2023-04-25 DIAGNOSIS — Z91.010 ALLERGY TO PEANUTS: ICD-10-CM

## 2023-04-25 DIAGNOSIS — M86.671 OTHER CHRONIC OSTEOMYELITIS, RIGHT ANKLE AND FOOT: ICD-10-CM

## 2023-04-25 DIAGNOSIS — Z98.890 OTHER SPECIFIED POSTPROCEDURAL STATES: ICD-10-CM

## 2023-04-27 DIAGNOSIS — M86.671 OTHER CHRONIC OSTEOMYELITIS, RIGHT ANKLE AND FOOT: ICD-10-CM

## 2023-04-27 DIAGNOSIS — L84 CORNS AND CALLOSITIES: ICD-10-CM

## 2023-04-27 DIAGNOSIS — L97.514 NON-PRESSURE CHRONIC ULCER OF OTHER PART OF RIGHT FOOT WITH NECROSIS OF BONE: ICD-10-CM

## 2023-04-27 DIAGNOSIS — K59.09 OTHER CONSTIPATION: ICD-10-CM

## 2023-04-27 DIAGNOSIS — Z87.39 PERSONAL HISTORY OF OTHER DISEASES OF THE MUSCULOSKELETAL SYSTEM AND CONNECTIVE TISSUE: ICD-10-CM

## 2023-04-27 DIAGNOSIS — Z91.012 ALLERGY TO EGGS: ICD-10-CM

## 2023-04-27 DIAGNOSIS — Z83.49 FAMILY HISTORY OF OTHER ENDOCRINE, NUTRITIONAL AND METABOLIC DISEASES: ICD-10-CM

## 2023-04-27 DIAGNOSIS — Z79.899 OTHER LONG TERM (CURRENT) DRUG THERAPY: ICD-10-CM

## 2023-04-27 DIAGNOSIS — Z91.010 ALLERGY TO PEANUTS: ICD-10-CM

## 2023-05-01 ENCOUNTER — APPOINTMENT (OUTPATIENT)
Dept: WOUND CARE | Facility: HOSPITAL | Age: 18
End: 2023-05-01
Payer: MEDICAID

## 2023-05-01 ENCOUNTER — OUTPATIENT (OUTPATIENT)
Dept: OUTPATIENT SERVICES | Facility: HOSPITAL | Age: 18
LOS: 1 days | Discharge: HOME | End: 2023-05-01
Payer: MEDICAID

## 2023-05-01 VITALS
HEART RATE: 125 BPM | HEIGHT: 68 IN | DIASTOLIC BLOOD PRESSURE: 86 MMHG | OXYGEN SATURATION: 98 % | TEMPERATURE: 98.3 F | BODY MASS INDEX: 18.19 KG/M2 | SYSTOLIC BLOOD PRESSURE: 133 MMHG | RESPIRATION RATE: 16 BRPM | WEIGHT: 120 LBS

## 2023-05-01 DIAGNOSIS — S91.309D UNSPECIFIED OPEN WOUND, UNSPECIFIED FOOT, SUBSEQUENT ENCOUNTER: ICD-10-CM

## 2023-05-01 PROCEDURE — G0463: CPT

## 2023-05-01 PROCEDURE — 99213 OFFICE O/P EST LOW 20 MIN: CPT

## 2023-05-10 ENCOUNTER — APPOINTMENT (OUTPATIENT)
Dept: WOUND CARE | Facility: HOSPITAL | Age: 18
End: 2023-05-10
Payer: MEDICAID

## 2023-05-10 ENCOUNTER — OUTPATIENT (OUTPATIENT)
Dept: OUTPATIENT SERVICES | Facility: HOSPITAL | Age: 18
LOS: 1 days | Discharge: HOME | End: 2023-05-10
Payer: MEDICAID

## 2023-05-10 VITALS
BODY MASS INDEX: 18.19 KG/M2 | HEIGHT: 68 IN | HEART RATE: 110 BPM | SYSTOLIC BLOOD PRESSURE: 125 MMHG | WEIGHT: 120 LBS | OXYGEN SATURATION: 98 % | TEMPERATURE: 97.9 F | RESPIRATION RATE: 18 BRPM | DIASTOLIC BLOOD PRESSURE: 86 MMHG

## 2023-05-10 DIAGNOSIS — L97.514 NON-PRESSURE CHRONIC ULCER OF OTHER PART OF RIGHT FOOT WITH NECROSIS OF BONE: ICD-10-CM

## 2023-05-10 DIAGNOSIS — S91.309D UNSPECIFIED OPEN WOUND, UNSPECIFIED FOOT, SUBSEQUENT ENCOUNTER: ICD-10-CM

## 2023-05-10 DIAGNOSIS — Z91.012 ALLERGY TO EGGS: ICD-10-CM

## 2023-05-10 DIAGNOSIS — Z87.39 PERSONAL HISTORY OF OTHER DISEASES OF THE MUSCULOSKELETAL SYSTEM AND CONNECTIVE TISSUE: ICD-10-CM

## 2023-05-10 DIAGNOSIS — Z79.899 OTHER LONG TERM (CURRENT) DRUG THERAPY: ICD-10-CM

## 2023-05-10 DIAGNOSIS — Z83.49 FAMILY HISTORY OF OTHER ENDOCRINE, NUTRITIONAL AND METABOLIC DISEASES: ICD-10-CM

## 2023-05-10 DIAGNOSIS — Z91.010 ALLERGY TO PEANUTS: ICD-10-CM

## 2023-05-10 DIAGNOSIS — K59.09 OTHER CONSTIPATION: ICD-10-CM

## 2023-05-10 DIAGNOSIS — L84 CORNS AND CALLOSITIES: ICD-10-CM

## 2023-05-10 DIAGNOSIS — Z98.890 OTHER SPECIFIED POSTPROCEDURAL STATES: ICD-10-CM

## 2023-05-10 DIAGNOSIS — M86.671 OTHER CHRONIC OSTEOMYELITIS, RIGHT ANKLE AND FOOT: ICD-10-CM

## 2023-05-10 PROCEDURE — 99213 OFFICE O/P EST LOW 20 MIN: CPT

## 2023-05-10 PROCEDURE — G0463: CPT

## 2023-05-10 NOTE — HISTORY OF PRESENT ILLNESS
[FreeTextEntry1] : 4/11/23 Patient is 17 year old male presenting for follow up s/p right great toe wound debridement and bone biopsy (+) OM on pathology, wound had healed and patient was to return to clinic in 2 weeks. Patient's mother states patient had started wearing regular sneakers and noticed some drainage and was concerned scheduled to return earlier. \par \par 4/17/23 Patient has transitioned back into surgical shoe and is limiting ambulation. Patient denies any pain to the right great toe \par \par 4/24/23 Patient has been changing the dressing to the right great toe as advise. Plantar hallux wound now healed. \par \par 5/1/23. Patient has been applying protective dressing to the right hallux - healed at this time. Patient denies any pain or tenderness to the right foot.

## 2023-05-10 NOTE — PLAN
[FreeTextEntry1] : Patient was seen and evaluated. Continue to closely monitor the wound- healed at this time. . Patient will benefit from custom foot orthotics for arch support and to stabilize and assist with gait and to offload the right hallux.  Patient seen by orthotist today and casted for orthotics. Patient to transition into regular shoes, Discussed with patient to wear supportive shoe gear with wide toed shoes and extra depth in toe box to accommodate the deformity.\par Discussed with patient to perform daily foot checks and monitor for any new lesions, open wounds or calluses and to return to clinic at the earliest.\par Discussed with patient and mother that will order MRI at the next visit to re-asses the OM in the hallux.  \par Spent 20 minutes for patient care and medical decision making.\par

## 2023-05-10 NOTE — PHYSICAL EXAM
[2 x 2] : 2 x 2  [2+] : left 2+ [Ankle Swelling (On Exam)] : not present [Varicose Veins Of Lower Extremities] : not present [Purpura] : no purpura  [] : not present [Petechiae] : no petechiae [Skin Ulcer] : no ulcer [Alert] : alert [Oriented to Person] : oriented to person [Oriented to Place] : oriented to place [Oriented to Time] : oriented to time [Calm] : calm [de-identified] : calm [de-identified] : 5/5 muscle strength for all muscles and tendons crossing the foot and ankle joints, ankle joint and STJ ROM intact b/l. No pain with calf compression b/l. [de-identified] : Right plantar hallux wound closed  (+)OM distal phalanx  - re- opened down to subcutaneous tissue with no signs of infection from the wound - healed  [FreeTextEntry1] : Right Plantar Hallux- Closed [de-identified] : callus [de-identified] : white cotton sock [TWNoteComboBox4] : None [de-identified] : other [TWNoteComboBox6] : Traumatic [de-identified] : None [de-identified] : None [de-identified] : No [de-identified] : Daily [de-identified] : Secondary Dressing

## 2023-05-10 NOTE — ASSESSMENT
[Verbal] : Verbal [Written] : Written [Demo] : Demo [Patient] : Patient [Family member] : Family member [Good - alert, interested, motivated] : Good - alert, interested, motivated [Verbalizes knowledge/Understanding] : Verbalizes knowledge/understanding [Dressing changes] : dressing changes [Foot Care] : foot care [Skin Care] : skin care [Signs and symptoms of infection] : sign and symptoms of infection [Nutrition] : nutrition [How and When to Call] : how and when to call [Off-loading] : off-loading [Stable] : stable [Home] : Home [Ambulatory] : Ambulatory [Not Applicable - Long Term Care/Home Health Agency] : Long Term Care/Home Health Agency: Not Applicable [] : No [FreeTextEntry2] : Infection prevention\par Offloading\par Orthotics. [FreeTextEntry3] : wound remains closed. [FreeTextEntry4] : OK'D per DPM for Pt to transition into own shoes\par No dressing needed per DPM for right great toe.\par F/u 1 week 2.62

## 2023-05-10 NOTE — REVIEW OF SYSTEMS
[Fever] : no fever [Chills] : no chills [Eye Pain] : no eye pain [Red Eyes] : eyes not red [Earache] : no earache [Loss Of Hearing] : no hearing loss [Nosebleeds] : no nosebleeds [Chest Pain] : no chest pain [Shortness Of Breath] : no shortness of breath [Wheezing] : no wheezing [Cough] : no cough [Abdominal Pain] : no abdominal pain [Vomiting] : no vomiting [Diarrhea] : no diarrhea [Skin Wound] : skin wound [Confused] : no confusion [Dizziness] : no dizziness [Anxiety] : no anxiety [Easy Bleeding] : no tendency for easy bleeding [Negative] : Endocrine [de-identified] : Right great toe wound - re-opened in the center down to subcutaneous tissue with no signs of infection  - healed

## 2023-05-16 NOTE — HISTORY OF PRESENT ILLNESS
[FreeTextEntry1] : 4/11/23 Patient is 17 year old male presenting for follow up s/p right great toe wound debridement and bone biopsy (+) OM on pathology, wound had healed and patient was to return to clinic in 2 weeks. Patient's mother states patient had started wearing regular sneakers and noticed some drainage and was concerned scheduled to return earlier. \par \par 4/17/23 Patient has transitioned back into surgical shoe and is limiting ambulation. Patient denies any pain to the right great toe \par \par 4/24/23 Patient has been changing the dressing to the right great toe as advise. Plantar hallux wound now healed. \par \par 5/1/23. Patient has been applying protective dressing to the right hallux - healed at this time. Patient denies any pain or tenderness to the right foot. \par \par 5/10/23 Patient has transitioned into regular shoe gear without any complications. Right hallux wound has healed with callus. Patient denies any pain or drainage to the right hallux.

## 2023-05-16 NOTE — ASSESSMENT
[Verbal] : Verbal [Written] : Written [Patient] : Patient [Good - alert, interested, motivated] : Good - alert, interested, motivated [Verbalizes knowledge/Understanding] : Verbalizes knowledge/understanding [Dressing changes] : dressing changes [Foot Care] : foot care [Skin Care] : skin care [Pressure relief] : pressure relief [Signs and symptoms of infection] : sign and symptoms of infection [How and When to Call] : how and when to call [Off-loading] : off-loading [Patient responsibility to plan of care] : patient responsibility to plan of care [Stable] : stable [Home] : Home [Ambulatory] : Ambulatory [] : No [FreeTextEntry2] : Maintain Skin Integrity\par S/S of Infection \par Foot Care \par Pressure Relief and Offloading To Foot\par F/U 2 weeks  [FreeTextEntry4] : DPM educated the patient to continue protective dry dressings and offload the foot. \par DPM cleared patient to return to work p/t - letter given.\par F/U 2 weeks

## 2023-05-16 NOTE — PHYSICAL EXAM
[2+] : left 2+ [Alert] : alert [Oriented to Person] : oriented to person [Oriented to Place] : oriented to place [Oriented to Time] : oriented to time [Calm] : calm [Ankle Swelling (On Exam)] : not present [Varicose Veins Of Lower Extremities] : not present [] : not present [Purpura] : no purpura  [Petechiae] : no petechiae [Skin Ulcer] : no ulcer [de-identified] : calm [de-identified] : 5/5 muscle strength for all muscles and tendons crossing the foot and ankle joints, ankle joint and STJ ROM intact b/l. No pain with calf compression b/l. [de-identified] : Right plantar hallux wound closed  (+)OM distal phalanx  - re- opened down to subcutaneous tissue with no signs of infection from the wound - healed  [FreeTextEntry1] : Right Plantar Hallux - CLOSED  [de-identified] : Mechanically Cleansed with Normal Saline and Sterile Gauze\par

## 2023-05-16 NOTE — REVIEW OF SYSTEMS
[Skin Wound] : skin wound [Negative] : Endocrine [Fever] : no fever [Chills] : no chills [Eye Pain] : no eye pain [Red Eyes] : eyes not red [Earache] : no earache [Loss Of Hearing] : no hearing loss [Nosebleeds] : no nosebleeds [Chest Pain] : no chest pain [Shortness Of Breath] : no shortness of breath [Wheezing] : no wheezing [Cough] : no cough [Abdominal Pain] : no abdominal pain [Vomiting] : no vomiting [Diarrhea] : no diarrhea [Confused] : no confusion [Dizziness] : no dizziness [Anxiety] : no anxiety [Easy Bleeding] : no tendency for easy bleeding [de-identified] : Right great toe wound - re-opened in the center down to subcutaneous tissue with no signs of infection  - healed

## 2023-05-18 DIAGNOSIS — Z87.39 PERSONAL HISTORY OF OTHER DISEASES OF THE MUSCULOSKELETAL SYSTEM AND CONNECTIVE TISSUE: ICD-10-CM

## 2023-05-18 DIAGNOSIS — Z98.890 OTHER SPECIFIED POSTPROCEDURAL STATES: ICD-10-CM

## 2023-05-18 DIAGNOSIS — K59.09 OTHER CONSTIPATION: ICD-10-CM

## 2023-05-18 DIAGNOSIS — Z91.010 ALLERGY TO PEANUTS: ICD-10-CM

## 2023-05-18 DIAGNOSIS — Z91.012 ALLERGY TO EGGS: ICD-10-CM

## 2023-05-18 DIAGNOSIS — L97.514 NON-PRESSURE CHRONIC ULCER OF OTHER PART OF RIGHT FOOT WITH NECROSIS OF BONE: ICD-10-CM

## 2023-05-18 DIAGNOSIS — Z79.899 OTHER LONG TERM (CURRENT) DRUG THERAPY: ICD-10-CM

## 2023-05-18 DIAGNOSIS — Z83.49 FAMILY HISTORY OF OTHER ENDOCRINE, NUTRITIONAL AND METABOLIC DISEASES: ICD-10-CM

## 2023-05-18 DIAGNOSIS — L84 CORNS AND CALLOSITIES: ICD-10-CM

## 2023-05-18 DIAGNOSIS — M86.671 OTHER CHRONIC OSTEOMYELITIS, RIGHT ANKLE AND FOOT: ICD-10-CM

## 2023-05-24 ENCOUNTER — APPOINTMENT (OUTPATIENT)
Dept: WOUND CARE | Facility: HOSPITAL | Age: 18
End: 2023-05-24
Payer: MEDICAID

## 2023-05-24 ENCOUNTER — OUTPATIENT (OUTPATIENT)
Dept: OUTPATIENT SERVICES | Facility: HOSPITAL | Age: 18
LOS: 1 days | Discharge: HOME | End: 2023-05-24
Payer: MEDICAID

## 2023-05-24 VITALS
OXYGEN SATURATION: 99 % | BODY MASS INDEX: 18.19 KG/M2 | TEMPERATURE: 98.4 F | HEIGHT: 68 IN | HEART RATE: 115 BPM | WEIGHT: 120 LBS | RESPIRATION RATE: 20 BRPM | SYSTOLIC BLOOD PRESSURE: 120 MMHG | DIASTOLIC BLOOD PRESSURE: 78 MMHG

## 2023-05-24 DIAGNOSIS — S91.309D UNSPECIFIED OPEN WOUND, UNSPECIFIED FOOT, SUBSEQUENT ENCOUNTER: ICD-10-CM

## 2023-05-24 PROCEDURE — 99213 OFFICE O/P EST LOW 20 MIN: CPT

## 2023-05-24 PROCEDURE — G0463: CPT

## 2023-05-24 NOTE — ASSESSMENT
[Verbal] : Verbal [Demo] : Demo [Patient] : Patient [Family member] : Family member [Good - alert, interested, motivated] : Good - alert, interested, motivated [Verbalizes knowledge/Understanding] : Verbalizes knowledge/understanding [Skin Care] : skin care [Signs and symptoms of infection] : sign and symptoms of infection [Nutrition] : nutrition [How and When to Call] : how and when to call [Pain Management] : pain management [Patient responsibility to plan of care] : patient responsibility to plan of care [] : Yes [Stable] : stable [Home] : Home [Ambulatory] : Ambulatory [Not Applicable - Long Term Care/Home Health Agency] : Long Term Care/Home Health Agency: Not Applicable [FreeTextEntry2] : Infection Prevention\par Dressing changes (Per MD order)\par \par \par \par \par  [FreeTextEntry4] : Letter for work given to patient\par Follow up [facility] 2 weeks

## 2023-05-24 NOTE — PLAN
[FreeTextEntry1] : continue observation and patient able to transition to work and all shoe gear PTR 1 month.  Spent 20 minutes for patient care and medical decision making.\par

## 2023-05-24 NOTE — REVIEW OF SYSTEMS
[Fever] : no fever [Chills] : no chills [Eye Pain] : no eye pain [Red Eyes] : eyes not red [Earache] : no earache [Loss Of Hearing] : no hearing loss [Nosebleeds] : no nosebleeds [Chest Pain] : no chest pain [Shortness Of Breath] : no shortness of breath [Wheezing] : no wheezing [Cough] : no cough [Abdominal Pain] : no abdominal pain [Vomiting] : no vomiting [Diarrhea] : no diarrhea [Skin Wound] : skin wound [Confused] : no confusion [Dizziness] : no dizziness [Anxiety] : no anxiety [Easy Bleeding] : no tendency for easy bleeding [Negative] : Endocrine [de-identified] : Right great toe wound - re-opened in the center down to subcutaneous tissue with no signs of infection  - healed

## 2023-05-24 NOTE — PHYSICAL EXAM
[2+] : left 2+ [Ankle Swelling (On Exam)] : not present [Varicose Veins Of Lower Extremities] : not present [] : not present [Purpura] : no purpura  [Petechiae] : no petechiae [Skin Ulcer] : no ulcer [Alert] : alert [Oriented to Person] : oriented to person [Oriented to Place] : oriented to place [Oriented to Time] : oriented to time [Calm] : calm [de-identified] : calm [de-identified] : 5/5 muscle strength for all muscles and tendons crossing the foot and ankle joints, ankle joint and STJ ROM intact b/l. No pain with calf compression b/l. [de-identified] : Right plantar hallux wound closed  (+)OM distal phalanx  , - soi [FreeTextEntry1] : Plantar Hallux- CLOSED [de-identified] : no product [de-identified] : Mechanically cleansed with 0.9% normal saline and sterile gauze\par  [TWNoteComboBox1] : Right [de-identified] : Daily [de-identified] : Primary Dressing

## 2023-05-25 DIAGNOSIS — M86.671 OTHER CHRONIC OSTEOMYELITIS, RIGHT ANKLE AND FOOT: ICD-10-CM

## 2023-05-25 DIAGNOSIS — K59.09 OTHER CONSTIPATION: ICD-10-CM

## 2023-05-25 DIAGNOSIS — Z91.012 ALLERGY TO EGGS: ICD-10-CM

## 2023-05-25 DIAGNOSIS — Z98.890 OTHER SPECIFIED POSTPROCEDURAL STATES: ICD-10-CM

## 2023-05-25 DIAGNOSIS — Z83.49 FAMILY HISTORY OF OTHER ENDOCRINE, NUTRITIONAL AND METABOLIC DISEASES: ICD-10-CM

## 2023-05-25 DIAGNOSIS — Z91.010 ALLERGY TO PEANUTS: ICD-10-CM

## 2023-05-25 DIAGNOSIS — L97.514 NON-PRESSURE CHRONIC ULCER OF OTHER PART OF RIGHT FOOT WITH NECROSIS OF BONE: ICD-10-CM

## 2023-05-25 DIAGNOSIS — Z87.39 PERSONAL HISTORY OF OTHER DISEASES OF THE MUSCULOSKELETAL SYSTEM AND CONNECTIVE TISSUE: ICD-10-CM

## 2023-05-25 DIAGNOSIS — Z79.899 OTHER LONG TERM (CURRENT) DRUG THERAPY: ICD-10-CM

## 2023-06-05 ENCOUNTER — APPOINTMENT (OUTPATIENT)
Dept: WOUND CARE | Facility: HOSPITAL | Age: 18
End: 2023-06-05
Payer: MEDICAID

## 2023-06-05 ENCOUNTER — OUTPATIENT (OUTPATIENT)
Dept: OUTPATIENT SERVICES | Facility: HOSPITAL | Age: 18
LOS: 1 days | Discharge: HOME | End: 2023-06-05
Payer: MEDICAID

## 2023-06-05 VITALS
OXYGEN SATURATION: 99 % | HEIGHT: 68 IN | SYSTOLIC BLOOD PRESSURE: 120 MMHG | HEART RATE: 114 BPM | BODY MASS INDEX: 18.19 KG/M2 | RESPIRATION RATE: 20 BRPM | TEMPERATURE: 98.5 F | DIASTOLIC BLOOD PRESSURE: 75 MMHG | WEIGHT: 120 LBS

## 2023-06-05 DIAGNOSIS — S91.309D UNSPECIFIED OPEN WOUND, UNSPECIFIED FOOT, SUBSEQUENT ENCOUNTER: ICD-10-CM

## 2023-06-05 PROCEDURE — 99213 OFFICE O/P EST LOW 20 MIN: CPT

## 2023-06-05 PROCEDURE — G0463: CPT

## 2023-06-16 ENCOUNTER — APPOINTMENT (OUTPATIENT)
Dept: RADIOLOGY | Facility: CLINIC | Age: 18
End: 2023-06-16
Payer: MEDICAID

## 2023-06-16 ENCOUNTER — APPOINTMENT (OUTPATIENT)
Dept: PODIATRY | Facility: CLINIC | Age: 18
End: 2023-06-16
Payer: MEDICAID

## 2023-06-16 ENCOUNTER — LABORATORY RESULT (OUTPATIENT)
Age: 18
End: 2023-06-16

## 2023-06-16 VITALS
SYSTOLIC BLOOD PRESSURE: 128 MMHG | HEART RATE: 90 BPM | BODY MASS INDEX: 18.19 KG/M2 | WEIGHT: 120 LBS | OXYGEN SATURATION: 96 % | HEIGHT: 68 IN | DIASTOLIC BLOOD PRESSURE: 82 MMHG | TEMPERATURE: 98 F

## 2023-06-16 PROCEDURE — 99213 OFFICE O/P EST LOW 20 MIN: CPT

## 2023-06-16 PROCEDURE — 73630 X-RAY EXAM OF FOOT: CPT | Mod: RT

## 2023-06-19 NOTE — HISTORY OF PRESENT ILLNESS
[FreeTextEntry1] : Patient is 17 year old male presenting for follow up right hallux wound with underlying OM - wound has healed with no signs of infection. Patient has started working part time and has transitioned into regular shoes.

## 2023-06-19 NOTE — ASSESSMENT
[Verbal] : Verbal [Demo] : Demo [Patient] : Patient [Family member] : Family member [Good - alert, interested, motivated] : Good - alert, interested, motivated [Verbalizes knowledge/Understanding] : Verbalizes knowledge/understanding [Dressing changes] : dressing changes [Skin Care] : skin care [Signs and symptoms of infection] : sign and symptoms of infection [How and When to Call] : how and when to call [Patient responsibility to plan of care] : patient responsibility to plan of care [Foot Care] : foot care [Stable] : stable [Home] : Home [Ambulatory] : Ambulatory [FreeTextEntry2] : Maintain Skin Integrity\par S/S of Infection \par Dressing Changes \par F/U 4 week at [facility] or POD [] : No [FreeTextEntry3] : wounds closed [FreeTextEntry4] : DPM educated patient to resume regular activity- working, walking, etc. \par All questions and concerns answered during appointment.\par F/U 4 week at [facility] or POD

## 2023-06-19 NOTE — PLAN
[FreeTextEntry1] : .Patient seen and evaluated. Discussed etiology and treatment plan. Patient verbalized understanding.\par Patient happy with the outcome. Patient to start regular activities and start normal work hours. Patient denies any pain to the right foot. Patient RTC in 4 weeks or eariler if needed. Spent 20 minutes for patient care and medical decision making.\par

## 2023-06-19 NOTE — PHYSICAL EXAM
[2+] : left 2+ [Ankle Swelling (On Exam)] : not present [Varicose Veins Of Lower Extremities] : not present [] : not present [Purpura] : no purpura  [Petechiae] : no petechiae [Skin Ulcer] : no ulcer [Alert] : alert [Oriented to Person] : oriented to person [Oriented to Place] : oriented to place [Oriented to Time] : oriented to time [Calm] : calm [de-identified] : calm [de-identified] : 5/5 muscle strength for all muscles and tendons crossing the foot and ankle joints, ankle joint and STJ ROM intact b/l. No pain with calf compression b/l. [de-identified] : Right plantar hallux wound closed  (+)OM distal phalanx  , healed with no signs of infection  [FreeTextEntry1] : Right Plantar Hallux- CLOSED  [de-identified] : Dry Dressing  [de-identified] : Mechanically Cleansed with Normal Saline and Sterile Gauze\par

## 2023-06-19 NOTE — REVIEW OF SYSTEMS
[Fever] : no fever [Chills] : no chills [Eye Pain] : no eye pain [Red Eyes] : eyes not red [Earache] : no earache [Loss Of Hearing] : no hearing loss [Nosebleeds] : no nosebleeds [Chest Pain] : no chest pain [Shortness Of Breath] : no shortness of breath [Wheezing] : no wheezing [Cough] : no cough [Abdominal Pain] : no abdominal pain [Vomiting] : no vomiting [Diarrhea] : no diarrhea [Skin Wound] : skin wound [Confused] : no confusion [Dizziness] : no dizziness [Anxiety] : no anxiety [Easy Bleeding] : no tendency for easy bleeding [Negative] : Endocrine [de-identified] : Right great toe wound - re-opened in the center down to subcutaneous tissue with no signs of infection  - healed

## 2023-06-20 DIAGNOSIS — L97.514 NON-PRESSURE CHRONIC ULCER OF OTHER PART OF RIGHT FOOT WITH NECROSIS OF BONE: ICD-10-CM

## 2023-06-20 DIAGNOSIS — Z91.010 ALLERGY TO PEANUTS: ICD-10-CM

## 2023-06-20 DIAGNOSIS — K59.09 OTHER CONSTIPATION: ICD-10-CM

## 2023-06-20 DIAGNOSIS — Z87.39 PERSONAL HISTORY OF OTHER DISEASES OF THE MUSCULOSKELETAL SYSTEM AND CONNECTIVE TISSUE: ICD-10-CM

## 2023-06-20 DIAGNOSIS — Z98.890 OTHER SPECIFIED POSTPROCEDURAL STATES: ICD-10-CM

## 2023-06-20 DIAGNOSIS — Z91.012 ALLERGY TO EGGS: ICD-10-CM

## 2023-06-20 DIAGNOSIS — Z79.899 OTHER LONG TERM (CURRENT) DRUG THERAPY: ICD-10-CM

## 2023-06-20 DIAGNOSIS — M86.671 OTHER CHRONIC OSTEOMYELITIS, RIGHT ANKLE AND FOOT: ICD-10-CM

## 2023-06-20 DIAGNOSIS — Z83.49 FAMILY HISTORY OF OTHER ENDOCRINE, NUTRITIONAL AND METABOLIC DISEASES: ICD-10-CM

## 2023-06-21 RX ORDER — AMOXICILLIN AND CLAVULANATE POTASSIUM 875; 125 MG/1; MG/1
875-125 TABLET, COATED ORAL
Qty: 14 | Refills: 0 | Status: COMPLETED | COMMUNITY
Start: 2023-06-21 | End: 2023-06-28

## 2023-06-23 ENCOUNTER — APPOINTMENT (OUTPATIENT)
Dept: MRI IMAGING | Facility: CLINIC | Age: 18
End: 2023-06-23
Payer: MEDICAID

## 2023-06-23 PROCEDURE — 73718 MRI LOWER EXTREMITY W/O DYE: CPT | Mod: RT

## 2023-06-26 ENCOUNTER — OUTPATIENT (OUTPATIENT)
Dept: OUTPATIENT SERVICES | Facility: HOSPITAL | Age: 18
LOS: 1 days | Discharge: HOME | End: 2023-06-26
Payer: MEDICAID

## 2023-06-26 ENCOUNTER — APPOINTMENT (OUTPATIENT)
Dept: WOUND CARE | Facility: HOSPITAL | Age: 18
End: 2023-06-26
Payer: MEDICAID

## 2023-06-26 VITALS
SYSTOLIC BLOOD PRESSURE: 125 MMHG | HEART RATE: 94 BPM | DIASTOLIC BLOOD PRESSURE: 88 MMHG | OXYGEN SATURATION: 98 % | RESPIRATION RATE: 12 BRPM | HEIGHT: 68 IN | WEIGHT: 120 LBS | TEMPERATURE: 99.1 F | BODY MASS INDEX: 18.19 KG/M2

## 2023-06-26 DIAGNOSIS — S91.309D UNSPECIFIED OPEN WOUND, UNSPECIFIED FOOT, SUBSEQUENT ENCOUNTER: ICD-10-CM

## 2023-06-26 PROCEDURE — G0463: CPT

## 2023-06-26 PROCEDURE — 99213 OFFICE O/P EST LOW 20 MIN: CPT

## 2023-06-26 NOTE — REVIEW OF SYSTEMS
[Skin Wound] : skin wound [Negative] : Endocrine [Fever] : no fever [Chills] : no chills [Eye Pain] : no eye pain [Red Eyes] : eyes not red [Earache] : no earache [Loss Of Hearing] : no hearing loss [Nosebleeds] : no nosebleeds [Chest Pain] : no chest pain [Shortness Of Breath] : no shortness of breath [Wheezing] : no wheezing [Cough] : no cough [Abdominal Pain] : no abdominal pain [Vomiting] : no vomiting [Diarrhea] : no diarrhea [Confused] : no confusion [Dizziness] : no dizziness [Anxiety] : no anxiety [Easy Bleeding] : no tendency for easy bleeding [de-identified] : Right great toe wound - re-opened  down to bone

## 2023-06-26 NOTE — REVIEW OF SYSTEMS
[Fever] : no fever [Chills] : no chills [Eye Pain] : no eye pain [Red Eyes] : eyes not red [Earache] : no earache [Loss Of Hearing] : no hearing loss [Nosebleeds] : no nosebleeds [Chest Pain] : no chest pain [Shortness Of Breath] : no shortness of breath [Wheezing] : no wheezing [Cough] : no cough [Abdominal Pain] : no abdominal pain [Vomiting] : no vomiting [Diarrhea] : no diarrhea [Skin Wound] : skin wound [Confused] : no confusion [Dizziness] : no dizziness [Anxiety] : no anxiety [Easy Bleeding] : no tendency for easy bleeding [Negative] : Endocrine [de-identified] : Right great toe wound - re-opened  down to bone

## 2023-06-26 NOTE — PHYSICAL EXAM
[2 x 2] : 2 x 2  [2+] : left 2+ [Ankle Swelling (On Exam)] : not present [Varicose Veins Of Lower Extremities] : not present [] : not present [Purpura] : no purpura  [Petechiae] : no petechiae [Skin Ulcer] : no ulcer [Alert] : alert [Oriented to Person] : oriented to person [Oriented to Place] : oriented to place [Oriented to Time] : oriented to time [Calm] : calm [de-identified] : calm [de-identified] : 5/5 muscle strength for all muscles and tendons crossing the foot and ankle joints, ankle joint and STJ ROM intact b/l. No pain with calf compression b/l. [de-identified] : Right plantar hallux wound re- opened down to bone with serous drainage, no purulence, no edema or erythema noted  [FreeTextEntry1] : Right Great Toe  [FreeTextEntry2] : 1.5 [FreeTextEntry3] : 0.2 [FreeTextEntry4] : 0.1 [de-identified] : serosanguineous [de-identified] : s [de-identified] : Silver Alginate [de-identified] : Mechanically Cleansed with Normal Saline and Sterile Gauze\par  [TWNoteComboBox4] : Small [TWNoteComboBox5] : No [TWNoteComboBox6] : Other [de-identified] : No [de-identified] : Erythema [de-identified] : None [de-identified] : None [de-identified] : 100% [de-identified] : 3x Weekly [de-identified] : Primary Dressing

## 2023-06-26 NOTE — ASSESSMENT
[Verbal] : Verbal [Demo] : Demo [Patient] : Patient [Family member] : Family member [Good - alert, interested, motivated] : Good - alert, interested, motivated [Verbalizes knowledge/Understanding] : Verbalizes knowledge/understanding [Dressing changes] : dressing changes [Foot Care] : foot care [Skin Care] : skin care [Pressure relief] : pressure relief [Signs and symptoms of infection] : sign and symptoms of infection [How and When to Call] : how and when to call [Off-loading] : off-loading [Patient responsibility to plan of care] : patient responsibility to plan of care [Stable] : stable [Home] : Home [Ambulatory] : Ambulatory [] : No [FreeTextEntry2] : Promote Skin Integrity\par Infection Prevention \par Localized Wound Care \par Offloading and Pressure Relief \par Dressing Changes \par F/U 6/27 for surgical interventions \par  [FreeTextEntry4] : DPM discussed surgical intervention with patient. DPM advised patient to be seen at [facility] 6/27/23 for medical clearances and prepare for surgery on 6/28. \par Patient and family member states they wanted to discuss at home. DPM advised patient if they decide to continue with surgical plan, to arrive at [facility] 6/27. \par F/U for surgical intervention

## 2023-06-26 NOTE — REASON FOR VISIT
[Initial Visit] : an initial visit for [Other:___] : [unfilled] [Parent] : parent [FreeTextEntry2] : PAtient is being seen for R big toe blister s/p Sx 1/2023

## 2023-06-26 NOTE — HISTORY OF PRESENT ILLNESS
"Thank you for referring Mr. Huff to Interventional Radiology at the [facility]. Please don't hesitate to contact us if there are any questions regarding this evaluation at [phone]. If you have any other patients for whom you would like a consultation, please place an order for "[id number]", and we will be happy to review their case.  Sincerely, BRITTANY Anne, FNP Interventional Radiology   "
[FreeTextEntry1] : Patient is a 17-year-old male seen for follow-up for right great toe with history of underlying osteomyelitis. Patient is accompanied by mother. Patient states he started increased work hours at his workplace in the two  weeks and was wearing regular shoes. Patient's mother states she noticed a blister last week  in the area of the wound and was unable to be seen at the wound care center so made the appointment at the podiatry clinic, where the wound had re- opened again and x- rays and MRI was ordered. Since then have been applying dressing and offloading the toe wound.  Patient denies any pain to the right foot and states he has been putting protective dressing since the wound had healed. \par

## 2023-06-26 NOTE — PHYSICAL EXAM
[General Appearance - Alert] : alert [General Appearance - In No Acute Distress] : in no acute distress [Oriented To Time, Place, And Person] : oriented to person, place, and time [Affect] : the affect was normal [2+] : left 2+ [Alert] : alert [Oriented to Person] : oriented to person [Oriented to Place] : oriented to place [Oriented to Time] : oriented to time [Calm] : calm [de-identified] : 5/5 muscle strength for all muscles and tendons crossing the foot and ankle joints, ankle joint and STJ ROM intact b/l. No pain with calf compression b/l.\par Hammer toe contractures b/l to digits 1-5  [FreeTextEntry1] : Light touch sensation intact to the level of the digits b/l. [Ankle Swelling (On Exam)] : not present [Varicose Veins Of Lower Extremities] : not present [] : not present [Purpura] : no purpura  [Petechiae] : no petechiae [Skin Ulcer] : no ulcer [de-identified] : calm [de-identified] : 5/5 muscle strength for all muscles and tendons crossing the foot and ankle joints, ankle joint and STJ ROM intact b/l. No pain with calf compression b/l. [de-identified] :  New blister to plantar right hallux in the area of the previous wound that healed.  Postdebridement open wound down to the level of bone mild serous drainage no erythema no edema no signs of acute infection

## 2023-06-26 NOTE — ASSESSMENT
[FreeTextEntry1] : .Patient seen and evaluated. Discussed etiology and treatment plan. Patient verbalized understanding.\par Discussed with patient and mother the underlying chronicity of bone infection.  Ordered new x-rays and MRI to reassess the underlying bone infection.  Discussed with patient and mother that patient will need surgical intervention at this time with possible amputation.  Also discussed can continue treating conservatively but patient will be at risk of more proximal amputations in the future if the underlying bone infection is not treated.  Patient tried conservative treatment with wound debridement and PICC line IV antibiotics but the wound has reopened with serous drainage.  Patient denies any pain to the wound.  Patient and mother understand the complications risks involved.  Prescription for oral antibiotics sent to patient's pharmacy.  Patient will be seen for further treatment at the wound care center in 1 week.  Applied Betadine sterile dressing advised patient and mother for patient to transition into surgical shoe and to restart local wound care and offloading as advised.  All questions answered.\par Spent 30 minutes for patient care and medical decision making.\par

## 2023-06-26 NOTE — HISTORY OF PRESENT ILLNESS
[FreeTextEntry1] : Patient is a 17-year-old male seen for follow-up for right great toe with history of underlying osteomyelitis.  Patient is accompanied by mother.  Patient states he started increased work hours at his workplace in the past week and was wearing regular shoes.  Patient's mother states she noticed a blister yesterday in the area of the wound and was unable to be seen at the wound care center so made the appointment at the podiatry clinic.  Patient denies any pain to the right foot and states he has been putting protective dressing since the wound had healed.

## 2023-06-26 NOTE — PLAN
[FreeTextEntry1] : .Patient seen and evaluated. Discussed etiology and treatment plan. Patient verbalized understanding. Discussed MRI with patient and mother, small amount of marrow edema to the distal tuft of the phalanx, wound down to bone. Discussed conservative vs surgical intervention with the patient and family. Conservative with bone biopsy and PICC line antibiotics and offloading and local wound care. Surgical intervention would include partial hallux amputation. Discussed risks and complications of  conservative and surgical treatment. All questions answered. Patient and family will discuss and confirm decision. Continue local wound care and offloading.  Spent 20 minutes for patient care and medical decision making.\par

## 2023-06-27 ENCOUNTER — TRANSCRIPTION ENCOUNTER (OUTPATIENT)
Age: 18
End: 2023-06-27

## 2023-06-27 ENCOUNTER — INPATIENT (INPATIENT)
Facility: HOSPITAL | Age: 18
LOS: 2 days | Discharge: HOME | DRG: 479 | End: 2023-06-30
Attending: FAMILY MEDICINE | Admitting: FAMILY MEDICINE
Payer: MEDICAID

## 2023-06-27 VITALS
OXYGEN SATURATION: 100 % | HEART RATE: 110 BPM | WEIGHT: 119.93 LBS | TEMPERATURE: 98 F | RESPIRATION RATE: 20 BRPM | HEIGHT: 69 IN | SYSTOLIC BLOOD PRESSURE: 114 MMHG | DIASTOLIC BLOOD PRESSURE: 80 MMHG

## 2023-06-27 DIAGNOSIS — L97.509 NON-PRESSURE CHRONIC ULCER OF OTHER PART OF UNSPECIFIED FOOT WITH UNSPECIFIED SEVERITY: ICD-10-CM

## 2023-06-27 DIAGNOSIS — Z29.9 ENCOUNTER FOR PROPHYLACTIC MEASURES, UNSPECIFIED: ICD-10-CM

## 2023-06-27 DIAGNOSIS — L98.499 NON-PRESSURE CHRONIC ULCER OF SKIN OF OTHER SITES WITH UNSPECIFIED SEVERITY: ICD-10-CM

## 2023-06-27 DIAGNOSIS — M86.9 OSTEOMYELITIS, UNSPECIFIED: ICD-10-CM

## 2023-06-27 LAB
ALBUMIN SERPL ELPH-MCNC: 4.1 G/DL — SIGNIFICANT CHANGE UP (ref 3.3–5)
ALBUMIN SERPL ELPH-MCNC: 4.5 G/DL — SIGNIFICANT CHANGE UP (ref 3.3–5)
ALP SERPL-CCNC: 134 U/L — HIGH (ref 40–120)
ALP SERPL-CCNC: 154 U/L — HIGH (ref 40–120)
ALT FLD-CCNC: 30 U/L — SIGNIFICANT CHANGE UP (ref 12–78)
ALT FLD-CCNC: 34 U/L — SIGNIFICANT CHANGE UP (ref 12–78)
ANION GAP SERPL CALC-SCNC: 2 MMOL/L — LOW (ref 5–17)
ANION GAP SERPL CALC-SCNC: 3 MMOL/L — LOW (ref 5–17)
APTT BLD: 33.6 SEC — SIGNIFICANT CHANGE UP (ref 27.5–35.5)
AST SERPL-CCNC: 19 U/L — SIGNIFICANT CHANGE UP (ref 15–37)
AST SERPL-CCNC: 22 U/L — SIGNIFICANT CHANGE UP (ref 15–37)
BASOPHILS # BLD AUTO: 0.02 K/UL — SIGNIFICANT CHANGE UP (ref 0–0.2)
BASOPHILS NFR BLD AUTO: 0.5 % — SIGNIFICANT CHANGE UP (ref 0–2)
BILIRUB SERPL-MCNC: 0.9 MG/DL — SIGNIFICANT CHANGE UP (ref 0.2–1.2)
BILIRUB SERPL-MCNC: 1.1 MG/DL — SIGNIFICANT CHANGE UP (ref 0.2–1.2)
BUN SERPL-MCNC: 18 MG/DL — SIGNIFICANT CHANGE UP (ref 7–23)
BUN SERPL-MCNC: 22 MG/DL — SIGNIFICANT CHANGE UP (ref 7–23)
CALCIUM SERPL-MCNC: 10 MG/DL — SIGNIFICANT CHANGE UP (ref 8.5–10.1)
CALCIUM SERPL-MCNC: 9.1 MG/DL — SIGNIFICANT CHANGE UP (ref 8.5–10.1)
CHLORIDE SERPL-SCNC: 103 MMOL/L — SIGNIFICANT CHANGE UP (ref 96–108)
CHLORIDE SERPL-SCNC: 107 MMOL/L — SIGNIFICANT CHANGE UP (ref 96–108)
CO2 SERPL-SCNC: 31 MMOL/L — SIGNIFICANT CHANGE UP (ref 22–31)
CO2 SERPL-SCNC: 32 MMOL/L — HIGH (ref 22–31)
CREAT SERPL-MCNC: 0.71 MG/DL — SIGNIFICANT CHANGE UP (ref 0.5–1.3)
CREAT SERPL-MCNC: 0.77 MG/DL — SIGNIFICANT CHANGE UP (ref 0.5–1.3)
EGFR: 132 ML/MIN/1.73M2 — SIGNIFICANT CHANGE UP
EGFR: 136 ML/MIN/1.73M2 — SIGNIFICANT CHANGE UP
EOSINOPHIL # BLD AUTO: 0.08 K/UL — SIGNIFICANT CHANGE UP (ref 0–0.5)
EOSINOPHIL NFR BLD AUTO: 2 % — SIGNIFICANT CHANGE UP (ref 0–6)
ERYTHROCYTE [SEDIMENTATION RATE] IN BLOOD: 4 MM/HR — SIGNIFICANT CHANGE UP (ref 0–15)
ERYTHROCYTE [SEDIMENTATION RATE] IN BLOOD: 4 MM/HR — SIGNIFICANT CHANGE UP (ref 0–15)
GLUCOSE SERPL-MCNC: 126 MG/DL — HIGH (ref 70–99)
GLUCOSE SERPL-MCNC: 99 MG/DL — SIGNIFICANT CHANGE UP (ref 70–99)
HCT VFR BLD CALC: 43.6 % — SIGNIFICANT CHANGE UP (ref 39–50)
HCT VFR BLD CALC: 47.4 % — SIGNIFICANT CHANGE UP (ref 39–50)
HGB BLD-MCNC: 15.1 G/DL — SIGNIFICANT CHANGE UP (ref 13–17)
HGB BLD-MCNC: 16.5 G/DL — SIGNIFICANT CHANGE UP (ref 13–17)
IMM GRANULOCYTES NFR BLD AUTO: 0.2 % — SIGNIFICANT CHANGE UP (ref 0–0.9)
INR BLD: 1.03 RATIO — SIGNIFICANT CHANGE UP (ref 0.88–1.16)
LYMPHOCYTES # BLD AUTO: 1.04 K/UL — SIGNIFICANT CHANGE UP (ref 1–3.3)
LYMPHOCYTES # BLD AUTO: 25.6 % — SIGNIFICANT CHANGE UP (ref 13–44)
MCHC RBC-ENTMCNC: 30.4 PG — SIGNIFICANT CHANGE UP (ref 27–34)
MCHC RBC-ENTMCNC: 30.6 PG — SIGNIFICANT CHANGE UP (ref 27–34)
MCHC RBC-ENTMCNC: 34.6 GM/DL — SIGNIFICANT CHANGE UP (ref 32–36)
MCHC RBC-ENTMCNC: 34.8 GM/DL — SIGNIFICANT CHANGE UP (ref 32–36)
MCV RBC AUTO: 87.8 FL — SIGNIFICANT CHANGE UP (ref 80–100)
MCV RBC AUTO: 87.9 FL — SIGNIFICANT CHANGE UP (ref 80–100)
MONOCYTES # BLD AUTO: 0.37 K/UL — SIGNIFICANT CHANGE UP (ref 0–0.9)
MONOCYTES NFR BLD AUTO: 9.1 % — SIGNIFICANT CHANGE UP (ref 2–14)
NEUTROPHILS # BLD AUTO: 2.54 K/UL — SIGNIFICANT CHANGE UP (ref 1.8–7.4)
NEUTROPHILS NFR BLD AUTO: 62.6 % — SIGNIFICANT CHANGE UP (ref 43–77)
NRBC # BLD: 0 /100 WBCS — SIGNIFICANT CHANGE UP (ref 0–0)
NRBC # BLD: 0 /100 WBCS — SIGNIFICANT CHANGE UP (ref 0–0)
PLATELET # BLD AUTO: 227 K/UL — SIGNIFICANT CHANGE UP (ref 150–400)
PLATELET # BLD AUTO: 243 K/UL — SIGNIFICANT CHANGE UP (ref 150–400)
POTASSIUM SERPL-MCNC: 3.6 MMOL/L — SIGNIFICANT CHANGE UP (ref 3.5–5.3)
POTASSIUM SERPL-MCNC: 4 MMOL/L — SIGNIFICANT CHANGE UP (ref 3.5–5.3)
POTASSIUM SERPL-SCNC: 3.6 MMOL/L — SIGNIFICANT CHANGE UP (ref 3.5–5.3)
POTASSIUM SERPL-SCNC: 4 MMOL/L — SIGNIFICANT CHANGE UP (ref 3.5–5.3)
PROT SERPL-MCNC: 7.3 G/DL — SIGNIFICANT CHANGE UP (ref 6–8.3)
PROT SERPL-MCNC: 8.1 G/DL — SIGNIFICANT CHANGE UP (ref 6–8.3)
PROTHROM AB SERPL-ACNC: 12.1 SEC — SIGNIFICANT CHANGE UP (ref 10.5–13.4)
RBC # BLD: 4.96 M/UL — SIGNIFICANT CHANGE UP (ref 4.2–5.8)
RBC # BLD: 5.4 M/UL — SIGNIFICANT CHANGE UP (ref 4.2–5.8)
RBC # FLD: 13.4 % — SIGNIFICANT CHANGE UP (ref 10.3–14.5)
RBC # FLD: 13.6 % — SIGNIFICANT CHANGE UP (ref 10.3–14.5)
SODIUM SERPL-SCNC: 138 MMOL/L — SIGNIFICANT CHANGE UP (ref 135–145)
SODIUM SERPL-SCNC: 140 MMOL/L — SIGNIFICANT CHANGE UP (ref 135–145)
WBC # BLD: 3.88 K/UL — SIGNIFICANT CHANGE UP (ref 3.8–10.5)
WBC # BLD: 4.06 K/UL — SIGNIFICANT CHANGE UP (ref 3.8–10.5)
WBC # FLD AUTO: 3.88 K/UL — SIGNIFICANT CHANGE UP (ref 3.8–10.5)
WBC # FLD AUTO: 4.06 K/UL — SIGNIFICANT CHANGE UP (ref 3.8–10.5)

## 2023-06-27 PROCEDURE — 99221 1ST HOSP IP/OBS SF/LOW 40: CPT

## 2023-06-27 PROCEDURE — 71045 X-RAY EXAM CHEST 1 VIEW: CPT | Mod: 26

## 2023-06-27 PROCEDURE — 99285 EMERGENCY DEPT VISIT HI MDM: CPT

## 2023-06-27 RX ORDER — ACETAMINOPHEN 500 MG
650 TABLET ORAL EVERY 6 HOURS
Refills: 0 | Status: DISCONTINUED | OUTPATIENT
Start: 2023-06-27 | End: 2023-06-28

## 2023-06-27 RX ORDER — SODIUM CHLORIDE 9 MG/ML
1000 INJECTION INTRAMUSCULAR; INTRAVENOUS; SUBCUTANEOUS ONCE
Refills: 0 | Status: COMPLETED | OUTPATIENT
Start: 2023-06-27 | End: 2023-06-27

## 2023-06-27 RX ORDER — CEFAZOLIN SODIUM 1 G
1000 VIAL (EA) INJECTION EVERY 8 HOURS
Refills: 0 | Status: DISCONTINUED | OUTPATIENT
Start: 2023-06-27 | End: 2023-06-28

## 2023-06-27 RX ORDER — HEPARIN SODIUM 5000 [USP'U]/ML
5000 INJECTION INTRAVENOUS; SUBCUTANEOUS EVERY 12 HOURS
Refills: 0 | Status: DISCONTINUED | OUTPATIENT
Start: 2023-06-27 | End: 2023-06-28

## 2023-06-27 RX ORDER — VANCOMYCIN HCL 1 G
750 VIAL (EA) INTRAVENOUS ONCE
Refills: 0 | Status: DISCONTINUED | OUTPATIENT
Start: 2023-06-27 | End: 2023-06-27

## 2023-06-27 RX ORDER — PIPERACILLIN AND TAZOBACTAM 4; .5 G/20ML; G/20ML
3.38 INJECTION, POWDER, LYOPHILIZED, FOR SOLUTION INTRAVENOUS ONCE
Refills: 0 | Status: COMPLETED | OUTPATIENT
Start: 2023-06-27 | End: 2023-06-27

## 2023-06-27 RX ADMIN — SODIUM CHLORIDE 1000 MILLILITER(S): 9 INJECTION INTRAMUSCULAR; INTRAVENOUS; SUBCUTANEOUS at 15:09

## 2023-06-27 RX ADMIN — HEPARIN SODIUM 5000 UNIT(S): 5000 INJECTION INTRAVENOUS; SUBCUTANEOUS at 21:01

## 2023-06-27 RX ADMIN — PIPERACILLIN AND TAZOBACTAM 200 GRAM(S): 4; .5 INJECTION, POWDER, LYOPHILIZED, FOR SOLUTION INTRAVENOUS at 15:09

## 2023-06-27 RX ADMIN — Medication 100 MILLIGRAM(S): at 23:02

## 2023-06-27 NOTE — CONSULT NOTE ADULT - SUBJECTIVE AND OBJECTIVE BOX
NOTE IN PROGRESS    Pt for the OR tomorrow HPI:  17y year old Male seen at hospitals ED for a right great toe infection with osteomyelitis. Pt is well known to the [facility] wound care team. Pt and family advised by Dr. Irwin regarding need for surgical partial amputation of the right great toe and sent in today for admission. Denies any fever, chills, nausea, vomiting, chest pain, shortness of breath, or calf pain at this time.    PAST MEDICAL & SURGICAL HISTORY:  Scoliosis      Food allergy  egg, peanuts      No significant past surgical history          Allergies    eggs (Anaphylaxis)  peanuts (Anaphylaxis)  sulfa drugs (Rash)    Intolerances        MEDICATIONS  (STANDING):  vancomycin  IVPB 750 milliGRAM(s) IV Intermittent Once    MEDICATIONS  (PRN):      Social History:      FAMILY HISTORY:      Vital Signs Last 24 Hrs  T(C): 36.6 (27 Jun 2023 11:59), Max: 36.6 (27 Jun 2023 11:59)  T(F): 97.8 (27 Jun 2023 11:59), Max: 97.8 (27 Jun 2023 11:59)  HR: 110 (27 Jun 2023 11:59) (110 - 110)  BP: 114/80 (27 Jun 2023 11:59) (114/80 - 114/80)  BP(mean): --  RR: 20 (27 Jun 2023 11:59) (20 - 20)  SpO2: 100% (27 Jun 2023 11:59) (100% - 100%)    Parameters below as of 27 Jun 2023 11:59  Patient On (Oxygen Delivery Method): room air        PHYSICAL EXAM:  Vascular: DP & PT palpable bilaterally, Capillary refill 3 seconds  Neurological: Light touch sensation intact bilaterally  Musculoskeletal: 5/5 strength in all quadrants bilaterally, AJ & STJ ROM intact  Dermatological: Right hallux ulceration down to skin, subcutaneous tissue, fat, bone      Imaging: MRI shows marrow edema of the right hallux distal phalanx

## 2023-06-27 NOTE — ED ADULT NURSE NOTE - OBJECTIVE STATEMENT
17y male here for admission for OR tomorrow. Pt is scheduled for right toe amputation tomorrow. Pmh of osteomyelitis. Able to ambulate without difficulty. No fevers/chills.

## 2023-06-27 NOTE — ED ADULT TRIAGE NOTE - CHIEF COMPLAINT QUOTE
I have a wound on the bottom of my right foot.  I have had it for about one year.  it just appeared one day, it was not the result of any injury or trauma.  In January, I started with the wound center.  they had sent me over here also at that time and I was admitted.  At that time, they took a biopsy of the bone and I was in the hospital for 7 days with a picc line and abx.  I have continued with my treatments next door once a week since January.  Recently, the wound opened up again and now they have to go in and operate.  the surgery is scheduled for tomorrow   the patient is able to ambulate with a steady gait.  he has no pain.

## 2023-06-27 NOTE — PATIENT PROFILE ADULT - NSPROPASSIVESMOKEEXPOSURE_GEN_A_NUR
Hpi Title: Evaluation of Skin Lesions
How Severe Are Your Spot(S)?: moderate
Have Your Spot(S) Been Treated In The Past?: has not been treated
Additional History: FBSE. No concerns.
No

## 2023-06-27 NOTE — ED PROVIDER NOTE - OBJECTIVE STATEMENT
17 M sent to ED for wound infection, scheduled for amputation tomorrow. Was seen earlier this year for osteo of distal right great toe, had bone biopsy, received abx via picc line. Wound opened again, saw dr vásquez, plans for amputation tomorrow. Taking po amoxicillin. Denies f/c, n/v, cp, sob, cough, abd pain.

## 2023-06-27 NOTE — H&P ADULT - NSHPREVIEWOFSYSTEMS_GEN_ALL_CORE
· CONSTITUTIONAL: no fever and no chills.  · MUSCULOSKELETAL: - - -  · Musculoskeletal [-]: no limited range of motion  · SKIN: - - -  · Skin [+]: ulcer  · ROS STATEMENT: all other ROS negative except as per HPI

## 2023-06-27 NOTE — ED ADULT NURSE NOTE - NSFALLUNIVINTERV_ED_ALL_ED
Bed/Stretcher in lowest position, wheels locked, appropriate side rails in place/Call bell, personal items and telephone in reach/Instruct patient to call for assistance before getting out of bed/chair/stretcher/Non-slip footwear applied when patient is off stretcher/[place] to call system/Physically safe environment - no spills, clutter or unnecessary equipment/Purposeful proactive rounding/Room/bathroom lighting operational, light cord in reach

## 2023-06-27 NOTE — ED PROVIDER NOTE - CLINICAL SUMMARY MEDICAL DECISION MAKING FREE TEXT BOX
Patient is a 17-year-old male who presents to the emergency room with an infected toe and concern for osteomyelitis.  Past medical history of scoliosis.  Patient was last admitted to the hospital from January 24 through January 30 with right first toe osteomyelitis.  He underwent biopsy PICC line was placed and he was discharged on antibiotics.  He has been following with wound care and hyperbaric.  Was sent in today from wound care for admission for surgical partial amputation of the right great toe.  Patient endorses that the wound in the toe did open again.  He is currently taking p.o. amoxicillin.  Denies any fevers chills nausea vomiting chest pain shortness of breath.  On exam patient is lying in bed no acute distress normocephalic atraumatic pupils equal round and reactive heart is regular rate lungs clear to auscultation abdomen soft nontender nondistended.  There is a right hallux ulceration down the skin and subcutaneous fat and bone in the right first toe.  Patient presenting to the emergency room with osteomyelitis of the right first toe.  Being sent over for admission for partial amputation.  Will obtain screening labs cover with antibiotics and admit for further work-up and management. Results of labs reviewed patient with a normal white counts no significant electrolyte abnormality.  Independent review of EKG reveals normal sinus rhythm at a rate of 77 bpm.  Independent review of chest x-ray reveals no acute infiltrate.

## 2023-06-27 NOTE — H&P ADULT - HISTORY OF PRESENT ILLNESS
17 M sent to ED for wound infection, scheduled for amputation tomorrow. Was seen earlier this year for osteo of distal right great toe, had bone biopsy, received abx via picc line. Wound opened again, saw dr vásquez, plans for amputation tomorrow. Taking po amoxicillin. Denies f/c, n/v, cp, sob, cough, abd pain.  In ER patient was seen by ID.  patient is being admitted for further work up and treatment.

## 2023-06-27 NOTE — CONSULT NOTE ADULT - ASSESSMENT
17 M with hx of scoliosis, who presents for follow up R 1st toe wound. He was admitted [facility] in Jan 2023 for nonhealing wound on distal phalanx of right first toe. MRI showed osteomyelitis of the first distal phalanx and head of the first proximal phalanx. Also with trace effusion at the first interphalangeal joint. S/p debridement and bone biopsy on 1/26. Bone culture grew staph lugdunensis (oxacillin sensitive) and finegoldia magna. 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 He completed 6 weeks of cefazolin and 4 weeks of metronidazole, then additional 2 weeks of Keflex. Toe improved significantly in appearance on follow up.    Concern for R 1st toe infection, recently developed blister. MRI from 6/23 showed improvement from study back in January. Majority of the marrow signal abnormality within the great toe has resolved compared to the prior study, although remains a small amount of bone marrow edema in the distal aspect of distal phalanx. Had extensive discussion with mother and she has decided to go for bone biopsy and another course of IV antibiotics in case path shows osteomyelitis.    -suggest cefazolin 1g IV q8h  -check CRP in AM  -send OR bacterial, fungal and AFB cultures, pathology    Thank you for courtesy of this consult.     Will follow.  Discussed with Dr. Hahn and Dr. Alida Ospina MD  Division of Infectious Diseases   Cell [phone] between 8am and 6pm   After 6pm and weekends please call ID service at [phone].    17 M with hx of scoliosis, who presents for follow up R 1st toe wound. He was admitted [facility] in Jan 2023 for nonhealing wound on distal phalanx of right first toe. MRI showed osteomyelitis of the first distal phalanx and head of the first proximal phalanx. Also with trace effusion at the first interphalangeal joint. S/p debridement and bone biopsy on 1/26. Bone culture grew staph lugdunensis (oxacillin sensitive) and finegoldia magna. 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 He completed 6 weeks of cefazolin and 4 weeks of metronidazole, then additional 2 weeks of Keflex. Toe improved significantly in appearance on follow up.    Concern for R 1st toe infection, recently developed blister. MRI from 6/23 showed improvement from study back in January. Majority of the marrow signal abnormality within the great toe has resolved compared to the prior study, although remains a small amount of bone marrow edema in the distal aspect of distal phalanx. Had extensive discussion with mother and she has decided to go for bone biopsy and another course of IV antibiotics in case path shows osteomyelitis.    -suggest cefazolin 1g IV q8h  -check CRP in AM  -send OR bacterial, fungal and AFB cultures, pathology    Thank you for courtesy of this consult. I will be covered by Dr. Combs and Dr. Reilly tomorrow, 6/28/23.    Discussed with Dr. Hahn and Dr. Alida Ospina MD  Division of Infectious Diseases   Cell [phone] between 8am and 6pm   After 6pm and weekends please call ID service at [phone].

## 2023-06-27 NOTE — CONSULT NOTE ADULT - SUBJECTIVE AND OBJECTIVE BOX
[facility]  INFECTIOUS DISEASES - Tommie Combs, Bhavesh  [address] [place] [address], [place] [address]  Tel: [phone]     Fax: [phone]  =======================================================    [facility]-[id number]  YARITZA MERRILL     CC: Patient is a 17y old  Male who presents with a chief complaint of OM (27 Jun 2023 16:44)    HPI:  17 M with hx of scoliosis, who presents for follow up R 1st toe wound. He was admitted [facility] in Jan 2023 for nonhealing wound on distal phalanx of right first toe. MRI showed osteomyelitis of the first distal phalanx and head of the first proximal phalanx. Also with trace effusion at the first interphalangeal joint. S/p debridement and bone biopsy on 1/26. Bone culture grew staph lugdunensis (oxacillin sensitive) and finegoldia magna. 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 He completed 6 weeks of cefazolin and 4 weeks of metronidazole, then additional 2 weeks of Keflex. Toe improved significantly in appearance on follow up.    Mother also at bedside. She says the toe wound had healed until he developed a blister about a week ago. Patient also recently returned to work about 2 weeks ago. Otherwise patient denies any pain, fevers or chills.     PAST MEDICAL & SURGICAL HISTORY:  Scoliosis      Food allergy  egg, peanuts      No significant past surgical history          Social Hx:     FAMILY HISTORY:      Allergies    eggs (Anaphylaxis)  peanuts (Anaphylaxis)  sulfa drugs (Rash)    Intolerances        Antibiotics:  MEDICATIONS  (STANDING):  ceFAZolin   IVPB 1000 milliGRAM(s) IV Intermittent every 8 hours  heparin   Injectable 5000 Unit(s) SubCutaneous every 12 hours    MEDICATIONS  (PRN):  acetaminophen     Tablet .. 650 milliGRAM(s) Oral every 6 hours PRN Temp greater or equal to 38C (100.4F), Mild Pain (1 - 3)       REVIEW OF SYSTEMS:  CONSTITUTIONAL:  No Fever or chills  HEENT:  No sore throat or runny nose.  CARDIOVASCULAR:  No chest pain or SOB.  RESPIRATORY:  No cough, shortness of breath  GASTROINTESTINAL:  No nausea, vomiting or diarrhea.  GENITOURINARY:  No dysuria, frequency or urgency  MUSCULOSKELETAL:  no joint aches, no muscle pain  SKIN:  see history  NEUROLOGIC:  No headache or dizziness  PSYCHIATRIC:  No disorder of thought or mood.    Physical Exam:  Vital Signs Last 24 Hrs  T(C): 36.6 (27 Jun 2023 11:59), Max: 36.6 (27 Jun 2023 11:59)  T(F): 97.8 (27 Jun 2023 11:59), Max: 97.8 (27 Jun 2023 11:59)  HR: 110 (27 Jun 2023 11:59) (110 - 110)  BP: 114/80 (27 Jun 2023 11:59) (114/80 - 114/80)  BP(mean): --  RR: 20 (27 Jun 2023 11:59) (20 - 20)  SpO2: 100% (27 Jun 2023 11:59) (100% - 100%)    Parameters below as of 27 Jun 2023 11:59  Patient On (Oxygen Delivery Method): room air      Height (cm): 175.3 (06-27 @ 11:59)  Weight (kg): 54.4 (06-27 @ 11:59)  BMI (kg/m2): 17.7 (06-27 @ 11:59)  BSA (m2): 1.66 (06-27 @ 11:59)  GEN: NAD  HEENT: normocephalic and atraumatic.   NECK: Supple.   LUNGS: Normal respiratory effort  HEART: Regular rate and rhythm   ABDOMEN: Soft, nontender, and nondistended.    EXTREMITIES: No leg edema.  NEUROLOGIC: grossly intact.  PSYCHIATRIC: Appropriate affect .  SKIN: (+) R 1st toe wound, no odor or active drainage, swelling and discoloration on distal toe appear less from when last seen by me    Labs:                                RECENT CULTURES:        All imaging and other data have been reviewed.    MRI R foot:  FINDINGS:    LIGAMENTS AND CAPSULAR STRUCTURES: The Lisfranc ligament is intact. The capsular structures of the MTP joints are normal.  MUSCLES AND TENDONS: There is no tear, tendinosis or tenosynovitis  CARTILAGE AND SUBCHONDRAL BONE: The chondral surfaces are intact. There is no subchondral edema.  OSSEOUS ALIGNMENT: Normal  BONE MARROW: The majority of the marrow signal abnormality within the great toe has resolved compared to the prior study. There does however remain a small amount of bone marrow edema signal within the distal aspect of the distal phalanx of the great toe with associated low T1 signal abnormality. Residual or recurrent osteomyelitis cannot be excluded. There is patchy bone marrow edema at the base of the second metatarsal that is likely from stress reaction. There is no discrete fracture line. The previously noted marrow edema within the medial cuneiform has resolved.  WEB SPACES: Normal  PERIPHERAL SOFT TISSUES: Mild soft tissue swelling with skin thickening and ulceration at the distal aspect of the great toe.    IMPRESSION: The majority of the marrow signal abnormality within the great toe has resolved compared to the prior study. There does however remain a small amount of bone marrow edema signal within the distal aspect of the distal phalanx of the great toe with associated low T1 signal abnormality. Residual or recurrent osteomyelitis cannot be excluded.    Patchy bone marrow edema at the base of the second metatarsal that is likely from stress reaction. There is no discrete fracture line. The previously noted marrow edema within the medial cuneiform has resolved.

## 2023-06-27 NOTE — H&P ADULT - NSHPPHYSICALEXAM_GEN_ALL_CORE
· CONSTITUTIONAL: Well appearing, awake, alert, oriented to person, place, time/situation and in no apparent distress.  · EYES: Clear bilaterally, pupils equal, round and reactive to light.  · CARDIAC: Normal rate, regular rhythm.  Heart sounds S1, S2.  · RESPIRATORY: Breath sounds clear and equal bilaterally.  · GASTROINTESTINAL: Abdomen soft, non-tender, no guarding.  · MUSCULOSKELETAL: range of motion is not limited; great toe of first foot with bandage c/d/i  · NEUROLOGICAL: Alert and oriented, no focal deficits, no motor or sensory deficits.

## 2023-06-27 NOTE — CONSULT NOTE ADULT - PROBLEM SELECTOR RECOMMENDATION 9
Patient examined and evaluated at this time. Patient's mother and father advised regarding etiology of patient's symptoms and treatment plan. Given the patient's severity of symptoms, will plan for surgical intervention at this time. Discussed risks, benefits, and potential complications with patient and family members regarding surgical intervention including sepsis, loss of limb, and loss of life. All questions answered to satisfaction at this time.  Requesting medical risk stratification and optimization.  Will plan for right hallux partial amputation tomorrow 6/28/23 with Dr. Irwin.

## 2023-06-27 NOTE — ED ADULT TRIAGE NOTE - AS TEMP SITE
[FreeTextEntry1] : CLAUDINE one year.\par Follow up in 6 months for clinical exam.\par May consider warm compresses pr caffeine cessation if left breast pain worsens.
oral

## 2023-06-27 NOTE — H&P ADULT - NSHPLABSRESULTS_GEN_ALL_CORE
06-27    140  |  107  |  18  ----------------------------<  126<H>  4.0   |  31  |  0.77    Ca    9.1      27 Jun 2023 18:01    TPro  7.3  /  Alb  4.1  /  TBili  1.1  /  DBili  x   /  AST  19  /  ALT  30  /  AlkPhos  134<H>  06-27                            15.1   3.88  )-----------( 227      ( 27 Jun 2023 18:01 )             43.6             LIVER FUNCTIONS - ( 27 Jun 2023 18:01 )  Alb: 4.1 g/dL / Pro: 7.3 g/dL / ALK PHOS: 134 U/L / ALT: 30 U/L / AST: 19 U/L / GGT: x             PT/INR - ( 27 Jun 2023 15:05 )   PT: 12.1 sec;   INR: 1.03 ratio         PTT - ( 27 Jun 2023 15:05 )  PTT:33.6 sec    Urinalysis Basic - ( 27 Jun 2023 18:01 )    Color: x / Appearance: x / SG: x / pH: x  Gluc: 126 mg/dL / Ketone: x  / Bili: x / Urobili: x   Blood: x / Protein: x / Nitrite: x   Leuk Esterase: x / RBC: x / WBC x   Sq Epi: x / Non Sq Epi: x / Bacteria: x

## 2023-06-27 NOTE — ED PROVIDER NOTE - DIFFERENTIAL DIAGNOSIS
Patient presenting to the emergency room with osteomyelitis of the right first toe.  Being sent over for admission for partial amputation.  Will obtain screening labs cover with antibiotics and admit for further work-up and management. Differential Diagnosis
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
## 2023-06-28 LAB
CRP SERPL-MCNC: <3 MG/L — SIGNIFICANT CHANGE UP
GRAM STN FLD: SIGNIFICANT CHANGE UP
GRAM STN FLD: SIGNIFICANT CHANGE UP
PREALB SERPL-MCNC: 29 MG/DL — SIGNIFICANT CHANGE UP (ref 20–40)
SPECIMEN SOURCE: SIGNIFICANT CHANGE UP
SPECIMEN SOURCE: SIGNIFICANT CHANGE UP

## 2023-06-28 PROCEDURE — 99233 SBSQ HOSP IP/OBS HIGH 50: CPT

## 2023-06-28 PROCEDURE — 88304 TISSUE EXAM BY PATHOLOGIST: CPT | Mod: 26

## 2023-06-28 PROCEDURE — 88311 DECALCIFY TISSUE: CPT | Mod: 26

## 2023-06-28 PROCEDURE — 11044 DBRDMT BONE 1ST 20 SQ CM/<: CPT

## 2023-06-28 PROCEDURE — 73630 X-RAY EXAM OF FOOT: CPT | Mod: 26,RT

## 2023-06-28 RX ORDER — ONDANSETRON 8 MG/1
4 TABLET, FILM COATED ORAL ONCE
Refills: 0 | Status: DISCONTINUED | OUTPATIENT
Start: 2023-06-28 | End: 2023-06-28

## 2023-06-28 RX ORDER — HYDROMORPHONE HYDROCHLORIDE 2 MG/ML
0.5 INJECTION INTRAMUSCULAR; INTRAVENOUS; SUBCUTANEOUS
Refills: 0 | Status: DISCONTINUED | OUTPATIENT
Start: 2023-06-28 | End: 2023-06-28

## 2023-06-28 RX ORDER — SODIUM CHLORIDE 9 MG/ML
1000 INJECTION, SOLUTION INTRAVENOUS
Refills: 0 | Status: DISCONTINUED | OUTPATIENT
Start: 2023-06-28 | End: 2023-06-28

## 2023-06-28 RX ORDER — CEFAZOLIN SODIUM 1 G
1000 VIAL (EA) INJECTION EVERY 8 HOURS
Refills: 0 | Status: COMPLETED | OUTPATIENT
Start: 2023-06-28 | End: 2023-06-29

## 2023-06-28 RX ORDER — ACETAMINOPHEN 500 MG
650 TABLET ORAL EVERY 6 HOURS
Refills: 0 | Status: DISCONTINUED | OUTPATIENT
Start: 2023-06-28 | End: 2023-06-30

## 2023-06-28 RX ORDER — OXYCODONE HYDROCHLORIDE 5 MG/1
5 TABLET ORAL ONCE
Refills: 0 | Status: DISCONTINUED | OUTPATIENT
Start: 2023-06-28 | End: 2023-06-28

## 2023-06-28 RX ADMIN — SODIUM CHLORIDE 75 MILLILITER(S): 9 INJECTION, SOLUTION INTRAVENOUS at 11:59

## 2023-06-28 RX ADMIN — Medication 100 MILLIGRAM(S): at 13:56

## 2023-06-28 RX ADMIN — SODIUM CHLORIDE 75 MILLILITER(S): 9 INJECTION, SOLUTION INTRAVENOUS at 12:50

## 2023-06-28 RX ADMIN — Medication 100 MILLIGRAM(S): at 21:13

## 2023-06-28 RX ADMIN — Medication 100 MILLIGRAM(S): at 06:28

## 2023-06-28 NOTE — BRIEF OPERATIVE NOTE - NSICDXBRIEFPOSTOP_GEN_ALL_CORE_FT
POST-OP DIAGNOSIS:  Open wound of toe 28-Jun-2023 11:14:22  Jennifer Irwin  Toe osteomyelitis, right 28-Jun-2023 11:14:36  Jennifer Irwin

## 2023-06-28 NOTE — PROGRESS NOTE ADULT - ASSESSMENT
{\rtf1\xmyura61992\ansi\gfikfhh2066\ftnbj\uc1\deff0  {\fonttbl{\f0 \fnil Segoe UI;}{\f1 \fnil \fcharset0 Segoe UI;}{\f2 \fnil  New;}{\f3 \fnil [place];}}  {\colortbl ;\tku081\ngrug129\ppgt552 ;\red0\green0\blue0 ;\red0\green0\vjth008 ;\hoa470\green0\fwov086 ;\avn998\green0\blue0 ;\red0\green0\blue0 ;}  {\stylesheet{\f0\fs20 Normal;}{\cs1 Default Paragraph Font;}{\s2\snext0\f2\fs0\b0\ul0\uldb0\uld0\embo0\i0\v0\strike0\striked0\protect0\super0\sub\caps0\scaps0\cf2\cb1\chcbpat1\expnd0\expndtw0\goyibdpnaq485\dn0 Definition Term;}{\s3\snext0\f2\fs0\b0\ul0\uldb0\uld0\embo0\i0\v0\strike0\striked0\protect0\super0\sub\caps0\scaps0\cf2\cb1\chcbpat1\expnd0\expndtw0\gqropfrdeh451\dn0\li360   Definition List;}{\cs4\f2\fs0\b0\ul0\uldb0\uld0\embo0\i\v0\strike0\striked0\protect0\super0\sub\caps0\scaps0\cf2\cb1\chcbpat1\expnd0\expndtw0\ngfzrvfwlb083\dn0 Definition;}{\s5\snext0\outlinelevel1\f2\fs48\b\ul0\uldb0\uld0\embo0\i0\v0\strike0\striked0\protect0\super0\sub\caps0\scaps0\cf2\cb1\chcbpat1\expnd0\expndtw0\yjwdvpfgcz301\dn0\sb100\sa100\sl0\outlinelevel1\keepn   H1;}{\s6\snext0\outlinelevel2\f2\fs36\b\ul0\uldb0\uld0\embo0\i0\v0\strike0\striked0\protect0\super0\sub\caps0\scaps0\cf2\cb1\chcbpat1\expnd0\expndtw0\cbptgmimss870\dn0\sb100\sa100\sl0\outlinelevel2\keepn H2;}{\s7\snext0\outlinelevel3\f2\fs28\b\ul0\uldb0\uld0\embo0\i0\v0\strike0\striked0\protect0\super0\sub\caps0\scaps0\cf2\cb1\chcbpat1\expnd0\expndtw0\wcyivhdvxu868\dn0\sb100\sa100\sl0\outlinelevel3\keepn   H3;}{\s8\snext0\outlinelevel4\f2\fs24\b\ul0\uldb0\uld0\embo0\i0\v0\strike0\striked0\protect0\super0\sub\caps0\scaps0\cf2\cb1\chcbpat1\expnd0\expndtw0\btwlimswaw368\dn0\sb100\sa100\sl0\outlinelevel4\keepn H4;}{\s9\snext0\outlinelevel5\f2\fs20\b\ul0\uldb0\uld0\embo0\i0\v0\strike0\striked0\protect0\super0\sub\caps0\scaps0\cf2\cb1\chcbpat1\expnd0\expndtw0\aqgskpbbqh236\dn0\sb100\sa100\sl0\outlinelevel5\keepn   H5;}{\s10\snext0\outlinelevel6\f2\fs16\b\ul0\uldb0\uld0\embo0\i0\v0\strike0\striked0\protect0\super0\sub\caps0\scaps0\cf2\cb1\chcbpat1\expnd0\expndtw0\mtfrztyedw733\dn0\sb100\sa100\sl0\outlinelevel6\keepn H6;}{\s11\snext0\f2\fs0\b0\ul0\uldb0\uld0\embo0\i\v0\strike0\striked0\protect0\super0\sub\caps0\scaps0\cf2\cb1\chcbpat1\expnd0\expndtw0\kgqjdkogbg332\dn0   Address;}{\s12\snext0\f2\fs0\b0\ul0\uldb0\uld0\embo0\i0\v0\strike0\striked0\protect0\super0\sub\caps0\scaps0\cf2\cb1\chcbpat1\expnd0\expndtw0\trtgwnunwr473\dn0\li360\ri360\sb100\sa100\sl0 Blockquote;}{\cs13\f2\fs0\b0\ul0\uldb0\uld0\embo0\i\v0\strike0\striked0\protect0\super0\sub\caps0\scaps0\cf2\cb1\chcbpat1\expnd0\expndtw0\vcaukitvzr623\dn0   CITE;}{\cs14\f2\fs20\b0\ul0\uldb0\uld0\embo0\i0\v0\strike0\striked0\protect0\super0\sub\caps0\scaps0\cf2\cb1\chcbpat1\expnd0\expndtw0\eorotbiejy952\dn0 CODE;}{\cs15\f2\fs0\b0\ul0\uldb0\uld0\embo0\i\v0\strike0\striked0\protect0\super0\sub\caps0\scaps0\cf2\cb1\chcbpat1\expnd0\expndtw0\tkdjvzgici388\dn0   Emphasis;}{\cs16\f2\fs0\b0\ul\uldb0\uld0\embo0\i0\v0\strike0\striked0\protect0\super0\sub\caps0\scaps0\cf3\cb1\chcbpat1\expnd0\expndtw0\pjeerohdkf038\dn0 Hyperlink;}{\cs17\f2\fs0\b0\ul\uldb0\uld0\embo0\i0\v0\strike0\striked0\protect0\super0\sub\caps0\scaps0\cf4\cb1\chcbpat1\expnd0\expndtw0\ewohwtltno910\dn0   FollowedHyperlink;}{\cs18\f2\fs20\b\ul0\uldb0\uld0\embo0\i0\v0\strike0\striked0\protect0\super0\sub\caps0\scaps0\cf2\cb1\chcbpat1\expnd0\expndtw0\rqhbgfgobu572\dn0 Keyboard;}{\s19\snext0\f2\fs20\b0\ul0\uldb0\uld0\embo0\i0\v0\strike0\striked0\protect0\super0\sub\caps0\scaps0\cf2\cb1\chcbpat1\expnd0\expndtw0\rhhxhqdvsi912\dn0\tx0\tx959\vn3067\dn3161\wx7344\ba5950\aa5426\vp6813\tb1736\uy6245\uk7758\sb0\sa0\sl0   Preformatted;}{\s20\snext0\f3\fs16\b0\ul0\uldb0\uld0\embo0\i0\v\strike0\striked0\protect0\super0\sub\caps0\scaps0\cf2\cb1\chcbpat1\expnd0\expndtw0\czuvzfrxlh621\dn0\brdrt\brdrdb\brdrcf2\qc z-Bottom of Form;}{\s21\snext0\f3\fs16\b0\ul0\uldb0\uld0\embo0\i0\v\strike0\striked0\protect0\super0\sub\caps0\scaps0\cf2\cb1\chcbpat1\expnd0\expndtw0\gjlwrbypxx332\dn0\brdrb\brdrdb\brdrcf2\qc   z-Top of Form;}{\cs22\f2\fs0\b0\ul0\uldb0\uld0\embo0\i0\v0\strike0\striked0\protect0\super0\sub\caps0\scaps0\cf2\cb1\chcbpat1\expnd0\expndtw0\czozrtnxpc423\dn0 Sample;}{\cs23\f2\fs0\b\ul0\uldb0\uld0\embo0\i0\v0\strike0\striked0\protect0\super0\sub\caps0\scaps0\cf2\cb1\chcbpat1\expnd0\expndtw0\mvvonfbzky053\dn0   Strong;}{\cs24\f2\fs20\b0\ul0\uldb0\uld0\embo0\i0\v0\strike0\striked0\protect0\super0\sub\caps0\scaps0\cf2\cb1\chcbpat1\expnd0\expndtw0\mrcjyxnllc817\dn0 Typewriter;}{\cs25\f2\fs0\b0\ul0\uldb0\uld0\embo0\i\v0\strike0\striked0\protect0\super0\sub\caps0\scaps0\cf2\cb1\chcbpat1\expnd0\expndtw0\nqtponiaqu468\dn0   Variable;}{\cs26\f2\fs0\b0\ul0\uldb0\uld0\embo0\i0\v\strike0\striked0\protect0\super0\sub\caps0\scaps0\cf5\cb1\chcbpat1\expnd0\expndtw0\zfhyczmsch848\dn0 HTML Markup;}{\cs27\f2\fs0\b0\ul0\uldb0\uld0\embo0\i0\v\strike0\striked0\protect0\super0\sub\caps0\scaps0\cf2\cb1\chcbpat1\expnd0\expndtw0\ibtxcdgkam088\dn0   Comment;}}  {\*\revtbl{Unknown;}}  {\*\listtable  {\list\listtemplateid0  {\listlevel\levelnfc0\levelfollow0\levelstartat1{\leveltext \'02\'00.}{\levelnumbers \'01}}  {\listlevel\levelnfc0\levelfollow0\levelstartat1{\leveltext \'02\'01.}{\levelnumbers \'01}}  {\listlevel\levelnfc0\levelfollow0\levelstartat1{\leveltext \'02\'02.}{\levelnumbers \'01}}  {\listlevel\levelnfc0\levelfollow0\levelstartat1{\leveltext \'02\'03.}{\levelnumbers \'01}}  {\listlevel\levelnfc0\levelfollow0\levelstartat1{\leveltext \'02\'04.}{\levelnumbers \'01}}  {\listlevel\levelnfc0\levelfollow0\levelstartat1{\leveltext \'02\'05.}{\levelnumbers \'01}}  {\listlevel\levelnfc0\levelfollow0\levelstartat1{\leveltext \'02\'06.}{\levelnumbers \'01}}  {\listlevel\levelnfc0\levelfollow0\levelstartat1{\leveltext \'02\'07.}{\levelnumbers \'01}}  {\listlevel\levelnfc0\levelfollow0\levelstartat0{\leveltext \'00}{\levelnumbers}}  {\listname ;}\listid1  }  }  {\*\listoverridetable}  \dvmfza76013\rdhfxv61164\krmdh8157\ekmam2668\jplev6174\vsend0083\tngvflo485\qzuxdkv200\nogrowautofit\zpkpyn294\formshade\nofeaturethrottle1\dntblnsbdb\fet4\aendnotes\aftnnrlc\pgbrdrhead\pgbrdrfoot  \sectd\eqemsx65035\yjgtmj41805\guttersxn0\niuvtqgl9921\vbirxmip1217\aejucknf3919\obneesab1170\maqpmim569\tbkvrgx575\sbkpage\pgncont\pgndec  \plain\plain\f0\fs24\ql\plain\f0\fs24\plain\f1\fs20\pmfx2192\hich\f1\dbch\f1\loch\f1\fs20 17 M with hx of scoliosis, who presents for follow up R 1st toe wound. He was admitted [facility] in Jan 2023 for nonhealing wound on distal phalanx of right first   toe. MRI showed osteomyelitis of the first distal phalanx and head of the first proximal phalanx. Also with trace effusion at the first interphalangeal joint. S/p debridement and bone biopsy on 1/26. Bone culture grew staph lugdunensis (oxacillin sensitive)   and finegoldia magna. 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 He completed 6 weeks of cefazolin and 4 weeks of metronidazole, then additional 2 weeks of Keflex. Toe improved significantly in appearance on follow up.\par  \par  Concern for R 1st toe infection, recently developed blister. MRI from 6/23 showed improvement from study back in January. Majority of the marrow signal abnormality within the great toe has resolved compared to the prior study, although remains a small   amount of bone marrow edema in the distal aspect of distal phalanx. Today had bone biopsy but wants to go home tonight. Unable to put PICC today. He has appt on monday for wound check. I discussed with Dr. Irwin that if he goes tonight will start   Augmentin to cover culture until next week, if pathology positive needs a PICC and 6 weeks treatment. Mother at the bedside agreed to plan. \par  \par  # Osteomyelitis? \par  \par  - Will Continue cefazolin if remains in the hospital\par  - Start Augmentin total 10days, if pathology positive will arrange for PICC next week \par  - Will follow OR cultures and pathology\par  \par  Dr. Ospina will follow tomorrow if remains in the hospital. \par  \par  Cruz Combs MD\par  Division of Infectious Diseases \par  Please call ID service at [phone] with any question.  \par  \par  35 minutes spent on total encounter assessing patient, examination, chart review, counseling and coordinating care by the attending physician/nurse/care manager.  \plain\f0\fs20\ylgl2400\hich\f0\dbch\f0\loch\f0\fs20\par  }

## 2023-06-28 NOTE — BRIEF OPERATIVE NOTE - NSICDXBRIEFPREOP_GEN_ALL_CORE_FT
PRE-OP DIAGNOSIS:  Toe osteomyelitis, right 28-Jun-2023 11:13:48  Jennifer Irwin  Open wound of toe 28-Jun-2023 11:14:05  Jennifer Irwin

## 2023-06-28 NOTE — DIETITIAN INITIAL EVALUATION ADULT - OTHER INFO
17 M sent to ED for wound infection, scheduled for amputation tomorrow. Was seen earlier this year for osteo of distal right great toe, had bone biopsy, received abx via picc line. Wound opened again, saw dr vásquez, plans for amputation tomorrow.  17 M sent to ED for wound infection, scheduled for amputation tomorrow. Was seen earlier this year for osteo of distal right great toe, had bone biopsy, received abx via picc line. Wound opened again, and surgery planned for today . Pt now in PACU ·  open wound of toe now s/p Biopsy of bone of distal great toe and debridement, bone, foot      17 M sent to ED for wound infection, seen earlier this year for osteo of distal right great toe, had bone biopsy, received abx via picc line. Wound opened again, and surgery planned for today . Pt now in PACU ·  open wound of toe now s/p Biopsy of bone of distal great toe and debridement, bone, foot .  At time of RD visit, pt just back from PACU and sleeping soundly. He is obviously thin appearing but review of 1/2023 RD note, pt has always been thin and on Pediasure in addition to a regular diet as a child. Pt with no hx of wt loss . jan 2023 admisison wt was 120#

## 2023-06-28 NOTE — DIETITIAN INITIAL EVALUATION ADULT - PERTINENT MEDS FT
MEDICATIONS  (STANDING):  ceFAZolin   IVPB 1000 milliGRAM(s) IV Intermittent every 8 hours  heparin   Injectable 5000 Unit(s) SubCutaneous every 12 hours    MEDICATIONS  (PRN):  acetaminophen     Tablet .. 650 milliGRAM(s) Oral every 6 hours PRN Temp greater or equal to 38C (100.4F), Mild Pain (1 - 3)

## 2023-06-28 NOTE — DIETITIAN INITIAL EVALUATION ADULT - NS FNS DIET ORDER
Diet, NPO after Midnight:      NPO Start Date: 27-Jun-2023,   NPO Start Time: 23:59 (06-27-23 @ 19:15)  Diet, Regular (06-27-23 @ 16:47)

## 2023-06-28 NOTE — DIETITIAN INITIAL EVALUATION ADULT - REASON
full not completed as pt is sleeping. There is on evidence of temporal wasting.  Pt has a hx of being very thin despite good intake

## 2023-06-28 NOTE — DIETITIAN INITIAL EVALUATION ADULT - PERTINENT LABORATORY DATA
06-27    140  |  107  |  18  ----------------------------<  126<H>  4.0   |  31  |  0.77    Ca    9.1      27 Jun 2023 18:01    TPro  7.3  /  Alb  4.1  /  TBili  1.1  /  DBili  x   /  AST  19  /  ALT  30  /  AlkPhos  134<H>  06-27  POCT Blood Glucose.: 84 mg/dL (06-28-23 @ 08:06)

## 2023-06-28 NOTE — BRIEF OPERATIVE NOTE - NSICDXBRIEFPROCEDURE_GEN_ALL_CORE_FT
PROCEDURES:  Biopsy of bone of distal great toe 28-Jun-2023 11:10:40  Jennifer Irwin  Debridement, bone, foot 28-Jun-2023 11:13:30  Jennifer Irwin

## 2023-06-29 ENCOUNTER — TRANSCRIPTION ENCOUNTER (OUTPATIENT)
Age: 18
End: 2023-06-29

## 2023-06-29 DIAGNOSIS — Z91.010 ALLERGY TO PEANUTS: ICD-10-CM

## 2023-06-29 DIAGNOSIS — L97.514 NON-PRESSURE CHRONIC ULCER OF OTHER PART OF RIGHT FOOT WITH NECROSIS OF BONE: ICD-10-CM

## 2023-06-29 DIAGNOSIS — Z79.899 OTHER LONG TERM (CURRENT) DRUG THERAPY: ICD-10-CM

## 2023-06-29 DIAGNOSIS — K59.09 OTHER CONSTIPATION: ICD-10-CM

## 2023-06-29 DIAGNOSIS — Z98.890 OTHER SPECIFIED POSTPROCEDURAL STATES: ICD-10-CM

## 2023-06-29 DIAGNOSIS — M86.671 OTHER CHRONIC OSTEOMYELITIS, RIGHT ANKLE AND FOOT: ICD-10-CM

## 2023-06-29 DIAGNOSIS — Z91.012 ALLERGY TO EGGS: ICD-10-CM

## 2023-06-29 DIAGNOSIS — Z87.39 PERSONAL HISTORY OF OTHER DISEASES OF THE MUSCULOSKELETAL SYSTEM AND CONNECTIVE TISSUE: ICD-10-CM

## 2023-06-29 DIAGNOSIS — Z83.49 FAMILY HISTORY OF OTHER ENDOCRINE, NUTRITIONAL AND METABOLIC DISEASES: ICD-10-CM

## 2023-06-29 PROCEDURE — 99024 POSTOP FOLLOW-UP VISIT: CPT

## 2023-06-29 PROCEDURE — 99233 SBSQ HOSP IP/OBS HIGH 50: CPT

## 2023-06-29 PROCEDURE — 36573 INSJ PICC RS&I 5 YR+: CPT

## 2023-06-29 PROCEDURE — 76937 US GUIDE VASCULAR ACCESS: CPT | Mod: 26

## 2023-06-29 PROCEDURE — 77001 FLUOROGUIDE FOR VEIN DEVICE: CPT | Mod: 26,59

## 2023-06-29 RX ORDER — SODIUM CHLORIDE 9 MG/ML
10 INJECTION INTRAMUSCULAR; INTRAVENOUS; SUBCUTANEOUS
Refills: 0 | Status: DISCONTINUED | OUTPATIENT
Start: 2023-06-29 | End: 2023-06-30

## 2023-06-29 RX ORDER — LANOLIN ALCOHOL/MO/W.PET/CERES
5 CREAM (GRAM) TOPICAL AT BEDTIME
Refills: 0 | Status: DISCONTINUED | OUTPATIENT
Start: 2023-06-29 | End: 2023-06-30

## 2023-06-29 RX ORDER — CEFTRIAXONE 500 MG/1
2000 INJECTION, POWDER, FOR SOLUTION INTRAMUSCULAR; INTRAVENOUS EVERY 24 HOURS
Refills: 0 | Status: DISCONTINUED | OUTPATIENT
Start: 2023-06-30 | End: 2023-06-30

## 2023-06-29 RX ORDER — HEPARIN SODIUM 5000 [USP'U]/ML
5000 INJECTION INTRAVENOUS; SUBCUTANEOUS EVERY 12 HOURS
Refills: 0 | Status: DISCONTINUED | OUTPATIENT
Start: 2023-06-29 | End: 2023-06-30

## 2023-06-29 RX ORDER — CHLORHEXIDINE GLUCONATE 213 G/1000ML
1 SOLUTION TOPICAL
Refills: 0 | Status: DISCONTINUED | OUTPATIENT
Start: 2023-06-29 | End: 2023-06-30

## 2023-06-29 RX ADMIN — Medication 100 MILLIGRAM(S): at 21:48

## 2023-06-29 RX ADMIN — Medication 100 MILLIGRAM(S): at 05:40

## 2023-06-29 RX ADMIN — HEPARIN SODIUM 5000 UNIT(S): 5000 INJECTION INTRAVENOUS; SUBCUTANEOUS at 17:08

## 2023-06-29 RX ADMIN — Medication 100 MILLIGRAM(S): at 14:52

## 2023-06-29 RX ADMIN — Medication 5 MILLIGRAM(S): at 21:47

## 2023-06-29 NOTE — PROCEDURE NOTE - NSINFORMCONSENT_GEN_A_CORE
Patient Education        Learning About RICE (Rest, Ice, Compression, and Elevation)  What is RICE? RICE is a way to care for an injury. RICE helps relieve pain and swelling. It may also help with healing and flexibility. RICE stands for:  · R est and protect the injured or sore area. · I ce or a cold pack used as soon as possible. · C ompression, or wrapping the injured or sore area with an elastic bandage. · E levation (propping up) the injured or sore area. How do you do RICE? You can use RICE for home treatment when you have general aches and pains or after an injury or surgery. Rest  · Do not put weight on the injury for at least 24 to 48 hours. · Use crutches for a badly sprained knee or ankle. · Support a sprained wrist, elbow, or shoulder with a sling. Ice  · Put ice or a cold pack on the injury right away to reduce pain and swelling. Frozen vegetables will also work as an ice pack. Put a thin cloth between the ice or cold pack and your skin. The cloth protects the injured area from getting too cold. · Use ice for 10 to 15 minutes at a time for the first 48 to 72 hours. Compression  · Use compression for sprains, strains, and surgeries of the arms and legs. · Wrap the injured area with an elastic bandage or compression sleeve to reduce swelling. · Don't wrap it too tightly. If the area below it feels numb, tingles, or feels cool, loosen the wrap. Elevation  · Use elevation for areas of the body that can be propped up, such as arms and legs. · Prop up the injured area on pillows whenever you use ice. Keep it propped up anytime you sit or lie down. · Try to keep the injured area at or above the level of your heart. This will help reduce swelling and bruising. Where can you learn more? Go to [email or web address].[email or web address]. org and sign in to your [employer] account. Enter M995 in the [employer] box to learn more about \"Learning About RICE (Rest, Ice, Compression, and Elevation). \"     If you do not have an account, please click on the \"Sign Up Now\" link. Current as of: March 2, 2020               Content Version: 12.6  © 9135-3952 [employer]. Care instructions adapted under license by [facility] ([facility]). If you have questions about a medical condition or this instruction, always ask your healthcare professional. [address] any warranty or liability for your use of this information. Benefits, risks, and possible complications of procedure explained to patient/caregiver who verbalized understanding and gave written consent.

## 2023-06-29 NOTE — PROVIDER CONTACT NOTE (CRITICAL VALUE NOTIFICATION) - ACTION/TREATMENT ORDERED:
MD aware no new orders at this time. Will contact  to make aware MD aware no new orders at this time. Will contact  to make aware, Dr.Perlman covering  and is aware

## 2023-06-29 NOTE — CARE COORDINATION ASSESSMENT. - NSCAREPROVIDERS_GEN_ALL_CORE_FT
CARE PROVIDERS:  Accepting Physician: Alexis Hahn  Access Services: Docty, Deisy  Admitting: Alexis Hahn  Attending: Alexis Hahn  Consultant: Scott Reilly  Consultant: Jennifer Irwin  Consultant: Gonzalez Rashid  Consultant: Cruz Combs  Consultant: Kadie Ospina  Consultant: Melissa Collins  Covering Team: Adolfo Lagunas  Covering Team: Perlman, Daryl  ED ACP: Paulette Mariano  ED Attending: Jackie Huber  ED Nurse: Alexa Arauz  Nurse: Radha Santiago  Nurse: Lucia Malhotra  Nurse: Ashley Donaldson  Ordered: ADM, User  Ordered: Doctor, Unknown  Outpatient Provider: Candie Saldana  Override: Abida Deng  Override: Prema Solano  Override: Sara Parker  PCA/Nursing Assistant: Tony Arauz  PCA/Nursing Assistant: Abida Deng  Primary Team: Parviz Ryan  Registered Dietitian: Su Murray  : Debbie Hawkins  : Patrizia Rush

## 2023-06-29 NOTE — PROGRESS NOTE ADULT - PROBLEM SELECTOR PLAN 2
Surgical culture proximal phalanx (+) with gram positive cocci   Discussed with Dr. Ospina and Dr. Hahn - patient to receive PICC line for long term antibiotics    Patient and family request discharge as soon as possible, per family patient does not want to stay in the hospital till final results of the surgical pathology and cultures are received. Patient and family would like to follow up at the wound care and continue care outpatient. Discussed with patient and family the risks and complications and also the delay in setting up home care if patient leaves prior to setting It up.   Patient and family verbalized understanding.
bone biopsy yesterday  IV ABX per ID
bone biopsy yesterday  IV ABX per ID

## 2023-06-29 NOTE — CHART NOTE - NSCHARTNOTEFT_GEN_A_CORE
Called by RN for critical value of rare gram positive cocci in tissue culture  explained to RN that critical values of private attendings' patients must be relayed to the attending of record.    - Patient is currently on cefazolin, which should cover gram positive cocci.   - ID follow-up

## 2023-06-29 NOTE — DISCHARGE NOTE PROVIDER - NSDCMRMEDTOKEN_GEN_ALL_CORE_FT
cefTRIAXone: 2,000 milligram(s) intravenously once a day   cefTRIAXone: 2,000 milligram(s) intravenously once a day  lactobacillus acidophilus oral capsule: 1 cap(s) orally once a day

## 2023-06-29 NOTE — DISCHARGE NOTE PROVIDER - HOSPITAL COURSE
admitted for right big toe osteomyelitis  ABX per ID  underwent bone biopsy  DC after ID and podiatry clearance admitted for right big toe osteomyelitis  ABX per ID  underwent bone biopsy  pathology significant for osteomyelitis  DC after ID and podiatry clearance

## 2023-06-29 NOTE — CARE COORDINATION ASSESSMENT. - OTHER PERTINENT DISCHARGE PLANNING INFORMATION:
Pt from home. Per pt, approximately 1 yr ago, he stepped on something which irritated his foot. Pt reports that he was at [facility] approximately 6 mts ago for same concern about foot. SW to follow and remain available for any needs.

## 2023-06-29 NOTE — DISCHARGE NOTE PROVIDER - CARE PROVIDER_API CALL
Kadie Ospina  Internal Medicine  [address] [place] [address]  Phone: [phone]  Fax: [phone]  Follow Up Time:     Jennifer Irwinh  Podiatric Medicine  [address] [place] [address]  Phone: [phone]  Fax: [phone]  Follow Up Time:

## 2023-06-29 NOTE — PROGRESS NOTE ADULT - ASSESSMENT
17 M with hx of scoliosis, who presents for follow up R 1st toe wound. He was admitted [facility] in Jan 2023 for nonhealing wound on distal phalanx of right first toe. MRI showed osteomyelitis of the first distal phalanx and head of the first proximal phalanx. Also with trace effusion at the first interphalangeal joint. S/p debridement and bone biopsy on 1/26. Bone culture grew staph lugdunensis (oxacillin sensitive) and finegoldia magna. 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 He completed 6 weeks of cefazolin and 4 weeks of metronidazole, then additional 2 weeks of Keflex. Toe improved significantly in appearance on follow up.    Concern for R 1st toe infection, recently developed blister. MRI from 6/23 showed improvement from study back in January. Majority of the marrow signal abnormality within the great toe has resolved compared to the prior study, although remains a small amount of bone marrow edema in the distal aspect of distal phalanx.     S/p bone biopsy on 6/28, path of distal phalanx shows mild chronic osteomyelitis. Noted to have gpc in pairs on gram stain of proximal phalanx, although culture currently no growth. Plan for treatment of osteomyelitis given high risk for further infection and amputation. Otherwise no fever and blood cultures currently no growth.    -continue cefazolin 1g IV q8h until tonight, then tomorrow AM will switch to ceftriaxone 2g IV [id number] plan for 6 weeks until 8/9/23  -follow cultures to completion  -PICC placement  -script sent to [facility]  -follow up with ID at Wound care center  -discussed with mother and father  -discussed with Dr. Hahn and Dr. Alida Ospina MD  Division of Infectious Diseases   Cell [phone] between 8am and 6pm   After 6pm and weekends please call ID service at [phone].

## 2023-06-29 NOTE — PROVIDER CONTACT NOTE (CRITICAL VALUE NOTIFICATION) - SITUATION
Received call from Sandra Chacko at [facility] with 6/28/23 right great toe tissue cx. No polymorphonuclear cells seen per low power field. Rare gram +cocci in pairs per oil power field. Pt is currently on Cefazolin ivabt

## 2023-06-29 NOTE — DISCHARGE NOTE PROVIDER - NSDCCPCAREPLAN_GEN_ALL_CORE_FT
PRINCIPAL DISCHARGE DIAGNOSIS  Diagnosis: Toe osteomyelitis  Assessment and Plan of Treatment: follow up with ID MD Dr. HAYWARD

## 2023-06-30 ENCOUNTER — TRANSCRIPTION ENCOUNTER (OUTPATIENT)
Age: 18
End: 2023-06-30

## 2023-06-30 VITALS
HEART RATE: 109 BPM | RESPIRATION RATE: 18 BRPM | TEMPERATURE: 98 F | SYSTOLIC BLOOD PRESSURE: 109 MMHG | OXYGEN SATURATION: 98 % | DIASTOLIC BLOOD PRESSURE: 73 MMHG

## 2023-06-30 PROCEDURE — 86901 BLOOD TYPING SEROLOGIC RH(D): CPT

## 2023-06-30 PROCEDURE — 87070 CULTURE OTHR SPECIMN AEROBIC: CPT

## 2023-06-30 PROCEDURE — 87040 BLOOD CULTURE FOR BACTERIA: CPT

## 2023-06-30 PROCEDURE — 82962 GLUCOSE BLOOD TEST: CPT

## 2023-06-30 PROCEDURE — 86900 BLOOD TYPING SEROLOGIC ABO: CPT

## 2023-06-30 PROCEDURE — 85610 PROTHROMBIN TIME: CPT

## 2023-06-30 PROCEDURE — 97165 OT EVAL LOW COMPLEX 30 MIN: CPT

## 2023-06-30 PROCEDURE — 85027 COMPLETE CBC AUTOMATED: CPT

## 2023-06-30 PROCEDURE — 93005 ELECTROCARDIOGRAM TRACING: CPT

## 2023-06-30 PROCEDURE — 80053 COMPREHEN METABOLIC PANEL: CPT

## 2023-06-30 PROCEDURE — 36415 COLL VENOUS BLD VENIPUNCTURE: CPT

## 2023-06-30 PROCEDURE — 96374 THER/PROPH/DIAG INJ IV PUSH: CPT

## 2023-06-30 PROCEDURE — 88304 TISSUE EXAM BY PATHOLOGIST: CPT

## 2023-06-30 PROCEDURE — 71045 X-RAY EXAM CHEST 1 VIEW: CPT

## 2023-06-30 PROCEDURE — 73630 X-RAY EXAM OF FOOT: CPT

## 2023-06-30 PROCEDURE — 99233 SBSQ HOSP IP/OBS HIGH 50: CPT

## 2023-06-30 PROCEDURE — 84134 ASSAY OF PREALBUMIN: CPT

## 2023-06-30 PROCEDURE — 85652 RBC SED RATE AUTOMATED: CPT

## 2023-06-30 PROCEDURE — 76937 US GUIDE VASCULAR ACCESS: CPT

## 2023-06-30 PROCEDURE — 88311 DECALCIFY TISSUE: CPT

## 2023-06-30 PROCEDURE — 97162 PT EVAL MOD COMPLEX 30 MIN: CPT

## 2023-06-30 PROCEDURE — 87075 CULTR BACTERIA EXCEPT BLOOD: CPT

## 2023-06-30 PROCEDURE — 86850 RBC ANTIBODY SCREEN: CPT

## 2023-06-30 PROCEDURE — 77001 FLUOROGUIDE FOR VEIN DEVICE: CPT

## 2023-06-30 PROCEDURE — 36573 INSJ PICC RS&I 5 YR+: CPT

## 2023-06-30 PROCEDURE — 86140 C-REACTIVE PROTEIN: CPT

## 2023-06-30 PROCEDURE — 99285 EMERGENCY DEPT VISIT HI MDM: CPT | Mod: 25

## 2023-06-30 PROCEDURE — 85730 THROMBOPLASTIN TIME PARTIAL: CPT

## 2023-06-30 PROCEDURE — 85025 COMPLETE CBC W/AUTO DIFF WBC: CPT

## 2023-06-30 PROCEDURE — C1751: CPT

## 2023-06-30 RX ORDER — LACTOBACILLUS ACIDOPHILUS 100MM CELL
1 CAPSULE ORAL
Qty: 0 | Refills: 0 | DISCHARGE
Start: 2023-06-30

## 2023-06-30 RX ORDER — CEFTRIAXONE 500 MG/1
2000 INJECTION, POWDER, FOR SOLUTION INTRAMUSCULAR; INTRAVENOUS
Qty: 0 | Refills: 0 | DISCHARGE
Start: 2023-06-30

## 2023-06-30 RX ORDER — LACTOBACILLUS ACIDOPHILUS 100MM CELL
1 CAPSULE ORAL DAILY
Refills: 0 | Status: DISCONTINUED | OUTPATIENT
Start: 2023-06-30 | End: 2023-06-30

## 2023-06-30 RX ADMIN — HEPARIN SODIUM 5000 UNIT(S): 5000 INJECTION INTRAVENOUS; SUBCUTANEOUS at 05:05

## 2023-06-30 RX ADMIN — Medication 1 TABLET(S): at 14:06

## 2023-06-30 RX ADMIN — CEFTRIAXONE 100 MILLIGRAM(S): 500 INJECTION, POWDER, FOR SOLUTION INTRAMUSCULAR; INTRAVENOUS at 05:04

## 2023-06-30 RX ADMIN — CHLORHEXIDINE GLUCONATE 1 APPLICATION(S): 213 SOLUTION TOPICAL at 12:03

## 2023-06-30 RX ADMIN — Medication 100 UNIT(S): at 14:28

## 2023-06-30 NOTE — OCCUPATIONAL THERAPY INITIAL EVALUATION ADULT - PERTINENT HX OF CURRENT PROBLEM, REHAB EVAL
Pt is a 18 y/o male with non-healing right foot wound plantar hallux down to bone s/p debridement 6/28/23 and PICC line placement 6/29/23.

## 2023-06-30 NOTE — OCCUPATIONAL THERAPY INITIAL EVALUATION ADULT - LEVEL OF INDEPENDENCE: BED TO CHAIR, REHAB EVAL
functional mobility in hospital room and hallway Rj hand-held assist with WB to right heel (+) surgical shoe; pt adamantly defers AD and mom reports someone will help with functional mobility at all times

## 2023-06-30 NOTE — OCCUPATIONAL THERAPY INITIAL EVALUATION ADULT - ADDITIONAL COMMENTS
Pt lives with his parents in a private home with 3 steps to enter (no rail) and 1 flight of stairs (B/L rails) to bedroom/bathroom. Bathroom has a stall shower. PTA pt was independent with ADLs and functional mobility without AD. Pt goes to college. Per pt's mom, they have a shower chair and plastic bags to cover foot and PICC for bathing.

## 2023-06-30 NOTE — PROGRESS NOTE ADULT - SUBJECTIVE AND OBJECTIVE BOX
Date of Service 06-29-23 @ 16:10    Patient is a 17y old  Male who presents with a chief complaint of OM (29 Jun 2023 14:51)      INTERVAL /OVERNIGHT EVENTS: pain tolerable    MEDICATIONS  (STANDING):  ceFAZolin   IVPB 1000 milliGRAM(s) IV Intermittent every 8 hours  chlorhexidine 4% Liquid 1 Application(s) Topical <User Schedule>  heparin   Injectable 5000 Unit(s) SubCutaneous every 12 hours  melatonin 5 milliGRAM(s) Oral at bedtime    MEDICATIONS  (PRN):  acetaminophen     Tablet .. 650 milliGRAM(s) Oral every 6 hours PRN Temp greater or equal to 38C (100.4F), Mild Pain (1 - 3)  sodium chloride 0.9% lock flush 10 milliLiter(s) IV Push every 1 hour PRN Pre/post blood products, medications, blood draw, and to maintain line patency      Allergies    eggs (Anaphylaxis)  peanuts (Anaphylaxis)  sulfa drugs (Rash)    Intolerances        REVIEW OF SYSTEMS:  CONSTITUTIONAL: No fever, weight loss, or fatigue  EYES: No eye pain, visual disturbances, or discharge  ENMT:  No difficulty hearing, tinnitus, vertigo; No sinus or throat pain  NECK: No pain or stiffness  RESPIRATORY: No cough, wheezing, chills or hemoptysis; No shortness of breath  CARDIOVASCULAR: No chest pain, palpitations, dizziness, or leg swelling  GASTROINTESTINAL: No abdominal or epigastric pain. No nausea, vomiting, or hematemesis; No diarrhea or constipation. No melena or hematochezia.  GENITOURINARY: No dysuria, frequency, hematuria, or incontinence  NEUROLOGICAL: No headaches, memory loss, loss of strength, numbness, or tremors  SKIN: No itching, burning, rashes, or lesions   LYMPH NODES: No enlarged glands  ENDOCRINE: No heat or cold intolerance; No hair loss; No polydipsia or polyuria  MUSCULOSKELETAL: No joint pain or swelling; No muscle, back, or extremity pain  PSYCHIATRIC: No depression, anxiety, mood swings, or difficulty sleeping  HEME/LYMPH: No easy bruising, or bleeding gums  ALLERGY AND IMMUNOLOGIC: No hives or eczema    Vital Signs Last 24 Hrs  T(C): 36.6 (29 Jun 2023 13:43), Max: 36.9 (29 Jun 2023 04:56)  T(F): 97.9 (29 Jun 2023 13:43), Max: 98.4 (29 Jun 2023 04:56)  HR: 74 (29 Jun 2023 13:43) (69 - 82)  BP: 113/73 (29 Jun 2023 13:43) (103/61 - 118/70)  BP(mean): --  RR: 17 (29 Jun 2023 13:43) (16 - 17)  SpO2: 96% (29 Jun 2023 13:43) (96% - 98%)    Parameters below as of 29 Jun 2023 13:43  Patient On (Oxygen Delivery Method): room air        PHYSICAL EXAM:  GENERAL: NAD, well-groomed, well-developed  HEAD:  Atraumatic, Normocephalic  EYES: EOMI, PERRLA, conjunctiva and sclera clear  ENMT: No tonsillar erythema, exudates, or enlargement; Moist mucous membranes, Good dentition, No lesions  NECK: Supple, No JVD, Normal thyroid  NERVOUS SYSTEM:  Alert & Oriented X3, Good concentration; Motor Strength 5/5 B/L upper and lower extremities; DTRs 2+ intact and symmetric  CHEST/LUNG: Clear to auscultation bilaterally; No rales, rhonchi, wheezing, or rubs  HEART: Regular rate and rhythm; No murmurs, rubs, or gallops  ABDOMEN: Soft, Nontender, Nondistended; Bowel sounds present  EXTREMITIES:  2+ Peripheral Pulses, No clubbing, cyanosis, or edema  LYMPH: No lymphadenopathy noted  SKIN: No rashes or lesions    LABS:      Ca    9.1        27 Jun 2023 18:01        Urinalysis Basic - ( 27 Jun 2023 18:01 )    Color: x / Appearance: x / SG: x / pH: x  Gluc: 126 mg/dL / Ketone: x  / Bili: x / Urobili: x   Blood: x / Protein: x / Nitrite: x   Leuk Esterase: x / RBC: x / WBC x   Sq Epi: x / Non Sq Epi: x / Bacteria: x      CAPILLARY BLOOD GLUCOSE          RADIOLOGY & ADDITIONAL TESTS:    Notes Reviewed:  [x ] YES  [ ] NO    Care Discussed with Consultants/Other Providers [x ] YES  [ ] NO
[facility]  INFECTIOUS DISEASES - [address]mie [address], [place] [address]  Tel: [phone]     Fax: [phone]  =======================================================    YARITZA MERRILL [id number]    Follow up: No fevers. Denies any pain or new complaints.    Allergies:  eggs (Anaphylaxis)  peanuts (Anaphylaxis)  sulfa drugs (Rash)      Antibiotics:  acetaminophen     Tablet .. 650 milliGRAM(s) Oral every 6 hours PRN  cefTRIAXone   IVPB 2000 milliGRAM(s) IV Intermittent every 24 hours  chlorhexidine 4% Liquid 1 Application(s) Topical <User Schedule>  heparin   Injectable 5000 Unit(s) SubCutaneous every 12 hours  lactobacillus acidophilus 1 Tablet(s) Oral daily  melatonin 5 milliGRAM(s) Oral at bedtime  sodium chloride 0.9% lock flush 10 milliLiter(s) IV Push every 1 hour PRN       REVIEW OF SYSTEMS:  CONSTITUTIONAL:  No Fever or chills  HEENT:  No sore throat or runny nose.  CARDIOVASCULAR:  No chest pain or SOB.  RESPIRATORY:  No cough, shortness of breath  GASTROINTESTINAL:  No nausea, vomiting or diarrhea.  GENITOURINARY:  No dysuria, frequency or urgency  MUSCULOSKELETAL:  no joint aches, no muscle pain  NEUROLOGIC:  No headache or dizziness     Physical Exam:  ICU Vital Signs Last 24 Hrs  T(C): 36.8 (30 Jun 2023 13:25), Max: 36.8 (30 Jun 2023 13:25)  T(F): 98.2 (30 Jun 2023 13:25), Max: 98.2 (30 Jun 2023 13:25)  HR: 109 (30 Jun 2023 13:25) (74 - 109)  BP: 109/73 (30 Jun 2023 13:25) (98/63 - 114/71)  BP(mean): --  ABP: --  ABP(mean): --  RR: 18 (30 Jun 2023 13:25) (17 - 18)  SpO2: 98% (30 Jun 2023 13:25) (96% - 98%)    O2 Parameters below as of 30 Jun 2023 13:25  Patient On (Oxygen Delivery Method): room air        GEN: NAD  HEENT: normocephalic and atraumatic.   NECK: Supple.   LUNGS: Normal respiratory effort  HEART: Regular rate and rhythm   ABDOMEN: Soft, nontender, and nondistended.    EXTREMITIES: No leg edema.  NEUROLOGIC: grossly intact.  PSYCHIATRIC: Appropriate affect .  SKIN: right foot wrapped with dressing    Labs:                  RECENT CULTURES:  06-28 @ 10:30 .Tissue Other, RIGHT FOOT DISTAL PHALANX     No growth    No polymorphonuclear cells seen per low power field  No organisms seen per oil power field      06-27 @ 15:05 .Blood Blood-Peripheral     No growth at 48 Hours        06-27 @ 15:00 .Blood Blood-Peripheral     No growth at 48 Hours              All imaging and data are reviewed.   
Patient is a 17y old  Male who presents with a chief complaint of OM (29 Jun 2023 20:36)  seen [facility] PACU, comfortable      INTERVAL HPI/OVERNIGHT EVENTS:  T(C): 36.7 (06-29-23 @ 20:38), Max: 36.9 (06-29-23 @ 04:56)  HR: 82 (06-29-23 @ 20:38) (69 - 82)  BP: 114/71 (06-29-23 @ 20:38) (103/61 - 118/70)  RR: 17 (06-29-23 @ 20:38) (16 - 17)  SpO2: 96% (06-29-23 @ 20:38) (96% - 98%)  Wt(kg): --  I&O's Summary    28 Jun 2023 07:01  -  29 Jun 2023 07:00  --------------------------------------------------------  IN: 175 mL / OUT: 0 mL / NET: 175 mL        LABS:              CAPILLARY BLOOD GLUCOSE                MEDICATIONS  (STANDING):  ceFAZolin   IVPB 1000 milliGRAM(s) IV Intermittent every 8 hours  chlorhexidine 4% Liquid 1 Application(s) Topical <User Schedule>  heparin   Injectable 5000 Unit(s) SubCutaneous every 12 hours  melatonin 5 milliGRAM(s) Oral at bedtime    MEDICATIONS  (PRN):  acetaminophen     Tablet .. 650 milliGRAM(s) Oral every 6 hours PRN Temp greater or equal to 38C (100.4F), Mild Pain (1 - 3)  sodium chloride 0.9% lock flush 10 milliLiter(s) IV Push every 1 hour PRN Pre/post blood products, medications, blood draw, and to maintain line patency      REVIEW OF SYSTEMS:  CONSTITUTIONAL: No fever, weight loss, or fatigue  EYES: No eye pain, visual disturbances, or discharge  ENMT:  No difficulty hearing, tinnitus, vertigo; No sinus or throat pain  NECK: No pain or stiffness  RESPIRATORY: No cough, wheezing, chills or hemoptysis; No shortness of breath  CARDIOVASCULAR: No chest pain, palpitations, dizziness, or leg swelling  GASTROINTESTINAL: No abdominal or epigastric pain. No nausea, vomiting, or hematemesis; No diarrhea or constipation. No melena or hematochezia.  GENITOURINARY: No dysuria, frequency, hematuria, or incontinence  NEUROLOGICAL: No headaches, memory loss, loss of strength, numbness, or tremors  SKIN: No itching, burning, rashes, or lesions   LYMPH NODES: No enlarged glands  ENDOCRINE: No heat or cold intolerance; No hair loss  MUSCULOSKELETAL: No joint pain or swelling; No muscle, back, or extremity pain  PSYCHIATRIC: No depression, anxiety, mood swings, or difficulty sleeping  HEME/LYMPH: No easy bruising, or bleeding gums  ALLERY AND IMMUNOLOGIC: No hives or eczema    RADIOLOGY & ADDITIONAL TESTS:    Imaging Personally Reviewed:  [ ] YES  [ ] NO    Consultant(s) Notes Reviewed:  [ ] YES  [ ] NO    PHYSICAL EXAM:  GENERAL: NAD, well-groomed, well-developed  HEAD:  Atraumatic, Normocephalic  EYES: EOMI, PERRLA, conjunctiva and sclera clear  ENMT: No tonsillar erythema, exudates, or enlargement; Moist mucous membranes, Good dentition, No lesions  NECK: Supple, No JVD, Normal thyroid  NERVOUS SYSTEM:  Alert & Oriented X3, Good concentration; Motor Strength 5/5 B/L upper and lower extremities; DTRs 2+ intact and symmetric  CHEST/LUNG: Clear to percussion bilaterally; No rales, rhonchi, wheezing, or rubs  HEART: Regular rate and rhythm; No murmurs, rubs, or gallops  ABDOMEN: Soft, Nontender, Nondistended; Bowel sounds present  EXTREMITIES:  2+ Peripheral Pulses, No clubbing, cyanosis, or edema  LYMPH: No lymphadenopathy noted  SKIN: No rashes or lesions    Care Discussed with Consultants/Other Providers [x ] YES  [ ] NO    
[facility]  INFECTIOUS DISEASES - [address]mie [address], [place] [address]  Tel: [phone]     Fax: [phone]  =======================================================    YARITZA MERRILL [id number]    Follow up: No fevers. Seen earlier today. Denies any pain or new complaints.    Allergies:  eggs (Anaphylaxis)  peanuts (Anaphylaxis)  sulfa drugs (Rash)      Antibiotics:  acetaminophen     Tablet .. 650 milliGRAM(s) Oral every 6 hours PRN  ceFAZolin   IVPB 1000 milliGRAM(s) IV Intermittent every 8 hours  chlorhexidine 4% Liquid 1 Application(s) Topical <User Schedule>  heparin   Injectable 5000 Unit(s) SubCutaneous every 12 hours  melatonin 5 milliGRAM(s) Oral at bedtime  sodium chloride 0.9% lock flush 10 milliLiter(s) IV Push every 1 hour PRN       REVIEW OF SYSTEMS:  CONSTITUTIONAL:  No Fever or chills  HEENT:  No sore throat or runny nose.  CARDIOVASCULAR:  No chest pain or SOB.  RESPIRATORY:  No cough, shortness of breath  GASTROINTESTINAL:  No nausea, vomiting or diarrhea.  GENITOURINARY:  No dysuria, frequency or urgency  MUSCULOSKELETAL:  no joint aches, no muscle pain  SKIN:  see history  NEUROLOGIC:  No headache or dizziness     Physical Exam:  ICU Vital Signs Last 24 Hrs  T(C): 36.6 (29 Jun 2023 13:43), Max: 36.9 (29 Jun 2023 04:56)  T(F): 97.9 (29 Jun 2023 13:43), Max: 98.4 (29 Jun 2023 04:56)  HR: 74 (29 Jun 2023 13:43) (69 - 82)  BP: 113/73 (29 Jun 2023 13:43) (103/61 - 118/70)  BP(mean): --  ABP: --  ABP(mean): --  RR: 17 (29 Jun 2023 13:43) (16 - 17)  SpO2: 96% (29 Jun 2023 13:43) (96% - 98%)    O2 Parameters below as of 29 Jun 2023 13:43  Patient On (Oxygen Delivery Method): room air      GEN: NAD  HEENT: normocephalic and atraumatic.   NECK: Supple.   LUNGS: Normal respiratory effort  HEART: Regular rate and rhythm   ABDOMEN: Soft, nontender, and nondistended.    EXTREMITIES: No leg edema.  NEUROLOGIC: grossly intact.  PSYCHIATRIC: Appropriate affect .  SKIN: right foot wrapped with dressing    Labs:                  RECENT CULTURES:  06-28 @ 10:30 .Tissue Other, RIGHT FOOT DISTAL PHALANX     No growth    No polymorphonuclear cells seen per low power field  No organisms seen per oil power field      06-27 @ 15:05 .Blood Blood-Peripheral     No growth at 24 hours        06-27 @ 15:00 .Blood Blood-Peripheral     No growth at 24 hours              All imaging and data are reviewed.   
[facility]   INFECTIOUS DISEASES   [address] [place] [address]  Tel: [phone]     Fax: [phone]  =========================================================  MD Tommie Kraus Kaushal, MD Cho, Michelle, MD Sunjit, Jaspal, MD  =========================================================    YARITZA MERRILL [id number]    Follow up: Foot osteomyelitis    No fever, no complaint, had biopsy today.     Allergies:  eggs (Anaphylaxis)  peanuts (Anaphylaxis)  sulfa drugs (Rash)    Antibiotics:  acetaminophen     Tablet .. 650 milliGRAM(s) Oral every 6 hours PRN  ceFAZolin   IVPB 1000 milliGRAM(s) IV Intermittent every 8 hours  HYDROmorphone  Injectable 0.5 milliGRAM(s) IV Push every 10 minutes PRN  lactated ringers. 1000 milliLiter(s) IV Continuous <Continuous>  ondansetron Injectable 4 milliGRAM(s) IV Push once PRN  oxyCODONE    IR 5 milliGRAM(s) Oral once PRN    REVIEW OF SYSTEMS:  CONSTITUTIONAL:  No Fever or chills  HEENT:   No diplopia or blurred vision.  No earache, sore throat or runny nose.  CARDIOVASCULAR:  No chest pain or SOB  RESPIRATORY:  No cough, shortness of breath, PND or orthopnea.  GASTROINTESTINAL:  No nausea, vomiting or diarrhea.  GENITOURINARY:  No dysuria, frequency or urgency. No Blood in urine  MUSCULOSKELETAL:  no joint aches, no muscle pain  SKIN:  No change in skin, hair or nails.  NEUROLOGIC:  No paresthesias or weakness.  PSYCHIATRIC:  No disorder of thought or mood.  ENDOCRINE:  No heat or cold intolerance, polyuria or polydipsia.  HEMATOLOGICAL:  No easy bruising or bleeding.      Physical Exam:  ICU Vital Signs Last 24 Hrs  T(C): 36.4 (28 Jun 2023 12:43), Max: 37.1 (27 Jun 2023 22:30)  T(F): 97.5 (28 Jun 2023 12:43), Max: 98.7 (27 Jun 2023 22:30)  HR: 86 (28 Jun 2023 12:43) (71 - 93)  BP: 122/71 (28 Jun 2023 12:43) (96/53 - 122/79)  BP(mean): --  ABP: --  ABP(mean): --  RR: 16 (28 Jun 2023 12:43) (12 - 18)  SpO2: 98% (28 Jun 2023 12:43) (95% - 100%)  O2 Parameters below as of 28 Jun 2023 12:43  Patient On (Oxygen Delivery Method): room air  GEN: NAD  HEENT: normocephalic and atraumatic. EOMI. MAMI.    NECK: Supple.  No lymphadenopathy   LUNGS: Clear to auscultation.  HEART: Regular rate and rhythm without murmur.  ABDOMEN: Soft, nontender, and nondistended.  Positive bowel sounds.    : No CVA tenderness  EXTREMITIES: Without edema.  MSK: no joint swelling  NEUROLOGIC: grossly intact.  PSYCHIATRIC: Appropriate affect .  SKIN: R foot dressed     Labs:  06-27    140  |  107  |  18  ----------------------------<  126<H>  4.0   |  31  |  0.77    Ca    9.1      27 Jun 2023 18:01    TPro  7.3  /  Alb  4.1  /  TBili  1.1  /  DBili  x   /  AST  19  /  ALT  30  /  AlkPhos  134<H>  06-27                          15.1   3.88  )-----------( 227      ( 27 Jun 2023 18:01 )             43.6     PT/INR - ( 27 Jun 2023 15:05 )   PT: 12.1 sec;   INR: 1.03 ratio    PTT - ( 27 Jun 2023 15:05 )  PTT:33.6 sec  Urinalysis Basic - ( 27 Jun 2023 18:01 )    Color: x / Appearance: x / SG: x / pH: x  Gluc: 126 mg/dL / Ketone: x  / Bili: x / Urobili: x   Blood: x / Protein: x / Nitrite: x   Leuk Esterase: x / RBC: x / WBC x   Sq Epi: x / Non Sq Epi: x / Bacteria: x    LIVER FUNCTIONS - ( 27 Jun 2023 18:01 )  Alb: 4.1 g/dL / Pro: 7.3 g/dL / ALK PHOS: 134 U/L / ALT: 30 U/L / AST: 19 U/L / GGT: x             All imaging and data are reviewed.   < from: Xray Foot AP + Lateral + Oblique, Bilat (03.06.23 @ 16:58) >  IMPRESSION:  1. Mild hallux valgus and early degenerative change of the left first MTP.  2. Normal articulation of the first metacarpal with the proximal first   phalanx on the right.  3. There is a focal bony defect along the medial aspect of the proximal   first phalanx distally, unlikely to represent osteomyelitis and is   bounded by peripheral sclerosis, perhaps related to treated osteomyelitis.  4. There is no specific evidence of osteomyelitis on either side and   there is no soft tissue gas or opaque foreign body.    
17y year old Male seen at [facility] [id number] D1 s/p right great toe wound debridement and bone biopsy (DOS 06/28/23). Patient relates no overnight events and states that they are doing well at this time. Patient has been tolerating the diet well without any complications. Denies any fever, chills, nausea, vomiting, chest pain, shortness of breath, or calf pain at this time.    Allergies    eggs (Anaphylaxis)  peanuts (Anaphylaxis)  sulfa drugs (Rash)    Intolerances        MEDICATIONS  (STANDING):  ceFAZolin   IVPB 1000 milliGRAM(s) IV Intermittent every 8 hours  heparin   Injectable 5000 Unit(s) SubCutaneous every 12 hours  melatonin 5 milliGRAM(s) Oral at bedtime    MEDICATIONS  (PRN):  acetaminophen     Tablet .. 650 milliGRAM(s) Oral every 6 hours PRN Temp greater or equal to 38C (100.4F), Mild Pain (1 - 3)      Vital Signs Last 24 Hrs  T(C): 36.9 (29 Jun 2023 04:56), Max: 36.9 (29 Jun 2023 04:56)  T(F): 98.4 (29 Jun 2023 04:56), Max: 98.4 (29 Jun 2023 04:56)  HR: 69 (29 Jun 2023 04:56) (69 - 86)  BP: 103/61 (29 Jun 2023 04:56) (103/61 - 122/71)  BP(mean): --  RR: 17 (29 Jun 2023 04:56) (16 - 17)  SpO2: 97% (29 Jun 2023 04:56) (97% - 98%)    Parameters below as of 29 Jun 2023 04:56  Patient On (Oxygen Delivery Method): room air      PHYSICAL EXAM:  Vascular: DP & PT palpable bilaterally, Capillary refill 3 seconds  Neurological: Light touch sensation intact bilaterally  Musculoskeletal: 5/5 strength in all quadrants bilaterally, AJ & STJ ROM intact, flexion contractures of the digits 1-5 b/l   Dermatological: Right plantar hallux wound with granular tissue with central opening down to bone,  , no edema, no purulence, no erythema, no proximal streaking, no fluctuance, no malodor, no signs of infection at this time.    CBC Full  -  ( 27 Jun 2023 18:01 )  WBC Count : 3.88 K/uL  RBC Count : 4.96 M/uL  Hemoglobin : 15.1 g/dL  Hematocrit : 43.6 %  Platelet Count - Automated : 227 K/uL  Mean Cell Volume : 87.9 fl  Mean Cell Hemoglobin : 30.4 pg  Mean Cell Hemoglobin Concentration : 34.6 gm/dL  Auto Neutrophil # : x  Auto Lymphocyte # : x  Auto Monocyte # : x  Auto Eosinophil # : x  Auto Basophil # : x  Auto Neutrophil % : x  Auto Lymphocyte % : x  Auto Monocyte % : x  Auto Eosinophil % : x  Auto Basophil % : x      06-27    140  |  107  |  18  ----------------------------<  126<H>  4.0   |  31  |  0.77    Ca    9.1      27 Jun 2023 18:01    TPro  7.3  /  Alb  4.1  /  TBili  1.1  /  DBili  x   /  AST  19  /  ALT  30  /  AlkPhos  134<H>  06-27                          15.1   3.88  )-----------( 227      ( 27 Jun 2023 18:01 )             43.6     PT/INR - ( 27 Jun 2023 15:05 )   PT: 12.1 sec;   INR: 1.03 ratio         PTT - ( 27 Jun 2023 15:05 )  PTT:33.6 sec      Culture - Tissue with Gram Stain (collected 28 Jun 2023 10:30)  Source: .Tissue Other, RIGHT FOOT PROXIMAL PHALANX  Gram Stain (28 Jun 2023 20:15):    No polymorphonuclear cells seen per low power field    Rare Gram positive cocci in pairs per oil power field  Preliminary Report (29 Jun 2023 12:19):    No growth to date.    Culture - Tissue with Gram Stain (collected 28 Jun 2023 10:30)  Source: .Tissue Other, RIGHT FOOT DISTAL PHALANX  Gram Stain (28 Jun 2023 20:14):    No polymorphonuclear cells seen per low power field    No organisms seen per oil power field  Preliminary Report (29 Jun 2023 12:18):    No growth    Culture - Blood (collected 27 Jun 2023 15:05)  Source: .Blood Blood-Peripheral  Preliminary Report (29 Jun 2023 02:02):    No growth at 24 hours    Culture - Blood (collected 27 Jun 2023 15:00)  Source: .Blood Blood-Peripheral  Preliminary Report (29 Jun 2023 02:02):    No growth at 24 hours        Imaging: ----------    1 / 1 Sarah Lomeli              Report date: 6/28/2023      View Order      (Report matches exam selected in Patient History pane)                     ACC: [id number] EXAM: XR FOOT COMP MIN 3 VIEWS RT ORDERED BY: SYDNIE CHAVEZ    PROCEDURE DATE: 06/28/2023        INTERPRETATION: Clinical history: 17-year-old male, right foot. Biopsy hallux.    Three views of the right foot are correlated with the MRI of 6/23/2023.    FINDINGS: No fracture, dislocation, degenerative change or radiographic soft tissue abnormality. Post biopsy changes.    IMPRESSION:    Unremarkable postoperative views    --- End of Report ---            SARAH LOMELI DO; Attending Radiologist  This document has been electronically signed. Jun 28 2023 4:00PM                              1 / 1 Irwin Lyle              Report date: 6/26/2023      View Order      (Report matches exam selected in Patient History pane)                           EXAM: [id number] - MR FOOT RT - ORDERED BY: SYDNIE CHAVEZ      PROCEDURE DATE: 06/23/2023        INTERPRETATION: RIGHT FOOT MRI    CLINICAL INFORMATION: Great toe wound. Evaluate for osteomyelitis.  TECHNIQUE: Multiplanar, multisequence MRI was obtained of the right foot. Comparison is made to prior study from 1/25/2023    FINDINGS:    LIGAMENTS AND CAPSULAR STRUCTURES: The Lisfranc ligament is intact. The capsular structures of the MTP joints are normal.  MUSCLES AND TENDONS: There is no tear, tendinosis or tenosynovitis  CARTILAGE AND SUBCHONDRAL BONE: The chondral surfaces are intact. There is no subchondral edema.  OSSEOUS ALIGNMENT: Normal  BONE MARROW: The majority of the marrow signal abnormality within the great toe has resolved compared to the prior study. There does however remain a small amount of bone marrow edema signal within the distal aspect of the distal phalanx of the great toe with associated low T1 signal abnormality. Residual or recurrent osteomyelitis cannot be excluded. There is patchy bone marrow edema at the base of the second metatarsal that is likely from stress reaction. There is no discrete fracture line. The previously noted marrow edema within the medial cuneiform has resolved.  WEB SPACES: Normal  PERIPHERAL SOFT TISSUES: Mild soft tissue swelling with skin thickening and ulceration at the distal aspect of the great toe.    IMPRESSION: The majority of the marrow signal abnormality within the great toe has resolved compared to the prior study. There does however remain a small amount of bone marrow edema signal within the distal aspect of the distal phalanx of the great toe with associated low T1 signal abnormality. Residual or recurrent osteomyelitis cannot be excluded.    Patchy bone marrow edema at the base of the second metatarsal that is likely from stress reaction. There is no discrete fracture line. The previously noted marrow edema within the medial cuneiform has resolved.    --- End of Report ---              IRWIN LYLE MD; Attending Radiologist  This document has been electronically signed. Jun 26 2023 2:28PM

## 2023-06-30 NOTE — DISCHARGE NOTE NURSING/CASE MANAGEMENT/SOCIAL WORK - PATIENT PORTAL LINK FT
You can access the FollowMyHealth Patient Portal offered by [facility] by registering at the following website: [email or web address]. By joining [employer]’s FollowMyHealth portal, you will also be able to view your health information using other applications (apps) compatible with our system.

## 2023-06-30 NOTE — CASE MANAGEMENT PROGRESS NOTE - NSCMPROGRESSNOTE_GEN_ALL_CORE
Pt being discharged home today with home antibiotic infusion with [facility]. [facility] can accept for SOC 7/2 but pt is being D/C'd home today. Referral sent to [facility] and accepted for SOC 7/1. CM met with patient and mother at bedside. Patient and mom verbalize understanding and agree with transition plan. Mother informed CM that patient had used [facility] in January and would like to have [facility] again. Mom to transport home. CM will remain available.

## 2023-06-30 NOTE — DISCHARGE NOTE NURSING/CASE MANAGEMENT/SOCIAL WORK - NSDCPEFALRISK_GEN_ALL_CORE
For information on Fall & Injury Prevention, visit: [email or web address] OR  [email or web address] OR  https://www.cdc.gov/steadi/patient.html

## 2023-06-30 NOTE — PROGRESS NOTE ADULT - ASSESSMENT
17 M with hx of scoliosis, who presents for follow up R 1st toe wound. He was admitted [facility] in Jan 2023 for nonhealing wound on distal phalanx of right first toe. MRI showed osteomyelitis of the first distal phalanx and head of the first proximal phalanx. Also with trace effusion at the first interphalangeal joint. S/p debridement and bone biopsy on 1/26. Bone culture grew staph lugdunensis (oxacillin sensitive) and finegoldia magna. 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 He completed 6 weeks of cefazolin and 4 weeks of metronidazole, then additional 2 weeks of Keflex. Toe improved significantly in appearance on follow up.    Concern for R 1st toe infection, recently developed blister. MRI from 6/23 showed improvement from study back in January. Majority of the marrow signal abnormality within the great toe has resolved compared to the prior study, although remains a small amount of bone marrow edema in the distal aspect of distal phalanx.     S/p bone biopsy on 6/28, path of distal phalanx shows mild chronic osteomyelitis. Noted to have gpc in pairs on gram stain of proximal phalanx, although cultures remain no growth. Plan for treatment of osteomyelitis given high risk for further infection and amputation. Otherwise no fever and blood cultures remain no growth.    -continue ceftriaxone 2g IV [id number] plan for 6 weeks until 8/9/23  -follow cultures to completion  -script sent to [facility]  -follow up with ID at Wound care center in 2-3 weeks  -discussed with mother at bedside  -discussed with Dr. Jovani Ospina MD  Division of Infectious Diseases   Cell [phone] between 8am and 6pm   After 6pm and weekends please call ID service at [phone].      17 M with hx of scoliosis, who presents for follow up R 1st toe wound. He was admitted [facility] in Jan 2023 for nonhealing wound on distal phalanx of right first toe. MRI showed osteomyelitis of the first distal phalanx and head of the first proximal phalanx. Also with trace effusion at the first interphalangeal joint. S/p debridement and bone biopsy on 1/26. Bone culture grew staph lugdunensis (oxacillin sensitive) and finegoldia magna. 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 He completed 6 weeks of cefazolin and 4 weeks of metronidazole, then additional 2 weeks of Keflex. Toe improved significantly in appearance on follow up.    Concern for R 1st toe infection, recently developed blister. MRI from 6/23 showed improvement from study back in January. Majority of the marrow signal abnormality within the great toe has resolved compared to the prior study, although remains a small amount of bone marrow edema in the distal aspect of distal phalanx.     S/p bone biopsy on 6/28, path of distal phalanx shows mild chronic osteomyelitis. Noted to have gpc in pairs on gram stain of proximal phalanx, although cultures remain no growth. Plan for treatment of osteomyelitis given high risk for further infection and amputation. Otherwise no fever and blood cultures remain no growth.    -continue ceftriaxone 2g IV [id number] plan for 6 weeks until 8/9/23  -follow cultures to completion  -weekly CBC with diff, CMP, CRP while on IV antibiotics  -script sent to [facility]  -follow up with ID at Wound care center in 2-3 weeks  -discussed with mother at bedside  -discussed with Dr. Jovani Ospina MD  Division of Infectious Diseases   Cell [phone] between 8am and 6pm   After 6pm and weekends please call ID service at [phone].

## 2023-06-30 NOTE — OCCUPATIONAL THERAPY INITIAL EVALUATION ADULT - REHAB POTENTIAL, OT EVAL
Family plans to assist pt as needed with ADLs and at all times for functional mobility; they verb/demo good understanding of WB restrictions and compensatory techniques for ADLs- no further skilled OT needs./none

## 2023-07-02 ENCOUNTER — NON-APPOINTMENT (OUTPATIENT)
Age: 18
End: 2023-07-02

## 2023-07-03 ENCOUNTER — APPOINTMENT (OUTPATIENT)
Dept: WOUND CARE | Facility: HOSPITAL | Age: 18
End: 2023-07-03
Payer: MEDICAID

## 2023-07-03 ENCOUNTER — OUTPATIENT (OUTPATIENT)
Dept: OUTPATIENT SERVICES | Facility: HOSPITAL | Age: 18
LOS: 1 days | Discharge: HOME | End: 2023-07-03
Payer: MEDICAID

## 2023-07-03 VITALS
SYSTOLIC BLOOD PRESSURE: 121 MMHG | RESPIRATION RATE: 18 BRPM | DIASTOLIC BLOOD PRESSURE: 80 MMHG | OXYGEN SATURATION: 98 % | HEART RATE: 110 BPM | TEMPERATURE: 97.7 F | BODY MASS INDEX: 18.19 KG/M2 | WEIGHT: 120 LBS | HEIGHT: 68 IN

## 2023-07-03 DIAGNOSIS — S91.309D UNSPECIFIED OPEN WOUND, UNSPECIFIED FOOT, SUBSEQUENT ENCOUNTER: ICD-10-CM

## 2023-07-03 LAB
CULTURE RESULTS: SIGNIFICANT CHANGE UP
SPECIMEN SOURCE: SIGNIFICANT CHANGE UP

## 2023-07-03 PROCEDURE — 99214 OFFICE O/P EST MOD 30 MIN: CPT

## 2023-07-03 PROCEDURE — G0463: CPT

## 2023-07-03 RX ORDER — CEFTRIAXONE 2 G/1
2 INJECTION, POWDER, FOR SOLUTION INTRAMUSCULAR; INTRAVENOUS
Refills: 0 | Status: ACTIVE | COMMUNITY

## 2023-07-06 ENCOUNTER — APPOINTMENT (OUTPATIENT)
Dept: WOUND CARE | Facility: HOSPITAL | Age: 18
End: 2023-07-06
Payer: MEDICAID

## 2023-07-06 ENCOUNTER — OUTPATIENT (OUTPATIENT)
Dept: OUTPATIENT SERVICES | Facility: HOSPITAL | Age: 18
LOS: 1 days | Discharge: HOME | End: 2023-07-06
Payer: MEDICAID

## 2023-07-06 ENCOUNTER — TRANSCRIPTION ENCOUNTER (OUTPATIENT)
Age: 18
End: 2023-07-06

## 2023-07-06 VITALS
HEART RATE: 92 BPM | DIASTOLIC BLOOD PRESSURE: 79 MMHG | RESPIRATION RATE: 20 BRPM | TEMPERATURE: 98 F | SYSTOLIC BLOOD PRESSURE: 114 MMHG | WEIGHT: 120 LBS | HEIGHT: 68 IN | OXYGEN SATURATION: 98 % | BODY MASS INDEX: 18.19 KG/M2

## 2023-07-06 DIAGNOSIS — Z91.012 ALLERGY TO EGGS: ICD-10-CM

## 2023-07-06 DIAGNOSIS — M86.671 OTHER CHRONIC OSTEOMYELITIS, RIGHT ANKLE AND FOOT: ICD-10-CM

## 2023-07-06 DIAGNOSIS — K59.09 OTHER CONSTIPATION: ICD-10-CM

## 2023-07-06 DIAGNOSIS — S91.309D UNSPECIFIED OPEN WOUND, UNSPECIFIED FOOT, SUBSEQUENT ENCOUNTER: ICD-10-CM

## 2023-07-06 DIAGNOSIS — Z83.49 FAMILY HISTORY OF OTHER ENDOCRINE, NUTRITIONAL AND METABOLIC DISEASES: ICD-10-CM

## 2023-07-06 DIAGNOSIS — L97.514 NON-PRESSURE CHRONIC ULCER OF OTHER PART OF RIGHT FOOT WITH NECROSIS OF BONE: ICD-10-CM

## 2023-07-06 DIAGNOSIS — Z79.899 OTHER LONG TERM (CURRENT) DRUG THERAPY: ICD-10-CM

## 2023-07-06 DIAGNOSIS — Z91.010 ALLERGY TO PEANUTS: ICD-10-CM

## 2023-07-06 DIAGNOSIS — Z98.890 OTHER SPECIFIED POSTPROCEDURAL STATES: ICD-10-CM

## 2023-07-06 DIAGNOSIS — Z87.39 PERSONAL HISTORY OF OTHER DISEASES OF THE MUSCULOSKELETAL SYSTEM AND CONNECTIVE TISSUE: ICD-10-CM

## 2023-07-06 PROCEDURE — ZZZZZ: CPT

## 2023-07-06 PROCEDURE — G0463: CPT

## 2023-07-07 DIAGNOSIS — L97.514 NON-PRESSURE CHRONIC ULCER OF OTHER PART OF RIGHT FOOT WITH NECROSIS OF BONE: ICD-10-CM

## 2023-07-07 DIAGNOSIS — M86.671 OTHER CHRONIC OSTEOMYELITIS, RIGHT ANKLE AND FOOT: ICD-10-CM

## 2023-07-10 ENCOUNTER — APPOINTMENT (OUTPATIENT)
Dept: WOUND CARE | Facility: HOSPITAL | Age: 18
End: 2023-07-10
Payer: MEDICAID

## 2023-07-10 ENCOUNTER — OUTPATIENT (OUTPATIENT)
Dept: OUTPATIENT SERVICES | Facility: HOSPITAL | Age: 18
LOS: 1 days | Discharge: HOME | End: 2023-07-10
Payer: MEDICAID

## 2023-07-10 VITALS
RESPIRATION RATE: 16 BRPM | BODY MASS INDEX: 18.19 KG/M2 | TEMPERATURE: 98.1 F | DIASTOLIC BLOOD PRESSURE: 73 MMHG | OXYGEN SATURATION: 99 % | HEART RATE: 123 BPM | SYSTOLIC BLOOD PRESSURE: 125 MMHG | WEIGHT: 120 LBS | HEIGHT: 68 IN

## 2023-07-10 DIAGNOSIS — S91.309D UNSPECIFIED OPEN WOUND, UNSPECIFIED FOOT, SUBSEQUENT ENCOUNTER: ICD-10-CM

## 2023-07-10 PROCEDURE — G0463: CPT

## 2023-07-10 PROCEDURE — 99213 OFFICE O/P EST LOW 20 MIN: CPT

## 2023-07-11 DIAGNOSIS — L97.514 NON-PRESSURE CHRONIC ULCER OF OTHER PART OF RIGHT FOOT WITH NECROSIS OF BONE: ICD-10-CM

## 2023-07-11 DIAGNOSIS — Z91.012 ALLERGY TO EGGS: ICD-10-CM

## 2023-07-11 DIAGNOSIS — Z79.899 OTHER LONG TERM (CURRENT) DRUG THERAPY: ICD-10-CM

## 2023-07-11 DIAGNOSIS — Z83.49 FAMILY HISTORY OF OTHER ENDOCRINE, NUTRITIONAL AND METABOLIC DISEASES: ICD-10-CM

## 2023-07-11 DIAGNOSIS — Z91.010 ALLERGY TO PEANUTS: ICD-10-CM

## 2023-07-11 DIAGNOSIS — Z87.39 PERSONAL HISTORY OF OTHER DISEASES OF THE MUSCULOSKELETAL SYSTEM AND CONNECTIVE TISSUE: ICD-10-CM

## 2023-07-11 DIAGNOSIS — M86.671 OTHER CHRONIC OSTEOMYELITIS, RIGHT ANKLE AND FOOT: ICD-10-CM

## 2023-07-11 DIAGNOSIS — Z98.890 OTHER SPECIFIED POSTPROCEDURAL STATES: ICD-10-CM

## 2023-07-11 DIAGNOSIS — K59.09 OTHER CONSTIPATION: ICD-10-CM

## 2023-07-11 NOTE — REVIEW OF SYSTEMS
[Fever] : no fever [Chills] : no chills [Eye Pain] : no eye pain [Red Eyes] : eyes not red [Earache] : no earache [Loss Of Hearing] : no hearing loss [Nosebleeds] : no nosebleeds [Chest Pain] : no chest pain [Shortness Of Breath] : no shortness of breath [Wheezing] : no wheezing [Cough] : no cough [Abdominal Pain] : no abdominal pain [Vomiting] : no vomiting [Diarrhea] : no diarrhea [Skin Wound] : skin wound [Confused] : no confusion [Dizziness] : no dizziness [Anxiety] : no anxiety [Easy Bleeding] : no tendency for easy bleeding [Negative] : Endocrine [de-identified] : Right great toe wound - re-opened  down to bone

## 2023-07-11 NOTE — PLAN
[FreeTextEntry1] : Patient examined and evaluated at this time.  Discussed the treatment options and current treatment plan with patient and patient's mother.  Patient remains a very high risk for infection, sepsis, limb loss, and death.  Discussed surgical treatment options to correct the biomechanical etiology of patient's symptoms.  We will continue with IV antibiotics as per infectious disease recommendations at this time.  Upon completion of the antibiotics, we will revisit surgical treatment options to alleviate the biomechanical etiology of patient's right hallux plantar medial ulceration.  All questions answered to satisfaction and patient and patient's mother verbalized understanding at this time.  Spent 20 minutes on patient care and medical decision making.

## 2023-07-11 NOTE — ASSESSMENT
[Verbal] : Verbal [Demo] : Demo [Patient] : Patient [Good - alert, interested, motivated] : Good - alert, interested, motivated [Demonstrates independently] : demonstrates independently [Dressing changes] : dressing changes [Foot Care] : foot care [Skin Care] : skin care [Pressure relief] : pressure relief [Signs and symptoms of infection] : sign and symptoms of infection [Nutrition] : nutrition [How and When to Call] : how and when to call [Patient responsibility to plan of care] : patient responsibility to plan of care [Stable] : stable [Home] : Home [Ambulatory] : Ambulatory [Not Applicable - Long Term Care/Home Health Agency] : Long Term Care/Home Health Agency: Not Applicable [Family member] : Family member [] : No [FreeTextEntry2] : infection prevention\par promote skin integrity\par pressure relief\par IV antibiotics via PICC line [FreeTextEntry4] : F/U 1 week for assessment \par IV Ceftriaxone in progress via PICC line, no side effects to note.

## 2023-07-11 NOTE — HISTORY OF PRESENT ILLNESS
[FreeTextEntry1] : Patient is a 17-year-old male seen for follow-up for right great toe with history of underlying osteomyelitis. Patient is accompanied by mother. Patient states he started increased work hours at his workplace in the two  weeks and was wearing regular shoes. Patient's mother states she noticed a blister last week  in the area of the wound and was unable to be seen at the wound care center so made the appointment at the podiatry clinic, where the wound had re- opened again and x- rays and MRI was ordered. Since then have been applying dressing and offloading the toe wound.  Patient denies any pain to the right foot and states he has been putting protective dressing since the wound had healed. \par \par 7/3/2023: Patient seen for follow-up status post right great toe bone biopsy.  Patient is currently on a PICC line for IV antibiotic administration for osteomyelitis of the right great toe.  Patient seen with mother on today's visit.\par 7/10/2023: Patient seen for follow-up status post right great toe bone biopsy.  Patient currently on PICC line for IV antibiotics to treat osteomyelitis.  Patient seen with mother today.  Patient relates that he has been doing well and denies any other complaints at this time.

## 2023-07-11 NOTE — PHYSICAL EXAM
[4 x 4] : 4 x 4  [2+] : left 2+ [Ankle Swelling (On Exam)] : not present [Varicose Veins Of Lower Extremities] : not present [] : not present [Purpura] : no purpura  [Petechiae] : no petechiae [Skin Ulcer] : no ulcer [Alert] : alert [Oriented to Person] : oriented to person [Oriented to Place] : oriented to place [Oriented to Time] : oriented to time [Calm] : calm [de-identified] : calm [de-identified] : 5/5 muscle strength for all muscles and tendons crossing the foot and ankle joints, ankle joint and STJ ROM intact b/l. No pain with calf compression b/l. [de-identified] : Right plantar hallux wound down to bone, healing at this time [FreeTextEntry1] : Right Plantar Hallux- scab [FreeTextEntry2] : 0.6 [FreeTextEntry3] : 0.1 [FreeTextEntry4] : 0.1 [de-identified] : b [de-identified] : callus- shaved by HERNANDO [de-identified] : silver alginate  [de-identified] : Mechanically cleansed with Sterile gauze and 0.9% Normal Saline\par \par scab/callus removed by DPM, underlying open ulcer noted w/ 100% granular tissue [FreeTextEntry7] : Right Medial Hallux- sutures removed by DPM, site with scattered pin point openings remaining [FreeTextEntry8] : 0 [de-identified] : scant sanguineous  [de-identified] : silver alginate  [de-identified] : Mechanically cleansed with Sterile gauze and 0.9% Normal Saline\par \par  [TWNoteComboBox4] : None [TWNoteComboBox5] : No [TWNoteComboBox6] : Other [de-identified] : No [de-identified] : other [de-identified] : None [de-identified] : None [de-identified] : 100% [de-identified] : No [de-identified] : Every other day [de-identified] : Primary Dressing [de-identified] : No [de-identified] : Surgical [de-identified] : No [de-identified] : Normal [de-identified] : None [de-identified] : None [de-identified] : 100% [de-identified] : No [de-identified] : Every other day [de-identified] : Primary Dressing

## 2023-07-14 ENCOUNTER — APPOINTMENT (OUTPATIENT)
Dept: WOUND CARE | Facility: HOSPITAL | Age: 18
End: 2023-07-14

## 2023-07-18 ENCOUNTER — OUTPATIENT (OUTPATIENT)
Dept: OUTPATIENT SERVICES | Facility: HOSPITAL | Age: 18
LOS: 1 days | Discharge: HOME | End: 2023-07-18
Payer: MEDICAID

## 2023-07-18 ENCOUNTER — APPOINTMENT (OUTPATIENT)
Dept: WOUND CARE | Facility: HOSPITAL | Age: 18
End: 2023-07-18
Payer: MEDICAID

## 2023-07-18 VITALS
RESPIRATION RATE: 16 BRPM | DIASTOLIC BLOOD PRESSURE: 81 MMHG | SYSTOLIC BLOOD PRESSURE: 118 MMHG | HEART RATE: 122 BPM | WEIGHT: 120 LBS | TEMPERATURE: 98 F | HEIGHT: 68 IN | OXYGEN SATURATION: 98 % | BODY MASS INDEX: 18.19 KG/M2

## 2023-07-18 DIAGNOSIS — S91.309D UNSPECIFIED OPEN WOUND, UNSPECIFIED FOOT, SUBSEQUENT ENCOUNTER: ICD-10-CM

## 2023-07-18 PROCEDURE — G0463: CPT

## 2023-07-18 PROCEDURE — 99213 OFFICE O/P EST LOW 20 MIN: CPT

## 2023-07-18 NOTE — PHYSICAL EXAM
[2+] : left 2+ [Ankle Swelling (On Exam)] : not present [Varicose Veins Of Lower Extremities] : not present [] : not present [Purpura] : no purpura  [Petechiae] : no petechiae [Skin Ulcer] : no ulcer [Alert] : alert [Oriented to Person] : oriented to person [Oriented to Place] : oriented to place [Oriented to Time] : oriented to time [Calm] : calm [de-identified] : calm [de-identified] : 5/5 muscle strength for all muscles and tendons crossing the foot and ankle joints, ankle joint and STJ ROM intact b/l. No pain with calf compression b/l. [de-identified] : Right plantar hallux wound down to bone, epithelialized at this time [de-identified] : Callus shaved, planter foot, hallux [FreeTextEntry1] : Right Plantar Hallux [FreeTextEntry2] : 0.5 [FreeTextEntry3] : 0.1 [FreeTextEntry4] : 0.1 [de-identified] : very small sanguinous [de-identified] : callus shaved [de-identified] : none [de-identified] : Mechanically cleansed with 0.9% Normal Saline\par  [FreeTextEntry7] : Right Medial Hallux [de-identified] : none [de-identified] : Mechanically cleansed with 0.9% Normal Saline\par  [TWNoteComboBox5] : No [TWNoteComboBox6] : Other [de-identified] : No [de-identified] : None [de-identified] : 100% [de-identified] : None [de-identified] : None [de-identified] : No [de-identified] : Other [de-identified] : No [de-identified] : None [de-identified] : None [de-identified] : 100%

## 2023-07-18 NOTE — HISTORY OF PRESENT ILLNESS
[FreeTextEntry1] : Patient is a 17-year-old male seen for follow-up for right great toe with history of underlying osteomyelitis. Patient is accompanied by mother. Patient states he started increased work hours at his workplace in the two  weeks and was wearing regular shoes. Patient's mother states she noticed a blister last week  in the area of the wound and was unable to be seen at the wound care center so made the appointment at the podiatry clinic, where the wound had re- opened again and x- rays and MRI was ordered. Since then have been applying dressing and offloading the toe wound.  Patient denies any pain to the right foot and states he has been putting protective dressing since the wound had healed. \par \par 7/3/2023: Patient seen for follow-up status post right great toe bone biopsy.  Patient is currently on a PICC line for IV antibiotic administration for osteomyelitis of the right great toe.  Patient seen with mother on today's visit.\par 7/10/2023: Patient seen for follow-up status post right great toe bone biopsy.  Patient currently on PICC line for IV antibiotics to treat osteomyelitis.  Patient seen with mother today.  Patient relates that he has been doing well and denies any other complaints at this time.\par 7/15/2023: Patient seen for follow-up status post right great toe bone biopsy currently on PICC line for IV antibiotics for osteomyelitis.  Patient seen with mother at this time.  No other complaints at this time.

## 2023-07-18 NOTE — ASSESSMENT
[Verbal] : Verbal [Demo] : Demo [Patient] : Patient [Family member] : Family member [Good - alert, interested, motivated] : Good - alert, interested, motivated [Verbalizes knowledge/Understanding] : Verbalizes knowledge/understanding [Dressing changes] : dressing changes [Foot Care] : foot care [Skin Care] : skin care [Pressure relief] : pressure relief [Signs and symptoms of infection] : sign and symptoms of infection [Nutrition] : nutrition [How and When to Call] : how and when to call [Patient responsibility to plan of care] : patient responsibility to plan of care [] : Yes [Stable] : stable [Home] : Home [Ambulatory] : Ambulatory [Not Applicable - Long Term Care/Home Health Agency] : Long Term Care/Home Health Agency: Not Applicable [FreeTextEntry2] : Infection Prevention\par Wound care and Dressing changes\par Promote and Restore optimal skin integrity\par Nutrition and Wound Healing \par Offloading and Pressure relief\par Protect and Guard wound site \par Safety\par \par  [FreeTextEntry4] : DPM assessed wound site.  Right planter halllux callus shaved.\par Pt receiving IV antibiotics via PICC line daily at home for the past few weeks \par Dry dressing only, as needed for cushioning.\par Pt may shower, but no pool or baths.\par Pt may wear supportive sneaker with socks, but must check feet and toes often - stressed the importance of checking feet.\par Pt and Mom verbalized understanding of all discussed.\par Follow up one week

## 2023-07-18 NOTE — REVIEW OF SYSTEMS
[Fever] : no fever [Chills] : no chills [Eye Pain] : no eye pain [Red Eyes] : eyes not red [Earache] : no earache [Loss Of Hearing] : no hearing loss [Nosebleeds] : no nosebleeds [Chest Pain] : no chest pain [Shortness Of Breath] : no shortness of breath [Wheezing] : no wheezing [Cough] : no cough [Abdominal Pain] : no abdominal pain [Vomiting] : no vomiting [Diarrhea] : no diarrhea [Skin Wound] : skin wound [Confused] : no confusion [Dizziness] : no dizziness [Anxiety] : no anxiety [Easy Bleeding] : no tendency for easy bleeding [Negative] : Endocrine [de-identified] : Right great toe wound - re-opened  down to bone

## 2023-07-19 DIAGNOSIS — K59.09 OTHER CONSTIPATION: ICD-10-CM

## 2023-07-19 DIAGNOSIS — L84 CORNS AND CALLOSITIES: ICD-10-CM

## 2023-07-19 DIAGNOSIS — M86.671 OTHER CHRONIC OSTEOMYELITIS, RIGHT ANKLE AND FOOT: ICD-10-CM

## 2023-07-19 DIAGNOSIS — Z91.010 ALLERGY TO PEANUTS: ICD-10-CM

## 2023-07-19 DIAGNOSIS — Z83.49 FAMILY HISTORY OF OTHER ENDOCRINE, NUTRITIONAL AND METABOLIC DISEASES: ICD-10-CM

## 2023-07-19 DIAGNOSIS — Z98.890 OTHER SPECIFIED POSTPROCEDURAL STATES: ICD-10-CM

## 2023-07-19 DIAGNOSIS — Z87.39 PERSONAL HISTORY OF OTHER DISEASES OF THE MUSCULOSKELETAL SYSTEM AND CONNECTIVE TISSUE: ICD-10-CM

## 2023-07-19 DIAGNOSIS — Z79.899 OTHER LONG TERM (CURRENT) DRUG THERAPY: ICD-10-CM

## 2023-07-19 DIAGNOSIS — L97.514 NON-PRESSURE CHRONIC ULCER OF OTHER PART OF RIGHT FOOT WITH NECROSIS OF BONE: ICD-10-CM

## 2023-07-19 DIAGNOSIS — Z91.012 ALLERGY TO EGGS: ICD-10-CM

## 2023-07-25 ENCOUNTER — EMERGENCY (EMERGENCY)
Facility: HOSPITAL | Age: 18
LOS: 1 days | Discharge: HOME | End: 2023-07-25
Attending: EMERGENCY MEDICINE | Admitting: EMERGENCY MEDICINE
Payer: MEDICAID

## 2023-07-25 ENCOUNTER — APPOINTMENT (OUTPATIENT)
Dept: WOUND CARE | Facility: HOSPITAL | Age: 18
End: 2023-07-25

## 2023-07-25 VITALS
OXYGEN SATURATION: 99 % | SYSTOLIC BLOOD PRESSURE: 124 MMHG | TEMPERATURE: 98 F | RESPIRATION RATE: 22 BRPM | WEIGHT: 119.93 LBS | HEIGHT: 69 IN | HEART RATE: 108 BPM | DIASTOLIC BLOOD PRESSURE: 86 MMHG

## 2023-07-25 VITALS
OXYGEN SATURATION: 99 % | DIASTOLIC BLOOD PRESSURE: 70 MMHG | TEMPERATURE: 98 F | RESPIRATION RATE: 18 BRPM | HEART RATE: 85 BPM | SYSTOLIC BLOOD PRESSURE: 110 MMHG

## 2023-07-25 DIAGNOSIS — M86.9 OSTEOMYELITIS, UNSPECIFIED: ICD-10-CM

## 2023-07-25 DIAGNOSIS — L97.519 NON-PRESSURE CHRONIC ULCER OF OTHER PART OF RIGHT FOOT WITH UNSPECIFIED SEVERITY: ICD-10-CM

## 2023-07-25 PROCEDURE — 99283 EMERGENCY DEPT VISIT LOW MDM: CPT

## 2023-07-25 PROCEDURE — 99284 EMERGENCY DEPT VISIT MOD MDM: CPT

## 2023-07-25 PROCEDURE — 99284 EMERGENCY DEPT VISIT MOD MDM: CPT | Mod: 25

## 2023-07-25 PROCEDURE — 96374 THER/PROPH/DIAG INJ IV PUSH: CPT

## 2023-07-25 RX ORDER — CEFTRIAXONE 500 MG/1
2000 INJECTION, POWDER, FOR SOLUTION INTRAMUSCULAR; INTRAVENOUS ONCE
Refills: 0 | Status: COMPLETED | OUTPATIENT
Start: 2023-07-25 | End: 2023-07-25

## 2023-07-25 RX ADMIN — CEFTRIAXONE 100 MILLIGRAM(S): 500 INJECTION, POWDER, FOR SOLUTION INTRAMUSCULAR; INTRAVENOUS at 15:03

## 2023-07-25 NOTE — ED PROVIDER NOTE - NSFOLLOWUPINSTRUCTIONS_ED_ALL_ED_FT
Do not use PICC line   return tomorrow at 8 am for picc line replacement  return to er for any worsening symptoms     PICC Home Care Guide  A peripherally inserted central catheter (PICC) is a form of IV access that allows medicines and IV fluids to be quickly put into the blood and spread throughout the body. The PICC is a long, thin, flexible tube (catheter) that is put into a vein in a person's arm or leg. The catheter ends in a large vein just outside the heart called the superior vena cava (SVC). After the PICC is put in, a chest X-ray may be done to make sure that it is in the right place.    A PICC may be placed for different reasons, such as:  To give medicines and liquid nutrition.  To give IV fluids and blood products.  To take blood samples often.  If there is trouble placing a peripheral intravenous (PIV) catheter.  If cared for properly, a PICC can remain in place for many months. Having a PICC can allow you to go home from the hospital sooner and continue treatment at home. Medicines and PICC care can be managed at home by a family member, caregiver, or home health care team.    What are the risks?  Generally, having a PICC is safe. However, problems may occur, including:  A blood clot (thrombus) forming in or at the end of the PICC.  A blood clot forming in a vein (deep vein thrombosis) or traveling to the lung (pulmonary embolism).  Inflammation of the vein (phlebitis) in which the PICC is placed.  Infection at the insertion site or in the blood. Blood infections from central lines, like PICCs, can be serious and often require a hospital stay.  PICC malposition, or PICC movement or poor placement.  A break or cut in the PICC. Do not use scissors near the PICC.  Nerve or tendon irritation or injury during PICC insertion.  How to care for your PICC  Please follow the specific guidelines provided by your health care provider.    Preventing infection    You and any caregivers should wash your hands often with soap and water for at least 20 seconds. Wash hands:  Before touching the PICC or the infusion device.  Before changing a bandage (dressing). Do not change the dressing unless you have been taught to do so and have shown you are able to change it safely.  Flush the PICC as told. Tell your health care provider right away if the PICC is hard to flush or does not flush. Do not use force to flush the PICC.  Use clean and germ-free (sterile) supplies only. Keep the supplies in a dry place. Do not reuse needles, syringes, or any other supplies. Reusing supplies can lead to infection.  Keep the PICC dressing dry and secure it with tape if the edges stop sticking to your skin.  Check your PICC insertion site every day for signs of infection. Check for:  Redness, swelling, or pain.  Fluid or blood.  Warmth.  Pus or a bad smell.  Preventing other problems    Do not use a syringe that is less than 10 mL to flush the PICC.  Do not have your blood pressure checked on the arm in which the PICC is placed.  Do not ever pull or tug on the PICC. Keep it secured to your arm with tape or a stretch wrap when not in use.  Do not take the PICC out yourself. Only a trained health care provider should remove the PICC.  Keep pets and children away from your PICC.  How to care for your PICC dressing  Keep your PICC dressing clean and dry to prevent infection.  Do not take baths, swim, or use a hot tub until your health care provider approves. Ask your health care provider if you can take showers. You may only be allowed to take sponge baths. When you are allowed to shower:  Ask your health care provider to teach you how to wrap the PICC.  Cover the PICC with clear plastic wrap and tape to keep it dry while showering.  Follow instructions from your health care provider about how to take care of your insertion site and dressing. Make sure you:  Wash your hands with soap and water for at least 20 seconds before and after you change your dressing. If soap and water are not available, use hand .  Change your dressing only if taught to do so by your health care provider. Your PICC dressing needs to be changed if it becomes loose or wet.  Leave stitches (sutures), skin glue, or adhesive strips in place. These skin closures may need to stay in place for 2 weeks or longer. If adhesive strip edges start to loosen and curl up, you may trim the loose edges. Do not remove adhesive strips completely unless your health care provider tells you to do that.  Follow these instructions at home:  Disposal of supplies    Throw away any syringes in a disposal container that is meant for sharp items (sharps container). You can buy a sharps container from a pharmacy, or you can make one by using an empty, hard plastic bottle with a lid.  Place any used dressings or infusion bags into a plastic bag. Throw that bag in the trash.  General instructions    A medical alert bracelet on a person's wrist.  Always carry your PICC identification card or wear a medical alert bracelet.  Keep the tube clamped at all times, unless it is being used.  Always carry a smooth-edge clamp with you to clamp the PICC if it breaks.  Do not use scissors or sharp objects near the tube.  You may bend your arm and move it freely. If your PICC is near or at the bend of your elbow, avoid activity with repeated motion at the elbow.  Avoid lifting heavy objects as told by your health care provider.  Keep all follow-up visits. This is important. You will need to have your PICC dressing changed at least once a week.  Contact a health care provider if:  You have pain in your arm, ear, face, or teeth.  You have a fever or chills.  You have redness, swelling, or pain around the insertion site.  You have fluid or blood coming from the insertion site.  Your insertion site feels warm to the touch.  You have pus or a bad smell coming from the insertion site.  Your skin feels hard and raised around the insertion site.  Your PICC dressing has gotten wet or is coming off and you have not been taught how to change it.  Get help right away if:  You have problems with your PICC, such as your PICC:  Was tugged or pulled and has partially come out. Do not push the PICC back in.  Cannot be flushed, is hard to flush, or leaks around the insertion site when it is flushed.  Makes a flushing sound when it is flushed.  Appears to have a hole or tear.  Is accidentally pulled all the way out. If this happens, cover the insertion site with a gauze dressing. Do not throw the PICC away. Your health care provider will need to check it to be sure the entire catheter came out.  You feel your heart racing or skipping beats, or you have chest pain.  You have shortness of breath or trouble breathing.  You have swelling, redness, warmth, or pain in the arm in which the PICC is placed.  You have a red streak going up your arm that starts under the PICC dressing.  These symptoms may be an emergency. Get help right away. Call 911.  Do not wait to see if the symptoms will go away.  Do not drive yourself to the hospital.  Summary  A peripherally inserted central catheter (PICC) is a long, thin, flexible tube (catheter) that is put into a vein in the arm or leg.  If cared for properly, a PICC can remain in place for many months. Having a PICC can allow you to go home from the hospital sooner and continue treatment at home.  The PICC is inserted using a germ-free (sterile) technique by a specially trained health care provider. Only a trained health care provider should remove it.  Do not have your blood pressure checked on the arm in which your PICC is placed.  Always keep your PICC identification card with you.  This information is not intended to replace advice given to you by your health care provider. Make sure you discuss any questions you have with your health care provider.

## 2023-07-25 NOTE — ED PROVIDER NOTE - PROGRESS NOTE DETAILS
IR called no IR today advised can schedule for tomorrow  seen by wound awaiting plan spoke with IR advised to leave PICC line in so can be wire guided in tomorrow  pt advised to come back tomorrow for picc line seen by ID and podiatry in ed given dose of abx

## 2023-07-25 NOTE — ED PROVIDER NOTE - OBJECTIVE STATEMENT
Patient is a 17-year-old male with past medical hx of scoliosis chronic osteomyelitis right great toe wound with PICC line in coming in for leaking PICC line noted today by home nurse.  Patient receiving 2 g of ceftriaxone daily.  Patient denies any fever chills drainage or any other complaints.

## 2023-07-25 NOTE — ED PROVIDER NOTE - ATTENDING APP SHARED VISIT CONTRIBUTION OF CARE
18yo male here for leaking picc. pt is getting iv abx and today noticed it was leaking no pain or bleeding  exam:PICC in place, neurovasc intact  plan: iv dose of abx, replace picc with IR  agree with assessment and plan of PA

## 2023-07-25 NOTE — CONSULT NOTE ADULT - ASSESSMENT
17 M with hx of scoliosis, R 1st toe chronic osteomyelitis, who presents for leakage from PICC tubing. He was admitted at [facility] last month, s/p bone biopsy and found to have mild chronic osteomyelitis of 1st toe distal phalanx on path. No growth on cultures. Discharged on ceftriaxone to complete 6 weeks until 8/9.     No signs/symptoms of systemic infection and no obvious signs of PICC infection. Per IR patient can keep current line and return tomorrow for re-evaluation. Wound examined with Podiatry, no signs of wound infection but concern that wound has not completely healed and still probes to bone.    -resume ceftriaxone until 8/9 once PICC has been fixed by IR  -do not use PICC line pending evaluation by IR  -will defer decision regarding toe amputation between Podiatry and patient    Thank you for courtesy of this consult.     Discussed with Gregory IBARRA and Dr. Alida Ospina MD  Division of Infectious Diseases   Cell [phone] between 8am and 6pm   After 6pm and weekends please call ID service at [phone].

## 2023-07-25 NOTE — ED ADULT TRIAGE NOTE - CHIEF COMPLAINT QUOTE
I was told to come in because there is a crack in my picc.  (per mom, it is leaking).  he is receiving iv abx s/p bone biopsy right foot)

## 2023-07-25 NOTE — ED PROVIDER NOTE - MUSCULOSKELETAL, MLM
Spine appears normal, range of motion is not limited, no muscle or joint tenderness right upper arm with leaking picc line

## 2023-07-25 NOTE — CONSULT NOTE ADULT - PROBLEM SELECTOR RECOMMENDATION 2
Continue with PICC line antibiotics per ID   Patient will return tomorrow for replacement of the PICC line

## 2023-07-25 NOTE — ED PROVIDER NOTE - PATIENT PORTAL LINK FT
You can access the FollowMyHealth Patient Portal offered by [facility] by registering at the following website: [email or web address]. By joining [employer]’s FollowMyHealth portal, you will also be able to view your health information using other applications (apps) compatible with our system.

## 2023-07-25 NOTE — CONSULT NOTE ADULT - SUBJECTIVE AND OBJECTIVE BOX
HPI:      17y year old Male seen at [facility] ED for right great toe wound.   Denies any fever, chills, nausea, vomiting, chest pain, shortness of breath, or calf pain at this time. Patient is known to the wound care team and was recently admitted for bone biopsy with positive OM on pathology.  Patient has been on PICC line since discharge, patient was seen in the ER for leakage from the PICC line.  Patient accompanied by mother who states that home nursing performed infusion after which there was leakage noted and came to the ER for further evaluation.  Patient denies any pain to the right great toe and has been applying dressings as advised    REVIEW OF SYSTEMS    PAST MEDICAL & SURGICAL HISTORY:  Scoliosis      Food allergy  egg, peanuts      No significant past surgical history          Allergies    eggs (Anaphylaxis)  peanuts (Anaphylaxis)  sulfa drugs (Rash)    Intolerances        MEDICATIONS  (STANDING):    MEDICATIONS  (PRN):      Social History:      FAMILY HISTORY:      Vital Signs Last 24 Hrs  T(C): 36.7 (25 Jul 2023 15:45), Max: 36.7 (25 Jul 2023 11:55)  T(F): 98 (25 Jul 2023 15:45), Max: 98 (25 Jul 2023 11:55)  HR: 85 (25 Jul 2023 15:45) (85 - 108)  BP: 110/70 (25 Jul 2023 15:45) (110/70 - 124/86)  BP(mean): --  RR: 18 (25 Jul 2023 15:45) (18 - 22)  SpO2: 99% (25 Jul 2023 15:45) (99% - 99%)    Parameters below as of 25 Jul 2023 11:55  Patient On (Oxygen Delivery Method): room air        PHYSICAL EXAM:  Vascular: DP & PT palpable bilaterally, Capillary refill 3 seconds, Digital hair present bilaterally  Neurological: Light touch sensation intact bilaterally  Musculoskeletal: 5/5 strength in all quadrants bilaterally, AJ & STJ ROM intact  Dermatological: 1.5cm x 0.2 cm x probe to bone cm ulceration noted to the right great toe,  granular wound bed, no periwound erythema, no fluctuance, no malodor, no proximal streaking at this time            
[facility]  INFECTIOUS DISEASES - Tommie Combs, [address], [place] [address]  Tel: [phone]     Fax: [phone]  =======================================================    [id number]  YARITZA MERRILL     CC: Patient is a 17y old  Male who presents with a chief complaint of PICC leakage    HPI:  17 M with hx of scoliosis, R 1st toe chronic osteomyelitis, who presents for leakage from PICC tubing. He was admitted at [facility] last month, s/p bone biopsy and found to have mild chronic osteomyelitis of 1st toe distal phalanx on path. Discharged on ceftriaxone to complete 6 weeks until 8/9. Prior to that was in [facility] in Jan 2023 for nonhealing wound on distal phalanx of right first toe. S/p debridement and bone biopsy on 1/26. Bone culture grew staph lugdunensis (oxacillin sensitive) and finegoldia magna. 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 He completed 6 weeks of cefazolin and 4 weeks of metronidazole.    Mother at bedside. Leakage noted only today, no prior issues noted with PICC. Patient denies any pain, swelling, fevers, chills, SOB, nausea or diarrhea.      PAST MEDICAL & SURGICAL HISTORY:  Scoliosis      Food allergy  egg, peanuts      No significant past surgical history          Social Hx:     FAMILY HISTORY:      Allergies    eggs (Anaphylaxis)  peanuts (Anaphylaxis)  sulfa drugs (Rash)    Intolerances        Antibiotics:  MEDICATIONS  (STANDING):    MEDICATIONS  (PRN):       REVIEW OF SYSTEMS:  CONSTITUTIONAL:  No Fever or chills  HEENT:  No sore throat or runny nose.  CARDIOVASCULAR:  No chest pain or SOB.  RESPIRATORY:  No cough, shortness of breath  GASTROINTESTINAL:  No nausea, vomiting or diarrhea.  GENITOURINARY:  No dysuria, frequency or urgency  MUSCULOSKELETAL:  no joint aches, no muscle pain  NEUROLOGIC:  No headache or dizziness    Physical Exam:  Vital Signs Last 24 Hrs  T(C): 36.7 (25 Jul 2023 15:45), Max: 36.7 (25 Jul 2023 11:55)  T(F): 98 (25 Jul 2023 15:45), Max: 98 (25 Jul 2023 11:55)  HR: 85 (25 Jul 2023 15:45) (85 - 108)  BP: 110/70 (25 Jul 2023 15:45) (110/70 - 124/86)  BP(mean): --  RR: 18 (25 Jul 2023 15:45) (18 - 22)  SpO2: 99% (25 Jul 2023 15:45) (99% - 99%)    Parameters below as of 25 Jul 2023 11:55  Patient On (Oxygen Delivery Method): room air      Height (cm): 175.3 (07-25 @ 11:55)  Weight (kg): 54.4 (07-25 @ 11:55)  BMI (kg/m2): 17.7 (07-25 @ 11:55)  BSA (m2): 1.66 (07-25 @ 11:55)  GEN: NAD  HEENT: normocephalic and atraumatic.   NECK: Supple.   LUNGS: Normal respiratory effort  HEART: Regular rate and rhythm   ABDOMEN: Soft, nontender, and nondistended.    EXTREMITIES: No leg edema.  NEUROLOGIC: grossly intact.  PSYCHIATRIC: Appropriate affect .  SKIN: R 1st toe callus, noted wound upon bedside debridement with probe to bone per Podiatry, no odor or drainage, no swelling/erythema    Labs:                                RECENT CULTURES:        All imaging and other data have been reviewed.

## 2023-07-25 NOTE — CONSULT NOTE ADULT - PROBLEM SELECTOR RECOMMENDATION 9
Patient was seen and evaluated  Debrided wound noted down to the level of bone, discussed with Dr. Ospina from ID will continue and finish the course of PICC line antibiotics  Patient to continue follow-up at the wound care center  Patient to continue wound care and offloading  Patient to monitor for any signs of infection and return to the ER if needed.  Patient is still high risk for further surgery possible amputation sepsis loss of limb loss of life.  All questions answered and discussed further plan and treatment with the patient and mother.  Patient to follow-up with the wound care center.

## 2023-07-26 ENCOUNTER — EMERGENCY (EMERGENCY)
Facility: HOSPITAL | Age: 18
LOS: 1 days | Discharge: HOME | End: 2023-07-26
Attending: EMERGENCY MEDICINE | Admitting: EMERGENCY MEDICINE
Payer: MEDICAID

## 2023-07-26 VITALS
DIASTOLIC BLOOD PRESSURE: 72 MMHG | RESPIRATION RATE: 18 BRPM | HEART RATE: 72 BPM | TEMPERATURE: 98 F | SYSTOLIC BLOOD PRESSURE: 122 MMHG | OXYGEN SATURATION: 100 %

## 2023-07-26 VITALS
WEIGHT: 119.93 LBS | SYSTOLIC BLOOD PRESSURE: 123 MMHG | HEIGHT: 69 IN | HEART RATE: 90 BPM | TEMPERATURE: 97 F | OXYGEN SATURATION: 100 % | DIASTOLIC BLOOD PRESSURE: 85 MMHG | RESPIRATION RATE: 18 BRPM

## 2023-07-26 PROCEDURE — 99284 EMERGENCY DEPT VISIT MOD MDM: CPT | Mod: 25

## 2023-07-26 PROCEDURE — 76937 US GUIDE VASCULAR ACCESS: CPT | Mod: 26

## 2023-07-26 PROCEDURE — 36573 INSJ PICC RS&I 5 YR+: CPT

## 2023-07-26 PROCEDURE — C1751: CPT

## 2023-07-26 PROCEDURE — 76937 US GUIDE VASCULAR ACCESS: CPT

## 2023-07-26 PROCEDURE — 36573 INSJ PICC RS&I 5 YR+: CPT | Mod: 53

## 2023-07-26 PROCEDURE — 99214 OFFICE O/P EST MOD 30 MIN: CPT

## 2023-07-26 PROCEDURE — 99284 EMERGENCY DEPT VISIT MOD MDM: CPT

## 2023-07-26 NOTE — ED PROVIDER NOTE - CLINICAL SUMMARY MEDICAL DECISION MAKING FREE TEXT BOX
Patient with chronic osteomyelitis, on IV antibiotics at home with her PICC line, now with nonfunctioning PICC.  Patient here for replacement.

## 2023-07-26 NOTE — ED PROVIDER NOTE - PHYSICAL EXAMINATION
R arm with PICC in place, no edema to arm, no redness / discharge. NO axilla tenderness / swelling.  nl dist str/sens equal bl

## 2023-07-26 NOTE — ED PROVIDER NOTE - PROGRESS NOTE DETAILS
Patient doing well status post midline placement in left arm.  Patient with some inflammation around right arm old PICC line site, no acute signs of infection.  No acute edema.  Patient will continue to monitor.

## 2023-07-26 NOTE — ED ADULT TRIAGE NOTE - CHIEF COMPLAINT QUOTE
Patient has PICC line in right upper arm that is "cracked" and needs to be replaced. Due for antibiotic tonight. Needs IR.

## 2023-07-26 NOTE — ED PROVIDER NOTE - NSFOLLOWUPINSTRUCTIONS_ED_ALL_ED_FT
1. Follow-up with your Primary Medical Doctor, Dr Alan Bloomberg,  for prompt follow-up.  2. Return to Emergency room for any worsening or persistent pain, weakness, fever, redness or swelling to the PICC line, swelling of the arm, redness or discharge, nonfunctioning PICC line, or any other concerning symptoms.  3. See attached instruction sheets for additional information, including information regarding signs and symptoms to look out for, reasons to seek immediate care and other important instructions.  4.  PICC care as discussed 1. Follow-up with your Primary Medical Doctor, Dr Alan Bloomberg,  for prompt follow-up.  2. Return to Emergency room for any worsening or persistent pain, weakness, fever, redness or swelling to the PICC line, swelling of the arm, redness or discharge, nonfunctioning PICC line, or any other concerning symptoms.  3. See attached instruction sheets for additional information, including information regarding signs and symptoms to look out for, reasons to seek immediate care and other important instructions.  4.  PICC / Midline care as discussed

## 2023-07-26 NOTE — ED ADULT NURSE NOTE - OBJECTIVE STATEMENT
pt ambulatory from triage a&ox4 with complaints of issues with PICC line after having it placed here yesterday. receiving IV abx for osteomyelitis right foot. had last dose of IV abx last night. denies pain/redness/discomort to site. reports seen by homecare nurse and line was "cracked." supposed to be on IV abx until 1st week of august.

## 2023-07-26 NOTE — ED PROVIDER NOTE - OBJECTIVE STATEMENT
17-year-old male with a history of a waxing waning right toe osteomyelitis presents with came into the ED today for a nonfunctioning PICC line replacement.  Patient was seen in the ED yesterday, was given IV antibiotics, for which he is going to be receiving for the next several weeks.  The PICC line was found to not be working, and patient was set up to have the PICC changed today with interventional radiology.  Patient is otherwise feeling well.  No fever or chills.  The wound is healing.  No arm pain, no chest pain.  No arm swelling no redness to the site.  No aggravating or alleviating factors otherwise noted.  No other acute complaints at this time.

## 2023-07-26 NOTE — ED PROVIDER NOTE - PATIENT PORTAL LINK FT
You can access the FollowMyHealth Patient Portal offered by [facility] by registering at the following website: [email or web address]. By joining [employer]’s FollowMyHealth portal, you will also be able to view your health information using other applications (apps) compatible with our system.

## 2023-07-26 NOTE — PROCEDURE NOTE - PROCEDURE FINDINGS AND DETAILS
__13___cm bard power midline inserted into patent right basilic vein under sterile conditions and ultrasound guidance.  Midline catheter can be accessed.

## 2023-07-26 NOTE — PROVIDER CONTACT NOTE (OTHER) - RECOMMENDATIONS
FRED Luis getting consent for procedure. Patient transferred to procedure room and prepped. Safety maintained.

## 2023-07-28 ENCOUNTER — NON-APPOINTMENT (OUTPATIENT)
Age: 18
End: 2023-07-28

## 2023-07-28 NOTE — HISTORY OF PRESENT ILLNESS
[FreeTextEntry1] : Patient is a 17-year-old male seen for follow-up for right great toe with history of underlying osteomyelitis. Patient is accompanied by mother. Patient states he started increased work hours at his workplace in the two  weeks and was wearing regular shoes. Patient's mother states she noticed a blister last week  in the area of the wound and was unable to be seen at the wound care center so made the appointment at the podiatry clinic, where the wound had re- opened again and x- rays and MRI was ordered. Since then have been applying dressing and offloading the toe wound.  Patient denies any pain to the right foot and states he has been putting protective dressing since the wound had healed. \par \par 7/3/2023: Patient seen for follow-up status post right great toe bone biopsy.  Patient is currently on a PICC line for IV antibiotic administration for osteomyelitis of the right great toe.  Patient seen with mother on today's visit.

## 2023-07-28 NOTE — PHYSICAL EXAM
[2+] : left 2+ [Alert] : alert [Oriented to Person] : oriented to person [Oriented to Place] : oriented to place [Oriented to Time] : oriented to time [Calm] : calm [Ankle Swelling (On Exam)] : not present [Varicose Veins Of Lower Extremities] : not present [] : not present [Purpura] : no purpura  [Petechiae] : no petechiae [Skin Ulcer] : no ulcer [de-identified] : calm [de-identified] : 5/5 muscle strength for all muscles and tendons crossing the foot and ankle joints, ankle joint and STJ ROM intact b/l. No pain with calf compression b/l. [de-identified] : Right plantar hallux wound down to bone, healing at this time [FreeTextEntry1] : Right Plantar Hallux [FreeTextEntry2] : 0.6 [FreeTextEntry3] : 0.1 [FreeTextEntry4] : 0.1 [de-identified] : Scant Serosanguineous [de-identified] : Intact/ callus [de-identified] : Silver Alginate/ Dry Dressing & Kerlix [de-identified] : Cleansed with Normal saline\par  [FreeTextEntry7] : Right Medial Hallux- Incision line D&I with sutures in place (s/p Bone Biopsy 06/28/23) [de-identified] : Intact [de-identified] : Silver Alginate/ Dry Dressing & Kerlix [de-identified] : Cleansed with Normal saline\par  [de-identified] : None [de-identified] : None [de-identified] : 100% [de-identified] : No [de-identified] : None [de-identified] : None [de-identified] : None

## 2023-07-28 NOTE — REVIEW OF SYSTEMS
[Skin Wound] : skin wound [Negative] : Endocrine [Fever] : no fever [Chills] : no chills [Eye Pain] : no eye pain [Red Eyes] : eyes not red [Earache] : no earache [Loss Of Hearing] : no hearing loss [Nosebleeds] : no nosebleeds [Chest Pain] : no chest pain [Shortness Of Breath] : no shortness of breath [Wheezing] : no wheezing [Cough] : no cough [Abdominal Pain] : no abdominal pain [Vomiting] : no vomiting [Diarrhea] : no diarrhea [Confused] : no confusion [Dizziness] : no dizziness [Anxiety] : no anxiety [Easy Bleeding] : no tendency for easy bleeding [de-identified] : Right great toe wound - re-opened  down to bone

## 2023-07-28 NOTE — ASSESSMENT
[Verbal] : Verbal [Demo] : Demo [Patient] : Patient [Family member] : Family member [Good - alert, interested, motivated] : Good - alert, interested, motivated [Verbalizes knowledge/Understanding] : Verbalizes knowledge/understanding [Dressing changes] : dressing changes [Foot Care] : foot care [Skin Care] : skin care [Pressure relief] : pressure relief [Signs and symptoms of infection] : sign and symptoms of infection [How and When to Call] : how and when to call [Labs and Tests] : labs and tests [Off-loading] : off-loading [Patient responsibility to plan of care] : patient responsibility to plan of care [] : Yes [Stable] : stable [Home] : Home [Ambulatory] : Ambulatory [FreeTextEntry2] : Alteration in skin integrity- promote optimal skin integrity\par  [FreeTextEntry4] : Dr Rashid/ Photos taken\par Pt was hospitalized [facility] 06/27/23-06/30/23 & underwent Bone Biopsy & PICC line inserted & pt receiving Ceftriaxone IV via PICC line & lactobacillus acidophilus\par F/U to [facility] on Thursday, 07/06/23 for dressing change & then 1 week for assessment

## 2023-07-28 NOTE — PLAN
[FreeTextEntry1] : Patient examined and evaluated at this time.  Discussed the treatment options and current treatment plan with patient and patient's mother.  Patient remains a very high risk for infection, sepsis, limb loss, and death.  Discussed surgical treatment options to correct the biomechanical etiology of patient's symptoms.  We will continue with IV antibiotics as per infectious disease recommendations at this time.  Upon completion of the antibiotics, we will revisit surgical treatment options to alleviate the biomechanical etiology of patient's right hallux plantar medial ulceration.  All questions answered to satisfaction and patient and patient's mother verbalized understanding at this time.  Spent 30 minutes on patient care and medical decision making.

## 2023-07-28 NOTE — VITALS
[] : No [de-identified] : Pt denies c/o any pains or discomforts at present [FreeTextEntry5] : Pt was hospitalized [facility] 06/27/23-06/30/23 & underwent Bone Biopsy & PICC line inserted & pt receiving Ceftriaxone IV via PICC line & lactobacillus acidophilus

## 2023-08-02 ENCOUNTER — OUTPATIENT (OUTPATIENT)
Dept: OUTPATIENT SERVICES | Facility: HOSPITAL | Age: 18
LOS: 1 days | Discharge: HOME | End: 2023-08-02
Payer: MEDICAID

## 2023-08-02 ENCOUNTER — APPOINTMENT (OUTPATIENT)
Dept: WOUND CARE | Facility: HOSPITAL | Age: 18
End: 2023-08-02
Payer: MEDICAID

## 2023-08-02 VITALS
SYSTOLIC BLOOD PRESSURE: 109 MMHG | RESPIRATION RATE: 14 BRPM | OXYGEN SATURATION: 98 % | TEMPERATURE: 99.3 F | HEIGHT: 68 IN | WEIGHT: 120 LBS | HEART RATE: 95 BPM | BODY MASS INDEX: 18.19 KG/M2 | DIASTOLIC BLOOD PRESSURE: 71 MMHG

## 2023-08-02 DIAGNOSIS — L97.514 NON-PRESSURE CHRONIC ULCER OF OTHER PART OF RIGHT FOOT WITH NECROSIS OF BONE: ICD-10-CM

## 2023-08-02 PROCEDURE — G0463: CPT

## 2023-08-02 PROCEDURE — 99213 OFFICE O/P EST LOW 20 MIN: CPT

## 2023-08-10 ENCOUNTER — APPOINTMENT (OUTPATIENT)
Dept: WOUND CARE | Facility: HOSPITAL | Age: 18
End: 2023-08-10
Payer: MEDICAID

## 2023-08-10 ENCOUNTER — OUTPATIENT (OUTPATIENT)
Dept: OUTPATIENT SERVICES | Facility: HOSPITAL | Age: 18
LOS: 1 days | Discharge: HOME | End: 2023-08-10
Payer: MEDICAID

## 2023-08-10 VITALS
OXYGEN SATURATION: 98 % | HEIGHT: 68 IN | DIASTOLIC BLOOD PRESSURE: 69 MMHG | RESPIRATION RATE: 14 BRPM | TEMPERATURE: 98.2 F | HEART RATE: 110 BPM | BODY MASS INDEX: 18.19 KG/M2 | SYSTOLIC BLOOD PRESSURE: 107 MMHG | WEIGHT: 120 LBS

## 2023-08-10 DIAGNOSIS — L97.514 NON-PRESSURE CHRONIC ULCER OF OTHER PART OF RIGHT FOOT WITH NECROSIS OF BONE: ICD-10-CM

## 2023-08-10 PROCEDURE — 99213 OFFICE O/P EST LOW 20 MIN: CPT

## 2023-08-10 PROCEDURE — G0463: CPT

## 2023-08-15 DIAGNOSIS — Z79.899 OTHER LONG TERM (CURRENT) DRUG THERAPY: ICD-10-CM

## 2023-08-15 DIAGNOSIS — Z91.012 ALLERGY TO EGGS: ICD-10-CM

## 2023-08-15 DIAGNOSIS — Z83.49 FAMILY HISTORY OF OTHER ENDOCRINE, NUTRITIONAL AND METABOLIC DISEASES: ICD-10-CM

## 2023-08-15 DIAGNOSIS — K59.09 OTHER CONSTIPATION: ICD-10-CM

## 2023-08-15 DIAGNOSIS — L97.514 NON-PRESSURE CHRONIC ULCER OF OTHER PART OF RIGHT FOOT WITH NECROSIS OF BONE: ICD-10-CM

## 2023-08-15 DIAGNOSIS — Z87.39 PERSONAL HISTORY OF OTHER DISEASES OF THE MUSCULOSKELETAL SYSTEM AND CONNECTIVE TISSUE: ICD-10-CM

## 2023-08-15 DIAGNOSIS — Z98.890 OTHER SPECIFIED POSTPROCEDURAL STATES: ICD-10-CM

## 2023-08-15 DIAGNOSIS — M86.671 OTHER CHRONIC OSTEOMYELITIS, RIGHT ANKLE AND FOOT: ICD-10-CM

## 2023-08-15 DIAGNOSIS — Z91.010 ALLERGY TO PEANUTS: ICD-10-CM

## 2023-08-15 DIAGNOSIS — L84 CORNS AND CALLOSITIES: ICD-10-CM

## 2023-08-15 NOTE — PHYSICAL EXAM
[4 x 4] : 4 x 4  [2+] : left 2+ [Ankle Swelling (On Exam)] : not present [Varicose Veins Of Lower Extremities] : not present [] : not present [Purpura] : no purpura  [Petechiae] : no petechiae [Alert] : alert [Skin Ulcer] : no ulcer [Oriented to Person] : oriented to person [Oriented to Place] : oriented to place [Oriented to Time] : oriented to time [Calm] : calm [de-identified] : calm [de-identified] : 5/5 muscle strength for all muscles and tendons crossing the foot and ankle joints, ankle joint and STJ ROM intact b/l. No pain with calf compression b/l. [de-identified] : Right plantar hallux wound down to bone, with hyperkeratotic tissue overlying - scant serous drainage  [FreeTextEntry1] : Right Plantar Hallux [FreeTextEntry2] : 0.4 [FreeTextEntry3] : 0.2 [FreeTextEntry4] : 0.3 to bone [de-identified] : Callused, dry [de-identified] : Alginate Ag [FreeTextEntry7] : Right Medial Hallux - closed [de-identified] : Mechanically cleansed with 0.9% Normal Saline Paper Tape [de-identified] : none [TWNoteComboBox4] : None [TWNoteComboBox5] : No [de-identified] : No [de-identified] : other [de-identified] : None [de-identified] : None [de-identified] : 100% [de-identified] : No [de-identified] : Every other day [de-identified] : Primary Dressing

## 2023-08-15 NOTE — PLAN
[FreeTextEntry1] : Patient examined and evaluated at this time.  Discussed the treatment options and current treatment plan with patient and patient's mother.  Patient remains a very high risk for infection, sepsis, limb loss, and death.  Discussed conservative vs surgical treatment options, including  We will continue with IV antibiotics as per infectious disease recommendations at this time. Discussed to continue offloading  and monitor the wound. If the wound heals in the next few weeks then will have patient transition into regular shoes.   All questions answered to satisfaction and patient and patient's mother verbalized understanding at this time.  Spent 20 minutes on patient care and medical decision making.

## 2023-08-15 NOTE — PHYSICAL EXAM
[4 x 4] : 4 x 4  [2+] : left 2+ [Ankle Swelling (On Exam)] : not present [Varicose Veins Of Lower Extremities] : not present [] : not present [Purpura] : no purpura  [Petechiae] : no petechiae [Skin Ulcer] : no ulcer [Alert] : alert [Oriented to Person] : oriented to person [Oriented to Place] : oriented to place [Oriented to Time] : oriented to time [Calm] : calm [de-identified] : calm [de-identified] : 5/5 muscle strength for all muscles and tendons crossing the foot and ankle joints, ankle joint and STJ ROM intact b/l. No pain with calf compression b/l. [de-identified] : Right plantar hallux wound down to bone, with hyperkeratotic tissue overlying  [de-identified] : Callus shaved, planter foot, hallux [FreeTextEntry1] : Right Plantar Hallux [FreeTextEntry2] : 0.5 [FreeTextEntry3] : 0.1 [FreeTextEntry4] : 0.1 [de-identified] : very small sanguinous [de-identified] : callus shaved [de-identified] : silver alginate [de-identified] : Mechanically cleansed with 0.9% Normal Saline  Dressing applied by DPM [FreeTextEntry7] : Right Medial Hallux [de-identified] : none [de-identified] : Mechanically cleansed with 0.9% Normal Saline\par   [TWNoteComboBox5] : No [TWNoteComboBox6] : Other [de-identified] : No [de-identified] : None [de-identified] : None [de-identified] : 100% [de-identified] : Every other day [de-identified] : Primary Dressing [de-identified] : None [de-identified] : No [de-identified] : Other [de-identified] : No [de-identified] : None [de-identified] : None [de-identified] : None [de-identified] : No

## 2023-08-15 NOTE — REVIEW OF SYSTEMS
[Fever] : no fever [Chills] : no chills [Eye Pain] : no eye pain [Red Eyes] : eyes not red [Earache] : no earache [Loss Of Hearing] : no hearing loss [Nosebleeds] : no nosebleeds [Chest Pain] : no chest pain [Shortness Of Breath] : no shortness of breath [Wheezing] : no wheezing [Cough] : no cough [Abdominal Pain] : no abdominal pain [Vomiting] : no vomiting [Diarrhea] : no diarrhea [Skin Wound] : skin wound [Confused] : no confusion [Dizziness] : no dizziness [Anxiety] : no anxiety [Easy Bleeding] : no tendency for easy bleeding [Negative] : Endocrine [de-identified] : Right great toe wound - re-opened  down to bone

## 2023-08-15 NOTE — ASSESSMENT
[Verbal] : Verbal [Demo] : Demo [Patient] : Patient [Family member] : Family member [Good - alert, interested, motivated] : Good - alert, interested, motivated [Verbalizes knowledge/Understanding] : Verbalizes knowledge/understanding [Dressing changes] : dressing changes [Foot Care] : foot care [Skin Care] : skin care [Pressure relief] : pressure relief [Signs and symptoms of infection] : sign and symptoms of infection [Nutrition] : nutrition [How and When to Call] : how and when to call [Patient responsibility to plan of care] : patient responsibility to plan of care [Stable] : stable [Home] : Home [Ambulatory] : Ambulatory [Not Applicable - Long Term Care/Home Health Agency] : Long Term Care/Home Health Agency: Not Applicable [] : No [FreeTextEntry2] : Infection Prevention Wound care and Dressing changes Promote and Restore optimal skin integrity Nutrition and Wound Healing  Offloading and Pressure relief Protect and Guard wound site  Maintain acceptable levels of Pain Compliance R/T Elevation of Lower Extremities [FreeTextEntry4] : Patient completed IV antibiotics yesterday, as per his mother. DPM assessed site - Callus shaved - wound depth 0.3cm to bone  DPM discussed plan with patient and mother - will leave callus around wound at next visit, and monitor. Patient to continue keeping wound dry and wearing surgical shoe  Patient verbalized understanding of all discussed. Patient to return to [facility] in two weeks.

## 2023-08-15 NOTE — REVIEW OF SYSTEMS
[Fever] : no fever [Chills] : no chills [Eye Pain] : no eye pain [Red Eyes] : eyes not red [Earache] : no earache [Loss Of Hearing] : no hearing loss [Nosebleeds] : no nosebleeds [Chest Pain] : no chest pain [Shortness Of Breath] : no shortness of breath [Wheezing] : no wheezing [Cough] : no cough [Abdominal Pain] : no abdominal pain [Vomiting] : no vomiting [Diarrhea] : no diarrhea [Skin Wound] : skin wound [Confused] : no confusion [Dizziness] : no dizziness [Anxiety] : no anxiety [Easy Bleeding] : no tendency for easy bleeding [Negative] : Endocrine [de-identified] : Right great toe wound - re-opened  down to bone

## 2023-08-15 NOTE — HISTORY OF PRESENT ILLNESS
[FreeTextEntry1] : Patient is 17 year old male presenting for wound to the right hallux s/p wound debridement and bone biopsy x 2 , and PICC line. Patient has been applying sterile dressing to the wound as advised and has been ambulating in surgical shoe. Patient is accompanied by his mother. Patient's mother states she would like to discuss regarding surgical intervention and that to continue with conservative treatment at this time. Patient's mother states that she has planned not to travel but would like to continue conservative treatment at this time.

## 2023-08-15 NOTE — PLAN
[FreeTextEntry1] : Patient examined and evaluated at this time.  Discussed the treatment options and current treatment plan with patient and patient's mother.  Patient remains a very high risk for infection, sepsis, limb loss, and death.  Discussed conservative vs surgical treatment options, including  We will continue with IV antibiotics as per infectious disease recommendations at this time.  Upon completion of the antibiotics, we will discuss further treatment options.  All questions answered to satisfaction and patient and patient's mother verbalized understanding at this time.  Spent 20 minutes on patient care and medical decision making.

## 2023-08-15 NOTE — ASSESSMENT
[Verbal] : Verbal [Demo] : Demo [Patient] : Patient [Good - alert, interested, motivated] : Good - alert, interested, motivated [Demonstrates independently] : demonstrates independently [Dressing changes] : dressing changes [Foot Care] : foot care [Skin Care] : skin care [Signs and symptoms of infection] : sign and symptoms of infection [Nutrition] : nutrition [How and When to Call] : how and when to call [Pain Management] : pain management [Patient responsibility to plan of care] : patient responsibility to plan of care [Stable] : stable [Home] : Home [Ambulatory] : Ambulatory [Not Applicable - Long Term Care/Home Health Agency] : Long Term Care/Home Health Agency: Not Applicable [] : No [FreeTextEntry2] : infection prevention promote skin integrity pressure relief/offloading nutrition and wound healing [FreeTextEntry4] : No photos due to IT error, to be obtained next visit F/U 1 week Midline in place, antibiotics in progress

## 2023-08-23 ENCOUNTER — APPOINTMENT (OUTPATIENT)
Dept: WOUND CARE | Facility: HOSPITAL | Age: 18
End: 2023-08-23
Payer: MEDICAID

## 2023-08-23 ENCOUNTER — OUTPATIENT (OUTPATIENT)
Dept: OUTPATIENT SERVICES | Facility: HOSPITAL | Age: 18
LOS: 1 days | Discharge: HOME | End: 2023-08-23
Payer: MEDICAID

## 2023-08-23 VITALS
RESPIRATION RATE: 14 BRPM | OXYGEN SATURATION: 99 % | TEMPERATURE: 98.8 F | DIASTOLIC BLOOD PRESSURE: 70 MMHG | SYSTOLIC BLOOD PRESSURE: 119 MMHG | HEART RATE: 111 BPM

## 2023-08-23 DIAGNOSIS — L97.514 NON-PRESSURE CHRONIC ULCER OF OTHER PART OF RIGHT FOOT WITH NECROSIS OF BONE: ICD-10-CM

## 2023-08-23 DIAGNOSIS — M21.20: ICD-10-CM

## 2023-08-23 PROCEDURE — G0463: CPT

## 2023-08-23 PROCEDURE — 99213 OFFICE O/P EST LOW 20 MIN: CPT

## 2023-08-24 DIAGNOSIS — Z87.39 PERSONAL HISTORY OF OTHER DISEASES OF THE MUSCULOSKELETAL SYSTEM AND CONNECTIVE TISSUE: ICD-10-CM

## 2023-08-24 DIAGNOSIS — K59.09 OTHER CONSTIPATION: ICD-10-CM

## 2023-08-24 DIAGNOSIS — Z91.010 ALLERGY TO PEANUTS: ICD-10-CM

## 2023-08-24 DIAGNOSIS — Z83.49 FAMILY HISTORY OF OTHER ENDOCRINE, NUTRITIONAL AND METABOLIC DISEASES: ICD-10-CM

## 2023-08-24 DIAGNOSIS — Z98.890 OTHER SPECIFIED POSTPROCEDURAL STATES: ICD-10-CM

## 2023-08-24 DIAGNOSIS — M86.671 OTHER CHRONIC OSTEOMYELITIS, RIGHT ANKLE AND FOOT: ICD-10-CM

## 2023-08-24 DIAGNOSIS — Z79.899 OTHER LONG TERM (CURRENT) DRUG THERAPY: ICD-10-CM

## 2023-08-24 DIAGNOSIS — Z91.012 ALLERGY TO EGGS: ICD-10-CM

## 2023-08-24 DIAGNOSIS — L84 CORNS AND CALLOSITIES: ICD-10-CM

## 2023-08-24 DIAGNOSIS — L97.514 NON-PRESSURE CHRONIC ULCER OF OTHER PART OF RIGHT FOOT WITH NECROSIS OF BONE: ICD-10-CM

## 2023-08-24 PROBLEM — M21.20: Status: ACTIVE | Noted: 2023-08-24

## 2023-08-24 NOTE — ASSESSMENT
[Verbal] : Verbal [Demo] : Demo [Patient] : Patient [Good - alert, interested, motivated] : Good - alert, interested, motivated [Verbalizes knowledge/Understanding] : Verbalizes knowledge/understanding [Dressing changes] : dressing changes [Foot Care] : foot care [Skin Care] : skin care [Signs and symptoms of infection] : sign and symptoms of infection [How and When to Call] : how and when to call [Patient responsibility to plan of care] : patient responsibility to plan of care [Stable] : stable [Home] : Home [Ambulatory] : Ambulatory [FreeTextEntry2] : Maintain Skin Integrity Infection Prevention  Localized Wound Care  Dressing Changes  F/U 1 week  [FreeTextEntry4] : DPM requested HBO- Authorization submitted. Consents signed. Order Submitted.  Patient requires HBO orientation upon insurance clearance.  Patient to follow up with neurology. Patient to follow up with Dr. Irwin at [facility] on Friday for nail biopsy.  F/U 1 week

## 2023-08-24 NOTE — REVIEW OF SYSTEMS
[Skin Wound] : skin wound [Negative] : Endocrine [Fever] : no fever [Chills] : no chills [Eye Pain] : no eye pain [Red Eyes] : eyes not red [Earache] : no earache [Loss Of Hearing] : no hearing loss [Nosebleeds] : no nosebleeds [Chest Pain] : no chest pain [Shortness Of Breath] : no shortness of breath [Wheezing] : no wheezing [Cough] : no cough [Abdominal Pain] : no abdominal pain [Vomiting] : no vomiting [Diarrhea] : no diarrhea [Confused] : no confusion [Dizziness] : no dizziness [Anxiety] : no anxiety [Easy Bleeding] : no tendency for easy bleeding [de-identified] : Right great toe wound - re-opened  down to bone

## 2023-08-24 NOTE — PLAN
[FreeTextEntry1] : Patient examined and evaluated at this time.  Discussed the treatment options and current treatment plan with patient and patient's mother.  Patient remains a very high risk for infection, sepsis, limb loss, and death.  Discussed conservative vs surgical treatment options. Patient will benefit from HBOT for chronix refractory OM. Patient will also benefit from  with vernon's extension b/l.  Patient will benefit from custom foot orthotics for arch support and to stabilize and assist with gait. Patient agreed to conservative treatment. Patient given referral for orthotist.   . Discussed to continue offloading and monitor the wound.  Referral for neurology given to assess and evaluate for any underlying neurologixal pathology causing the hammer toes. Patient has hx of idiopathic scoliosis with hx of surgery.   All questions answered to satisfaction and patient and patient's mother verbalized understanding at this time.  Spent 20 minutes on patient care and medical decision making.
Involve in decision making

## 2023-08-24 NOTE — HISTORY OF PRESENT ILLNESS
[FreeTextEntry1] : Patient is 17 year old male presenting for follow up chronic non healing wound to the right hallux and underlying chronic OM. Patient is s/p bone biopsy with PICC line antibiotics x 2. Patient is accompanied by his mother who states that patient has transitioned into regular shoes and has increased ambulation from before. Denies any drainage noticed from the wound. Denies any pain to the right foot.

## 2023-08-24 NOTE — PHYSICAL EXAM
[2 x 2] : 2 x 2  [2+] : left 2+ [Alert] : alert [Oriented to Person] : oriented to person [Oriented to Place] : oriented to place [Oriented to Time] : oriented to time [Calm] : calm [Ankle Swelling (On Exam)] : not present [Varicose Veins Of Lower Extremities] : not present [] : not present [Purpura] : no purpura  [Petechiae] : no petechiae [Skin Ulcer] : no ulcer [de-identified] : calm [de-identified] : 5/5 muscle strength for all muscles and tendons crossing the foot and ankle joints, ankle joint and STJ ROM intact b/l. No pain with calf compression b/l. Flexion contracture of the digits 2,3 and 4 b/l and varus rotation of the 5th digits b/l. Plantarflexed 1st metatarsal when loded the 1st MPJ b/l, decreased bilateral medial arches  [de-identified] : Right plantar hallux wound down to bone, with hyperkeratotic tissue overlying - showing progress of healing  [FreeTextEntry1] : Right Plantar Hallux - chronic osteo [de-identified] : callus  [de-identified] : Dry Dressing [de-identified] : Mechanically Cleansed with Normal Saline and Sterile Gauze [TWNoteComboBox4] : None [TWNoteComboBox5] : No [TWNoteComboBox6] : Other [de-identified] : No [de-identified] : other [de-identified] : None [de-identified] : None [de-identified] : None [de-identified] : No [de-identified] : Daily [de-identified] : Primary Dressing

## 2023-08-28 ENCOUNTER — APPOINTMENT (OUTPATIENT)
Dept: PODIATRY | Facility: CLINIC | Age: 18
End: 2023-08-28
Payer: MEDICAID

## 2023-08-28 ENCOUNTER — LABORATORY RESULT (OUTPATIENT)
Age: 18
End: 2023-08-28

## 2023-08-28 VITALS
SYSTOLIC BLOOD PRESSURE: 114 MMHG | BODY MASS INDEX: 18.19 KG/M2 | OXYGEN SATURATION: 97 % | TEMPERATURE: 97.5 F | WEIGHT: 120 LBS | DIASTOLIC BLOOD PRESSURE: 81 MMHG | HEIGHT: 68 IN | HEART RATE: 121 BPM

## 2023-08-28 DIAGNOSIS — B35.1 TINEA UNGUIUM: ICD-10-CM

## 2023-08-28 PROCEDURE — 11755 BIOPSY NAIL UNIT: CPT

## 2023-08-28 RX ORDER — CICLOPIROX 80 MG/ML
8 SOLUTION TOPICAL
Qty: 1 | Refills: 2 | Status: ACTIVE | COMMUNITY
Start: 2023-08-28 | End: 1900-01-01

## 2023-08-30 ENCOUNTER — APPOINTMENT (OUTPATIENT)
Dept: WOUND CARE | Facility: HOSPITAL | Age: 18
End: 2023-08-30

## 2023-08-30 ENCOUNTER — NON-APPOINTMENT (OUTPATIENT)
Age: 18
End: 2023-08-30

## 2023-09-01 ENCOUNTER — APPOINTMENT (OUTPATIENT)
Dept: RADIOLOGY | Facility: CLINIC | Age: 18
End: 2023-09-01
Payer: MEDICAID

## 2023-09-01 PROCEDURE — 71046 X-RAY EXAM CHEST 2 VIEWS: CPT

## 2023-09-06 ENCOUNTER — APPOINTMENT (OUTPATIENT)
Dept: WOUND CARE | Facility: HOSPITAL | Age: 18
End: 2023-09-06
Payer: MEDICAID

## 2023-09-06 ENCOUNTER — OUTPATIENT (OUTPATIENT)
Dept: OUTPATIENT SERVICES | Facility: HOSPITAL | Age: 18
LOS: 1 days | Discharge: HOME | End: 2023-09-06
Payer: MEDICAID

## 2023-09-06 ENCOUNTER — APPOINTMENT (OUTPATIENT)
Dept: NEUROLOGY | Facility: CLINIC | Age: 18
End: 2023-09-06

## 2023-09-06 VITALS
SYSTOLIC BLOOD PRESSURE: 123 MMHG | HEART RATE: 102 BPM | OXYGEN SATURATION: 98 % | TEMPERATURE: 99.3 F | RESPIRATION RATE: 16 BRPM | DIASTOLIC BLOOD PRESSURE: 82 MMHG

## 2023-09-06 DIAGNOSIS — L97.514 NON-PRESSURE CHRONIC ULCER OF OTHER PART OF RIGHT FOOT WITH NECROSIS OF BONE: ICD-10-CM

## 2023-09-06 PROCEDURE — 99213 OFFICE O/P EST LOW 20 MIN: CPT

## 2023-09-06 PROCEDURE — G0463: CPT

## 2023-09-07 DIAGNOSIS — K59.09 OTHER CONSTIPATION: ICD-10-CM

## 2023-09-07 DIAGNOSIS — Z83.49 FAMILY HISTORY OF OTHER ENDOCRINE, NUTRITIONAL AND METABOLIC DISEASES: ICD-10-CM

## 2023-09-07 DIAGNOSIS — Z79.899 OTHER LONG TERM (CURRENT) DRUG THERAPY: ICD-10-CM

## 2023-09-07 DIAGNOSIS — Z98.890 OTHER SPECIFIED POSTPROCEDURAL STATES: ICD-10-CM

## 2023-09-07 DIAGNOSIS — L84 CORNS AND CALLOSITIES: ICD-10-CM

## 2023-09-07 DIAGNOSIS — L97.514 NON-PRESSURE CHRONIC ULCER OF OTHER PART OF RIGHT FOOT WITH NECROSIS OF BONE: ICD-10-CM

## 2023-09-07 DIAGNOSIS — Z87.39 PERSONAL HISTORY OF OTHER DISEASES OF THE MUSCULOSKELETAL SYSTEM AND CONNECTIVE TISSUE: ICD-10-CM

## 2023-09-07 DIAGNOSIS — M86.671 OTHER CHRONIC OSTEOMYELITIS, RIGHT ANKLE AND FOOT: ICD-10-CM

## 2023-09-07 DIAGNOSIS — Z91.010 ALLERGY TO PEANUTS: ICD-10-CM

## 2023-09-07 DIAGNOSIS — Z91.012 ALLERGY TO EGGS: ICD-10-CM

## 2023-09-07 NOTE — PHYSICAL EXAM
[2 x 2] : 2 x 2  [2+] : left 2+ [Ankle Swelling (On Exam)] : not present [Varicose Veins Of Lower Extremities] : not present [] : not present [Purpura] : no purpura  [Petechiae] : no petechiae [Skin Ulcer] : no ulcer [Alert] : alert [Oriented to Person] : oriented to person [Oriented to Place] : oriented to place [Oriented to Time] : oriented to time [Calm] : calm [de-identified] : calm [de-identified] : 5/5 muscle strength for all muscles and tendons crossing the foot and ankle joints, ankle joint and STJ ROM intact b/l. No pain with calf compression b/l. Flexion contracture of the digits 2,3 and 4 b/l and varus rotation of the 5th digits b/l. Plantarflexed 1st metatarsal when loded the 1st MPJ b/l, decreased bilateral medial arches  [de-identified] : Right plantar hallux wound down to bone, with hyperkeratotic tissue overlying - healed at this time with no edeam, no erythema, no drainage, no proximal streaking  [FreeTextEntry1] : Right Plantar Hallux - chronic osteo [de-identified] : callus  [de-identified] : Dry Dressing [de-identified] : Cleansed with 0.9% Normal Saline  Paper tape [TWNoteComboBox4] : None [TWNoteComboBox5] : No [TWNoteComboBox6] : Other [de-identified] : No [de-identified] : other [de-identified] : None [de-identified] : None [de-identified] : None [de-identified] : No [de-identified] : Daily [de-identified] : Primary Dressing

## 2023-09-07 NOTE — REVIEW OF SYSTEMS
[Fever] : no fever [Chills] : no chills [Eye Pain] : no eye pain [Red Eyes] : eyes not red [Earache] : no earache [Loss Of Hearing] : no hearing loss [Nosebleeds] : no nosebleeds [Chest Pain] : no chest pain [Shortness Of Breath] : no shortness of breath [Wheezing] : no wheezing [Cough] : no cough [Abdominal Pain] : no abdominal pain [Vomiting] : no vomiting [Diarrhea] : no diarrhea [Skin Wound] : skin wound [Confused] : no confusion [Dizziness] : no dizziness [Anxiety] : no anxiety [Easy Bleeding] : no tendency for easy bleeding [Negative] : Endocrine [de-identified] : Right great toe wound - re-opened  down to bone - healed at thiis time

## 2023-09-07 NOTE — PLAN
[FreeTextEntry1] : Patient examined and evaluated at this time.  Discussed the treatment options and current treatment plan with patient and patient's mother.  Patient remains a very high risk for infection, sepsis, limb loss, and death.  Discussed conservative vs surgical treatment options. Patient will benefit from HBOT for chronic refractory OM, auth has been received but patient and mother want to start at the end of September for personal reasons  Patient will also benefit from  with vernon's extension b/l.  Patient will benefit from custom foot orthotics for arch support and to stabilize and assist with gait. Patient agreed to conservative treatment. Patient given referral for orthotist.   . Discussed to continue offloading and monitor the wound. Discussed txt plan with patient's father over telephone conversation. No guarantees given, discussed that patient is still high risk for possible surgery, amputation, loss of limb and loss of life.  Referral for neurology given to assess and evaluate for any underlying neurological pathology causing the hammer toes. Patient has hx of idiopathic scoliosis with hx of surgery.   All questions answered to satisfaction and patient and patient's mother verbalized understanding at this time.  Spent 20 minutes on patient care and medical decision making.

## 2023-09-07 NOTE — ASSESSMENT
[Verbal] : Verbal [Patient] : Patient [Family member] : Family member [Good - alert, interested, motivated] : Good - alert, interested, motivated [Verbalizes knowledge/Understanding] : Verbalizes knowledge/understanding [Dressing changes] : dressing changes [Foot Care] : foot care [Skin Care] : skin care [Pressure relief] : pressure relief [Signs and symptoms of infection] : sign and symptoms of infection [How and When to Call] : how and when to call [Off-loading] : off-loading [Patient responsibility to plan of care] : patient responsibility to plan of care [] : Yes [Stable] : stable [Home] : Home [Ambulatory] : Ambulatory [Not Applicable - Long Term Care/Home Health Agency] : Long Term Care/Home Health Agency: Not Applicable [FreeTextEntry2] : Infection prevention Localized wound care Promote optimal skin integrity  Maintain acceptable level of pain with use of pharmacological and nonpharmacological interventions Offloading / Pressure relief  [FreeTextEntry4] : Follow up for an assessment in one week Patient was provided with order to complete blood work for HBOT Patient completed 2 view chest x-ray for HBOT last week Patient completed HBO orientation and had TM assessed by physician Authorization for 30 HBOT was submitted today Request for consult with Que for IMS submitted today

## 2023-09-07 NOTE — HISTORY OF PRESENT ILLNESS
[FreeTextEntry1] : 09/06/23  Patient is 17 year old presenting for follow up for right foot wound to the hallux with chronic OM. Patient has started weight bearing and also transitioned into regular shoes - denies any pain or drainage from the wound. Has been applying protective dressing to the wound that is healed at this time. Patient is accompanied by his mother.    Patient is 17 year old male presenting for wound to the right hallux s/p wound debridement and bone biopsy x 2 , and PICC line. Patient has been applying sterile dressing to the wound as advised and has been ambulating in surgical shoe. Patient is accompanied by his mother. Patient's mother states she would like to discuss regarding surgical intervention and that to continue with conservative treatment at this time. Patient's mother states that she has planned not to travel but would like to continue conservative treatment at this time.

## 2023-09-12 ENCOUNTER — LABORATORY RESULT (OUTPATIENT)
Age: 18
End: 2023-09-12

## 2023-09-18 ENCOUNTER — APPOINTMENT (OUTPATIENT)
Dept: HYPERBARIC MEDICINE | Facility: HOSPITAL | Age: 18
End: 2023-09-18

## 2023-09-19 ENCOUNTER — APPOINTMENT (OUTPATIENT)
Dept: HYPERBARIC MEDICINE | Facility: HOSPITAL | Age: 18
End: 2023-09-19
Payer: MEDICAID

## 2023-09-19 ENCOUNTER — OUTPATIENT (OUTPATIENT)
Dept: OUTPATIENT SERVICES | Facility: HOSPITAL | Age: 18
LOS: 1 days | Discharge: HOME | End: 2023-09-19
Payer: MEDICAID

## 2023-09-19 VITALS
SYSTOLIC BLOOD PRESSURE: 126 MMHG | RESPIRATION RATE: 16 BRPM | TEMPERATURE: 97.5 F | DIASTOLIC BLOOD PRESSURE: 78 MMHG | OXYGEN SATURATION: 98 % | HEART RATE: 96 BPM

## 2023-09-19 VITALS
RESPIRATION RATE: 16 BRPM | HEART RATE: 73 BPM | DIASTOLIC BLOOD PRESSURE: 75 MMHG | OXYGEN SATURATION: 98 % | SYSTOLIC BLOOD PRESSURE: 103 MMHG | TEMPERATURE: 97.5 F

## 2023-09-19 DIAGNOSIS — L97.514 NON-PRESSURE CHRONIC ULCER OF OTHER PART OF RIGHT FOOT WITH NECROSIS OF BONE: ICD-10-CM

## 2023-09-19 PROCEDURE — 99183 HYPERBARIC OXYGEN THERAPY: CPT

## 2023-09-19 PROCEDURE — G0277: CPT

## 2023-09-19 PROCEDURE — 82962 GLUCOSE BLOOD TEST: CPT

## 2023-09-20 ENCOUNTER — OUTPATIENT (OUTPATIENT)
Dept: OUTPATIENT SERVICES | Facility: HOSPITAL | Age: 18
LOS: 1 days | Discharge: HOME | End: 2023-09-20
Payer: MEDICAID

## 2023-09-20 ENCOUNTER — APPOINTMENT (OUTPATIENT)
Dept: HYPERBARIC MEDICINE | Facility: HOSPITAL | Age: 18
End: 2023-09-20
Payer: MEDICAID

## 2023-09-20 VITALS
TEMPERATURE: 97.6 F | SYSTOLIC BLOOD PRESSURE: 124 MMHG | RESPIRATION RATE: 16 BRPM | HEART RATE: 62 BPM | OXYGEN SATURATION: 99 % | DIASTOLIC BLOOD PRESSURE: 81 MMHG

## 2023-09-20 VITALS
OXYGEN SATURATION: 98 % | HEART RATE: 77 BPM | DIASTOLIC BLOOD PRESSURE: 84 MMHG | RESPIRATION RATE: 16 BRPM | TEMPERATURE: 97.7 F | SYSTOLIC BLOOD PRESSURE: 122 MMHG

## 2023-09-20 DIAGNOSIS — M86.671 OTHER CHRONIC OSTEOMYELITIS, RIGHT ANKLE AND FOOT: ICD-10-CM

## 2023-09-20 DIAGNOSIS — L97.514 NON-PRESSURE CHRONIC ULCER OF OTHER PART OF RIGHT FOOT WITH NECROSIS OF BONE: ICD-10-CM

## 2023-09-20 PROCEDURE — 99183 HYPERBARIC OXYGEN THERAPY: CPT

## 2023-09-20 PROCEDURE — 82962 GLUCOSE BLOOD TEST: CPT

## 2023-09-20 PROCEDURE — G0277: CPT

## 2023-09-21 ENCOUNTER — OUTPATIENT (OUTPATIENT)
Dept: OUTPATIENT SERVICES | Facility: HOSPITAL | Age: 18
LOS: 1 days | Discharge: HOME | End: 2023-09-21
Payer: MEDICAID

## 2023-09-21 ENCOUNTER — APPOINTMENT (OUTPATIENT)
Dept: HYPERBARIC MEDICINE | Facility: HOSPITAL | Age: 18
End: 2023-09-21
Payer: MEDICAID

## 2023-09-21 VITALS
HEART RATE: 96 BPM | OXYGEN SATURATION: 98 % | TEMPERATURE: 98.8 F | RESPIRATION RATE: 16 BRPM | HEIGHT: 68 IN | SYSTOLIC BLOOD PRESSURE: 120 MMHG | DIASTOLIC BLOOD PRESSURE: 80 MMHG | WEIGHT: 125 LBS | BODY MASS INDEX: 18.94 KG/M2

## 2023-09-21 DIAGNOSIS — L97.514 NON-PRESSURE CHRONIC ULCER OF OTHER PART OF RIGHT FOOT WITH NECROSIS OF BONE: ICD-10-CM

## 2023-09-21 PROCEDURE — 99213 OFFICE O/P EST LOW 20 MIN: CPT

## 2023-09-21 PROCEDURE — G0463: CPT

## 2023-09-22 ENCOUNTER — OUTPATIENT (OUTPATIENT)
Dept: OUTPATIENT SERVICES | Facility: HOSPITAL | Age: 18
LOS: 1 days | Discharge: HOME | End: 2023-09-22
Payer: MEDICAID

## 2023-09-22 ENCOUNTER — APPOINTMENT (OUTPATIENT)
Dept: HYPERBARIC MEDICINE | Facility: HOSPITAL | Age: 18
End: 2023-09-22
Payer: MEDICAID

## 2023-09-22 VITALS
RESPIRATION RATE: 16 BRPM | DIASTOLIC BLOOD PRESSURE: 82 MMHG | HEART RATE: 73 BPM | OXYGEN SATURATION: 99 % | TEMPERATURE: 97.8 F | SYSTOLIC BLOOD PRESSURE: 123 MMHG

## 2023-09-22 VITALS
SYSTOLIC BLOOD PRESSURE: 118 MMHG | RESPIRATION RATE: 16 BRPM | TEMPERATURE: 98.4 F | OXYGEN SATURATION: 98 % | DIASTOLIC BLOOD PRESSURE: 73 MMHG | HEART RATE: 85 BPM

## 2023-09-22 DIAGNOSIS — L97.514 NON-PRESSURE CHRONIC ULCER OF OTHER PART OF RIGHT FOOT WITH NECROSIS OF BONE: ICD-10-CM

## 2023-09-22 PROCEDURE — 82962 GLUCOSE BLOOD TEST: CPT

## 2023-09-22 PROCEDURE — G0277: CPT

## 2023-09-22 PROCEDURE — 99183 HYPERBARIC OXYGEN THERAPY: CPT

## 2023-09-23 DIAGNOSIS — M86.671 OTHER CHRONIC OSTEOMYELITIS, RIGHT ANKLE AND FOOT: ICD-10-CM

## 2023-09-23 DIAGNOSIS — L97.514 NON-PRESSURE CHRONIC ULCER OF OTHER PART OF RIGHT FOOT WITH NECROSIS OF BONE: ICD-10-CM

## 2023-09-25 ENCOUNTER — OUTPATIENT (OUTPATIENT)
Dept: OUTPATIENT SERVICES | Facility: HOSPITAL | Age: 18
LOS: 1 days | Discharge: HOME | End: 2023-09-25
Payer: MEDICAID

## 2023-09-25 ENCOUNTER — APPOINTMENT (OUTPATIENT)
Dept: HYPERBARIC MEDICINE | Facility: HOSPITAL | Age: 18
End: 2023-09-25
Payer: MEDICAID

## 2023-09-25 VITALS
SYSTOLIC BLOOD PRESSURE: 112 MMHG | RESPIRATION RATE: 16 BRPM | DIASTOLIC BLOOD PRESSURE: 78 MMHG | HEART RATE: 69 BPM | TEMPERATURE: 98 F | OXYGEN SATURATION: 98 %

## 2023-09-25 VITALS
HEART RATE: 85 BPM | RESPIRATION RATE: 18 BRPM | DIASTOLIC BLOOD PRESSURE: 79 MMHG | TEMPERATURE: 98.2 F | SYSTOLIC BLOOD PRESSURE: 122 MMHG | OXYGEN SATURATION: 98 %

## 2023-09-25 DIAGNOSIS — M20.40 OTHER HAMMER TOE(S) (ACQUIRED), UNSPECIFIED FOOT: ICD-10-CM

## 2023-09-25 DIAGNOSIS — Z91.012 ALLERGY TO EGGS: ICD-10-CM

## 2023-09-25 DIAGNOSIS — L84 CORNS AND CALLOSITIES: ICD-10-CM

## 2023-09-25 DIAGNOSIS — Z91.010 ALLERGY TO PEANUTS: ICD-10-CM

## 2023-09-25 DIAGNOSIS — Z87.39 PERSONAL HISTORY OF OTHER DISEASES OF THE MUSCULOSKELETAL SYSTEM AND CONNECTIVE TISSUE: ICD-10-CM

## 2023-09-25 DIAGNOSIS — K59.09 OTHER CONSTIPATION: ICD-10-CM

## 2023-09-25 DIAGNOSIS — Z79.899 OTHER LONG TERM (CURRENT) DRUG THERAPY: ICD-10-CM

## 2023-09-25 DIAGNOSIS — L97.514 NON-PRESSURE CHRONIC ULCER OF OTHER PART OF RIGHT FOOT WITH NECROSIS OF BONE: ICD-10-CM

## 2023-09-25 DIAGNOSIS — M86.671 OTHER CHRONIC OSTEOMYELITIS, RIGHT ANKLE AND FOOT: ICD-10-CM

## 2023-09-25 DIAGNOSIS — M41.20 OTHER IDIOPATHIC SCOLIOSIS, SITE UNSPECIFIED: ICD-10-CM

## 2023-09-25 DIAGNOSIS — Z98.890 OTHER SPECIFIED POSTPROCEDURAL STATES: ICD-10-CM

## 2023-09-25 DIAGNOSIS — Z83.49 FAMILY HISTORY OF OTHER ENDOCRINE, NUTRITIONAL AND METABOLIC DISEASES: ICD-10-CM

## 2023-09-25 PROCEDURE — 99183 HYPERBARIC OXYGEN THERAPY: CPT

## 2023-09-25 PROCEDURE — G0277: CPT

## 2023-09-25 PROCEDURE — 82962 GLUCOSE BLOOD TEST: CPT

## 2023-09-26 ENCOUNTER — APPOINTMENT (OUTPATIENT)
Dept: HYPERBARIC MEDICINE | Facility: HOSPITAL | Age: 18
End: 2023-09-26
Payer: MEDICAID

## 2023-09-26 ENCOUNTER — OUTPATIENT (OUTPATIENT)
Dept: OUTPATIENT SERVICES | Facility: HOSPITAL | Age: 18
LOS: 1 days | Discharge: HOME | End: 2023-09-26
Payer: MEDICAID

## 2023-09-26 ENCOUNTER — APPOINTMENT (OUTPATIENT)
Dept: NEUROLOGY | Facility: CLINIC | Age: 18
End: 2023-09-26

## 2023-09-26 VITALS
SYSTOLIC BLOOD PRESSURE: 116 MMHG | HEART RATE: 84 BPM | RESPIRATION RATE: 14 BRPM | TEMPERATURE: 99.1 F | OXYGEN SATURATION: 99 % | DIASTOLIC BLOOD PRESSURE: 73 MMHG

## 2023-09-26 VITALS
SYSTOLIC BLOOD PRESSURE: 115 MMHG | DIASTOLIC BLOOD PRESSURE: 73 MMHG | TEMPERATURE: 98.2 F | HEART RATE: 67 BPM | OXYGEN SATURATION: 99 % | RESPIRATION RATE: 14 BRPM

## 2023-09-26 DIAGNOSIS — L97.514 NON-PRESSURE CHRONIC ULCER OF OTHER PART OF RIGHT FOOT WITH NECROSIS OF BONE: ICD-10-CM

## 2023-09-26 DIAGNOSIS — M86.671 OTHER CHRONIC OSTEOMYELITIS, RIGHT ANKLE AND FOOT: ICD-10-CM

## 2023-09-26 LAB — GLUCOSE BLDC GLUCOMTR-MCNC: 127 MG/DL — HIGH (ref 70–99)

## 2023-09-26 PROCEDURE — 99183 HYPERBARIC OXYGEN THERAPY: CPT

## 2023-09-26 PROCEDURE — 82962 GLUCOSE BLOOD TEST: CPT

## 2023-09-26 PROCEDURE — G0277: CPT

## 2023-09-27 ENCOUNTER — OUTPATIENT (OUTPATIENT)
Dept: OUTPATIENT SERVICES | Facility: HOSPITAL | Age: 18
LOS: 1 days | Discharge: HOME | End: 2023-09-27
Payer: MEDICAID

## 2023-09-27 ENCOUNTER — APPOINTMENT (OUTPATIENT)
Dept: HYPERBARIC MEDICINE | Facility: HOSPITAL | Age: 18
End: 2023-09-27
Payer: MEDICAID

## 2023-09-27 VITALS
SYSTOLIC BLOOD PRESSURE: 129 MMHG | HEART RATE: 79 BPM | OXYGEN SATURATION: 99 % | RESPIRATION RATE: 16 BRPM | DIASTOLIC BLOOD PRESSURE: 81 MMHG | TEMPERATURE: 97.2 F

## 2023-09-27 DIAGNOSIS — L97.514 NON-PRESSURE CHRONIC ULCER OF OTHER PART OF RIGHT FOOT WITH NECROSIS OF BONE: ICD-10-CM

## 2023-09-27 PROCEDURE — G0277: CPT

## 2023-09-27 PROCEDURE — 99183 HYPERBARIC OXYGEN THERAPY: CPT

## 2023-09-27 PROCEDURE — 82962 GLUCOSE BLOOD TEST: CPT

## 2023-09-28 ENCOUNTER — OUTPATIENT (OUTPATIENT)
Dept: OUTPATIENT SERVICES | Facility: HOSPITAL | Age: 18
LOS: 1 days | Discharge: HOME | End: 2023-09-28
Payer: MEDICAID

## 2023-09-28 ENCOUNTER — NON-APPOINTMENT (OUTPATIENT)
Age: 18
End: 2023-09-28

## 2023-09-28 ENCOUNTER — APPOINTMENT (OUTPATIENT)
Dept: HYPERBARIC MEDICINE | Facility: HOSPITAL | Age: 18
End: 2023-09-28
Payer: MEDICAID

## 2023-09-28 VITALS
HEART RATE: 83 BPM | OXYGEN SATURATION: 98 % | TEMPERATURE: 97.9 F | RESPIRATION RATE: 18 BRPM | SYSTOLIC BLOOD PRESSURE: 119 MMHG | DIASTOLIC BLOOD PRESSURE: 81 MMHG

## 2023-09-28 VITALS
DIASTOLIC BLOOD PRESSURE: 79 MMHG | TEMPERATURE: 98.2 F | OXYGEN SATURATION: 99 % | RESPIRATION RATE: 16 BRPM | SYSTOLIC BLOOD PRESSURE: 127 MMHG | HEART RATE: 92 BPM

## 2023-09-28 DIAGNOSIS — M86.671 OTHER CHRONIC OSTEOMYELITIS, RIGHT ANKLE AND FOOT: ICD-10-CM

## 2023-09-28 DIAGNOSIS — L97.514 NON-PRESSURE CHRONIC ULCER OF OTHER PART OF RIGHT FOOT WITH NECROSIS OF BONE: ICD-10-CM

## 2023-09-28 LAB — GLUCOSE BLDC GLUCOMTR-MCNC: 114 MG/DL — HIGH (ref 70–99)

## 2023-09-28 PROCEDURE — G0277: CPT

## 2023-09-28 PROCEDURE — 82962 GLUCOSE BLOOD TEST: CPT

## 2023-09-28 PROCEDURE — 99183 HYPERBARIC OXYGEN THERAPY: CPT

## 2023-09-29 ENCOUNTER — APPOINTMENT (OUTPATIENT)
Dept: HYPERBARIC MEDICINE | Facility: HOSPITAL | Age: 18
End: 2023-09-29
Payer: MEDICAID

## 2023-09-29 ENCOUNTER — OUTPATIENT (OUTPATIENT)
Dept: OUTPATIENT SERVICES | Facility: HOSPITAL | Age: 18
LOS: 1 days | Discharge: HOME | End: 2023-09-29
Payer: MEDICAID

## 2023-09-29 VITALS
DIASTOLIC BLOOD PRESSURE: 72 MMHG | SYSTOLIC BLOOD PRESSURE: 109 MMHG | OXYGEN SATURATION: 99 % | TEMPERATURE: 97.9 F | RESPIRATION RATE: 20 BRPM | HEART RATE: 97 BPM

## 2023-09-29 VITALS
OXYGEN SATURATION: 99 % | DIASTOLIC BLOOD PRESSURE: 76 MMHG | RESPIRATION RATE: 20 BRPM | HEART RATE: 86 BPM | TEMPERATURE: 98.5 F | SYSTOLIC BLOOD PRESSURE: 114 MMHG

## 2023-09-29 DIAGNOSIS — M86.671 OTHER CHRONIC OSTEOMYELITIS, RIGHT ANKLE AND FOOT: ICD-10-CM

## 2023-09-29 DIAGNOSIS — L97.514 NON-PRESSURE CHRONIC ULCER OF OTHER PART OF RIGHT FOOT WITH NECROSIS OF BONE: ICD-10-CM

## 2023-09-29 PROCEDURE — 99183 HYPERBARIC OXYGEN THERAPY: CPT

## 2023-09-29 PROCEDURE — 82962 GLUCOSE BLOOD TEST: CPT

## 2023-09-29 PROCEDURE — G0277: CPT

## 2023-10-02 ENCOUNTER — OUTPATIENT (OUTPATIENT)
Dept: OUTPATIENT SERVICES | Facility: HOSPITAL | Age: 18
LOS: 1 days | Discharge: HOME | End: 2023-10-02
Payer: MEDICAID

## 2023-10-02 ENCOUNTER — APPOINTMENT (OUTPATIENT)
Dept: HYPERBARIC MEDICINE | Facility: HOSPITAL | Age: 18
End: 2023-10-02
Payer: MEDICAID

## 2023-10-02 VITALS
OXYGEN SATURATION: 99 % | HEART RATE: 86 BPM | RESPIRATION RATE: 18 BRPM | TEMPERATURE: 97.6 F | SYSTOLIC BLOOD PRESSURE: 130 MMHG | DIASTOLIC BLOOD PRESSURE: 81 MMHG

## 2023-10-02 VITALS
OXYGEN SATURATION: 99 % | HEART RATE: 83 BPM | RESPIRATION RATE: 16 BRPM | TEMPERATURE: 97.9 F | DIASTOLIC BLOOD PRESSURE: 82 MMHG | SYSTOLIC BLOOD PRESSURE: 122 MMHG

## 2023-10-02 VITALS
TEMPERATURE: 97.2 F | SYSTOLIC BLOOD PRESSURE: 125 MMHG | RESPIRATION RATE: 20 BRPM | OXYGEN SATURATION: 99 % | DIASTOLIC BLOOD PRESSURE: 80 MMHG | HEART RATE: 91 BPM

## 2023-10-02 DIAGNOSIS — L97.514 NON-PRESSURE CHRONIC ULCER OF OTHER PART OF RIGHT FOOT WITH NECROSIS OF BONE: ICD-10-CM

## 2023-10-02 DIAGNOSIS — M86.671 OTHER CHRONIC OSTEOMYELITIS, RIGHT ANKLE AND FOOT: ICD-10-CM

## 2023-10-02 PROCEDURE — G0277: CPT

## 2023-10-02 PROCEDURE — 82962 GLUCOSE BLOOD TEST: CPT

## 2023-10-02 PROCEDURE — 99183 HYPERBARIC OXYGEN THERAPY: CPT

## 2023-10-03 ENCOUNTER — OUTPATIENT (OUTPATIENT)
Dept: OUTPATIENT SERVICES | Facility: HOSPITAL | Age: 18
LOS: 1 days | Discharge: HOME | End: 2023-10-03
Payer: MEDICAID

## 2023-10-03 ENCOUNTER — APPOINTMENT (OUTPATIENT)
Dept: HYPERBARIC MEDICINE | Facility: HOSPITAL | Age: 18
End: 2023-10-03
Payer: MEDICAID

## 2023-10-03 VITALS
DIASTOLIC BLOOD PRESSURE: 72 MMHG | HEART RATE: 81 BPM | RESPIRATION RATE: 18 BRPM | OXYGEN SATURATION: 98 % | SYSTOLIC BLOOD PRESSURE: 110 MMHG | TEMPERATURE: 97.8 F

## 2023-10-03 VITALS
SYSTOLIC BLOOD PRESSURE: 118 MMHG | DIASTOLIC BLOOD PRESSURE: 79 MMHG | RESPIRATION RATE: 18 BRPM | HEART RATE: 79 BPM | TEMPERATURE: 97.9 F | OXYGEN SATURATION: 99 %

## 2023-10-03 DIAGNOSIS — M86.671 OTHER CHRONIC OSTEOMYELITIS, RIGHT ANKLE AND FOOT: ICD-10-CM

## 2023-10-03 DIAGNOSIS — L97.514 NON-PRESSURE CHRONIC ULCER OF OTHER PART OF RIGHT FOOT WITH NECROSIS OF BONE: ICD-10-CM

## 2023-10-03 LAB — GLUCOSE BLDC GLUCOMTR-MCNC: 130 MG/DL — HIGH (ref 70–99)

## 2023-10-03 PROCEDURE — 82962 GLUCOSE BLOOD TEST: CPT

## 2023-10-03 PROCEDURE — 99183 HYPERBARIC OXYGEN THERAPY: CPT

## 2023-10-03 PROCEDURE — G0277: CPT

## 2023-10-04 ENCOUNTER — OUTPATIENT (OUTPATIENT)
Dept: OUTPATIENT SERVICES | Facility: HOSPITAL | Age: 18
LOS: 1 days | Discharge: HOME | End: 2023-10-04
Payer: MEDICAID

## 2023-10-04 ENCOUNTER — NON-APPOINTMENT (OUTPATIENT)
Age: 18
End: 2023-10-04

## 2023-10-04 ENCOUNTER — APPOINTMENT (OUTPATIENT)
Dept: HYPERBARIC MEDICINE | Facility: HOSPITAL | Age: 18
End: 2023-10-04
Payer: MEDICAID

## 2023-10-04 VITALS
HEART RATE: 87 BPM | OXYGEN SATURATION: 99 % | SYSTOLIC BLOOD PRESSURE: 119 MMHG | RESPIRATION RATE: 16 BRPM | TEMPERATURE: 97.6 F | DIASTOLIC BLOOD PRESSURE: 81 MMHG

## 2023-10-04 VITALS
SYSTOLIC BLOOD PRESSURE: 121 MMHG | OXYGEN SATURATION: 100 % | TEMPERATURE: 98.1 F | RESPIRATION RATE: 14 BRPM | DIASTOLIC BLOOD PRESSURE: 80 MMHG | HEART RATE: 79 BPM

## 2023-10-04 DIAGNOSIS — L97.514 NON-PRESSURE CHRONIC ULCER OF OTHER PART OF RIGHT FOOT WITH NECROSIS OF BONE: ICD-10-CM

## 2023-10-04 DIAGNOSIS — M86.671 OTHER CHRONIC OSTEOMYELITIS, RIGHT ANKLE AND FOOT: ICD-10-CM

## 2023-10-04 LAB — GLUCOSE BLDC GLUCOMTR-MCNC: 106 MG/DL — HIGH (ref 70–99)

## 2023-10-04 PROCEDURE — 82962 GLUCOSE BLOOD TEST: CPT

## 2023-10-04 PROCEDURE — 99183 HYPERBARIC OXYGEN THERAPY: CPT

## 2023-10-04 PROCEDURE — G0277: CPT

## 2023-10-05 ENCOUNTER — OUTPATIENT (OUTPATIENT)
Dept: OUTPATIENT SERVICES | Facility: HOSPITAL | Age: 18
LOS: 1 days | Discharge: HOME | End: 2023-10-05
Payer: MEDICAID

## 2023-10-05 ENCOUNTER — APPOINTMENT (OUTPATIENT)
Dept: HYPERBARIC MEDICINE | Facility: HOSPITAL | Age: 18
End: 2023-10-05
Payer: MEDICAID

## 2023-10-05 VITALS
SYSTOLIC BLOOD PRESSURE: 125 MMHG | HEIGHT: 68 IN | RESPIRATION RATE: 14 BRPM | BODY MASS INDEX: 18.94 KG/M2 | HEART RATE: 104 BPM | WEIGHT: 125 LBS | OXYGEN SATURATION: 98 % | TEMPERATURE: 98.5 F | DIASTOLIC BLOOD PRESSURE: 83 MMHG

## 2023-10-05 DIAGNOSIS — L97.514 NON-PRESSURE CHRONIC ULCER OF OTHER PART OF RIGHT FOOT WITH NECROSIS OF BONE: ICD-10-CM

## 2023-10-05 PROCEDURE — G0463: CPT

## 2023-10-05 PROCEDURE — 99213 OFFICE O/P EST LOW 20 MIN: CPT

## 2023-10-06 ENCOUNTER — APPOINTMENT (OUTPATIENT)
Dept: HYPERBARIC MEDICINE | Facility: HOSPITAL | Age: 18
End: 2023-10-06
Payer: MEDICAID

## 2023-10-06 ENCOUNTER — OUTPATIENT (OUTPATIENT)
Dept: OUTPATIENT SERVICES | Facility: HOSPITAL | Age: 18
LOS: 1 days | Discharge: HOME | End: 2023-10-06
Payer: MEDICAID

## 2023-10-06 VITALS
RESPIRATION RATE: 20 BRPM | DIASTOLIC BLOOD PRESSURE: 75 MMHG | OXYGEN SATURATION: 99 % | TEMPERATURE: 98.3 F | HEART RATE: 81 BPM | SYSTOLIC BLOOD PRESSURE: 109 MMHG

## 2023-10-06 VITALS
OXYGEN SATURATION: 99 % | TEMPERATURE: 97.5 F | HEART RATE: 85 BPM | SYSTOLIC BLOOD PRESSURE: 116 MMHG | DIASTOLIC BLOOD PRESSURE: 79 MMHG | RESPIRATION RATE: 20 BRPM

## 2023-10-06 DIAGNOSIS — L97.514 NON-PRESSURE CHRONIC ULCER OF OTHER PART OF RIGHT FOOT WITH NECROSIS OF BONE: ICD-10-CM

## 2023-10-06 DIAGNOSIS — M86.671 OTHER CHRONIC OSTEOMYELITIS, RIGHT ANKLE AND FOOT: ICD-10-CM

## 2023-10-06 PROCEDURE — G0277: CPT

## 2023-10-06 PROCEDURE — 99183 HYPERBARIC OXYGEN THERAPY: CPT

## 2023-10-06 PROCEDURE — 82962 GLUCOSE BLOOD TEST: CPT

## 2023-10-09 ENCOUNTER — OUTPATIENT (OUTPATIENT)
Dept: OUTPATIENT SERVICES | Facility: HOSPITAL | Age: 18
LOS: 1 days | Discharge: HOME | End: 2023-10-09
Payer: MEDICAID

## 2023-10-09 ENCOUNTER — APPOINTMENT (OUTPATIENT)
Dept: HYPERBARIC MEDICINE | Facility: HOSPITAL | Age: 18
End: 2023-10-09
Payer: MEDICAID

## 2023-10-09 VITALS
OXYGEN SATURATION: 98 % | TEMPERATURE: 98.1 F | DIASTOLIC BLOOD PRESSURE: 77 MMHG | RESPIRATION RATE: 16 BRPM | SYSTOLIC BLOOD PRESSURE: 115 MMHG | HEART RATE: 81 BPM

## 2023-10-09 VITALS
SYSTOLIC BLOOD PRESSURE: 123 MMHG | TEMPERATURE: 98.1 F | HEART RATE: 68 BPM | RESPIRATION RATE: 20 BRPM | DIASTOLIC BLOOD PRESSURE: 84 MMHG | OXYGEN SATURATION: 99 %

## 2023-10-09 DIAGNOSIS — Z87.39 PERSONAL HISTORY OF OTHER DISEASES OF THE MUSCULOSKELETAL SYSTEM AND CONNECTIVE TISSUE: ICD-10-CM

## 2023-10-09 DIAGNOSIS — L97.514 NON-PRESSURE CHRONIC ULCER OF OTHER PART OF RIGHT FOOT WITH NECROSIS OF BONE: ICD-10-CM

## 2023-10-09 DIAGNOSIS — Z98.890 OTHER SPECIFIED POSTPROCEDURAL STATES: ICD-10-CM

## 2023-10-09 DIAGNOSIS — Z91.010 ALLERGY TO PEANUTS: ICD-10-CM

## 2023-10-09 DIAGNOSIS — M41.9 SCOLIOSIS, UNSPECIFIED: ICD-10-CM

## 2023-10-09 DIAGNOSIS — K59.09 OTHER CONSTIPATION: ICD-10-CM

## 2023-10-09 DIAGNOSIS — M86.671 OTHER CHRONIC OSTEOMYELITIS, RIGHT ANKLE AND FOOT: ICD-10-CM

## 2023-10-09 DIAGNOSIS — Z83.49 FAMILY HISTORY OF OTHER ENDOCRINE, NUTRITIONAL AND METABOLIC DISEASES: ICD-10-CM

## 2023-10-09 DIAGNOSIS — Z79.899 OTHER LONG TERM (CURRENT) DRUG THERAPY: ICD-10-CM

## 2023-10-09 DIAGNOSIS — Z91.012 ALLERGY TO EGGS: ICD-10-CM

## 2023-10-09 DIAGNOSIS — L84 CORNS AND CALLOSITIES: ICD-10-CM

## 2023-10-09 PROCEDURE — G0277: CPT

## 2023-10-09 PROCEDURE — 82962 GLUCOSE BLOOD TEST: CPT

## 2023-10-09 PROCEDURE — 99183 HYPERBARIC OXYGEN THERAPY: CPT

## 2023-10-10 ENCOUNTER — NON-APPOINTMENT (OUTPATIENT)
Age: 18
End: 2023-10-10

## 2023-10-10 ENCOUNTER — APPOINTMENT (OUTPATIENT)
Dept: HYPERBARIC MEDICINE | Facility: HOSPITAL | Age: 18
End: 2023-10-10
Payer: MEDICAID

## 2023-10-10 ENCOUNTER — OUTPATIENT (OUTPATIENT)
Dept: OUTPATIENT SERVICES | Facility: HOSPITAL | Age: 18
LOS: 1 days | Discharge: HOME | End: 2023-10-10
Payer: MEDICAID

## 2023-10-10 VITALS
RESPIRATION RATE: 12 BRPM | TEMPERATURE: 98.1 F | OXYGEN SATURATION: 99 % | SYSTOLIC BLOOD PRESSURE: 114 MMHG | DIASTOLIC BLOOD PRESSURE: 74 MMHG | HEART RATE: 76 BPM

## 2023-10-10 VITALS
DIASTOLIC BLOOD PRESSURE: 78 MMHG | HEART RATE: 68 BPM | OXYGEN SATURATION: 99 % | SYSTOLIC BLOOD PRESSURE: 118 MMHG | RESPIRATION RATE: 14 BRPM | TEMPERATURE: 97.9 F

## 2023-10-10 DIAGNOSIS — M86.671 OTHER CHRONIC OSTEOMYELITIS, RIGHT ANKLE AND FOOT: ICD-10-CM

## 2023-10-10 DIAGNOSIS — L97.514 NON-PRESSURE CHRONIC ULCER OF OTHER PART OF RIGHT FOOT WITH NECROSIS OF BONE: ICD-10-CM

## 2023-10-10 LAB — GLUCOSE BLDC GLUCOMTR-MCNC: 105 MG/DL — HIGH (ref 70–99)

## 2023-10-10 PROCEDURE — 99183 HYPERBARIC OXYGEN THERAPY: CPT

## 2023-10-10 PROCEDURE — 82962 GLUCOSE BLOOD TEST: CPT

## 2023-10-10 PROCEDURE — G0277: CPT

## 2023-10-11 ENCOUNTER — APPOINTMENT (OUTPATIENT)
Dept: HYPERBARIC MEDICINE | Facility: HOSPITAL | Age: 18
End: 2023-10-11
Payer: MEDICAID

## 2023-10-11 ENCOUNTER — OUTPATIENT (OUTPATIENT)
Dept: OUTPATIENT SERVICES | Facility: HOSPITAL | Age: 18
LOS: 1 days | Discharge: HOME | End: 2023-10-11
Payer: MEDICAID

## 2023-10-11 VITALS
OXYGEN SATURATION: 99 % | HEART RATE: 77 BPM | DIASTOLIC BLOOD PRESSURE: 79 MMHG | TEMPERATURE: 98.3 F | SYSTOLIC BLOOD PRESSURE: 117 MMHG | RESPIRATION RATE: 12 BRPM

## 2023-10-11 VITALS
RESPIRATION RATE: 12 BRPM | OXYGEN SATURATION: 99 % | HEART RATE: 76 BPM | TEMPERATURE: 97.9 F | DIASTOLIC BLOOD PRESSURE: 84 MMHG | SYSTOLIC BLOOD PRESSURE: 128 MMHG

## 2023-10-11 DIAGNOSIS — M86.671 OTHER CHRONIC OSTEOMYELITIS, RIGHT ANKLE AND FOOT: ICD-10-CM

## 2023-10-11 DIAGNOSIS — L97.514 NON-PRESSURE CHRONIC ULCER OF OTHER PART OF RIGHT FOOT WITH NECROSIS OF BONE: ICD-10-CM

## 2023-10-11 PROCEDURE — G0277: CPT

## 2023-10-11 PROCEDURE — 99183 HYPERBARIC OXYGEN THERAPY: CPT

## 2023-10-11 PROCEDURE — 82962 GLUCOSE BLOOD TEST: CPT

## 2023-10-12 ENCOUNTER — APPOINTMENT (OUTPATIENT)
Dept: HYPERBARIC MEDICINE | Facility: HOSPITAL | Age: 18
End: 2023-10-12
Payer: MEDICAID

## 2023-10-12 ENCOUNTER — OUTPATIENT (OUTPATIENT)
Dept: OUTPATIENT SERVICES | Facility: HOSPITAL | Age: 18
LOS: 1 days | Discharge: HOME | End: 2023-10-12
Payer: MEDICAID

## 2023-10-12 VITALS
OXYGEN SATURATION: 98 % | TEMPERATURE: 97.8 F | HEART RATE: 88 BPM | SYSTOLIC BLOOD PRESSURE: 123 MMHG | DIASTOLIC BLOOD PRESSURE: 71 MMHG | RESPIRATION RATE: 12 BRPM

## 2023-10-12 VITALS
OXYGEN SATURATION: 100 % | HEART RATE: 74 BPM | DIASTOLIC BLOOD PRESSURE: 80 MMHG | SYSTOLIC BLOOD PRESSURE: 119 MMHG | TEMPERATURE: 98.4 F | RESPIRATION RATE: 18 BRPM

## 2023-10-12 DIAGNOSIS — L97.514 NON-PRESSURE CHRONIC ULCER OF OTHER PART OF RIGHT FOOT WITH NECROSIS OF BONE: ICD-10-CM

## 2023-10-12 DIAGNOSIS — M86.671 OTHER CHRONIC OSTEOMYELITIS, RIGHT ANKLE AND FOOT: ICD-10-CM

## 2023-10-12 LAB — GLUCOSE BLDC GLUCOMTR-MCNC: 118 MG/DL — HIGH (ref 70–99)

## 2023-10-12 PROCEDURE — G0277: CPT

## 2023-10-12 PROCEDURE — 99183 HYPERBARIC OXYGEN THERAPY: CPT

## 2023-10-12 PROCEDURE — 82962 GLUCOSE BLOOD TEST: CPT

## 2023-10-13 ENCOUNTER — OUTPATIENT (OUTPATIENT)
Dept: OUTPATIENT SERVICES | Facility: HOSPITAL | Age: 18
LOS: 1 days | Discharge: HOME | End: 2023-10-13
Payer: MEDICAID

## 2023-10-13 ENCOUNTER — APPOINTMENT (OUTPATIENT)
Dept: HYPERBARIC MEDICINE | Facility: HOSPITAL | Age: 18
End: 2023-10-13
Payer: MEDICAID

## 2023-10-13 VITALS
DIASTOLIC BLOOD PRESSURE: 87 MMHG | OXYGEN SATURATION: 99 % | SYSTOLIC BLOOD PRESSURE: 130 MMHG | HEART RATE: 92 BPM | TEMPERATURE: 98.7 F | RESPIRATION RATE: 18 BRPM

## 2023-10-13 VITALS
DIASTOLIC BLOOD PRESSURE: 85 MMHG | TEMPERATURE: 98.2 F | SYSTOLIC BLOOD PRESSURE: 125 MMHG | RESPIRATION RATE: 18 BRPM | OXYGEN SATURATION: 98 % | HEART RATE: 79 BPM

## 2023-10-13 DIAGNOSIS — L97.514 NON-PRESSURE CHRONIC ULCER OF OTHER PART OF RIGHT FOOT WITH NECROSIS OF BONE: ICD-10-CM

## 2023-10-13 DIAGNOSIS — M86.671 OTHER CHRONIC OSTEOMYELITIS, RIGHT ANKLE AND FOOT: ICD-10-CM

## 2023-10-13 PROCEDURE — 82962 GLUCOSE BLOOD TEST: CPT

## 2023-10-13 PROCEDURE — G0277: CPT

## 2023-10-13 PROCEDURE — 99183 HYPERBARIC OXYGEN THERAPY: CPT

## 2023-10-16 ENCOUNTER — OUTPATIENT (OUTPATIENT)
Dept: OUTPATIENT SERVICES | Facility: HOSPITAL | Age: 18
LOS: 1 days | Discharge: HOME | End: 2023-10-16
Payer: MEDICAID

## 2023-10-16 ENCOUNTER — APPOINTMENT (OUTPATIENT)
Dept: HYPERBARIC MEDICINE | Facility: HOSPITAL | Age: 18
End: 2023-10-16
Payer: MEDICAID

## 2023-10-16 VITALS
DIASTOLIC BLOOD PRESSURE: 79 MMHG | SYSTOLIC BLOOD PRESSURE: 118 MMHG | HEART RATE: 98 BPM | OXYGEN SATURATION: 98 % | RESPIRATION RATE: 16 BRPM | TEMPERATURE: 98.3 F

## 2023-10-16 VITALS
HEART RATE: 75 BPM | RESPIRATION RATE: 20 BRPM | DIASTOLIC BLOOD PRESSURE: 83 MMHG | TEMPERATURE: 98 F | SYSTOLIC BLOOD PRESSURE: 120 MMHG | OXYGEN SATURATION: 96 %

## 2023-10-16 DIAGNOSIS — L97.514 NON-PRESSURE CHRONIC ULCER OF OTHER PART OF RIGHT FOOT WITH NECROSIS OF BONE: ICD-10-CM

## 2023-10-16 DIAGNOSIS — M86.671 OTHER CHRONIC OSTEOMYELITIS, RIGHT ANKLE AND FOOT: ICD-10-CM

## 2023-10-16 PROCEDURE — 82962 GLUCOSE BLOOD TEST: CPT

## 2023-10-16 PROCEDURE — 99183 HYPERBARIC OXYGEN THERAPY: CPT

## 2023-10-16 PROCEDURE — G0277: CPT

## 2023-10-17 ENCOUNTER — OUTPATIENT (OUTPATIENT)
Dept: OUTPATIENT SERVICES | Facility: HOSPITAL | Age: 18
LOS: 1 days | Discharge: HOME | End: 2023-10-17
Payer: MEDICAID

## 2023-10-17 ENCOUNTER — APPOINTMENT (OUTPATIENT)
Dept: HYPERBARIC MEDICINE | Facility: HOSPITAL | Age: 18
End: 2023-10-17
Payer: MEDICAID

## 2023-10-17 VITALS
TEMPERATURE: 97.7 F | RESPIRATION RATE: 18 BRPM | OXYGEN SATURATION: 96 % | DIASTOLIC BLOOD PRESSURE: 79 MMHG | HEART RATE: 82 BPM | SYSTOLIC BLOOD PRESSURE: 118 MMHG

## 2023-10-17 VITALS
OXYGEN SATURATION: 99 % | SYSTOLIC BLOOD PRESSURE: 127 MMHG | DIASTOLIC BLOOD PRESSURE: 84 MMHG | HEART RATE: 93 BPM | TEMPERATURE: 97.8 F | RESPIRATION RATE: 93 BRPM

## 2023-10-17 DIAGNOSIS — M86.671 OTHER CHRONIC OSTEOMYELITIS, RIGHT ANKLE AND FOOT: ICD-10-CM

## 2023-10-17 DIAGNOSIS — L97.514 NON-PRESSURE CHRONIC ULCER OF OTHER PART OF RIGHT FOOT WITH NECROSIS OF BONE: ICD-10-CM

## 2023-10-17 LAB
GLUCOSE BLDC GLUCOMTR-MCNC: 128 MG/DL — HIGH (ref 70–99)
GLUCOSE BLDC GLUCOMTR-MCNC: 128 MG/DL — HIGH (ref 70–99)
GLUCOSE BLDC GLUCOMTR-MCNC: 89 MG/DL — SIGNIFICANT CHANGE UP (ref 70–99)
GLUCOSE BLDC GLUCOMTR-MCNC: 89 MG/DL — SIGNIFICANT CHANGE UP (ref 70–99)

## 2023-10-17 PROCEDURE — 99183 HYPERBARIC OXYGEN THERAPY: CPT

## 2023-10-17 PROCEDURE — G0277: CPT

## 2023-10-17 PROCEDURE — 82962 GLUCOSE BLOOD TEST: CPT

## 2023-10-18 ENCOUNTER — APPOINTMENT (OUTPATIENT)
Dept: HYPERBARIC MEDICINE | Facility: HOSPITAL | Age: 18
End: 2023-10-18
Payer: MEDICAID

## 2023-10-18 ENCOUNTER — NON-APPOINTMENT (OUTPATIENT)
Age: 18
End: 2023-10-18

## 2023-10-18 ENCOUNTER — OUTPATIENT (OUTPATIENT)
Dept: OUTPATIENT SERVICES | Facility: HOSPITAL | Age: 18
LOS: 1 days | Discharge: HOME | End: 2023-10-18
Payer: MEDICAID

## 2023-10-18 VITALS
TEMPERATURE: 98.1 F | OXYGEN SATURATION: 98 % | SYSTOLIC BLOOD PRESSURE: 122 MMHG | DIASTOLIC BLOOD PRESSURE: 81 MMHG | HEART RATE: 83 BPM | RESPIRATION RATE: 16 BRPM

## 2023-10-18 VITALS
DIASTOLIC BLOOD PRESSURE: 84 MMHG | SYSTOLIC BLOOD PRESSURE: 129 MMHG | RESPIRATION RATE: 16 BRPM | OXYGEN SATURATION: 99 % | HEART RATE: 90 BPM | TEMPERATURE: 97.8 F

## 2023-10-18 DIAGNOSIS — M86.671 OTHER CHRONIC OSTEOMYELITIS, RIGHT ANKLE AND FOOT: ICD-10-CM

## 2023-10-18 DIAGNOSIS — L97.514 NON-PRESSURE CHRONIC ULCER OF OTHER PART OF RIGHT FOOT WITH NECROSIS OF BONE: ICD-10-CM

## 2023-10-18 LAB
GLUCOSE BLDC GLUCOMTR-MCNC: 122 MG/DL — HIGH (ref 70–99)
GLUCOSE BLDC GLUCOMTR-MCNC: 122 MG/DL — HIGH (ref 70–99)

## 2023-10-18 PROCEDURE — G0277: CPT

## 2023-10-18 PROCEDURE — 82962 GLUCOSE BLOOD TEST: CPT

## 2023-10-18 PROCEDURE — 99183 HYPERBARIC OXYGEN THERAPY: CPT

## 2023-10-19 ENCOUNTER — APPOINTMENT (OUTPATIENT)
Dept: HYPERBARIC MEDICINE | Facility: HOSPITAL | Age: 18
End: 2023-10-19
Payer: MEDICAID

## 2023-10-19 ENCOUNTER — OUTPATIENT (OUTPATIENT)
Dept: OUTPATIENT SERVICES | Facility: HOSPITAL | Age: 18
LOS: 1 days | Discharge: HOME | End: 2023-10-19
Payer: MEDICAID

## 2023-10-19 VITALS
DIASTOLIC BLOOD PRESSURE: 81 MMHG | OXYGEN SATURATION: 98 % | BODY MASS INDEX: 18.94 KG/M2 | TEMPERATURE: 98.1 F | RESPIRATION RATE: 18 BRPM | SYSTOLIC BLOOD PRESSURE: 114 MMHG | HEIGHT: 68 IN | WEIGHT: 125 LBS | HEART RATE: 93 BPM

## 2023-10-19 DIAGNOSIS — L97.514 NON-PRESSURE CHRONIC ULCER OF OTHER PART OF RIGHT FOOT WITH NECROSIS OF BONE: ICD-10-CM

## 2023-10-19 PROCEDURE — G0463: CPT

## 2023-10-19 PROCEDURE — 99213 OFFICE O/P EST LOW 20 MIN: CPT

## 2023-10-20 ENCOUNTER — APPOINTMENT (OUTPATIENT)
Dept: HYPERBARIC MEDICINE | Facility: HOSPITAL | Age: 18
End: 2023-10-20
Payer: MEDICAID

## 2023-10-20 ENCOUNTER — OUTPATIENT (OUTPATIENT)
Dept: OUTPATIENT SERVICES | Facility: HOSPITAL | Age: 18
LOS: 1 days | Discharge: HOME | End: 2023-10-20
Payer: MEDICAID

## 2023-10-20 VITALS
RESPIRATION RATE: 20 BRPM | DIASTOLIC BLOOD PRESSURE: 77 MMHG | HEART RATE: 86 BPM | TEMPERATURE: 98.3 F | SYSTOLIC BLOOD PRESSURE: 115 MMHG | OXYGEN SATURATION: 98 %

## 2023-10-20 VITALS
HEART RATE: 81 BPM | OXYGEN SATURATION: 99 % | DIASTOLIC BLOOD PRESSURE: 79 MMHG | SYSTOLIC BLOOD PRESSURE: 117 MMHG | TEMPERATURE: 97.9 F | RESPIRATION RATE: 20 BRPM

## 2023-10-20 DIAGNOSIS — L97.514 NON-PRESSURE CHRONIC ULCER OF OTHER PART OF RIGHT FOOT WITH NECROSIS OF BONE: ICD-10-CM

## 2023-10-20 DIAGNOSIS — M86.671 OTHER CHRONIC OSTEOMYELITIS, RIGHT ANKLE AND FOOT: ICD-10-CM

## 2023-10-20 PROCEDURE — 82962 GLUCOSE BLOOD TEST: CPT

## 2023-10-20 PROCEDURE — 99183 HYPERBARIC OXYGEN THERAPY: CPT

## 2023-10-20 PROCEDURE — G0277: CPT

## 2023-10-23 ENCOUNTER — OUTPATIENT (OUTPATIENT)
Dept: OUTPATIENT SERVICES | Facility: HOSPITAL | Age: 18
LOS: 1 days | Discharge: HOME | End: 2023-10-23
Payer: MEDICAID

## 2023-10-23 ENCOUNTER — APPOINTMENT (OUTPATIENT)
Dept: HYPERBARIC MEDICINE | Facility: HOSPITAL | Age: 18
End: 2023-10-23
Payer: MEDICAID

## 2023-10-23 VITALS
TEMPERATURE: 98.2 F | DIASTOLIC BLOOD PRESSURE: 74 MMHG | SYSTOLIC BLOOD PRESSURE: 112 MMHG | RESPIRATION RATE: 20 BRPM | OXYGEN SATURATION: 99 % | HEART RATE: 78 BPM

## 2023-10-23 VITALS
TEMPERATURE: 98.8 F | DIASTOLIC BLOOD PRESSURE: 79 MMHG | SYSTOLIC BLOOD PRESSURE: 118 MMHG | RESPIRATION RATE: 14 BRPM | OXYGEN SATURATION: 100 % | HEART RATE: 77 BPM

## 2023-10-23 DIAGNOSIS — M86.671 OTHER CHRONIC OSTEOMYELITIS, RIGHT ANKLE AND FOOT: ICD-10-CM

## 2023-10-23 DIAGNOSIS — L97.514 NON-PRESSURE CHRONIC ULCER OF OTHER PART OF RIGHT FOOT WITH NECROSIS OF BONE: ICD-10-CM

## 2023-10-23 PROCEDURE — 99183 HYPERBARIC OXYGEN THERAPY: CPT

## 2023-10-23 PROCEDURE — 82962 GLUCOSE BLOOD TEST: CPT

## 2023-10-23 PROCEDURE — G0277: CPT

## 2023-10-24 ENCOUNTER — OUTPATIENT (OUTPATIENT)
Dept: OUTPATIENT SERVICES | Facility: HOSPITAL | Age: 18
LOS: 1 days | Discharge: HOME | End: 2023-10-24
Payer: MEDICAID

## 2023-10-24 ENCOUNTER — APPOINTMENT (OUTPATIENT)
Dept: HYPERBARIC MEDICINE | Facility: HOSPITAL | Age: 18
End: 2023-10-24
Payer: MEDICAID

## 2023-10-24 VITALS
RESPIRATION RATE: 14 BRPM | HEART RATE: 67 BPM | TEMPERATURE: 98.1 F | OXYGEN SATURATION: 99 % | DIASTOLIC BLOOD PRESSURE: 86 MMHG | SYSTOLIC BLOOD PRESSURE: 128 MMHG

## 2023-10-24 VITALS
RESPIRATION RATE: 14 BRPM | OXYGEN SATURATION: 100 % | DIASTOLIC BLOOD PRESSURE: 80 MMHG | TEMPERATURE: 97.7 F | SYSTOLIC BLOOD PRESSURE: 123 MMHG | HEART RATE: 84 BPM

## 2023-10-24 DIAGNOSIS — L97.514 NON-PRESSURE CHRONIC ULCER OF OTHER PART OF RIGHT FOOT WITH NECROSIS OF BONE: ICD-10-CM

## 2023-10-24 DIAGNOSIS — M86.671 OTHER CHRONIC OSTEOMYELITIS, RIGHT ANKLE AND FOOT: ICD-10-CM

## 2023-10-24 LAB
GLUCOSE BLDC GLUCOMTR-MCNC: 123 MG/DL — HIGH (ref 70–99)
GLUCOSE BLDC GLUCOMTR-MCNC: 123 MG/DL — HIGH (ref 70–99)
GLUCOSE BLDC GLUCOMTR-MCNC: 83 MG/DL — SIGNIFICANT CHANGE UP (ref 70–99)
GLUCOSE BLDC GLUCOMTR-MCNC: 83 MG/DL — SIGNIFICANT CHANGE UP (ref 70–99)
GLUCOSE BLDC GLUCOMTR-MCNC: 85 MG/DL — SIGNIFICANT CHANGE UP (ref 70–99)
GLUCOSE BLDC GLUCOMTR-MCNC: 85 MG/DL — SIGNIFICANT CHANGE UP (ref 70–99)

## 2023-10-24 PROCEDURE — 82962 GLUCOSE BLOOD TEST: CPT

## 2023-10-24 PROCEDURE — 99183 HYPERBARIC OXYGEN THERAPY: CPT

## 2023-10-24 PROCEDURE — G0277: CPT

## 2023-10-25 ENCOUNTER — OUTPATIENT (OUTPATIENT)
Dept: OUTPATIENT SERVICES | Facility: HOSPITAL | Age: 18
LOS: 1 days | Discharge: HOME | End: 2023-10-25
Payer: MEDICAID

## 2023-10-25 ENCOUNTER — APPOINTMENT (OUTPATIENT)
Dept: HYPERBARIC MEDICINE | Facility: HOSPITAL | Age: 18
End: 2023-10-25
Payer: MEDICAID

## 2023-10-25 VITALS
HEART RATE: 84 BPM | TEMPERATURE: 97.9 F | RESPIRATION RATE: 20 BRPM | OXYGEN SATURATION: 98 % | SYSTOLIC BLOOD PRESSURE: 130 MMHG | DIASTOLIC BLOOD PRESSURE: 68 MMHG

## 2023-10-25 VITALS
DIASTOLIC BLOOD PRESSURE: 71 MMHG | OXYGEN SATURATION: 99 % | RESPIRATION RATE: 20 BRPM | HEART RATE: 97 BPM | SYSTOLIC BLOOD PRESSURE: 136 MMHG | TEMPERATURE: 98.2 F

## 2023-10-25 VITALS
DIASTOLIC BLOOD PRESSURE: 79 MMHG | SYSTOLIC BLOOD PRESSURE: 120 MMHG | HEART RATE: 77 BPM | TEMPERATURE: 98 F | OXYGEN SATURATION: 99 % | RESPIRATION RATE: 20 BRPM

## 2023-10-25 DIAGNOSIS — L97.514 NON-PRESSURE CHRONIC ULCER OF OTHER PART OF RIGHT FOOT WITH NECROSIS OF BONE: ICD-10-CM

## 2023-10-25 PROCEDURE — 99183 HYPERBARIC OXYGEN THERAPY: CPT

## 2023-10-25 PROCEDURE — G0277: CPT

## 2023-10-25 PROCEDURE — 82962 GLUCOSE BLOOD TEST: CPT

## 2023-10-26 ENCOUNTER — APPOINTMENT (OUTPATIENT)
Dept: HYPERBARIC MEDICINE | Facility: HOSPITAL | Age: 18
End: 2023-10-26

## 2023-10-27 ENCOUNTER — APPOINTMENT (OUTPATIENT)
Dept: HYPERBARIC MEDICINE | Facility: HOSPITAL | Age: 18
End: 2023-10-27
Payer: MEDICAID

## 2023-10-27 ENCOUNTER — OUTPATIENT (OUTPATIENT)
Dept: OUTPATIENT SERVICES | Facility: HOSPITAL | Age: 18
LOS: 1 days | Discharge: HOME | End: 2023-10-27
Payer: MEDICAID

## 2023-10-27 VITALS
RESPIRATION RATE: 20 BRPM | SYSTOLIC BLOOD PRESSURE: 127 MMHG | HEART RATE: 78 BPM | TEMPERATURE: 98.1 F | OXYGEN SATURATION: 97 % | DIASTOLIC BLOOD PRESSURE: 80 MMHG

## 2023-10-27 VITALS
HEART RATE: 81 BPM | TEMPERATURE: 98.1 F | SYSTOLIC BLOOD PRESSURE: 122 MMHG | OXYGEN SATURATION: 97 % | RESPIRATION RATE: 20 BRPM | DIASTOLIC BLOOD PRESSURE: 77 MMHG

## 2023-10-27 DIAGNOSIS — M86.671 OTHER CHRONIC OSTEOMYELITIS, RIGHT ANKLE AND FOOT: ICD-10-CM

## 2023-10-27 DIAGNOSIS — L97.514 NON-PRESSURE CHRONIC ULCER OF OTHER PART OF RIGHT FOOT WITH NECROSIS OF BONE: ICD-10-CM

## 2023-10-27 PROCEDURE — G0277: CPT

## 2023-10-27 PROCEDURE — 82962 GLUCOSE BLOOD TEST: CPT

## 2023-10-27 PROCEDURE — 99183 HYPERBARIC OXYGEN THERAPY: CPT

## 2023-10-29 DIAGNOSIS — Z91.012 ALLERGY TO EGGS: ICD-10-CM

## 2023-10-29 DIAGNOSIS — Z91.010 ALLERGY TO PEANUTS: ICD-10-CM

## 2023-10-29 DIAGNOSIS — Z83.49 FAMILY HISTORY OF OTHER ENDOCRINE, NUTRITIONAL AND METABOLIC DISEASES: ICD-10-CM

## 2023-10-29 DIAGNOSIS — M86.671 OTHER CHRONIC OSTEOMYELITIS, RIGHT ANKLE AND FOOT: ICD-10-CM

## 2023-10-29 DIAGNOSIS — Z87.39 PERSONAL HISTORY OF OTHER DISEASES OF THE MUSCULOSKELETAL SYSTEM AND CONNECTIVE TISSUE: ICD-10-CM

## 2023-10-29 DIAGNOSIS — L84 CORNS AND CALLOSITIES: ICD-10-CM

## 2023-10-29 DIAGNOSIS — Z98.890 OTHER SPECIFIED POSTPROCEDURAL STATES: ICD-10-CM

## 2023-10-29 DIAGNOSIS — K59.09 OTHER CONSTIPATION: ICD-10-CM

## 2023-10-29 DIAGNOSIS — M41.9 SCOLIOSIS, UNSPECIFIED: ICD-10-CM

## 2023-10-29 DIAGNOSIS — L97.514 NON-PRESSURE CHRONIC ULCER OF OTHER PART OF RIGHT FOOT WITH NECROSIS OF BONE: ICD-10-CM

## 2023-10-29 DIAGNOSIS — Z79.899 OTHER LONG TERM (CURRENT) DRUG THERAPY: ICD-10-CM

## 2023-10-30 ENCOUNTER — OUTPATIENT (OUTPATIENT)
Dept: OUTPATIENT SERVICES | Facility: HOSPITAL | Age: 18
LOS: 1 days | Discharge: HOME | End: 2023-10-30
Payer: MEDICAID

## 2023-10-30 ENCOUNTER — APPOINTMENT (OUTPATIENT)
Dept: HYPERBARIC MEDICINE | Facility: HOSPITAL | Age: 18
End: 2023-10-30
Payer: MEDICAID

## 2023-10-30 VITALS
RESPIRATION RATE: 18 BRPM | TEMPERATURE: 98.2 F | DIASTOLIC BLOOD PRESSURE: 78 MMHG | OXYGEN SATURATION: 99 % | SYSTOLIC BLOOD PRESSURE: 125 MMHG | HEART RATE: 106 BPM

## 2023-10-30 VITALS
RESPIRATION RATE: 18 BRPM | TEMPERATURE: 98.1 F | OXYGEN SATURATION: 100 % | HEART RATE: 73 BPM | DIASTOLIC BLOOD PRESSURE: 84 MMHG | SYSTOLIC BLOOD PRESSURE: 125 MMHG

## 2023-10-30 DIAGNOSIS — L97.514 NON-PRESSURE CHRONIC ULCER OF OTHER PART OF RIGHT FOOT WITH NECROSIS OF BONE: ICD-10-CM

## 2023-10-30 DIAGNOSIS — M86.671 OTHER CHRONIC OSTEOMYELITIS, RIGHT ANKLE AND FOOT: ICD-10-CM

## 2023-10-30 LAB
GLUCOSE BLDC GLUCOMTR-MCNC: 138 MG/DL — HIGH (ref 70–99)
GLUCOSE BLDC GLUCOMTR-MCNC: 138 MG/DL — HIGH (ref 70–99)

## 2023-10-30 PROCEDURE — 82962 GLUCOSE BLOOD TEST: CPT

## 2023-10-30 PROCEDURE — 99183 HYPERBARIC OXYGEN THERAPY: CPT

## 2023-10-30 PROCEDURE — G0277: CPT

## 2023-10-31 ENCOUNTER — APPOINTMENT (OUTPATIENT)
Dept: WOUND CARE | Facility: HOSPITAL | Age: 18
End: 2023-10-31
Payer: MEDICAID

## 2023-10-31 ENCOUNTER — OUTPATIENT (OUTPATIENT)
Dept: OUTPATIENT SERVICES | Facility: HOSPITAL | Age: 18
LOS: 1 days | Discharge: HOME | End: 2023-10-31
Payer: MEDICAID

## 2023-10-31 ENCOUNTER — APPOINTMENT (OUTPATIENT)
Dept: HYPERBARIC MEDICINE | Facility: HOSPITAL | Age: 18
End: 2023-10-31
Payer: MEDICAID

## 2023-10-31 VITALS
TEMPERATURE: 97.7 F | HEART RATE: 73 BPM | OXYGEN SATURATION: 99 % | DIASTOLIC BLOOD PRESSURE: 82 MMHG | RESPIRATION RATE: 16 BRPM | SYSTOLIC BLOOD PRESSURE: 125 MMHG

## 2023-10-31 VITALS
HEART RATE: 107 BPM | OXYGEN SATURATION: 99 % | DIASTOLIC BLOOD PRESSURE: 79 MMHG | RESPIRATION RATE: 14 BRPM | SYSTOLIC BLOOD PRESSURE: 121 MMHG | TEMPERATURE: 98.3 F

## 2023-10-31 DIAGNOSIS — M86.671 OTHER CHRONIC OSTEOMYELITIS, RIGHT ANKLE AND FOOT: ICD-10-CM

## 2023-10-31 DIAGNOSIS — M41.9 SCOLIOSIS, UNSPECIFIED: ICD-10-CM

## 2023-10-31 DIAGNOSIS — S90.812A ABRASION, LEFT FOOT, INITIAL ENCOUNTER: ICD-10-CM

## 2023-10-31 DIAGNOSIS — L97.514 NON-PRESSURE CHRONIC ULCER OF OTHER PART OF RIGHT FOOT WITH NECROSIS OF BONE: ICD-10-CM
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
## 2023-10-31 DIAGNOSIS — Z91.012 ALLERGY TO EGGS: ICD-10-CM
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
## 2023-10-31 DIAGNOSIS — K59.09 OTHER CONSTIPATION: ICD-10-CM

## 2023-10-31 DIAGNOSIS — Z98.890 OTHER SPECIFIED POSTPROCEDURAL STATES: ICD-10-CM

## 2023-10-31 DIAGNOSIS — Z83.49 FAMILY HISTORY OF OTHER ENDOCRINE, NUTRITIONAL AND METABOLIC DISEASES: ICD-10-CM
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
## 2023-10-31 DIAGNOSIS — Z90.10 ACQUIRED ABSENCE OF UNSPECIFIED BREAST AND NIPPLE: ICD-10-CM

## 2023-10-31 DIAGNOSIS — Z79.899 OTHER LONG TERM (CURRENT) DRUG THERAPY: ICD-10-CM
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
## 2023-10-31 DIAGNOSIS — Z87.39 PERSONAL HISTORY OF OTHER DISEASES OF THE MUSCULOSKELETAL SYSTEM AND CONNECTIVE TISSUE: ICD-10-CM

## 2023-10-31 PROCEDURE — G0463: CPT | Mod: 25

## 2023-10-31 PROCEDURE — 99183 HYPERBARIC OXYGEN THERAPY: CPT

## 2023-10-31 PROCEDURE — G0277: CPT

## 2023-10-31 PROCEDURE — 82962 GLUCOSE BLOOD TEST: CPT

## 2023-10-31 PROCEDURE — 99213 OFFICE O/P EST LOW 20 MIN: CPT

## 2023-10-31 RX ORDER — MUPIROCIN 20 MG/G
2 OINTMENT TOPICAL TWICE DAILY
Qty: 1 | Refills: 5 | Status: ACTIVE | COMMUNITY
Start: 2023-10-31 | End: 1900-01-01

## 2023-11-01 ENCOUNTER — NON-APPOINTMENT (OUTPATIENT)
Age: 18
End: 2023-11-01

## 2023-11-01 ENCOUNTER — OUTPATIENT (OUTPATIENT)
Dept: OUTPATIENT SERVICES | Facility: HOSPITAL | Age: 18
LOS: 1 days | Discharge: HOME | End: 2023-11-01
Payer: MEDICAID

## 2023-11-01 ENCOUNTER — APPOINTMENT (OUTPATIENT)
Dept: HYPERBARIC MEDICINE | Facility: HOSPITAL | Age: 18
End: 2023-11-01
Payer: MEDICAID

## 2023-11-01 VITALS
OXYGEN SATURATION: 99 % | RESPIRATION RATE: 18 BRPM | DIASTOLIC BLOOD PRESSURE: 77 MMHG | SYSTOLIC BLOOD PRESSURE: 119 MMHG | TEMPERATURE: 98.1 F | HEART RATE: 84 BPM

## 2023-11-01 VITALS
TEMPERATURE: 98.7 F | OXYGEN SATURATION: 99 % | DIASTOLIC BLOOD PRESSURE: 85 MMHG | RESPIRATION RATE: 18 BRPM | SYSTOLIC BLOOD PRESSURE: 121 MMHG | HEART RATE: 90 BPM

## 2023-11-01 DIAGNOSIS — L97.514 NON-PRESSURE CHRONIC ULCER OF OTHER PART OF RIGHT FOOT WITH NECROSIS OF BONE: ICD-10-CM

## 2023-11-01 DIAGNOSIS — M86.671 OTHER CHRONIC OSTEOMYELITIS, RIGHT ANKLE AND FOOT: ICD-10-CM

## 2023-11-01 LAB
GLUCOSE BLDC GLUCOMTR-MCNC: 110 MG/DL — HIGH (ref 70–99)
GLUCOSE BLDC GLUCOMTR-MCNC: 110 MG/DL — HIGH (ref 70–99)

## 2023-11-01 PROCEDURE — G0277: CPT

## 2023-11-01 PROCEDURE — 82962 GLUCOSE BLOOD TEST: CPT

## 2023-11-01 PROCEDURE — 99183 HYPERBARIC OXYGEN THERAPY: CPT

## 2023-11-02 ENCOUNTER — OUTPATIENT (OUTPATIENT)
Dept: OUTPATIENT SERVICES | Facility: HOSPITAL | Age: 18
LOS: 1 days | Discharge: HOME | End: 2023-11-02
Payer: MEDICAID

## 2023-11-02 ENCOUNTER — APPOINTMENT (OUTPATIENT)
Dept: HYPERBARIC MEDICINE | Facility: HOSPITAL | Age: 18
End: 2023-11-02
Payer: MEDICAID

## 2023-11-02 VITALS
RESPIRATION RATE: 12 BRPM | BODY MASS INDEX: 18.94 KG/M2 | TEMPERATURE: 98.2 F | HEIGHT: 68 IN | OXYGEN SATURATION: 99 % | SYSTOLIC BLOOD PRESSURE: 122 MMHG | DIASTOLIC BLOOD PRESSURE: 83 MMHG | HEART RATE: 81 BPM | WEIGHT: 125 LBS

## 2023-11-02 DIAGNOSIS — Z91.010 ALLERGY TO PEANUTS: ICD-10-CM

## 2023-11-02 DIAGNOSIS — L97.514 NON-PRESSURE CHRONIC ULCER OF OTHER PART OF RIGHT FOOT WITH NECROSIS OF BONE: ICD-10-CM

## 2023-11-02 DIAGNOSIS — M86.671 OTHER CHRONIC OSTEOMYELITIS, RIGHT ANKLE AND FOOT: ICD-10-CM

## 2023-11-02 DIAGNOSIS — Z87.39 PERSONAL HISTORY OF OTHER DISEASES OF THE MUSCULOSKELETAL SYSTEM AND CONNECTIVE TISSUE: ICD-10-CM

## 2023-11-02 DIAGNOSIS — Z98.890 OTHER SPECIFIED POSTPROCEDURAL STATES: ICD-10-CM

## 2023-11-02 DIAGNOSIS — Z79.899 OTHER LONG TERM (CURRENT) DRUG THERAPY: ICD-10-CM

## 2023-11-02 DIAGNOSIS — Z83.49 FAMILY HISTORY OF OTHER ENDOCRINE, NUTRITIONAL AND METABOLIC DISEASES: ICD-10-CM

## 2023-11-02 DIAGNOSIS — K59.09 OTHER CONSTIPATION: ICD-10-CM

## 2023-11-02 DIAGNOSIS — Z90.12 ACQUIRED ABSENCE OF LEFT BREAST AND NIPPLE: ICD-10-CM

## 2023-11-02 DIAGNOSIS — L84 CORNS AND CALLOSITIES: ICD-10-CM

## 2023-11-02 PROCEDURE — G0463: CPT

## 2023-11-02 PROCEDURE — 99213 OFFICE O/P EST LOW 20 MIN: CPT

## 2023-11-03 ENCOUNTER — NON-APPOINTMENT (OUTPATIENT)
Age: 18
End: 2023-11-03

## 2023-11-03 ENCOUNTER — APPOINTMENT (OUTPATIENT)
Dept: HYPERBARIC MEDICINE | Facility: HOSPITAL | Age: 18
End: 2023-11-03

## 2023-11-06 ENCOUNTER — NON-APPOINTMENT (OUTPATIENT)
Age: 18
End: 2023-11-06

## 2023-11-06 ENCOUNTER — APPOINTMENT (OUTPATIENT)
Dept: HYPERBARIC MEDICINE | Facility: HOSPITAL | Age: 18
End: 2023-11-06

## 2023-11-07 ENCOUNTER — APPOINTMENT (OUTPATIENT)
Dept: HYPERBARIC MEDICINE | Facility: HOSPITAL | Age: 18
End: 2023-11-07

## 2023-11-08 ENCOUNTER — APPOINTMENT (OUTPATIENT)
Dept: HYPERBARIC MEDICINE | Facility: HOSPITAL | Age: 18
End: 2023-11-08

## 2023-11-08 ENCOUNTER — NON-APPOINTMENT (OUTPATIENT)
Age: 18
End: 2023-11-08

## 2023-11-09 ENCOUNTER — OUTPATIENT (OUTPATIENT)
Dept: OUTPATIENT SERVICES | Facility: HOSPITAL | Age: 18
LOS: 1 days | Discharge: HOME | End: 2023-11-09
Payer: MEDICAID

## 2023-11-09 ENCOUNTER — APPOINTMENT (OUTPATIENT)
Dept: HYPERBARIC MEDICINE | Facility: HOSPITAL | Age: 18
End: 2023-11-09

## 2023-11-09 ENCOUNTER — NON-APPOINTMENT (OUTPATIENT)
Age: 18
End: 2023-11-09

## 2023-11-09 VITALS
OXYGEN SATURATION: 99 % | BODY MASS INDEX: 18.94 KG/M2 | HEART RATE: 99 BPM | HEIGHT: 68 IN | TEMPERATURE: 98 F | WEIGHT: 125 LBS | DIASTOLIC BLOOD PRESSURE: 77 MMHG | SYSTOLIC BLOOD PRESSURE: 112 MMHG | RESPIRATION RATE: 18 BRPM

## 2023-11-09 DIAGNOSIS — L97.514 NON-PRESSURE CHRONIC ULCER OF OTHER PART OF RIGHT FOOT WITH NECROSIS OF BONE: ICD-10-CM

## 2023-11-09 DIAGNOSIS — Z91.010 ALLERGY TO PEANUTS: ICD-10-CM

## 2023-11-09 DIAGNOSIS — Z91.012 ALLERGY TO EGGS: ICD-10-CM

## 2023-11-09 DIAGNOSIS — Z79.899 OTHER LONG TERM (CURRENT) DRUG THERAPY: ICD-10-CM

## 2023-11-09 DIAGNOSIS — Z98.890 OTHER SPECIFIED POSTPROCEDURAL STATES: ICD-10-CM

## 2023-11-09 DIAGNOSIS — M86.671 OTHER CHRONIC OSTEOMYELITIS, RIGHT ANKLE AND FOOT: ICD-10-CM

## 2023-11-09 DIAGNOSIS — K59.09 OTHER CONSTIPATION: ICD-10-CM

## 2023-11-09 DIAGNOSIS — Z87.39 PERSONAL HISTORY OF OTHER DISEASES OF THE MUSCULOSKELETAL SYSTEM AND CONNECTIVE TISSUE: ICD-10-CM

## 2023-11-09 DIAGNOSIS — Z83.49 FAMILY HISTORY OF OTHER ENDOCRINE, NUTRITIONAL AND METABOLIC DISEASES: ICD-10-CM

## 2023-11-09 PROCEDURE — G0463: CPT

## 2023-11-10 ENCOUNTER — APPOINTMENT (OUTPATIENT)
Dept: HYPERBARIC MEDICINE | Facility: HOSPITAL | Age: 18
End: 2023-11-10

## 2023-11-15 ENCOUNTER — OUTPATIENT (OUTPATIENT)
Dept: OUTPATIENT SERVICES | Facility: HOSPITAL | Age: 18
LOS: 1 days | Discharge: HOME | End: 2023-11-15
Payer: MEDICAID

## 2023-11-15 ENCOUNTER — APPOINTMENT (OUTPATIENT)
Dept: WOUND CARE | Facility: HOSPITAL | Age: 18
End: 2023-11-15
Payer: MEDICAID

## 2023-11-15 VITALS
RESPIRATION RATE: 18 BRPM | OXYGEN SATURATION: 96 % | HEART RATE: 114 BPM | HEIGHT: 68 IN | BODY MASS INDEX: 18.94 KG/M2 | SYSTOLIC BLOOD PRESSURE: 123 MMHG | TEMPERATURE: 99.9 F | DIASTOLIC BLOOD PRESSURE: 84 MMHG | WEIGHT: 125 LBS

## 2023-11-15 DIAGNOSIS — L97.514 NON-PRESSURE CHRONIC ULCER OF OTHER PART OF RIGHT FOOT WITH NECROSIS OF BONE: ICD-10-CM

## 2023-11-15 PROCEDURE — 99213 OFFICE O/P EST LOW 20 MIN: CPT

## 2023-11-15 PROCEDURE — G0463: CPT

## 2023-11-20 DIAGNOSIS — Z91.012 ALLERGY TO EGGS: ICD-10-CM

## 2023-11-20 DIAGNOSIS — L84 CORNS AND CALLOSITIES: ICD-10-CM

## 2023-11-20 DIAGNOSIS — Z83.49 FAMILY HISTORY OF OTHER ENDOCRINE, NUTRITIONAL AND METABOLIC DISEASES: ICD-10-CM

## 2023-11-20 DIAGNOSIS — Z87.39 PERSONAL HISTORY OF OTHER DISEASES OF THE MUSCULOSKELETAL SYSTEM AND CONNECTIVE TISSUE: ICD-10-CM

## 2023-11-20 DIAGNOSIS — M86.671 OTHER CHRONIC OSTEOMYELITIS, RIGHT ANKLE AND FOOT: ICD-10-CM

## 2023-11-20 DIAGNOSIS — Z98.890 OTHER SPECIFIED POSTPROCEDURAL STATES: ICD-10-CM

## 2023-11-20 DIAGNOSIS — L97.514 NON-PRESSURE CHRONIC ULCER OF OTHER PART OF RIGHT FOOT WITH NECROSIS OF BONE: ICD-10-CM

## 2023-11-20 DIAGNOSIS — Z79.899 OTHER LONG TERM (CURRENT) DRUG THERAPY: ICD-10-CM

## 2023-11-20 DIAGNOSIS — Z91.010 ALLERGY TO PEANUTS: ICD-10-CM

## 2023-11-20 DIAGNOSIS — K59.09 OTHER CONSTIPATION: ICD-10-CM

## 2023-11-29 ENCOUNTER — OUTPATIENT (OUTPATIENT)
Dept: OUTPATIENT SERVICES | Facility: HOSPITAL | Age: 18
LOS: 1 days | Discharge: HOME | End: 2023-11-29
Payer: MEDICAID

## 2023-11-29 ENCOUNTER — APPOINTMENT (OUTPATIENT)
Dept: WOUND CARE | Facility: HOSPITAL | Age: 18
End: 2023-11-29
Payer: MEDICAID

## 2023-11-29 VITALS
WEIGHT: 125 LBS | SYSTOLIC BLOOD PRESSURE: 129 MMHG | TEMPERATURE: 99.1 F | RESPIRATION RATE: 16 BRPM | HEIGHT: 68 IN | OXYGEN SATURATION: 96 % | BODY MASS INDEX: 18.94 KG/M2 | HEART RATE: 111 BPM | DIASTOLIC BLOOD PRESSURE: 83 MMHG

## 2023-11-29 DIAGNOSIS — L97.514 NON-PRESSURE CHRONIC ULCER OF OTHER PART OF RIGHT FOOT WITH NECROSIS OF BONE: ICD-10-CM

## 2023-11-29 PROCEDURE — 99213 OFFICE O/P EST LOW 20 MIN: CPT

## 2023-11-29 PROCEDURE — G0463: CPT

## 2023-12-02 DIAGNOSIS — Z98.890 OTHER SPECIFIED POSTPROCEDURAL STATES: ICD-10-CM

## 2023-12-02 DIAGNOSIS — M86.671 OTHER CHRONIC OSTEOMYELITIS, RIGHT ANKLE AND FOOT: ICD-10-CM

## 2023-12-02 DIAGNOSIS — Z79.899 OTHER LONG TERM (CURRENT) DRUG THERAPY: ICD-10-CM

## 2023-12-02 DIAGNOSIS — Z91.012 ALLERGY TO EGGS: ICD-10-CM

## 2023-12-02 DIAGNOSIS — Z87.39 PERSONAL HISTORY OF OTHER DISEASES OF THE MUSCULOSKELETAL SYSTEM AND CONNECTIVE TISSUE: ICD-10-CM

## 2023-12-02 DIAGNOSIS — K59.09 OTHER CONSTIPATION: ICD-10-CM

## 2023-12-02 DIAGNOSIS — L97.514 NON-PRESSURE CHRONIC ULCER OF OTHER PART OF RIGHT FOOT WITH NECROSIS OF BONE: ICD-10-CM

## 2023-12-02 DIAGNOSIS — Z91.010 ALLERGY TO PEANUTS: ICD-10-CM

## 2023-12-02 DIAGNOSIS — Z83.49 FAMILY HISTORY OF OTHER ENDOCRINE, NUTRITIONAL AND METABOLIC DISEASES: ICD-10-CM

## 2023-12-13 ENCOUNTER — OUTPATIENT (OUTPATIENT)
Dept: OUTPATIENT SERVICES | Facility: HOSPITAL | Age: 18
LOS: 1 days | Discharge: HOME | End: 2023-12-13
Payer: MEDICAID

## 2023-12-13 ENCOUNTER — APPOINTMENT (OUTPATIENT)
Dept: WOUND CARE | Facility: HOSPITAL | Age: 18
End: 2023-12-13
Payer: MEDICAID

## 2023-12-13 VITALS
BODY MASS INDEX: 18.94 KG/M2 | SYSTOLIC BLOOD PRESSURE: 121 MMHG | DIASTOLIC BLOOD PRESSURE: 78 MMHG | TEMPERATURE: 97.6 F | RESPIRATION RATE: 16 BRPM | WEIGHT: 125 LBS | OXYGEN SATURATION: 99 % | HEIGHT: 68 IN | HEART RATE: 125 BPM

## 2023-12-13 DIAGNOSIS — L97.514 NON-PRESSURE CHRONIC ULCER OF OTHER PART OF RIGHT FOOT WITH NECROSIS OF BONE: ICD-10-CM

## 2023-12-13 PROCEDURE — 99213 OFFICE O/P EST LOW 20 MIN: CPT

## 2023-12-13 PROCEDURE — G0463: CPT

## 2023-12-15 ENCOUNTER — NON-APPOINTMENT (OUTPATIENT)
Age: 18
End: 2023-12-15

## 2023-12-23 DIAGNOSIS — Z91.010 ALLERGY TO PEANUTS: ICD-10-CM

## 2023-12-23 DIAGNOSIS — Z79.899 OTHER LONG TERM (CURRENT) DRUG THERAPY: ICD-10-CM

## 2023-12-23 DIAGNOSIS — M86.671 OTHER CHRONIC OSTEOMYELITIS, RIGHT ANKLE AND FOOT: ICD-10-CM

## 2023-12-23 DIAGNOSIS — Z91.012 ALLERGY TO EGGS: ICD-10-CM

## 2023-12-23 DIAGNOSIS — Z87.39 PERSONAL HISTORY OF OTHER DISEASES OF THE MUSCULOSKELETAL SYSTEM AND CONNECTIVE TISSUE: ICD-10-CM

## 2023-12-23 DIAGNOSIS — Z98.890 OTHER SPECIFIED POSTPROCEDURAL STATES: ICD-10-CM

## 2023-12-23 DIAGNOSIS — L97.514 NON-PRESSURE CHRONIC ULCER OF OTHER PART OF RIGHT FOOT WITH NECROSIS OF BONE: ICD-10-CM

## 2023-12-23 DIAGNOSIS — K59.09 OTHER CONSTIPATION: ICD-10-CM

## 2023-12-23 DIAGNOSIS — Z83.49 FAMILY HISTORY OF OTHER ENDOCRINE, NUTRITIONAL AND METABOLIC DISEASES: ICD-10-CM

## 2023-12-23 NOTE — ASSESSMENT
[Verbal] : Verbal [Patient] : Patient [Family member] : Family member [Good - alert, interested, motivated] : Good - alert, interested, motivated [Demonstrates independently] : demonstrates independently [Dressing changes] : dressing changes [Foot Care] : foot care [Skin Care] : skin care [Signs and symptoms of infection] : sign and symptoms of infection [Nutrition] : nutrition [How and When to Call] : how and when to call [Off-loading] : off-loading [Patient responsibility to plan of care] : patient responsibility to plan of care [Glycemic Control] : glycemic control [Stable] : stable [Home] : Home [Ambulatory] : Ambulatory [] : No [FreeTextEntry2] : Infection Prevention Foot and nail care Nutrition and wound healing Glycemic control [FreeTextEntry3] : unchanged. [FreeTextEntry4] : Patient seen by Orthotist 1 week ago, and states he had his feet measured, and hopes to have new orthotics for the new year. Patient has a neurology appt in Jan 2024.  MRI Auth submitted. DPM Discussed with patient possible outpatient surgical options (Arthroplasty) Right great toe, F/U 2 Weeks

## 2023-12-23 NOTE — REVIEW OF SYSTEMS
[Fever] : no fever [Chills] : no chills [Eye Pain] : no eye pain [Red Eyes] : eyes not red [Earache] : no earache [Loss Of Hearing] : no hearing loss [Nosebleeds] : no nosebleeds [Chest Pain] : no chest pain [Shortness Of Breath] : no shortness of breath [Wheezing] : no wheezing [Cough] : no cough [Abdominal Pain] : no abdominal pain [Vomiting] : no vomiting [Diarrhea] : no diarrhea [Skin Wound] : skin wound [Confused] : no confusion [Dizziness] : no dizziness [Anxiety] : no anxiety [Easy Bleeding] : no tendency for easy bleeding [Negative] : Endocrine [de-identified] : Right great toe wound - re-opened  down to bone, noted with slow progress of healing

## 2023-12-23 NOTE — HISTORY OF PRESENT ILLNESS
[FreeTextEntry1] : Patient is 18 year old male presenting for follow up for right plantar hallux wound with chronic OM. Patient is accompanied by his mother. Patient has finished HBOT . Patient has started school and has been walking in regular shoes. Patient relates he has been performing dressing changes to his foot himself.  Patient denies any pain to the foot. Patient is scheduled for neurology consult.

## 2023-12-23 NOTE — PHYSICAL EXAM
[2 x 2] : 2 x 2  [2+] : left 2+ [Ankle Swelling (On Exam)] : not present [Varicose Veins Of Lower Extremities] : not present [] : not present [Purpura] : no purpura  [Petechiae] : no petechiae [Skin Ulcer] : no ulcer [Alert] : alert [Oriented to Person] : oriented to person [Oriented to Place] : oriented to place [Oriented to Time] : oriented to time [Calm] : calm [de-identified] : calm, AOX3  [de-identified] : Right plantar hallux wound down to bone with hyperkeratotic tissue overlying - no edema, no erythema, no drainage, no proximal streaking - slow progress of healing  [de-identified] : 5/5 muscle strength for all muscles and tendons crossing the foot and ankle joints, ankle joint and STJ ROM intact b/l. No pain with calf compression b/l. Flexion contracture of the digits 2,3 and 4 b/l and varus rotation of the 5th digits b/l. Plantarflexed 1st metatarsal when loded the 1st MPJ b/l, decreased bilateral medial arches  [FreeTextEntry1] : Right plantar hallux wound [FreeTextEntry2] : 0.2 [FreeTextEntry3] : 0.2 [FreeTextEntry4] : 0.1 [de-identified] : small serosanguineous [de-identified] : none [de-identified] : diabetic [de-identified] : none [de-identified] : calloused [de-identified] : none [de-identified] : 100% [de-identified] : none [de-identified] : Alginate Ag [de-identified] : Mechanically cleansed with sterile gauze and normal saline 0.9% Dry Dressing [de-identified] : 3x Weekly [de-identified] : Primary Dressing

## 2023-12-23 NOTE — PLAN
[FreeTextEntry1] : Patient examined and evaluated at this time.  Discussed the treatment options and current treatment plan with patient and patient's mother. Patient remains a very high risk for infection, sepsis, limb loss, and death. Discussed conservative vs surgical treatment options.  Pre- fabricated device will not benefit patient due to the deformity custom inserts will be worn for longer than 6 months.  No guarantees given, discussed that patient is still high risk for possible surgery, amputation, loss of limb and loss of life. Referral for neurology given to assess and evaluate for any underlying neurological pathology causing the hammer toes. Patient has hx of idiopathic scoliosis with hx of surgery. Advised patient and mother the importance of  neurological consult, have scheduled for 20th of January. Discussed with patient and family that patient  will benefit patient wear  to offload the hallux and assess for wound healing. Will await neurology evaluation and if the wound is still present, will need surgical intervention with possible partial hallux amputation vs arthroplasty of the IPJ of the hallux to offload the hallux RTC in 2 weeks. Will reassess MRI of right hallux.  All questions answered to satisfaction and patient and patient's mother verbalized understanding at this time. Spent 20 minutes on patient care and medical decision making.

## 2023-12-27 ENCOUNTER — APPOINTMENT (OUTPATIENT)
Dept: WOUND CARE | Facility: HOSPITAL | Age: 18
End: 2023-12-27
Payer: MEDICAID

## 2023-12-27 ENCOUNTER — OUTPATIENT (OUTPATIENT)
Dept: OUTPATIENT SERVICES | Facility: HOSPITAL | Age: 18
LOS: 1 days | Discharge: HOME | End: 2023-12-27
Payer: MEDICAID

## 2023-12-27 VITALS
HEIGHT: 68 IN | RESPIRATION RATE: 16 BRPM | WEIGHT: 125 LBS | TEMPERATURE: 98.7 F | HEART RATE: 93 BPM | SYSTOLIC BLOOD PRESSURE: 116 MMHG | DIASTOLIC BLOOD PRESSURE: 77 MMHG | BODY MASS INDEX: 18.94 KG/M2 | OXYGEN SATURATION: 97 %

## 2023-12-27 DIAGNOSIS — L97.514 NON-PRESSURE CHRONIC ULCER OF OTHER PART OF RIGHT FOOT WITH NECROSIS OF BONE: ICD-10-CM

## 2023-12-27 PROCEDURE — 99213 OFFICE O/P EST LOW 20 MIN: CPT

## 2023-12-27 PROCEDURE — G0463: CPT

## 2024-01-03 ENCOUNTER — TRANSCRIPTION ENCOUNTER (OUTPATIENT)
Age: 19
End: 2024-01-03

## 2024-01-03 ENCOUNTER — INPATIENT (INPATIENT)
Facility: HOSPITAL | Age: 19
LOS: 1 days | Discharge: HOME | DRG: 516 | End: 2024-01-05
Attending: FAMILY MEDICINE | Admitting: INTERNAL MEDICINE
Payer: MEDICAID

## 2024-01-03 VITALS
DIASTOLIC BLOOD PRESSURE: 79 MMHG | WEIGHT: 125 LBS | RESPIRATION RATE: 18 BRPM | HEIGHT: 69 IN | HEART RATE: 98 BPM | SYSTOLIC BLOOD PRESSURE: 115 MMHG | OXYGEN SATURATION: 98 % | TEMPERATURE: 98 F

## 2024-01-03 DIAGNOSIS — M86.9 OSTEOMYELITIS, UNSPECIFIED: ICD-10-CM

## 2024-01-03 DIAGNOSIS — M86.10 OTHER ACUTE OSTEOMYELITIS, UNSPECIFIED SITE: ICD-10-CM

## 2024-01-03 DIAGNOSIS — M86.9 OSTEOMYELITIS, UNSPECIFIED: Chronic | ICD-10-CM

## 2024-01-03 DIAGNOSIS — Z29.9 ENCOUNTER FOR PROPHYLACTIC MEASURES, UNSPECIFIED: ICD-10-CM

## 2024-01-03 DIAGNOSIS — Z98.1 ARTHRODESIS STATUS: Chronic | ICD-10-CM

## 2024-01-03 LAB
ALBUMIN SERPL ELPH-MCNC: 4.2 G/DL — SIGNIFICANT CHANGE UP (ref 3.3–5)
ALBUMIN SERPL ELPH-MCNC: 4.2 G/DL — SIGNIFICANT CHANGE UP (ref 3.3–5)
ALP SERPL-CCNC: 144 U/L — HIGH (ref 40–120)
ALP SERPL-CCNC: 144 U/L — HIGH (ref 40–120)
ALT FLD-CCNC: 21 U/L — SIGNIFICANT CHANGE UP (ref 12–78)
ALT FLD-CCNC: 21 U/L — SIGNIFICANT CHANGE UP (ref 12–78)
ANION GAP SERPL CALC-SCNC: 2 MMOL/L — LOW (ref 5–17)
ANION GAP SERPL CALC-SCNC: 2 MMOL/L — LOW (ref 5–17)
APTT BLD: 33.5 SEC — SIGNIFICANT CHANGE UP (ref 24.5–35.6)
APTT BLD: 33.5 SEC — SIGNIFICANT CHANGE UP (ref 24.5–35.6)
AST SERPL-CCNC: 19 U/L — SIGNIFICANT CHANGE UP (ref 15–37)
AST SERPL-CCNC: 19 U/L — SIGNIFICANT CHANGE UP (ref 15–37)
BASOPHILS # BLD AUTO: 0.03 K/UL — SIGNIFICANT CHANGE UP (ref 0–0.2)
BASOPHILS # BLD AUTO: 0.03 K/UL — SIGNIFICANT CHANGE UP (ref 0–0.2)
BASOPHILS NFR BLD AUTO: 0.6 % — SIGNIFICANT CHANGE UP (ref 0–2)
BASOPHILS NFR BLD AUTO: 0.6 % — SIGNIFICANT CHANGE UP (ref 0–2)
BILIRUB SERPL-MCNC: 0.9 MG/DL — SIGNIFICANT CHANGE UP (ref 0.2–1.2)
BILIRUB SERPL-MCNC: 0.9 MG/DL — SIGNIFICANT CHANGE UP (ref 0.2–1.2)
BUN SERPL-MCNC: 17 MG/DL — SIGNIFICANT CHANGE UP (ref 7–23)
BUN SERPL-MCNC: 17 MG/DL — SIGNIFICANT CHANGE UP (ref 7–23)
CALCIUM SERPL-MCNC: 9.5 MG/DL — SIGNIFICANT CHANGE UP (ref 8.5–10.1)
CALCIUM SERPL-MCNC: 9.5 MG/DL — SIGNIFICANT CHANGE UP (ref 8.5–10.1)
CHLORIDE SERPL-SCNC: 107 MMOL/L — SIGNIFICANT CHANGE UP (ref 96–108)
CHLORIDE SERPL-SCNC: 107 MMOL/L — SIGNIFICANT CHANGE UP (ref 96–108)
CO2 SERPL-SCNC: 29 MMOL/L — SIGNIFICANT CHANGE UP (ref 22–31)
CO2 SERPL-SCNC: 29 MMOL/L — SIGNIFICANT CHANGE UP (ref 22–31)
CREAT SERPL-MCNC: 0.8 MG/DL — SIGNIFICANT CHANGE UP (ref 0.5–1.3)
CREAT SERPL-MCNC: 0.8 MG/DL — SIGNIFICANT CHANGE UP (ref 0.5–1.3)
CRP SERPL-MCNC: <3 MG/L — SIGNIFICANT CHANGE UP
CRP SERPL-MCNC: <3 MG/L — SIGNIFICANT CHANGE UP
EGFR: 130 ML/MIN/1.73M2 — SIGNIFICANT CHANGE UP
EGFR: 130 ML/MIN/1.73M2 — SIGNIFICANT CHANGE UP
EOSINOPHIL # BLD AUTO: 0.1 K/UL — SIGNIFICANT CHANGE UP (ref 0–0.5)
EOSINOPHIL # BLD AUTO: 0.1 K/UL — SIGNIFICANT CHANGE UP (ref 0–0.5)
EOSINOPHIL NFR BLD AUTO: 2.1 % — SIGNIFICANT CHANGE UP (ref 0–6)
EOSINOPHIL NFR BLD AUTO: 2.1 % — SIGNIFICANT CHANGE UP (ref 0–6)
ERYTHROCYTE [SEDIMENTATION RATE] IN BLOOD: 2 MM/HR — SIGNIFICANT CHANGE UP (ref 0–15)
ERYTHROCYTE [SEDIMENTATION RATE] IN BLOOD: 2 MM/HR — SIGNIFICANT CHANGE UP (ref 0–15)
GLUCOSE SERPL-MCNC: 103 MG/DL — HIGH (ref 70–99)
GLUCOSE SERPL-MCNC: 103 MG/DL — HIGH (ref 70–99)
HCT VFR BLD CALC: 46 % — SIGNIFICANT CHANGE UP (ref 39–50)
HCT VFR BLD CALC: 46 % — SIGNIFICANT CHANGE UP (ref 39–50)
HGB BLD-MCNC: 16 G/DL — SIGNIFICANT CHANGE UP (ref 13–17)
HGB BLD-MCNC: 16 G/DL — SIGNIFICANT CHANGE UP (ref 13–17)
IMM GRANULOCYTES NFR BLD AUTO: 0.4 % — SIGNIFICANT CHANGE UP (ref 0–0.9)
IMM GRANULOCYTES NFR BLD AUTO: 0.4 % — SIGNIFICANT CHANGE UP (ref 0–0.9)
INR BLD: 0.97 RATIO — SIGNIFICANT CHANGE UP (ref 0.85–1.18)
INR BLD: 0.97 RATIO — SIGNIFICANT CHANGE UP (ref 0.85–1.18)
LACTATE SERPL-SCNC: 1.3 MMOL/L — SIGNIFICANT CHANGE UP (ref 0.7–2)
LACTATE SERPL-SCNC: 1.3 MMOL/L — SIGNIFICANT CHANGE UP (ref 0.7–2)
LYMPHOCYTES # BLD AUTO: 1.35 K/UL — SIGNIFICANT CHANGE UP (ref 1–3.3)
LYMPHOCYTES # BLD AUTO: 1.35 K/UL — SIGNIFICANT CHANGE UP (ref 1–3.3)
LYMPHOCYTES # BLD AUTO: 28.3 % — SIGNIFICANT CHANGE UP (ref 13–44)
LYMPHOCYTES # BLD AUTO: 28.3 % — SIGNIFICANT CHANGE UP (ref 13–44)
MCHC RBC-ENTMCNC: 30.5 PG — SIGNIFICANT CHANGE UP (ref 27–34)
MCHC RBC-ENTMCNC: 30.5 PG — SIGNIFICANT CHANGE UP (ref 27–34)
MCHC RBC-ENTMCNC: 34.8 GM/DL — SIGNIFICANT CHANGE UP (ref 32–36)
MCHC RBC-ENTMCNC: 34.8 GM/DL — SIGNIFICANT CHANGE UP (ref 32–36)
MCV RBC AUTO: 87.6 FL — SIGNIFICANT CHANGE UP (ref 80–100)
MCV RBC AUTO: 87.6 FL — SIGNIFICANT CHANGE UP (ref 80–100)
MONOCYTES # BLD AUTO: 0.62 K/UL — SIGNIFICANT CHANGE UP (ref 0–0.9)
MONOCYTES # BLD AUTO: 0.62 K/UL — SIGNIFICANT CHANGE UP (ref 0–0.9)
MONOCYTES NFR BLD AUTO: 13 % — SIGNIFICANT CHANGE UP (ref 2–14)
MONOCYTES NFR BLD AUTO: 13 % — SIGNIFICANT CHANGE UP (ref 2–14)
NEUTROPHILS # BLD AUTO: 2.65 K/UL — SIGNIFICANT CHANGE UP (ref 1.8–7.4)
NEUTROPHILS # BLD AUTO: 2.65 K/UL — SIGNIFICANT CHANGE UP (ref 1.8–7.4)
NEUTROPHILS NFR BLD AUTO: 55.6 % — SIGNIFICANT CHANGE UP (ref 43–77)
NEUTROPHILS NFR BLD AUTO: 55.6 % — SIGNIFICANT CHANGE UP (ref 43–77)
NRBC # BLD: 0 /100 WBCS — SIGNIFICANT CHANGE UP (ref 0–0)
NRBC # BLD: 0 /100 WBCS — SIGNIFICANT CHANGE UP (ref 0–0)
PLATELET # BLD AUTO: 253 K/UL — SIGNIFICANT CHANGE UP (ref 150–400)
PLATELET # BLD AUTO: 253 K/UL — SIGNIFICANT CHANGE UP (ref 150–400)
POTASSIUM SERPL-MCNC: 3.5 MMOL/L — SIGNIFICANT CHANGE UP (ref 3.5–5.3)
POTASSIUM SERPL-MCNC: 3.5 MMOL/L — SIGNIFICANT CHANGE UP (ref 3.5–5.3)
POTASSIUM SERPL-SCNC: 3.5 MMOL/L — SIGNIFICANT CHANGE UP (ref 3.5–5.3)
POTASSIUM SERPL-SCNC: 3.5 MMOL/L — SIGNIFICANT CHANGE UP (ref 3.5–5.3)
PROT SERPL-MCNC: 7.7 G/DL — SIGNIFICANT CHANGE UP (ref 6–8.3)
PROT SERPL-MCNC: 7.7 G/DL — SIGNIFICANT CHANGE UP (ref 6–8.3)
PROTHROM AB SERPL-ACNC: 11.4 SEC — SIGNIFICANT CHANGE UP (ref 9.5–13)
PROTHROM AB SERPL-ACNC: 11.4 SEC — SIGNIFICANT CHANGE UP (ref 9.5–13)
RBC # BLD: 5.25 M/UL — SIGNIFICANT CHANGE UP (ref 4.2–5.8)
RBC # BLD: 5.25 M/UL — SIGNIFICANT CHANGE UP (ref 4.2–5.8)
RBC # FLD: 12.8 % — SIGNIFICANT CHANGE UP (ref 10.3–14.5)
RBC # FLD: 12.8 % — SIGNIFICANT CHANGE UP (ref 10.3–14.5)
SODIUM SERPL-SCNC: 138 MMOL/L — SIGNIFICANT CHANGE UP (ref 135–145)
SODIUM SERPL-SCNC: 138 MMOL/L — SIGNIFICANT CHANGE UP (ref 135–145)
WBC # BLD: 4.77 K/UL — SIGNIFICANT CHANGE UP (ref 3.8–10.5)
WBC # BLD: 4.77 K/UL — SIGNIFICANT CHANGE UP (ref 3.8–10.5)
WBC # FLD AUTO: 4.77 K/UL — SIGNIFICANT CHANGE UP (ref 3.8–10.5)
WBC # FLD AUTO: 4.77 K/UL — SIGNIFICANT CHANGE UP (ref 3.8–10.5)

## 2024-01-03 PROCEDURE — 99223 1ST HOSP IP/OBS HIGH 75: CPT | Mod: GC

## 2024-01-03 PROCEDURE — 99221 1ST HOSP IP/OBS SF/LOW 40: CPT

## 2024-01-03 PROCEDURE — 99222 1ST HOSP IP/OBS MODERATE 55: CPT

## 2024-01-03 PROCEDURE — 73660 X-RAY EXAM OF TOE(S): CPT | Mod: 26,RT

## 2024-01-03 PROCEDURE — 99285 EMERGENCY DEPT VISIT HI MDM: CPT

## 2024-01-03 RX ORDER — CEFAZOLIN SODIUM 1 G
2000 VIAL (EA) INJECTION EVERY 8 HOURS
Refills: 0 | Status: DISCONTINUED | OUTPATIENT
Start: 2024-01-03 | End: 2024-01-04

## 2024-01-03 RX ORDER — LANOLIN ALCOHOL/MO/W.PET/CERES
3 CREAM (GRAM) TOPICAL AT BEDTIME
Refills: 0 | Status: DISCONTINUED | OUTPATIENT
Start: 2024-01-03 | End: 2024-01-04

## 2024-01-03 RX ORDER — ACETAMINOPHEN 500 MG
650 TABLET ORAL EVERY 6 HOURS
Refills: 0 | Status: DISCONTINUED | OUTPATIENT
Start: 2024-01-03 | End: 2024-01-04

## 2024-01-03 RX ORDER — SODIUM CHLORIDE 9 MG/ML
1000 INJECTION INTRAMUSCULAR; INTRAVENOUS; SUBCUTANEOUS ONCE
Refills: 0 | Status: COMPLETED | OUTPATIENT
Start: 2024-01-03 | End: 2024-01-03

## 2024-01-03 RX ORDER — PIPERACILLIN AND TAZOBACTAM 4; .5 G/20ML; G/20ML
3.38 INJECTION, POWDER, LYOPHILIZED, FOR SOLUTION INTRAVENOUS ONCE
Refills: 0 | Status: COMPLETED | OUTPATIENT
Start: 2024-01-03 | End: 2024-01-03

## 2024-01-03 RX ADMIN — SODIUM CHLORIDE 1000 MILLILITER(S): 9 INJECTION INTRAMUSCULAR; INTRAVENOUS; SUBCUTANEOUS at 12:22

## 2024-01-03 RX ADMIN — SODIUM CHLORIDE 1000 MILLILITER(S): 9 INJECTION INTRAMUSCULAR; INTRAVENOUS; SUBCUTANEOUS at 13:39

## 2024-01-03 RX ADMIN — Medication 100 MILLIGRAM(S): at 23:00

## 2024-01-03 RX ADMIN — PIPERACILLIN AND TAZOBACTAM 200 GRAM(S): 4; .5 INJECTION, POWDER, LYOPHILIZED, FOR SOLUTION INTRAVENOUS at 12:22

## 2024-01-03 RX ADMIN — PIPERACILLIN AND TAZOBACTAM 3.38 GRAM(S): 4; .5 INJECTION, POWDER, LYOPHILIZED, FOR SOLUTION INTRAVENOUS at 13:39

## 2024-01-03 NOTE — H&P ADULT - NSHPREVIEWOFSYSTEMS_GEN_ALL_CORE
CONSTITUTIONAL:  No weakness, fevers or chills  EYES/ENT:  No throat pain or difficulty swallowing  RESPIRATORY:  No cough, wheezing, hemoptysis; No shortness of breath  CARDIOVASCULAR:  No chest pain or palpitations  GASTROINTESTINAL:  No abdominal or epigastric pain. No nausea, vomiting, or hematemesis; No diarrhea or constipation  GENITOURINARY:  No dysuria, frequency or hematuria  MUSCULOSKELETAL:  FROM all extremities, normal strength, No calf tenderness  NEUROLOGICAL:  No numbness or weakness

## 2024-01-03 NOTE — ED ADULT NURSE REASSESSMENT NOTE - MUSCULOSKELETAL ASSESSMENT
Dr. Bartlett  If pt agrees, would he qualify for a video visit?    Last CPE 3/6/2020  OV on 9/4/2020 canceled d/t work conflict   - - -

## 2024-01-03 NOTE — CONSULT NOTE ADULT - SUBJECTIVE AND OBJECTIVE BOX
S : 18y year old Male seen at bedside for Right hallux ulcer.  Patient is seen at the wound care clinic, and presents with a history of OM in the hallux.  Pt is scheduled for procedure tomorrow    Chief Complaint : Patient is a 18y old  Male who presents with a chief complaint of   HPI :17yo male sent from wound care with infected right big toe as r/o osteo, pt has been going to wound care for the last year and todays visit pt was told he needs surgery to shave the bone or possibly amputate, pt has no hx of diabetes, no orginal trauma no other complaitns      Patient admits to  (-) Fevers, (-) Chills, (-) Nausea, (-) Vomiting, (-) Shortness of Breath      PMH: Scoliosis    Food allergy      PSH:No significant past surgical history        Allergies:sulfa drugs (Rash)  peanuts (Anaphylaxis)  eggs (Anaphylaxis)      Labs:                          16.0   4.77  )-----------( 253      ( 03 Jan 2024 12:20 )             46.0     WBC Trend  4.77 Date (01-03 @ 12:20)      Chem  01-03    138  |  107  |  17  ----------------------------<  103<H>  3.5   |  29  |  0.80    Ca    9.5      03 Jan 2024 12:20    TPro  7.7  /  Alb  4.2  /  TBili  0.9  /  DBili  x   /  AST  19  /  ALT  21  /  AlkPhos  144<H>  01-03          T(F): 98.2 (01-03-24 @ 10:13), Max: 98.2 (01-03-24 @ 10:13)  HR: 98 (01-03-24 @ 10:13) (98 - 98)  BP: 115/79 (01-03-24 @ 10:13) (115/79 - 115/79)  RR: 18 (01-03-24 @ 10:13) (18 - 18)  SpO2: 98% (01-03-24 @ 10:13) (98% - 98%)  Wt(kg): --    O:   General: Pleasant  male NAD & AOX3.    Integument:  Skin warm, dry and supple bilateral.    Ulceration Right hallux ulcer, + hyperkeratotic border, wound base Fibrogranular, wound size (0.3cm X 0.1 cm X 0.4cm) + edema, + vida-wound erythema, - purulence, - fluctuance, + tracking/tunneling, +probe to bone.   Vascular: Dorsalis Pedis and Posterior Tibial pulses 2/4.  Capillary re-fill time less then 3 seconds digits 1-5 bilateral.    Neuro: Protective sensation intact to the level of the digits bilateral.  MSK: Muscle strength 5/5 all major muscle groups bilateral.         S : 18y year old Male seen at bedside for Right hallux ulcer.  Patient is seen at the wound care clinic, and presents with a history of OM in the hallux.  Pt is scheduled for procedure tomorrow    Chief Complaint : Patient is a 18y old  Male who presents with a chief complaint of right great toe non healing ulcer with underlying OM   HPI :19yo male sent from wound care with infected right big toe as r/o osteo, pt has been going to wound care for the last year and todays visit pt was told he needs surgery to shave the bone or possibly amputate, pt has no hx of diabetes, no orginal trauma no other complaints      Patient admits to  (-) Fevers, (-) Chills, (-) Nausea, (-) Vomiting, (-) Shortness of Breath      PMH: Scoliosis    Food allergy      PSH:No significant past surgical history        Allergies:sulfa drugs (Rash)  peanuts (Anaphylaxis)  eggs (Anaphylaxis)      Labs:                          16.0   4.77  )-----------( 253      ( 03 Jan 2024 12:20 )             46.0     WBC Trend  4.77 Date (01-03 @ 12:20)      Chem  01-03    138  |  107  |  17  ----------------------------<  103<H>  3.5   |  29  |  0.80    Ca    9.5      03 Jan 2024 12:20    TPro  7.7  /  Alb  4.2  /  TBili  0.9  /  DBili  x   /  AST  19  /  ALT  21  /  AlkPhos  144<H>  01-03          T(F): 98.2 (01-03-24 @ 10:13), Max: 98.2 (01-03-24 @ 10:13)  HR: 98 (01-03-24 @ 10:13) (98 - 98)  BP: 115/79 (01-03-24 @ 10:13) (115/79 - 115/79)  RR: 18 (01-03-24 @ 10:13) (18 - 18)  SpO2: 98% (01-03-24 @ 10:13) (98% - 98%)  Wt(kg): --    O:   General: Pleasant  male NAD & AOX3.    Integument:  Skin warm, dry and supple bilateral.    Ulceration Right hallux ulcer, + hyperkeratotic border, wound base Fibrogranular, wound size (0.3cm X 0.1 cm X 0.4cm) + edema, + vida-wound erythema, - purulence, - fluctuance, + tracking/tunneling, +probe to bone.   Vascular: Dorsalis Pedis and Posterior Tibial pulses 2/4.  Capillary re-fill time less then 3 seconds digits 1-5 bilateral.    Neuro: Protective sensation intact to the level of the digits bilateral.  MSK: Muscle strength 5/5 all major muscle groups bilateral.

## 2024-01-03 NOTE — ED PROVIDER NOTE - PRO INTERPRETER NEED 2
"-- Message is from the [facility]--    Patient is requesting a medication refill - medication is on active medication list    Patient is currently OUT of the requested medication. RX Name and Dose:  (copy from med list)  amLODIPine (NORVASC) 10 MG tablet    Per daughter patient is completely out please send to pharmacy today    Duration: 30 days    Pharmacy  [facility] [address] [place] [address]    Patient confirmed the above pharmacy as correct? Yes    Caller Information       Type Contact Phone    02/04/2019 09:14 AM Phone (Incoming) wesley  (Daughter) [phone]          Alternative phone number: n/a    Turnaround time given to caller: ""This message will be sent to [facility] Provider's name]. The clinical team will fulfill your request as soon as they review your message. \""  "
English

## 2024-01-03 NOTE — H&P ADULT - NSHPSOCIALHISTORY_GEN_ALL_CORE
Lives at home with mom, dad and two sisters.   Denies tobacco, alcohol, and recreational drug use.  COVID vaccinated ~2020.   Denies receiving flu vaccine

## 2024-01-03 NOTE — CONSULT NOTE ADULT - ATTENDING COMMENTS
Patient has been known to the wound care and has has bone biopsy of the distal and proximal phalanx with PICC line abx for (+) OM x 2.  Patient underwent HBOT to aid with the healing process but the wound is still present. Patient was unable to tolerate any more HBOT and was being treated with conservative treatment. Discussed with patient and family that the patient is still high risk for developing sepsis, loss of limb, more proximal amputation and loss of life.   Also recommended neurology consult - to assess for any underlying neurological pathology since patient has had hx of spinal fusion for scoliosis and has flexion contractures of the digits.   Discussed at length with patient and family that will perform IPJ arthroplasty  to alleviate the biomechanical pressure since this will be a recurring issue and will cause future risk of infections and non healing wounds.  Will send specimen for pathology to reassess for OM.  Patient verbalized understanding, all questions answered.   Patient scheduled for Right foot arthroplasty with plantar fasciotomy at 7:30 am tomorrow 01/04/23  Procedure discussed at length with patient regarding risks, complications, benefits, as well as pre/intra/post-operative expectations. Patient verbally consents to procedure and understood discussion regarding procedure and all questions were answered to patient's satisfaction at this time.    Request medical clearance   Please keep patient NPO after midnight

## 2024-01-03 NOTE — H&P ADULT - NSHPPHYSICALEXAM_GEN_ALL_CORE
VITALS:   T(C): 36.8 (01-03-24 @ 10:13), Max: 36.8 (01-03-24 @ 10:13)  HR: 98 (01-03-24 @ 10:13) (98 - 98)  BP: 115/79 (01-03-24 @ 10:13) (115/79 - 115/79)  RR: 18 (01-03-24 @ 10:13) (18 - 18)  SpO2: 98% (01-03-24 @ 10:13) (98% - 98%)    GENERAL: NAD, lying in bed comfortably  HEENT: NCAT, EOMI, conjunctiva and sclera clear  NECK: no cervical or supraclavicular LAD  HEART: Regular rate and rhythm, no murmurs, rubs, or gallops  LUNGS: Unlabored respirations.  Clear to auscultation bilaterally, no crackles, wheezing, or rhonchi  ABDOMEN: Soft, nontender, nondistended, +BS  EXTREMITIES: 2+ lower extremity bilaterally. R. toe wrapped in gauze. L. big toe with hammer deformity and dry, hyperkeratotic skin. Sensation in tact in the feet bl.  NERVOUS SYSTEM:  A&Ox3, no focal deficits VITALS:   T(C): 36.8 (01-03-24 @ 10:13), Max: 36.8 (01-03-24 @ 10:13)  HR: 98 (01-03-24 @ 10:13) (98 - 98)  BP: 115/79 (01-03-24 @ 10:13) (115/79 - 115/79)  RR: 18 (01-03-24 @ 10:13) (18 - 18)  SpO2: 98% (01-03-24 @ 10:13) (98% - 98%)    GENERAL: NAD, lying in bed comfortably  HEENT: NCAT, EOMI, conjunctiva and sclera clear  NECK: no cervical or supraclavicular LAD  HEART: Regular rate and rhythm, no murmurs, rubs, or gallops  LUNGS: Unlabored respirations.  Clear to auscultation bilaterally, no crackles, wheezing, or rhonchi  ABDOMEN: Soft, nontender, nondistended, +BS  EXTREMITIES: 2+ lower extremity pulses bilaterally. R. toe wrapped in gauze. L. big toe with hammer deformity and dry, hyperkeratotic skin. Sensation in tact in the feet bl.  NERVOUS SYSTEM:  A&Ox3, no focal deficits

## 2024-01-03 NOTE — ED PROVIDER NOTE - OBJECTIVE STATEMENT
19yo male sent from wound care with infected right big toe as r/o osteo, pt has been going to wound care for the last year and todays visit pt was told he needs surgery to shave the bone or possibly amputate, pt has no hx of diabetes, no orginal trauma no other complaitns

## 2024-01-03 NOTE — H&P ADULT - ATTENDING COMMENTS
19 y/o M w. PMHx of scolisos & right big toe ulcer / OM presenting to [facility] for surgical intervention of R. big toe ulcer, sent by wound care. Pt admitted for planned OR procedure.     Medical clearance as above.  Care d/w ID Dr. Combs, who will initiate further abx upon review of cultures.  Pod plan for OR tomorrow.  F/u cultures and MRI.  Trend WBC and fever curve.    Harjit Moran, Attending Physician 17 y/o M w. PMHx of scolisos & right big toe ulcer / OM presenting to [facility] for surgical intervention of R. big toe ulcer, sent by wound care. Pt admitted for planned OR procedure.     Medical clearance as above.  Care d/w ID Dr. Combs, who will initiate further abx upon review of cultures.  Pod plan for OR tomorrow.  F/u cultures and MRI.  Trend WBC and fever curve.    Harjit Moran, Attending Physician

## 2024-01-03 NOTE — ED ADULT NURSE NOTE - NSFALLUNIVINTERV_ED_ALL_ED
Bed/Stretcher in lowest position, wheels locked, appropriate side rails in place/Call bell, personal items and telephone in reach/Instruct patient to call for assistance before getting out of bed/chair/stretcher/Non-slip footwear applied when patient is off stretcher/[place] to call system/Physically safe environment - no spills, clutter or unnecessary equipment/Purposeful proactive rounding/Room/bathroom lighting operational, light cord in reach Bed/Stretcher in lowest position, wheels locked, appropriate side rails in place/Call bell, personal items and telephone in reach/Instruct patient to call for assistance before getting out of bed/chair/stretcher/Non-slip footwear applied when patient is off stretcher/[place] to call system/Physically safe environment - no spills, clutter or unnecessary equipment/Purposeful proactive rounding/Room/bathroom lighting operational, light cord in reach

## 2024-01-03 NOTE — ED ADULT TRIAGE NOTE - NS ED TRIAGE AVPU SCALE
Pediatric Cardiology Progress Note    Date: 10/11/22  Hospital Length of Stay: 3    Consults     HPI:  Onelia is a 12 month old female with DiGoerge syndrome, Interrupted Aortic Arch type B, posterior malaligned VSD, mildly hypoplastic aortic valve, large PDA, secundum ASD, G-tube dependent. She is status post interrupted arch repair with subclavian flap, Jose/Cyn palliation.   Patient was referred to ED after mother called primary cardiologist with concerns for  Increased work fo breathing & desaturations beginning day of arrival. She has had 3 days of intermittent fevers, coughing, and congestion. Patient was also noted to have decreased energy. Her oxygen saturations are normally high 70s low 80s not on home O2. Day of admissio she was desaturating to the low 70s and was put on 1L O2 at home, and on the way to the hospital. On drive to the hospital, Onelia vomited formula causng an episode of cyanosis and concern for aspiration. EMS was called, she had returned almost to baseline when they arrived so EMS recommended that the family continue to the hospital. Patient has also been having no PO intake. At baseline the majority of her feeds are via G-tube, but she has some PO intake.  Of note, she was recently treated for ruptured otitis media with otic drops and is now having greenish yellow discharge from right ear over the last couple days, along with discomfort.     ER Course: Stable upon arrival to ER, but with increased work of breathing. O2 was started and titrated up to HFNC 2L/kg. FiO2 weaned to maintain saturations 75-85%. Dose of augmentin given for AOM and possible aspiration pneumonia.COVID +. NO labs/IV placed. CXR with patchy opacities. She stabilized on 12L HFNC and admitted to the PICU after cardiology consultation.      PICU: She arrived from ER on 12 L HFNC @ 27% with Spo2 80-82%.      Patient was last seen by Cardiology in clinic on 10/6/2022 with Dr Lyon.    Interval Events: Patient continued  to spike fevers overnight with a T. Max of 38.6. Multiple attempts were made to obtain labs and an IV overnight but were unsuccessful. She remained hemodynamically stable overnight. She was started on dexamethasone 1.04mg Daily as well as Remdesivir for +COVID status per ID recommendations if IV is able to be established. She remains on 10L HFNC and was able to be weaned down from 65% to 40% and maintaining goal saturations. ENT came to evaluate for persistant otitis media with drainage and recommended she be started on Ofloxacin drops BID in addition to the amoxicillin she is on.     Telemetry reviewed:  Sinus Tachycardia    Vital Signs:   Vital Last Value 24 Hour Range   Temperature 97.9 °F (36.6 °C) (10/11/22 1200) Temp  Min: 97.8 °F (36.6 °C)  Max: 101.5 °F (38.6 °C)   Pulse 119 (10/11/22 1250) Pulse  Min: 104  Max: 172   Arterial   Blood Pressure   No data recorded   Non-Invasive  Blood Pressure (!) 112/65 (10/11/22 1200) BP  Min: 106/62  Max: 115/81   Central Venous Pressure   No data recorded   Respiratory 24 (10/11/22 1250) Resp  Min: 18  Max: 69   SpO2 (!) 77 % (10/11/22 1250) SpO2  Min: 75 %  Max: 87 %   cNIRS     rNIRS         Dosing Weight: 7 kg (15 lb 6.9 oz) (10/9/2022 12:00 AM)                Weight: (!) 6.44 kg (14 lb 3.2 oz) (10/08/22 2345)             Admit Weight: (!) 6.52 kg (14 lb 6 oz) (10/08/22 2047)             .  Vent Settings:     FiO2 (%):  [38 %-50 %] 50 %        Oxygen Therapy:  O2 Flow Rate (L/min): 10 L/min (10/11/22 1250)                         LDAs:     GI Tube 06/07/22 G-tube Left Abdomen (Active)        Scheduled Meds:  Current Facility-Administered Medications   Medication Dose Route Frequency Provider Last Rate Last Admin   • ofloxacin (FLOXIN) 0.3 % otic solution 4 drop  4 drop Both Ears 2 times per day Ni Dowd, DO   4 drop at 10/11/22 0831   • remdesivir (VEKLURY) 35 mg in sodium chloride 0.9 % 28 mL total volume pediatric IVPB syringe  5 mg/kg (Dosing Weight)  Intravenous Once Ni Dowd DO        Followed by   • remdesivir (VEKLURY) 17.5 mg in sodium chloride 0.9 % 14 mL total volume pediatric IVPB syringe  2.5 mg/kg (Dosing Weight) Intravenous Daily at noon Ni Dowd DO       • amoxicillin (AMOXIL) 400 MG/5ML suspension 312 mg  45 mg/kg (Dosing Weight) Oral 2 times per day Ni Dowd DO   312 mg at 10/11/22 0831   • dexAMETHasone 4 mg/mL solution 1.04 mg  0.15 mg/kg (Dosing Weight) Per G Tube Daily Hansa Wong DO   1.04 mg at 10/11/22 0831   • famotidine (PEPCID) oral suspension 3.12 mg  0.5 mg/kg Per NG tube 2 times per day Isabel Yun DO   3.12 mg at 10/11/22 0831   • omeprazole (PriLOSEC) percy/ped oral suspension 4 mg  4 mg PEG Tube Daily Isabel Yun DO   4 mg at 10/11/22 0831   • aspirin chewable 40.5 mg  40.5 mg Per G Tube Daily Isabel Yun DO   40.5 mg at 10/11/22 0831   • sodium chloride 3 % nebulizer solution 4 mL  4 mL Nebulization Q4H Alexandra Simpson MD   4 mL at 10/11/22 1250     Continuous Infusions:  Current Facility-Administered Medications   Medication Dose Route Frequency Provider Last Rate Last Admin      PRN Meds:  Current Facility-Administered Medications   Medication Dose Route Frequency   • ibuprofen (CHILDRENS ADVIL) 100 MG/5ML suspension 62 mg  10 mg/kg Per G Tube Q6H PRN   • acetaminophen (TYLENOL) 160 MG/5ML suspension 92.8 mg  15 mg/kg PEG Tube Q6H PRN   • lidocaine (ANECREAM/LMX) 4 % cream 1 application  1 application Topical PRN     Diet: Infant/pediatric Feedings Pediatric Formula/breast Milk Only; Elecare Infant 22cal - 8 Oz; Q3h; G-tube; 800; 150 Ml 4 Times A Day @ 150 Ml/hr ; Overnight Continuous 25 Ml/hr For 8 Hours    Physical Exam   Deferred Due to COVID + Status     Labs:  Lab Results   Component Value Date/Time    SODIUM 134 (L) 08/27/2022 01:34 PM    POTASSIUM 4.6 08/27/2022 01:34 PM    CHLORIDE 103 08/27/2022 01:34 PM    CO2 21 08/27/2022 01:34 PM    BUN 11 08/27/2022 01:34 PM     CREATININE <0.14 (L) 08/27/2022 01:34 PM    GLUCOSE 71 08/27/2022 01:34 PM    WBC 11.6 08/27/2022 01:34 PM    HGB 13.4 08/27/2022 01:34 PM    HCT 40.4 08/27/2022 01:34 PM     (H) 08/27/2022 01:34 PM    PT 10.9 2021 01:35 PM    INR 1.0 2021 01:35 PM    PTT 46 (H) 2021 01:35 PM     Imaging  Last ECHO: 10/6/22  Mildly to moderately dilated and hypertrophied right ventricle with normal systolic function.  Large posterior malalignment ventricular septal defect with bidirectional but predominantly left-to-right flow.  Normal left ventricular chamber size, wall thickness, and systolic function.  Cyn  - mid peak gradient 65 mmHg, mean 45 mmHg  - distal peak gradient 48 mmHg, mean 36 mmHg  Flow turbulence demonstrated in the proximal left pulmonary artery, likely propagated from the Cyn conduit. Flow in the RPA is not significantly elevated.  Peak velocity across the aortic arch is normal despite mild flow acceleration by color at the isthmus. Normal abdominal aortic pulsations.  No pericardial effusion.     As compared to the previous echocardiogram on 8/30/2022, this is a limited study with slightly lower Cyn gradient     Last EKG:        Encounter Date: 10/06/22   ELECTROCARDIOGRAM 12-LEAD   Result Value     Ventricular Rate EKG/Min (BPM) 137     Atrial Rate (BPM) 137     FL-Interval (MSEC) 112     QRS-Interval (MSEC) 58     QT-Interval (MSEC) 260     QTc 392     P Axis (Degrees) 55     R Axis (Degrees) 163     T Axis (Degrees) 70     REPORT TEXT         Normal sinus rhythm  Right atrial enlargement  Right axis deviation  Nonspecific T wave abnormality  Right ventricular hypertrophy  with strain pattern  When compared with ECG of  06-AUG-2022 07:40,  no significant change  Confirmed by fellow ORESTES NUGENT MD ([id number]) on 10/7/2022 1:47:42 PM       Last CXR: Results for orders placed during the hospital encounter of 10/08/22    XR CHEST PA OR AP 1 VIEW    Impression  Stable chest without  evidence of new findings or pneumonia.    This document is electronically signed by Nguyen Becker ([facility] pediatric radiologist), on 10/09/2022 01:42 AM CDT.    Diagnosis: Acute bronchiolitis due to other specified organisms  (primary encounter diagnosis)  COVID-19 virus infection    Assessment & Plan  Assessment/Plan:  Onelia is a 12 month old female PMH DiGeorge syndrome, interrupted aortic arch, posterior malaligned VSD, hypoplastic aortic valve, PDA status post interrupted arch repair, Jose/Cyn palliation, currently admitted to PICU for supplemental O2 requirement in the setting of COVID infection. Also found to have and being treated for Recurrent Otitis media.      -Continue on supplemental O2 as needed for increased work of breathing and to maintain saturations between 75 to 85%  -Continue home medications of aspirin, omeprazole   -Continue home feeding regiment as tolerated  -Notify cardiology with concern for hypertension (Enalapril Discontinued as an outpatient 10/6)  -Continue Supportive care per PICU  -ID and ENT on consult and appreciate recs  -Remainder of care per PICU Team    OLIVER Buenrostro,  Pediatric Cardiology        THIAGO Buenrostro   Pediatric Cardiology     Alert-The patient is alert, awake and responds to voice. The patient is oriented to time, place, and person. The triage nurse is able to obtain subjective information.

## 2024-01-03 NOTE — H&P ADULT - PROBLEM SELECTOR PLAN 1
Patient presents with 1 year of R. toe ulceration and OM  -s/p Zosyn and 1L NS bolus in ED  -Vitals wnl, pt afebrile, WBC wnl  -MR and Xray R. foot, awaiting results  -Lactate wnl  -F/U BCx  -F/U ESR, CRP  -Podiatry consulted, plan for OR tmrw am.  -ID consult, Dr. Combs, F/U recs Patient presents with 1 year of R. toe ulceration and OM  -s/p Zosyn and 1L NS bolus in ED  -Vitals wnl, pt afebrile, WBC wnl  -MR and Xray R. foot, awaiting results  -Lactate wnl  -F/U BCx  -F/U ESR, CRP  -Podiatry consulted, plan for OR tmrw am.  -C/w Vancomycin, Ceftriaxone for empiric coverage while awaiting BCx result. Pt with + BCx of Staph Lugdunensis in past   -ID consult, Dr. Combs, F/U recs Patient presents with 1 year of R. toe ulceration and OM  -s/p Zosyn and 1L NS bolus in ED  -Vitals wnl, pt afebrile, WBC wnl  -MR and Xray R. foot, awaiting results  -Lactate wnl  -F/U BCx  -F/U ESR, CRP  -Podiatry consulted, plan for OR tmrw am.  -C/w Vancomycin, Ceftriaxone for empiric coverage while awaiting BCx result. Pt with + BCx of Staph Luluis carlosunensis in past   -Neuro consult, as pt has hx. of spinal fusion that may have caused foot contractures, F/U recs  -Pt requires medical optimization prior to surgery  -ID consult, Dr. Combs, F/U recs Patient presents with 1 year of R. toe ulceration and OM  -s/p Zosyn and 1L NS bolus in ED  -Vitals wnl, pt afebrile, WBC wnl  -MR and Xray R. foot, awaiting results  -Lactate wnl  -F/U BCx  -F/U ESR, CRP  -Podiatry consulted, plan for OR tmrw am.  -C/w Vancomycin, Ceftriaxone for empiric coverage while awaiting BCx result. Pt with + BCx of Staph Xochitlnsis in past   -Neuro consult, as pt has hx. of spinal fusion that may have caused foot contractures, F/U recs  -RICI score 0, Class I risk, medically optimized for OR procedure  -ID consult, Dr. Combs, F/U recs Patient presents with 1 year of R. toe ulceration and OM  -s/p Zosyn and 1L NS bolus in ED  -Vitals wnl, pt afebrile, WBC wnl  -MR and Xray R. foot, awaiting results  -Lactate wnl  -F/U BCx  -F/U ESR, CRP  -Podiatry consulted, plan for OR tmrw am.  -C/w Vancomycin, Ceftriaxone for empiric coverage while awaiting BCx result. Pt with + BCx of Staph Lugdunensis in past   -RICI score 0, Class I risk, medically optimized for OR procedure  -ID consult, Dr. Combs, F/U recs Patient presents with 1 year of R. toe ulceration and OM  -s/p Zosyn and 1L NS bolus in ED  -Vitals wnl, pt afebrile, WBC wnl  -MR and Xray R. foot, awaiting results  -Lactate wnl  -F/U BCx  -F/U ESR, CRP  -Podiatry consulted, plan for OR tmrw am.  -Awaiting ID input for further Abx regimen as pt with history of PICC line and Ceftriaxone x6 weeks in June 23. Pt with + BCx of Spencer Echavarria in past   -RICI score 0, Class I risk, medically optimized for OR procedure  -ID consult, Dr. Combs, F/U recs Patient presents with 1 year of R. toe ulceration and OM  -s/p Zosyn and 1L NS bolus in ED  -Vitals wnl, pt afebrile, WBC wnl  -MR and Xray R. foot, awaiting results  -Lactate wnl  -F/U BCx  -F/U ESR, CRP  -Podiatry consulted, plan for OR tmrw am.  -Awaiting ID input for further Abx regimen as pt with history of PICC line and Ceftriaxone x6 weeks in June 23. Pt with + BCx of Spencer Echavarria in past   -Pre-operative clearance: Pt has greater than 4 METS, mark cute chest pain or sob or other signs of ACS, no h/o early CV death in family, and known cardiac disease or valvular pathology. RCRI score 0, Class I risk, medically optimized for OR procedure  -ID consult, Dr. Combs, F/U recs

## 2024-01-03 NOTE — CONSULT NOTE ADULT - SUBJECTIVE AND OBJECTIVE BOX
[facility]  INFECTIOUS DISEASES - Tommie Combs, [address], [place] [address]  Tel: [phone]     Fax: [phone]  =======================================================    [id number]  YARITZA MERRILL     CC: Patient is a 18y old  Male who presents with a chief complaint of right big toe ulcer / OM (03 Jan 2024 14:09)    HPI:  17 y/o M w. PMHx of scoliosis,right big toe ulcer with OM, who presented for surgical intervention of R. big toe ulcer, sent by wound care. Pt was seen by wound care today and was told he would need to the bone shaven and or amputated. He has been treated with 2 course of IV antibiotics in the past for chronic osteomyelitis of R 1st toe. Denies any fevers, chills, pain or increased swelling/drainage.    PAST MEDICAL & SURGICAL HISTORY:  Scoliosis      Food allergy  egg, peanuts      History of spinal fusion for scoliosis      Toe osteomyelitis, right          Social Hx:     FAMILY HISTORY:  FH: hypothyroidism (Mother)    FH: anemia (Mother)        Allergies    sulfa drugs (Rash)  peanuts (Anaphylaxis)  eggs (Anaphylaxis)    Intolerances        Antibiotics:  MEDICATIONS  (STANDING):    MEDICATIONS  (PRN):  acetaminophen     Tablet .. 650 milliGRAM(s) Oral every 6 hours PRN Temp greater or equal to 38C (100.4F), Mild Pain (1 - 3)  melatonin 3 milliGRAM(s) Oral at bedtime PRN Insomnia       REVIEW OF SYSTEMS:  CONSTITUTIONAL:  No Fever or chills  HEENT:  No sore throat or runny nose.  CARDIOVASCULAR:  No chest pain or SOB.  RESPIRATORY:  No cough, shortness of breath  GASTROINTESTINAL:  No nausea, vomiting or diarrhea.  GENITOURINARY:  No dysuria, frequency or urgency  MUSCULOSKELETAL:  no joint aches, no muscle pain  SKIN:  see history  NEUROLOGIC:  No headache or dizziness  PSYCHIATRIC:  No disorder of thought or mood.    Physical Exam:  Vital Signs Last 24 Hrs  T(C): 36.8 (03 Jan 2024 15:11), Max: 36.8 (03 Jan 2024 10:13)  T(F): 98.3 (03 Jan 2024 15:11), Max: 98.3 (03 Jan 2024 15:11)  HR: 90 (03 Jan 2024 15:11) (90 - 98)  BP: 110/71 (03 Jan 2024 15:11) (110/71 - 115/79)  BP(mean): --  RR: 16 (03 Jan 2024 15:11) (16 - 18)  SpO2: 98% (03 Jan 2024 15:11) (98% - 98%)    Parameters below as of 03 Jan 2024 10:13  Patient On (Oxygen Delivery Method): room air      Height (cm): 175.3 (01-03 @ 10:13)  Weight (kg): 56.7 (01-03 @ 10:13)  BMI (kg/m2): 18.5 (01-03 @ 10:13)  BSA (m2): 1.69 (01-03 @ 10:13)  GEN: NAD  HEENT: normocephalic and atraumatic.   NECK: Supple.   LUNGS: Normal respiratory effort  HEART: Regular rate and rhythm   ABDOMEN: Soft, nontender, and nondistended.    EXTREMITIES: No leg edema.  NEUROLOGIC: grossly intact.  PSYCHIATRIC: Appropriate affect .  SKIN: R 1st toe ulcer, mild swelling and periwound erythema, no odor or purulence    Labs:  01-03    138  |  107  |  17  ----------------------------<  103<H>  3.5   |  29  |  0.80    Ca    9.5      03 Jan 2024 12:20    TPro  7.7  /  Alb  4.2  /  TBili  0.9  /  DBili  x   /  AST  19  /  ALT  21  /  AlkPhos  144<H>  01-03                          16.0   4.77  )-----------( 253      ( 03 Jan 2024 12:20 )             46.0     PT/INR - ( 03 Jan 2024 12:20 )   PT: 11.4 sec;   INR: 0.97 ratio         PTT - ( 03 Jan 2024 12:20 )  PTT:33.5 sec  Urinalysis Basic - ( 03 Jan 2024 12:20 )    Color: x / Appearance: x / SG: x / pH: x  Gluc: 103 mg/dL / Ketone: x  / Bili: x / Urobili: x   Blood: x / Protein: x / Nitrite: x   Leuk Esterase: x / RBC: x / WBC x   Sq Epi: x / Non Sq Epi: x / Bacteria: x      LIVER FUNCTIONS - ( 03 Jan 2024 12:20 )  Alb: 4.2 g/dL / Pro: 7.7 g/dL / ALK PHOS: 144 U/L / ALT: 21 U/L / AST: 19 U/L / GGT: x                           RECENT CULTURES:        All imaging and other data have been reviewed.     [facility]  INFECTIOUS DISEASES - Tommie Combs, [address], [place] [address]  Tel: [phone]     Fax: [phone]  =======================================================    [id number]  YARITZA MERRILL     CC: Patient is a 18y old  Male who presents with a chief complaint of right big toe ulcer / OM (03 Jan 2024 14:09)    HPI:  19 y/o M w. PMHx of scoliosis,right big toe ulcer with OM, who presented for surgical intervention of R. big toe ulcer, sent by wound care. Pt was seen by wound care today and was told he would need to the bone shaven and or amputated. He has been treated with 2 course of IV antibiotics in the past for chronic osteomyelitis of R 1st toe. Denies any fevers, chills, pain or increased swelling/drainage.    PAST MEDICAL & SURGICAL HISTORY:  Scoliosis      Food allergy  egg, peanuts      History of spinal fusion for scoliosis      Toe osteomyelitis, right          Social Hx:     FAMILY HISTORY:  FH: hypothyroidism (Mother)    FH: anemia (Mother)        Allergies    sulfa drugs (Rash)  peanuts (Anaphylaxis)  eggs (Anaphylaxis)    Intolerances        Antibiotics:  MEDICATIONS  (STANDING):    MEDICATIONS  (PRN):  acetaminophen     Tablet .. 650 milliGRAM(s) Oral every 6 hours PRN Temp greater or equal to 38C (100.4F), Mild Pain (1 - 3)  melatonin 3 milliGRAM(s) Oral at bedtime PRN Insomnia       REVIEW OF SYSTEMS:  CONSTITUTIONAL:  No Fever or chills  HEENT:  No sore throat or runny nose.  CARDIOVASCULAR:  No chest pain or SOB.  RESPIRATORY:  No cough, shortness of breath  GASTROINTESTINAL:  No nausea, vomiting or diarrhea.  GENITOURINARY:  No dysuria, frequency or urgency  MUSCULOSKELETAL:  no joint aches, no muscle pain  SKIN:  see history  NEUROLOGIC:  No headache or dizziness  PSYCHIATRIC:  No disorder of thought or mood.    Physical Exam:  Vital Signs Last 24 Hrs  T(C): 36.8 (03 Jan 2024 15:11), Max: 36.8 (03 Jan 2024 10:13)  T(F): 98.3 (03 Jan 2024 15:11), Max: 98.3 (03 Jan 2024 15:11)  HR: 90 (03 Jan 2024 15:11) (90 - 98)  BP: 110/71 (03 Jan 2024 15:11) (110/71 - 115/79)  BP(mean): --  RR: 16 (03 Jan 2024 15:11) (16 - 18)  SpO2: 98% (03 Jan 2024 15:11) (98% - 98%)    Parameters below as of 03 Jan 2024 10:13  Patient On (Oxygen Delivery Method): room air      Height (cm): 175.3 (01-03 @ 10:13)  Weight (kg): 56.7 (01-03 @ 10:13)  BMI (kg/m2): 18.5 (01-03 @ 10:13)  BSA (m2): 1.69 (01-03 @ 10:13)  GEN: NAD  HEENT: normocephalic and atraumatic.   NECK: Supple.   LUNGS: Normal respiratory effort  HEART: Regular rate and rhythm   ABDOMEN: Soft, nontender, and nondistended.    EXTREMITIES: No leg edema.  NEUROLOGIC: grossly intact.  PSYCHIATRIC: Appropriate affect .  SKIN: R 1st toe ulcer, mild swelling and periwound erythema, no odor or purulence    Labs:  01-03    138  |  107  |  17  ----------------------------<  103<H>  3.5   |  29  |  0.80    Ca    9.5      03 Jan 2024 12:20    TPro  7.7  /  Alb  4.2  /  TBili  0.9  /  DBili  x   /  AST  19  /  ALT  21  /  AlkPhos  144<H>  01-03                          16.0   4.77  )-----------( 253      ( 03 Jan 2024 12:20 )             46.0     PT/INR - ( 03 Jan 2024 12:20 )   PT: 11.4 sec;   INR: 0.97 ratio         PTT - ( 03 Jan 2024 12:20 )  PTT:33.5 sec  Urinalysis Basic - ( 03 Jan 2024 12:20 )    Color: x / Appearance: x / SG: x / pH: x  Gluc: 103 mg/dL / Ketone: x  / Bili: x / Urobili: x   Blood: x / Protein: x / Nitrite: x   Leuk Esterase: x / RBC: x / WBC x   Sq Epi: x / Non Sq Epi: x / Bacteria: x      LIVER FUNCTIONS - ( 03 Jan 2024 12:20 )  Alb: 4.2 g/dL / Pro: 7.7 g/dL / ALK PHOS: 144 U/L / ALT: 21 U/L / AST: 19 U/L / GGT: x                           RECENT CULTURES:        All imaging and other data have been reviewed.

## 2024-01-03 NOTE — ED ADULT NURSE REASSESSMENT NOTE - NSFALLUNIVINTERV_ED_ALL_ED
Bed/Stretcher in lowest position, wheels locked, appropriate side rails in place/Call bell, personal items and telephone in reach/Instruct patient to call for assistance before getting out of bed/chair/stretcher/Non-slip footwear applied when patient is off stretcher/[place] to call system/Physically safe environment - no spills, clutter or unnecessary equipment/Purposeful proactive rounding/Room/bathroom lighting operational, light cord in reach Bed/Stretcher in lowest position, wheels locked, appropriate side rails in place/Call bell, personal items and telephone in reach/Instruct patient to call for assistance before getting out of bed/chair/stretcher/Non-slip footwear applied when patient is off stretcher/[place] to call system/Physically safe environment - no spills, clutter or unnecessary equipment/Purposeful proactive rounding/Room/bathroom lighting operational, light cord in reach

## 2024-01-03 NOTE — ED PROVIDER NOTE - WR ORDER DATE AND TIME 1
Home Program: 2022    Kegels in standin. stand comfortably with legs and buttocks relaxed.  Lean against a wall for support.   2. Squeeze and LIFT the muscles that stop the flow of urine and passage of gas.  Keep legs and buttock muscles quiet.  3. Hold lift for 10 seconds without holding breath.  4. Release and DROP for 10 seconds.  5. Repeat 10 times, 1 sets, 1 times per day.      No Kegels while urinating!       Continue Double Voiding for life!      CONTROLLING URINARY / FECAL URGENCY      WHEN YOU EXPERIENCE A STRONG URGE TO URINATE OR DEFECATE:    FIRST Stop activity, stand quietly or sit down. Try to stay very still to maintain control. Avoid rushing to the toilet.    SECOND Begin Quick Flicks (1 second LIFT of pelvic floor muscles, 4 second DROP). Pelvic floor contractions send a message to the bladder to relax and hold urine.     THIRD Relax. Do not rush to the toilet. Take a deep belly or diaphragmatic breath and let it out slowly. Let the urge to urinate pass by using distraction techniques and positive thoughts. Try not to think about going to the bathroom.    FINALLY If the urge returns, repeat the above steps to regain control. When you feel the urge subside, walk normally to the bathroom. You can urinate once the urge has subsided.          
03-Jan-2024 11:56

## 2024-01-03 NOTE — H&P ADULT - NSICDXFAMILYHX_GEN_ALL_CORE_FT
FAMILY HISTORY:  Mother  Still living? Unknown  FH: anemia, Age at diagnosis: Age Unknown  FH: hypothyroidism, Age at diagnosis: Age Unknown

## 2024-01-03 NOTE — ED ADULT NURSE REASSESSMENT NOTE - NS ED NURSE REASSESS COMMENT FT1
Received pt from [facility].  Pt resting comfortably in bed at this time, offers no complaints.  Denies any chest pain or SOB.  No n/v/d.  Plan for OR tomorrow 1/4/24, pt has osteomyelitis in right great toe.  NPO after midnight.  waiting medical bed, mom at bedside.  Maintain comfort and safety.

## 2024-01-03 NOTE — CONSULT NOTE ADULT - PROBLEM SELECTOR RECOMMENDATION 9
Chart reviewed and Patient evaluated  Discussed diagnosis and treatment with patient  Applied dry sterile dressing  X-rays reviewed : Show cortical breakdown of   Rec IV antibiotics As Per ID  Rec admit to hospitalist team  Rec neurology consult  Weight bearing as tolerated  Planned procedure tomorrow 1/4/2024 right hallux arthroplasty  Patient had an opportunity to have all questions asked and answered Patient is aware of all risks, benefits, alternatives and recuperative period involved with the planned surgical procedure.  No assurances or guarantees were given to the patient.  Patient understands he is at risk for further intervention including but not limited to partial loss of toe, loss of toe, foot, bka, sepsis, loss of life  Request medical optimization and cardiac clearance for planned procedure  Podiatry will follow while in house.  Will discuss care plan  with all  Attendings

## 2024-01-03 NOTE — ED ADULT NURSE NOTE - EXTENSIONS OF SELF_ADULT
44 yo m with pmh of gout, pul HTN, nephritic syndrome in Aug 2020, c/o L sided chest pain since yesterday afternoon. Pain is constant, described as pressure, non-radiating. Denies fever, chills, cough, sob, palpitations, sweats, n/v, LE swelling. last stress 2019 was normal. Admitted in aug 2020 for chest pain, found to be in nephritic syndrome secondary to viral illness with pul htn. None

## 2024-01-03 NOTE — ED PROVIDER NOTE - CLINICAL SUMMARY MEDICAL DECISION MAKING FREE TEXT BOX
19yo male with right big toe osteo, labs, xr, iv abx admit 17yo male with right big toe osteo, labs, xr, iv abx admit

## 2024-01-03 NOTE — ED PROVIDER NOTE - DIFFERENTIAL DIAGNOSIS
Differential Diagnosis ddx considered but not limited to osteo, cellulitis, diabetic ulcer, gangrene

## 2024-01-03 NOTE — H&P ADULT - ASSESSMENT
17 y/o M w. PMHx of scolisos & right big toe ulcer / OM presenting to [facility] for surgical intervention of R. big toe ulcer, sent by wound care. 19 y/o M w. PMHx of scolisos & right big toe ulcer / OM presenting to [facility] for surgical intervention of R. big toe ulcer, sent by wound care. 19 y/o M w. PMHx of scolisos & right big toe ulcer / OM presenting to [facility] for surgical intervention of R. big toe ulcer, sent by wound care. Pt admitted for planned OR procedure.  17 y/o M w. PMHx of scolisos & right big toe ulcer / OM presenting to [facility] for surgical intervention of R. big toe ulcer, sent by wound care. Pt admitted for planned OR procedure.

## 2024-01-03 NOTE — ED ADULT NURSE NOTE - OBJECTIVE STATEMENT
Pt arrived to ED sent by provider for surgery tomorrow AM to R great toe 2/2 recurring infection. Pt complains of pain when ambulating but not at rest. Has been to hospital several times in the past for same issue. Pt is calm and NAD at this time. Labs sent w/ cultures, Fluids running w/ abx as ordered. Abx started after bld cx drawn. Will continue to monitor.

## 2024-01-03 NOTE — CONSULT NOTE ADULT - ASSESSMENT
17 y/o M w. PMHx of scoliosis,right big toe ulcer with OM, who presented for surgical intervention of R. big toe ulcer, sent by wound care. Plan for right hallux arthroplasty tomorrow 1/4. He has been treated with 2 course of IV antibiotics in the past for chronic osteomyelitis of R 1st toe. Denies any fevers, chills, pain or increased swelling/drainage.    #R hallux ulcer  #Chronic osteomyelitis of R 1st toe    -suggest cefazolin  -send OR cultures, path    Thank you for courtesy of this consult.     Will follow.  Discussed with the primary team.     Kadie Ospina MD  Division of Infectious Diseases   Cell [phone] between 8am and 6pm   After 6pm and weekends please call ID service at [phone].     55 minutes spent on total encounter assessing patient, examination, chart review, counseling and coordinating care by the attending physician/nurse/care manager.  17 y/o M w. PMHx of scoliosis,right big toe ulcer with OM, who presented for surgical intervention of R. big toe ulcer, sent by wound care. Plan for right hallux arthroplasty tomorrow 1/4. He has been treated with 2 course of IV antibiotics in the past for chronic osteomyelitis of R 1st toe. Denies any fevers, chills, pain or increased swelling/drainage.    #R hallux ulcer  #Chronic osteomyelitis of R 1st toe    -suggest cefazolin  -send OR cultures, path    Thank you for courtesy of this consult.     Will follow.  Discussed with the primary team.     Kadie Ospina MD  Division of Infectious Diseases   Cell [phone] between 8am and 6pm   After 6pm and weekends please call ID service at [phone].     55 minutes spent on total encounter assessing patient, examination, chart review, counseling and coordinating care by the attending physician/nurse/care manager.  17 y/o M w. PMHx of scoliosis,right big toe ulcer with OM, who presented for surgical intervention of R. big toe ulcer, sent by wound care. Plan for right hallux arthroplasty tomorrow 1/4. He has been treated with 2 course of IV antibiotics in the past for chronic osteomyelitis of R 1st toe. Denies any fevers, chills, pain or increased swelling/drainage.    #R hallux ulcer  #Chronic osteomyelitis of R 1st toe    -suggest cefazolin  -send OR cultures, path  -discussed with mother at bedside    Thank you for courtesy of this consult.     Will follow.  Discussed with the primary team.     Kadie Ospina MD  Division of Infectious Diseases   Cell [phone] between 8am and 6pm   After 6pm and weekends please call ID service at [phone].     55 minutes spent on total encounter assessing patient, examination, chart review, counseling and coordinating care by the attending physician/nurse/care manager.  17 y/o M w. PMHx of scoliosis,right big toe ulcer with OM, who presented for surgical intervention of R. big toe ulcer, sent by wound care. Plan for right hallux arthroplasty tomorrow 1/4. He has been treated with 2 course of IV antibiotics in the past for chronic osteomyelitis of R 1st toe. Denies any fevers, chills, pain or increased swelling/drainage.    #R hallux ulcer  #Chronic osteomyelitis of R 1st toe    -suggest cefazolin  -send OR cultures, path  -discussed with mother at bedside    Thank you for courtesy of this consult.     Will follow.  Discussed with the primary team.     Kadie Ospina MD  Division of Infectious Diseases   Cell [phone] between 8am and 6pm   After 6pm and weekends please call ID service at [phone].     55 minutes spent on total encounter assessing patient, examination, chart review, counseling and coordinating care by the attending physician/nurse/care manager.

## 2024-01-04 ENCOUNTER — TRANSCRIPTION ENCOUNTER (OUTPATIENT)
Age: 19
End: 2024-01-04

## 2024-01-04 LAB
GRAM STN FLD: SIGNIFICANT CHANGE UP
SPECIMEN SOURCE: SIGNIFICANT CHANGE UP

## 2024-01-04 PROCEDURE — 28285 REPAIR OF HAMMERTOE: CPT | Mod: T5

## 2024-01-04 PROCEDURE — 99232 SBSQ HOSP IP/OBS MODERATE 35: CPT

## 2024-01-04 PROCEDURE — 88311 DECALCIFY TISSUE: CPT | Mod: 26

## 2024-01-04 PROCEDURE — 73630 X-RAY EXAM OF FOOT: CPT | Mod: 26,RT

## 2024-01-04 PROCEDURE — 73718 MRI LOWER EXTREMITY W/O DYE: CPT | Mod: 26,RT

## 2024-01-04 PROCEDURE — 88304 TISSUE EXAM BY PATHOLOGIST: CPT | Mod: 26

## 2024-01-04 RX ORDER — ACETAMINOPHEN 500 MG
650 TABLET ORAL EVERY 6 HOURS
Refills: 0 | Status: DISCONTINUED | OUTPATIENT
Start: 2024-01-04 | End: 2024-01-05

## 2024-01-04 RX ORDER — METOCLOPRAMIDE HCL 10 MG
5 TABLET ORAL ONCE
Refills: 0 | Status: COMPLETED | OUTPATIENT
Start: 2024-01-04 | End: 2024-01-04

## 2024-01-04 RX ORDER — SODIUM CHLORIDE 9 MG/ML
1000 INJECTION, SOLUTION INTRAVENOUS
Refills: 0 | Status: DISCONTINUED | OUTPATIENT
Start: 2024-01-04 | End: 2024-01-04

## 2024-01-04 RX ORDER — CEFAZOLIN SODIUM 1 G
2000 VIAL (EA) INJECTION EVERY 8 HOURS
Refills: 0 | Status: DISCONTINUED | OUTPATIENT
Start: 2024-01-04 | End: 2024-01-05

## 2024-01-04 RX ORDER — HYDROMORPHONE HYDROCHLORIDE 2 MG/ML
0.5 INJECTION INTRAMUSCULAR; INTRAVENOUS; SUBCUTANEOUS
Refills: 0 | Status: DISCONTINUED | OUTPATIENT
Start: 2024-01-04 | End: 2024-01-04

## 2024-01-04 RX ORDER — OXYCODONE HYDROCHLORIDE 5 MG/1
5 TABLET ORAL ONCE
Refills: 0 | Status: DISCONTINUED | OUTPATIENT
Start: 2024-01-04 | End: 2024-01-04

## 2024-01-04 RX ORDER — LANOLIN ALCOHOL/MO/W.PET/CERES
3 CREAM (GRAM) TOPICAL AT BEDTIME
Refills: 0 | Status: DISCONTINUED | OUTPATIENT
Start: 2024-01-04 | End: 2024-01-05

## 2024-01-04 RX ADMIN — SODIUM CHLORIDE 75 MILLILITER(S): 9 INJECTION, SOLUTION INTRAVENOUS at 09:59

## 2024-01-04 RX ADMIN — Medication 5 MILLIGRAM(S): at 09:54

## 2024-01-04 RX ADMIN — Medication 100 MILLIGRAM(S): at 05:06

## 2024-01-04 RX ADMIN — Medication 100 MILLIGRAM(S): at 23:31

## 2024-01-04 RX ADMIN — Medication 100 MILLIGRAM(S): at 16:27

## 2024-01-04 NOTE — DISCHARGE NOTE PROVIDER - NSDCFUSCHEDAPPT_GEN_ALL_CORE_FT
[facility]  [facility] [address]  Scheduled Appointment: 01/23/2024     [facility]  [facility] [address] [facility]  Scheduled Appointment: 01/23/2024     Naresh Campos  [facility]  WOUNDCARE  [address]  Scheduled Appointment: 01/08/2024    Momo Centeno  [facility]  NEUROLOGY [address] [facility]  Scheduled Appointment: 01/23/2024     Naresh Campos  [facility]  WOUNDCARE  [address]  Scheduled Appointment: 01/08/2024    Momo Centeno  [facility]  NEUROLOGY [address] [facility]  Scheduled Appointment: 01/23/2024

## 2024-01-04 NOTE — DISCHARGE NOTE PROVIDER - NSDCMRMEDTOKEN_GEN_ALL_CORE_FT
acetaminophen 325 mg oral tablet: 2 tab(s) orally every 6 hours As needed Temp greater or equal to 38C (100.4F), Mild Pain (1 - 3)  cephalexin 500 mg oral tablet: 1 tab(s) orally every 6 hours

## 2024-01-04 NOTE — PROGRESS NOTE ADULT - PROBLEM SELECTOR PLAN 2
bone biopsy yesterday  IV ABX per ID  can switch to po antibiotic tomorrow due to patient and family request to be discharged.

## 2024-01-04 NOTE — DISCHARGE NOTE PROVIDER - PROVIDER TOKENS
PROVIDER:[TOKEN:[[id number]],FOLLOWUP:[1 week]] PROVIDER:[TOKEN:[[id number]],FOLLOWUP:[1 week]] PROVIDER:[TOKEN:[[id number]],SCHEDULEDAPPT:[01/08/2024]] PROVIDER:[TOKEN:[[id number]],SCHEDULEDAPPT:[01/08/2024]]

## 2024-01-04 NOTE — CARE COORDINATION ASSESSMENT. - CURRENT MENTAL STATUS/COGNITIVE FUNCTIONING
AS per patient's mother Jimena./alert/oriented to person/oriented to place/oriented to time/oriented to situation/recall memory is intact/recent memory is intact

## 2024-01-04 NOTE — PATIENT PROFILE ADULT - VISION (WITH CORRECTIVE LENSES IF THE PATIENT USUALLY WEARS THEM):
Normal vision: sees adequately in most situations; can see medication labels, newsprint Melolabial Interpolation Flap Text: A decision was made to reconstruct the defect utilizing an interpolation axial flap and a staged reconstruction.  A telfa template was made of the defect.  This telfa template was then used to outline the melolabial interpolation flap.  The donor area for the pedicle flap was then injected with anesthesia.  The flap was excised through the skin and subcutaneous tissue down to the layer of the underlying musculature.  The pedicle flap was carefully excised within this deep plane to maintain its blood supply.  The edges of the donor site were undermined.   The donor site was closed in a primary fashion.  The pedicle was then rotated into position and sutured.  Once the tube was sutured into place, adequate blood supply was confirmed with blanching and refill.  The pedicle was then wrapped with xeroform gauze and dressed appropriately with a telfa and gauze bandage to ensure continued blood supply and protect the attached pedicle.
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
## 2024-01-04 NOTE — DISCHARGE NOTE PROVIDER - CARE PROVIDER_API CALL
Jennifer Irwin [facility]  Podiatric Medicine  [address] [place] [id number]  Phone: [phone]  Fax: [phone]  Follow Up Time: 1 week   Jennifer Irwin [facility]  Podiatric Medicine  [address] [place] [id number]  Phone: [phone]  Fax: [phone]  Follow Up Time: 1 week   Jennifer Irwin [facility]  Podiatric Medicine  [address] [place] [address]  Phone: [phone]  Fax: [phone]  Scheduled Appointment: 01/08/2024   Jennifer Irwin [facility]  Podiatric Medicine  [address] [place] [id number]  Phone: [phone]  Fax: [phone]  Scheduled Appointment: 01/08/2024

## 2024-01-04 NOTE — PATIENT PROFILE ADULT - FALL HARM RISK - UNIVERSAL INTERVENTIONS
Bed in lowest position, wheels locked, appropriate side rails in place/Call bell, personal items and telephone in reach/Instruct patient to call for assistance before getting out of bed or chair/Non-slip footwear when patient is out of bed/[place] to call system/Physically safe environment - no spills, clutter or unnecessary equipment/Purposeful Proactive Rounding/Room/bathroom lighting operational, light cord in reach Bed in lowest position, wheels locked, appropriate side rails in place/Call bell, personal items and telephone in reach/Instruct patient to call for assistance before getting out of bed or chair/Non-slip footwear when patient is out of bed/[place] to call system/Physically safe environment - no spills, clutter or unnecessary equipment/Purposeful Proactive Rounding/Room/bathroom lighting operational, light cord in reach

## 2024-01-04 NOTE — DISCHARGE NOTE PROVIDER - NSDCCPCAREPLAN_GEN_ALL_CORE_FT
PRINCIPAL DISCHARGE DIAGNOSIS  Diagnosis: Osteomyelitis of great toe of right foot  Assessment and Plan of Treatment: You were admitted with right big toe ulcer complicated by bone infections. You wer seen by the podiatrist which recommended you have an arthroplasty (procedure to restore function of the toe). Bone and tissue samples were sent for pathology. You were seen by the infectious disese doctor who recommended intravenous antibiotics while in hospital, and switch to oral antibiotics on discharge. Please follow with Dr. Irwin within one week of hosptial discharge.     PRINCIPAL DISCHARGE DIAGNOSIS  Diagnosis: Osteomyelitis of great toe of right foot  Assessment and Plan of Treatment: You were admitted with right big toe ulcer complicated by bone infections. You wer seen by the podiatrist which recommended you had an arthroplasty (procedure to restore function of the toe) . Bone and tissue samples were sent for pathology. You were seen by the infectious disese doctor who recommended intravenous antibiotics while in hospital, and switch to oral antibiotics on discharge. Please follow with Dr. Irwin within one week of hosptial discharge as scheduled

## 2024-01-04 NOTE — CARE COORDINATION ASSESSMENT. - NSPASTMEDSURGHISTORY_GEN_ALL_CORE_FT
PAST MEDICAL & SURGICAL HISTORY:  Food allergy  egg, peanuts      Scoliosis      Toe osteomyelitis, right      History of spinal fusion for scoliosis

## 2024-01-04 NOTE — DISCHARGE NOTE PROVIDER - HOSPITAL COURSE
HPI:  19 y/o M w. PMHx of scolisos & right big toe ulcer / OM presenting to [facility] for surgical intervention of R. big toe ulcer, sent by wound care. Pt was seen by wound care today and was told he would need to the bone shaven and or amputated. Pt states he has a history of hammer toes on both feet. Last year, he stepped on a tiny leg that caused a small laceration. Since then, it has progressively gotten better and then worse, as pt keeps re-injuring toe due to hammer deformity.  He denies any current or recent pain associated with the toe. He states that he does not realize when he is re-injuring it. He states his sensation is intact in the foot bilaterally. He has been to the hospital 3x for this issue. Denies any hx. of DM or IVDU.     ---  HOSPITAL COURSE: Patient admitted to medicine floor for management of     Pt seen and examined on day of discharge. Patient is medically optimized for discharge to home with close outpatient followup.    ---  CONSULTANTS:     ---  TIME SPENT:  I, the attending physician, was physically present for the key portions of the evaluation and management (E/M) service provided. The total amount of time spent reviewing the hospital notes, laboratory values, imaging findings, assessing/counseling the patient, discussing with consultant physicians, social work, nursing staff was -- minutes    ---  Primary care provider was made aware of plan for discharge:      [  ] NO     [  ] YES   HPI:  19 y/o M w. PMHx of scolisos & right big toe ulcer / OM presenting to [facility] for surgical intervention of R. big toe ulcer, sent by wound care. Pt was seen by wound care today and was told he would need to the bone shaven and or amputated. Pt states he has a history of hammer toes on both feet. Last year, he stepped on a tiny leg that caused a small laceration. Since then, it has progressively gotten better and then worse, as pt keeps re-injuring toe due to hammer deformity.  He denies any current or recent pain associated with the toe. He states that he does not realize when he is re-injuring it. He states his sensation is intact in the foot bilaterally. He has been to the hospital 3x for this issue. Denies any hx. of DM or IVDU.     ---  HOSPITAL COURSE: Patient admitted to medicine floor for management of     Pt seen and examined on day of discharge. Patient is medically optimized for discharge to home with close outpatient followup.    ---  CONSULTANTS:     ---  TIME SPENT:  I, the attending physician, was physically present for the key portions of the evaluation and management (E/M) service provided. The total amount of time spent reviewing the hospital notes, laboratory values, imaging findings, assessing/counseling the patient, discussing with consultant physicians, social work, nursing staff was -- minutes    ---  Primary care provider was made aware of plan for discharge:      [  ] NO     [  ] YES   HPI:  19 y/o M w. PMHx of scolisos & right big toe ulcer / OM presenting to [facility] for surgical intervention of R. big toe ulcer, sent by wound care. Pt was seen by wound care today and was told he would need to the bone shaven and or amputated. Pt states he has a history of hammer toes on both feet. Last year, he stepped on a tiny leg that caused a small laceration. Since then, it has progressively gotten better and then worse, as pt keeps re-injuring toe due to hammer deformity.  He denies any current or recent pain associated with the toe. He states that he does not realize when he is re-injuring it. He states his sensation is intact in the foot bilaterally. He has been to the hospital 3x for this issue. Denies any hx. of DM or IVDU.     ---  HOSPITAL COURSE: Patient admitted to medicine floor for management of  Ulcer of great toe and toe osteomyelitis. s/p Arthroplasty, IP joint 1/4/24  local wound care. by podiatry ,bone biopsy /culture pending, patient requested to be discharged today, discussed with ID, will switch to oral and follow up at podiatry result for further result and further antibiotic regiment.     Pt seen and examined on day of discharge. Patient is medically optimized for discharge to home with close outpatient followup.    physical :   GENERAL: NAD, lying in bed comfortably  HEAD:  Normocephalic  EYES:  conjunctiva and sclera clear  ENT: Moist mucous membranes  NECK: Supple  CHEST/LUNG: Clear to auscultation bilaterally; No rales or rhonchi; no wheezing. Unlabored respirations  HEART: Regular rate and rhythm; S1S2+  ABDOMEN: Bowel sounds present; Soft, Nontender, Nondistended.   EXTREMITIES:  + distal Peripheral Pulses;  No cyanosis, or edema  NERVOUS SYSTEM:  Alert & Oriented X3;  No gross focal deficits   MSK: moves all extremities, right foot in clean dressing   SKIN: No rashes    HPI:  17 y/o M w. PMHx of scolisos & right big toe ulcer / OM presenting to [facility] for surgical intervention of R. big toe ulcer, sent by wound care. Pt was seen by wound care today and was told he would need to the bone shaven and or amputated. Pt states he has a history of hammer toes on both feet. Last year, he stepped on a tiny leg that caused a small laceration. Since then, it has progressively gotten better and then worse, as pt keeps re-injuring toe due to hammer deformity.  He denies any current or recent pain associated with the toe. He states that he does not realize when he is re-injuring it. He states his sensation is intact in the foot bilaterally. He has been to the hospital 3x for this issue. Denies any hx. of DM or IVDU.     ---  HOSPITAL COURSE: Patient admitted to medicine floor for management of  Ulcer of great toe and toe osteomyelitis. s/p Arthroplasty, IP joint 1/4/24  local wound care. by podiatry ,bone biopsy /culture pending, patient requested to be discharged today, discussed with ID, will switch to oral and follow up at podiatry result for further result and further antibiotic regiment.     Pt seen and examined on day of discharge. Patient is medically optimized for discharge to home with close outpatient followup.    physical :   GENERAL: NAD, lying in bed comfortably  HEAD:  Normocephalic  EYES:  conjunctiva and sclera clear  ENT: Moist mucous membranes  NECK: Supple  CHEST/LUNG: Clear to auscultation bilaterally; No rales or rhonchi; no wheezing. Unlabored respirations  HEART: Regular rate and rhythm; S1S2+  ABDOMEN: Bowel sounds present; Soft, Nontender, Nondistended.   EXTREMITIES:  + distal Peripheral Pulses;  No cyanosis, or edema  NERVOUS SYSTEM:  Alert & Oriented X3;  No gross focal deficits   MSK: moves all extremities, right foot in clean dressing   SKIN: No rashes

## 2024-01-04 NOTE — PROGRESS NOTE ADULT - SUBJECTIVE AND OBJECTIVE BOX
Patient is a 18y old  Male who presents with a chief complaint of right big toe ulcer / OM (04 Jan 2024 13:46)      Subjective:  INTERVAL HPI/OVERNIGHT EVENTS: Patient seen and examined at bedside.  Patient has no complaints at this time.   MEDICATIONS  (STANDING):  ceFAZolin   IVPB 2000 milliGRAM(s) IV Intermittent every 8 hours    MEDICATIONS  (PRN):  acetaminophen     Tablet .. 650 milliGRAM(s) Oral every 6 hours PRN Temp greater or equal to 38C (100.4F), Mild Pain (1 - 3)  melatonin 3 milliGRAM(s) Oral at bedtime PRN Insomnia      Allergies    sulfa drugs (Rash)  peanuts (Anaphylaxis)  eggs (Anaphylaxis)    Intolerances        REVIEW OF SYSTEMS:  CONSTITUTIONAL: No fever or chills  HEENT:  No headache, no sore throat  RESPIRATORY: No cough or shortness of breath  CARDIOVASCULAR: No chest pain or palpitations  GASTROINTESTINAL: No abd pain, nausea, vomiting, or diarrhea      Objective:  Vital Signs Last 24 Hrs  T(C): 37.1 (04 Jan 2024 13:43), Max: 37.2 (03 Jan 2024 23:44)  T(F): 98.8 (04 Jan 2024 13:43), Max: 99 (04 Jan 2024 09:17)  HR: 76 (04 Jan 2024 13:43) (75 - 99)  BP: 95/56 (04 Jan 2024 13:43) (95/56 - 121/75)  BP(mean): --  RR: 17 (04 Jan 2024 13:43) (16 - 21)  SpO2: 95% (04 Jan 2024 13:43) (92% - 99%)    Parameters below as of 04 Jan 2024 13:43  Patient On (Oxygen Delivery Method): room air        GENERAL: NAD, lying in bed comfortably  HEAD:  Normocephalic  EYES:  conjunctiva and sclera clear  ENT: Moist mucous membranes  NECK: Supple  CHEST/LUNG: Clear to auscultation bilaterally; No rales or rhonchi; no wheezing. Unlabored respirations  HEART: Regular rate and rhythm; S1S2+  ABDOMEN: Bowel sounds present; Soft, Nontender, Nondistended.   EXTREMITIES:  + distal Peripheral Pulses;  No cyanosis, or edema  NERVOUS SYSTEM:  Alert & Oriented X3;  No gross focal deficits   MSK: moves all extremities, right foot in clean dressing   SKIN: No rashes     LABS:      Ca    9.5        03 Jan 2024 12:20      PT/INR - ( 03 Jan 2024 12:20 )   PT: 11.4 sec;   INR: 0.97 ratio         PTT - ( 03 Jan 2024 12:20 )  PTT:33.5 sec  Urinalysis Basic - ( 03 Jan 2024 12:20 )    Color: x / Appearance: x / SG: x / pH: x  Gluc: 103 mg/dL / Ketone: x  / Bili: x / Urobili: x   Blood: x / Protein: x / Nitrite: x   Leuk Esterase: x / RBC: x / WBC x   Sq Epi: x / Non Sq Epi: x / Bacteria: x      CAPILLARY BLOOD GLUCOSE              RADIOLOGY & ADDITIONAL TESTS:    Personally reviewed.     Consultant(s) Notes Reviewed:  [x] YES  [ ] NO    Plan of care discussed with patient /family mother at bedside ; all questions answered

## 2024-01-04 NOTE — CARE COORDINATION ASSESSMENT. - NSCAREPROVIDERS_GEN_ALL_CORE_FT
CARE PROVIDERS:  Accepting Physician: Harjit Moran  Administration: Tarik Magallanes  Administration: Jose Mota  Admitting: Harjit Moran  Attending: Tarik Magallanes  Case Management: Em Kothari  Consultant: Jennifer Irwin  Consultant: Pillo Ruffin  Consultant: Gonzalez Rashid  Consultant: Kadie Ospina  Consultant: Humberto Mendez  ED Attending: Radha Ricks  ED Nurse: Lynette Medina  Nurse: Elma Bedolla  Nurse: Jett Lawrence  Nurse: Viet Boateng  Nurse: Adilia Holguin  Ordered: Doctor, Unknown  Ordered: ADM, User  Ordered: ServiceAccount, [facility]MLM  Override: Nadia Gomez  Override: Jorge Lopez  Override: Vanessa Castro  PCA/Nursing Assistant: Minerva Chavez  Primary Team: She Weinstein  Primary Team: Parish Davis  Primary Team: Sukhdev Gomes  Primary Team: Erik Haley  Registered Dietitian: Nena Patel  Team: [facility]  Hospitalists, Team   CARE PROVIDERS:  Accepting Physician: Harjit Moran  Administration: Tarik Magallanes  Administration: Jose Mota  Admitting: Harjit Moran  Attending: Tarik Magallanes  Case Management: Em Kothari  Consultant: Jennifer Irwin  Consultant: Pillo Ruffin  Consultant: Gonzalez Rashid  Consultant: Kadie Ospina  Consultant: Humberto Mendez  ED Attending: Radha Ricks  ED Nurse: Lynette Medina  Nurse: Elma Bedolla  Nurse: Jett Lawrence  Nurse: Viet Boateng  Nurse: Adilia Holguin  Ordered: Doctor, Unknown  Ordered: ADM, User  Ordered: ServiceAccount, [facility]MLM  Override: Nadia Gomez  Override: Jorge Lopez  Override: Vanessa Castro  PCA/Nursing Assistant: Minerva Chavez  Primary Team: She Weinstein  Primary Team: Parish Davis  Primary Team: Sukhdev Gomes  Primary Team: Erik Haley  Registered Dietitian: Nena Patel  Team: [facility]  Hospitalists, Team

## 2024-01-04 NOTE — DISCHARGE NOTE PROVIDER - NSDCFUADDAPPT_GEN_ALL_CORE_FT
Patient called stating when he saw provider recently he was taken off his blood pressure medication for 2 weeks due to symptoms he was having.  He has now been found to have CYCLOSPORA.  He is wondering if he should continue his blood pressure medication or wait out the 2 weeks.  Please call him at [phone].  
Patient notified.   States he will  bp cuff and follow perimeters. Offer to schedule nurse bp check, patient declined.    
Per Dr Quintanilla, \"If he can get a BP measurement at home if his BP is under 140/90 he can stay off BP medications for now.  If it is above that then restart the medication. \"  
Please advise.   
Please follow with Dr. Irwin on discharge.

## 2024-01-04 NOTE — PATIENT PROFILE ADULT - NSPROPTRIGHTREPNAME_GEN_A__NUR
Left message on voicemail for client to return call.  If client calls back please obtain information needed, see below.  Mary Anne LAMBERT      Rosalva Esteban

## 2024-01-04 NOTE — CARE COORDINATION ASSESSMENT. - OTHER PERTINENT DISCHARGE PLANNING INFORMATION:
met with patient at bedside to introduce self. Role of case management and transition explained. Mother/Caregiver verbalized understanding. Patient is from home with his parents. Admitted for Osteomyelitis of Right great toe, on Ancef.  Choice and transition resources explained , Jimena stated that patient had [facility] in addition to other home cre agencies in the past. Patient mother understands that  will remain available.   met with patient at bedside to introduce self. Role of case management and transition explained. Mother/Caregiver verbalized understanding. Patient is from home with his parents. Admitted for Osteomyelitis of Right great toe, on Ancef.  Choice and transition resources explained , Jimena stated that patient had [facility] in addition to other home cre agencies in the past. Patient mother understands that  will remain available.

## 2024-01-04 NOTE — PATIENT CHOICE NOTE. - NSPTCHOICESTATE_GEN_ALL_CORE
I have met with the patient and/or caregiver to discuss discharge goals and treatment plan. Patient and/or caregiver also provided with instructions on accessing the CMS Compare websites for additional information related to Post Acute Provider quality and resource use measures to assist them in evaluation of the providers and in selecting their post-acute provider of choice. Patient and caregiver were informed of the facilities that are owned and/or operated by [facility]. I have discussed with the patient the availability of in-network facilities and providers. Patient and caregiver provided with a list of post-acute providers whose services are appropriate to the discharge plans and patient needs.     For patient requiring durable medical equipment, patient and/or caregiver were informed that they have the right to request who provides the required equipment. I have met with the patient and/or caregiver to discuss discharge goals and treatment plan. Patient and/or caregiver also provided with instructions on accessing the CMS Compare websites for additional information related to Post Acute Provider quality and resource use measures to assist them in evaluation of the providers and in selecting their post-acute provider of choice. Patient and caregiver were informed of the facilities that are owned and/or operated by [facility]. I have discussed with the patient the availability of in-network facilities and providers. Patient and caregiver provided with a list of post-acute providers whose services are appropriate to the discharge plans and patient needs.     For patient requiring durable medical equipment, patient and/or caregiver were informed that they have the right to request who provides the required equipment.

## 2024-01-05 ENCOUNTER — TRANSCRIPTION ENCOUNTER (OUTPATIENT)
Age: 19
End: 2024-01-05

## 2024-01-05 ENCOUNTER — EMERGENCY (EMERGENCY)
Facility: HOSPITAL | Age: 19
LOS: 1 days | Discharge: LEFT BEFORE DISCHARGE | End: 2024-01-05
Admitting: EMERGENCY MEDICINE
Payer: MEDICAID

## 2024-01-05 VITALS
DIASTOLIC BLOOD PRESSURE: 82 MMHG | TEMPERATURE: 98 F | WEIGHT: 125 LBS | HEART RATE: 78 BPM | SYSTOLIC BLOOD PRESSURE: 117 MMHG | RESPIRATION RATE: 17 BRPM | HEIGHT: 68 IN | OXYGEN SATURATION: 99 %

## 2024-01-05 VITALS — TEMPERATURE: 98 F

## 2024-01-05 DIAGNOSIS — Z98.1 ARTHRODESIS STATUS: Chronic | ICD-10-CM

## 2024-01-05 DIAGNOSIS — M86.9 OSTEOMYELITIS, UNSPECIFIED: Chronic | ICD-10-CM

## 2024-01-05 LAB
ALBUMIN SERPL ELPH-MCNC: 3.4 G/DL — SIGNIFICANT CHANGE UP (ref 3.3–5)
ALBUMIN SERPL ELPH-MCNC: 3.4 G/DL — SIGNIFICANT CHANGE UP (ref 3.3–5)
ALP SERPL-CCNC: 111 U/L — SIGNIFICANT CHANGE UP (ref 40–120)
ALP SERPL-CCNC: 111 U/L — SIGNIFICANT CHANGE UP (ref 40–120)
ALT FLD-CCNC: 13 U/L — SIGNIFICANT CHANGE UP (ref 12–78)
ALT FLD-CCNC: 13 U/L — SIGNIFICANT CHANGE UP (ref 12–78)
ANION GAP SERPL CALC-SCNC: 1 MMOL/L — LOW (ref 5–17)
ANION GAP SERPL CALC-SCNC: 1 MMOL/L — LOW (ref 5–17)
AST SERPL-CCNC: 14 U/L — LOW (ref 15–37)
AST SERPL-CCNC: 14 U/L — LOW (ref 15–37)
BASOPHILS # BLD AUTO: 0.01 K/UL — SIGNIFICANT CHANGE UP (ref 0–0.2)
BASOPHILS # BLD AUTO: 0.01 K/UL — SIGNIFICANT CHANGE UP (ref 0–0.2)
BASOPHILS NFR BLD AUTO: 0.1 % — SIGNIFICANT CHANGE UP (ref 0–2)
BASOPHILS NFR BLD AUTO: 0.1 % — SIGNIFICANT CHANGE UP (ref 0–2)
BILIRUB SERPL-MCNC: 0.6 MG/DL — SIGNIFICANT CHANGE UP (ref 0.2–1.2)
BILIRUB SERPL-MCNC: 0.6 MG/DL — SIGNIFICANT CHANGE UP (ref 0.2–1.2)
BUN SERPL-MCNC: 14 MG/DL — SIGNIFICANT CHANGE UP (ref 7–23)
BUN SERPL-MCNC: 14 MG/DL — SIGNIFICANT CHANGE UP (ref 7–23)
CALCIUM SERPL-MCNC: 8.6 MG/DL — SIGNIFICANT CHANGE UP (ref 8.5–10.1)
CALCIUM SERPL-MCNC: 8.6 MG/DL — SIGNIFICANT CHANGE UP (ref 8.5–10.1)
CHLORIDE SERPL-SCNC: 113 MMOL/L — HIGH (ref 96–108)
CHLORIDE SERPL-SCNC: 113 MMOL/L — HIGH (ref 96–108)
CO2 SERPL-SCNC: 28 MMOL/L — SIGNIFICANT CHANGE UP (ref 22–31)
CO2 SERPL-SCNC: 28 MMOL/L — SIGNIFICANT CHANGE UP (ref 22–31)
CREAT SERPL-MCNC: 0.83 MG/DL — SIGNIFICANT CHANGE UP (ref 0.5–1.3)
CREAT SERPL-MCNC: 0.83 MG/DL — SIGNIFICANT CHANGE UP (ref 0.5–1.3)
EGFR: 128 ML/MIN/1.73M2 — SIGNIFICANT CHANGE UP
EGFR: 128 ML/MIN/1.73M2 — SIGNIFICANT CHANGE UP
EOSINOPHIL # BLD AUTO: 0.04 K/UL — SIGNIFICANT CHANGE UP (ref 0–0.5)
EOSINOPHIL # BLD AUTO: 0.04 K/UL — SIGNIFICANT CHANGE UP (ref 0–0.5)
EOSINOPHIL NFR BLD AUTO: 0.6 % — SIGNIFICANT CHANGE UP (ref 0–6)
EOSINOPHIL NFR BLD AUTO: 0.6 % — SIGNIFICANT CHANGE UP (ref 0–6)
GLUCOSE SERPL-MCNC: 94 MG/DL — SIGNIFICANT CHANGE UP (ref 70–99)
GLUCOSE SERPL-MCNC: 94 MG/DL — SIGNIFICANT CHANGE UP (ref 70–99)
HCT VFR BLD CALC: 39.2 % — SIGNIFICANT CHANGE UP (ref 39–50)
HCT VFR BLD CALC: 39.2 % — SIGNIFICANT CHANGE UP (ref 39–50)
HGB BLD-MCNC: 13.8 G/DL — SIGNIFICANT CHANGE UP (ref 13–17)
HGB BLD-MCNC: 13.8 G/DL — SIGNIFICANT CHANGE UP (ref 13–17)
IMM GRANULOCYTES NFR BLD AUTO: 0.3 % — SIGNIFICANT CHANGE UP (ref 0–0.9)
IMM GRANULOCYTES NFR BLD AUTO: 0.3 % — SIGNIFICANT CHANGE UP (ref 0–0.9)
LYMPHOCYTES # BLD AUTO: 0.87 K/UL — LOW (ref 1–3.3)
LYMPHOCYTES # BLD AUTO: 0.87 K/UL — LOW (ref 1–3.3)
LYMPHOCYTES # BLD AUTO: 12.2 % — LOW (ref 13–44)
LYMPHOCYTES # BLD AUTO: 12.2 % — LOW (ref 13–44)
MCHC RBC-ENTMCNC: 30.9 PG — SIGNIFICANT CHANGE UP (ref 27–34)
MCHC RBC-ENTMCNC: 30.9 PG — SIGNIFICANT CHANGE UP (ref 27–34)
MCHC RBC-ENTMCNC: 35.2 GM/DL — SIGNIFICANT CHANGE UP (ref 32–36)
MCHC RBC-ENTMCNC: 35.2 GM/DL — SIGNIFICANT CHANGE UP (ref 32–36)
MCV RBC AUTO: 87.7 FL — SIGNIFICANT CHANGE UP (ref 80–100)
MCV RBC AUTO: 87.7 FL — SIGNIFICANT CHANGE UP (ref 80–100)
MONOCYTES # BLD AUTO: 0.84 K/UL — SIGNIFICANT CHANGE UP (ref 0–0.9)
MONOCYTES # BLD AUTO: 0.84 K/UL — SIGNIFICANT CHANGE UP (ref 0–0.9)
MONOCYTES NFR BLD AUTO: 11.8 % — SIGNIFICANT CHANGE UP (ref 2–14)
MONOCYTES NFR BLD AUTO: 11.8 % — SIGNIFICANT CHANGE UP (ref 2–14)
NEUTROPHILS # BLD AUTO: 5.35 K/UL — SIGNIFICANT CHANGE UP (ref 1.8–7.4)
NEUTROPHILS # BLD AUTO: 5.35 K/UL — SIGNIFICANT CHANGE UP (ref 1.8–7.4)
NEUTROPHILS NFR BLD AUTO: 75 % — SIGNIFICANT CHANGE UP (ref 43–77)
NEUTROPHILS NFR BLD AUTO: 75 % — SIGNIFICANT CHANGE UP (ref 43–77)
NRBC # BLD: 0 /100 WBCS — SIGNIFICANT CHANGE UP (ref 0–0)
NRBC # BLD: 0 /100 WBCS — SIGNIFICANT CHANGE UP (ref 0–0)
PLATELET # BLD AUTO: 196 K/UL — SIGNIFICANT CHANGE UP (ref 150–400)
PLATELET # BLD AUTO: 196 K/UL — SIGNIFICANT CHANGE UP (ref 150–400)
POTASSIUM SERPL-MCNC: 3.4 MMOL/L — LOW (ref 3.5–5.3)
POTASSIUM SERPL-MCNC: 3.4 MMOL/L — LOW (ref 3.5–5.3)
POTASSIUM SERPL-SCNC: 3.4 MMOL/L — LOW (ref 3.5–5.3)
POTASSIUM SERPL-SCNC: 3.4 MMOL/L — LOW (ref 3.5–5.3)
PROT SERPL-MCNC: 6.2 G/DL — SIGNIFICANT CHANGE UP (ref 6–8.3)
PROT SERPL-MCNC: 6.2 G/DL — SIGNIFICANT CHANGE UP (ref 6–8.3)
RBC # BLD: 4.47 M/UL — SIGNIFICANT CHANGE UP (ref 4.2–5.8)
RBC # BLD: 4.47 M/UL — SIGNIFICANT CHANGE UP (ref 4.2–5.8)
RBC # FLD: 12.9 % — SIGNIFICANT CHANGE UP (ref 10.3–14.5)
RBC # FLD: 12.9 % — SIGNIFICANT CHANGE UP (ref 10.3–14.5)
SODIUM SERPL-SCNC: 142 MMOL/L — SIGNIFICANT CHANGE UP (ref 135–145)
SODIUM SERPL-SCNC: 142 MMOL/L — SIGNIFICANT CHANGE UP (ref 135–145)
WBC # BLD: 7.13 K/UL — SIGNIFICANT CHANGE UP (ref 3.8–10.5)
WBC # BLD: 7.13 K/UL — SIGNIFICANT CHANGE UP (ref 3.8–10.5)
WBC # FLD AUTO: 7.13 K/UL — SIGNIFICANT CHANGE UP (ref 3.8–10.5)
WBC # FLD AUTO: 7.13 K/UL — SIGNIFICANT CHANGE UP (ref 3.8–10.5)

## 2024-01-05 PROCEDURE — 99232 SBSQ HOSP IP/OBS MODERATE 35: CPT

## 2024-01-05 PROCEDURE — 87070 CULTURE OTHR SPECIMN AEROBIC: CPT

## 2024-01-05 PROCEDURE — G1004: CPT

## 2024-01-05 PROCEDURE — 99285 EMERGENCY DEPT VISIT HI MDM: CPT

## 2024-01-05 PROCEDURE — 86140 C-REACTIVE PROTEIN: CPT

## 2024-01-05 PROCEDURE — 80053 COMPREHEN METABOLIC PANEL: CPT

## 2024-01-05 PROCEDURE — 85730 THROMBOPLASTIN TIME PARTIAL: CPT

## 2024-01-05 PROCEDURE — 88311 DECALCIFY TISSUE: CPT

## 2024-01-05 PROCEDURE — 85025 COMPLETE CBC W/AUTO DIFF WBC: CPT

## 2024-01-05 PROCEDURE — 99239 HOSP IP/OBS DSCHRG MGMT >30: CPT

## 2024-01-05 PROCEDURE — 73630 X-RAY EXAM OF FOOT: CPT

## 2024-01-05 PROCEDURE — 83605 ASSAY OF LACTIC ACID: CPT

## 2024-01-05 PROCEDURE — 73718 MRI LOWER EXTREMITY W/O DYE: CPT | Mod: MG

## 2024-01-05 PROCEDURE — L9992: CPT

## 2024-01-05 PROCEDURE — 36415 COLL VENOUS BLD VENIPUNCTURE: CPT

## 2024-01-05 PROCEDURE — 85652 RBC SED RATE AUTOMATED: CPT

## 2024-01-05 PROCEDURE — 88304 TISSUE EXAM BY PATHOLOGIST: CPT

## 2024-01-05 PROCEDURE — 97162 PT EVAL MOD COMPLEX 30 MIN: CPT

## 2024-01-05 PROCEDURE — 73660 X-RAY EXAM OF TOE(S): CPT

## 2024-01-05 PROCEDURE — 85610 PROTHROMBIN TIME: CPT

## 2024-01-05 PROCEDURE — 87075 CULTR BACTERIA EXCEPT BLOOD: CPT

## 2024-01-05 PROCEDURE — 87040 BLOOD CULTURE FOR BACTERIA: CPT

## 2024-01-05 PROCEDURE — 96365 THER/PROPH/DIAG IV INF INIT: CPT

## 2024-01-05 RX ORDER — CEPHALEXIN 500 MG
1 CAPSULE ORAL
Qty: 28 | Refills: 0
Start: 2024-01-05 | End: 2024-01-11

## 2024-01-05 RX ORDER — ACETAMINOPHEN 500 MG
2 TABLET ORAL
Qty: 0 | Refills: 0 | DISCHARGE
Start: 2024-01-05

## 2024-01-05 RX ADMIN — Medication 100 MILLIGRAM(S): at 06:09

## 2024-01-05 RX ADMIN — Medication 100 MILLIGRAM(S): at 13:05

## 2024-01-05 NOTE — PHYSICAL THERAPY INITIAL EVALUATION ADULT - PERTINENT HX OF CURRENT PROBLEM, REHAB EVAL
19 y/o M w. PMHx of scolisos & right big toe ulcer / OM presenting to [facility] for surgical intervention of R. big toe ulcer, sent by wound care. Pt was seen by wound care today and was told he would need to the bone shaven and or amputated. Pt states he has a history of hammer toes on both feet. Last year, he stepped on a tiny leg that caused a small laceration. Since then, it has progressively gotten better and then worse, as pt keeps re-injuring toe due to hammer toe deformity.  He denies any current or recent pain associated with the toe. He states that he does not realize when he is re-injuring it. He states his sensation is intact in the foot bilaterally. He has been to the hospital 3x for this issue. Denies any hx. of DM or IVDU. s/p Arthroplasty, IP joint 1/4/24 17 y/o M w. PMHx of scolisos & right big toe ulcer / OM presenting to [facility] for surgical intervention of R. big toe ulcer, sent by wound care. Pt was seen by wound care today and was told he would need to the bone shaven and or amputated. Pt states he has a history of hammer toes on both feet. Last year, he stepped on a tiny leg that caused a small laceration. Since then, it has progressively gotten better and then worse, as pt keeps re-injuring toe due to hammer toe deformity.  He denies any current or recent pain associated with the toe. He states that he does not realize when he is re-injuring it. He states his sensation is intact in the foot bilaterally. He has been to the hospital 3x for this issue. Denies any hx. of DM or IVDU. s/p Arthroplasty, IP joint 1/4/24

## 2024-01-05 NOTE — PROGRESS NOTE ADULT - ASSESSMENT
17 y/o M w. PMHx of scoliosis,right big toe ulcer with OM, who presented for surgical intervention of R. big toe ulcer, sent by wound care. S/p Arthroplasty of R hallux IP joint on 1/4.    OR cultures and path pending, but patient and family does not want to stay for final results and would [facility]. Otherwise no fevers or leukocytosis. CRP <3. Blood cultures currently no growth.    #R hallux ulcer  #Chronic osteomyelitis of R 1st toe    -if getting discharged can go on cephalexin 500mg PO q6h x 7 days  -follow up OR cultures, path  -ID follow [facility] if margins positive for osteomyelitis  -discussed with mother at bedside  -discussed with Dr. Elpidio Ospina MD  Division of Infectious Diseases   Cell [phone] between 8am and 6pm   After 6pm and weekends please call ID service at [phone].     35 minutes spent on total encounter assessing patient, examination, chart review, counseling and coordinating care by the attending physician/nurse/care manager.      19 y/o M w. PMHx of scoliosis,right big toe ulcer with OM, who presented for surgical intervention of R. big toe ulcer, sent by wound care. S/p Arthroplasty of R hallux IP joint on 1/4.    OR cultures and path pending, but patient and family does not want to stay for final results and would prefer to [facility]. Otherwise no fevers or leukocytosis. CRP <3. Blood cultures currently no growth.    #R hallux ulcer  #Chronic osteomyelitis of R 1st toe    -if getting discharged can go on cephalexin 500mg PO q6h x 7 days  -follow up OR cultures, path  -ID follow up [facility] if margins positive for osteomyelitis  -discussed with mother at bedside  -discussed with Dr. Elpidio Ospina MD  Division of Infectious Diseases   Cell [phone] between 8am and 6pm   After 6pm and weekends please call ID service at [phone].     35 minutes spent on total encounter assessing patient, examination, chart review, counseling and coordinating care by the attending physician/nurse/care manager.

## 2024-01-05 NOTE — ED ADULT NURSE REASSESSMENT NOTE - NS ED NURSE REASSESS COMMENT FT1
family called surgeon, surgeon arrived to ED and redressed foot in the triage room.  when told by triage RN that they have to be seen by the ED attending, she insisted that they don't and that she can just assess and re-dress the foot and they can leave.  charge RN CC notified during the entire exchange.

## 2024-01-05 NOTE — PHYSICAL THERAPY INITIAL EVALUATION ADULT - ADDITIONAL COMMENTS
Pt lives in a house w/ stairs/rail.  Pt is a community ambulator and is able to drive a car.  Pt's mother report will be at home w/ pt to assist him as needed.

## 2024-01-05 NOTE — ED ADULT NURSE NOTE - NS ED NURSE ELOPE COMMENTS
see reassessment note.  seen by surgeon in triage, never seen by ED attending and never discharged.  told by surgeon that they could leave

## 2024-01-05 NOTE — DISCHARGE NOTE NURSING/CASE MANAGEMENT/SOCIAL WORK - NSDCPEFALRISK_GEN_ALL_CORE
For information on Fall & Injury Prevention, visit: [email or web address] OR  [email or web address] OR  https://www.cdc.gov/steadi/patient.html For information on Fall & Injury Prevention, visit: [email or web address] OR  [email or web address]-[email or web address] OR  https://www.cdc.gov/steadi/patient.html

## 2024-01-05 NOTE — DISCHARGE NOTE NURSING/CASE MANAGEMENT/SOCIAL WORK - PATIENT PORTAL LINK FT
You can access the FollowMyHealth Patient Portal offered by [facility] by registering at the following website: [email or web address]. By joining [employer]’s FollowMyHealth portal, you will also be able to view your health information using other applications (apps) compatible with our system. You can access the FollowMyHealth Patient Portal offered by [facility] by registering at the following website: [email or web address]. By joining [email or web address]’s FollowMyHealth portal, you will also be able to view your health information using other applications (apps) compatible with our system.

## 2024-01-05 NOTE — PHYSICAL THERAPY INITIAL EVALUATION ADULT - AMBULATION SKILLS, REHAB EVAL
36 y/o m with pmhx pAfib ( one episode 9/2015/no AC), Anxiety, CAD/ MI s/p pci with BRAD to LAD (06/2/2016) presents for pain on left fot that occurred today while he was walking down the steps and hyper extended his foot. no other injury. immediately swelling and pain. wrapped in ACE. wife with him at bedside. Gen. no acute distress, Non toxic   HEENT: EOMI, mmm,   Lungs: CTAB/L no C/ W /R   CVS: S1S2   Abd; Soft non tender, non distended   Ext: + edema and ecchymosis on lateral mal and cuboid. ttp. limited ROM from pain. no sensory deficits. distal neurovasc intact.   Neuro AAOx3 non focal clear speech independent

## 2024-01-05 NOTE — PROGRESS NOTE ADULT - SUBJECTIVE AND OBJECTIVE BOX
[facility]  INFECTIOUS DISEASES - Tommie Combs, [address], [place] [address]  Tel: [phone]     Fax: [phone]  =======================================================    YARITZA MERRILL [id number]    Follow up: No fevers. Denies any pain or new complaints. Per mother walked with PT.    Allergies:  sulfa drugs (Rash)  peanuts (Anaphylaxis)  eggs (Anaphylaxis)      Antibiotics:  acetaminophen     Tablet .. 650 milliGRAM(s) Oral every 6 hours PRN  ceFAZolin   IVPB 2000 milliGRAM(s) IV Intermittent every 8 hours  melatonin 3 milliGRAM(s) Oral at bedtime PRN       REVIEW OF SYSTEMS:  CONSTITUTIONAL:  No Fever or chills  CARDIOVASCULAR:  No chest pain or SOB.  RESPIRATORY:  No cough, shortness of breath  GASTROINTESTINAL:  No nausea, vomiting or diarrhea.  GENITOURINARY:  No dysuria, frequency or urgency  MUSCULOSKELETAL:  no joint aches, no muscle pain  NEUROLOGIC:  No headache or dizziness  PSYCHIATRIC:  No disorder of thought or mood.     Physical Exam:  ICU Vital Signs Last 24 Hrs  T(C): 36.7 (05 Jan 2024 12:23), Max: 36.9 (05 Jan 2024 04:40)  T(F): 98.1 (05 Jan 2024 12:23), Max: 98.4 (05 Jan 2024 04:40)  HR: 83 (05 Jan 2024 11:27) (79 - 83)  BP: 110/66 (05 Jan 2024 11:27) (105/66 - 110/66)  BP(mean): --  ABP: --  ABP(mean): --  RR: 16 (05 Jan 2024 04:40) (16 - 16)  SpO2: 98% (05 Jan 2024 11:27) (97% - 98%)    O2 Parameters below as of 05 Jan 2024 11:27  Patient On (Oxygen Delivery Method): room air        GEN: NAD  HEENT: normocephalic and atraumatic.   NECK: Supple.   LUNGS: Normal respiratory effort  HEART: Regular rate and rhythm   ABDOMEN: Soft, nontender, and nondistended.    EXTREMITIES: No leg edema.  NEUROLOGIC: grossly intact.  PSYCHIATRIC: Appropriate affect .        Labs:  01-05    142  |  113<H>  |  14  ----------------------------<  94  3.4<L>   |  28  |  0.83    Ca    8.6      05 Jan 2024 06:35    TPro  6.2  /  Alb  3.4  /  TBili  0.6  /  DBili  x   /  AST  14<L>  /  ALT  13  /  AlkPhos  111  01-05                          13.8   7.13  )-----------( 196      ( 05 Jan 2024 06:35 )             39.2       Urinalysis Basic - ( 05 Jan 2024 06:35 )    Color: x / Appearance: x / SG: x / pH: x  Gluc: 94 mg/dL / Ketone: x  / Bili: x / Urobili: x   Blood: x / Protein: x / Nitrite: x   Leuk Esterase: x / RBC: x / WBC x   Sq Epi: x / Non Sq Epi: x / Bacteria: x      LIVER FUNCTIONS - ( 05 Jan 2024 06:35 )  Alb: 3.4 g/dL / Pro: 6.2 g/dL / ALK PHOS: 111 U/L / ALT: 13 U/L / AST: 14 U/L / GGT: x             RECENT CULTURES:  01-04 @ 08:50 .Tissue Other, RIGHT FOOT HALLUX CULTURE     No growth    No polymorphonuclear cells seen per low power field  No organisms seen per oil power field      01-04 @ 08:48 .Tissue Other, RIGHT FOOT HALLUX BONE CULTURE     No growth    No polymorphonuclear cells seen per low power field  No organisms seen per oil power field      01-03 @ 12:20 .Blood Blood-Peripheral     No growth at 24 hours        01-03 @ 12:15 .Blood Blood-Peripheral     No growth at 24 hours              All imaging and data are reviewed.    [facility]  INFECTIOUS DISEASES - Tommie Combs, [address], [place] [address]  Tel: [phone]     Fax: [phone]  =======================================================    YARITZA MERRILL [id number]    Follow up: No fevers. Denies any pain or new complaints. Per mother walked with PT.    Allergies:  sulfa drugs (Rash)  peanuts (Anaphylaxis)  eggs (Anaphylaxis)      Antibiotics:  acetaminophen     Tablet .. 650 milliGRAM(s) Oral every 6 hours PRN  ceFAZolin   IVPB 2000 milliGRAM(s) IV Intermittent every 8 hours  melatonin 3 milliGRAM(s) Oral at bedtime PRN       REVIEW OF SYSTEMS:  CONSTITUTIONAL:  No Fever or chills  CARDIOVASCULAR:  No chest pain or SOB.  RESPIRATORY:  No cough, shortness of breath  GASTROINTESTINAL:  No nausea, vomiting or diarrhea.  GENITOURINARY:  No dysuria, frequency or urgency  MUSCULOSKELETAL:  no joint aches, no muscle pain  NEUROLOGIC:  No headache or dizziness  PSYCHIATRIC:  No disorder of thought or mood.     Physical Exam:  ICU Vital Signs Last 24 Hrs  T(C): 36.7 (05 Jan 2024 12:23), Max: 36.9 (05 Jan 2024 04:40)  T(F): 98.1 (05 Jan 2024 12:23), Max: 98.4 (05 Jan 2024 04:40)  HR: 83 (05 Jan 2024 11:27) (79 - 83)  BP: 110/66 (05 Jan 2024 11:27) (105/66 - 110/66)  BP(mean): --  ABP: --  ABP(mean): --  RR: 16 (05 Jan 2024 04:40) (16 - 16)  SpO2: 98% (05 Jan 2024 11:27) (97% - 98%)    O2 Parameters below as of 05 Jan 2024 11:27  Patient On (Oxygen Delivery Method): room air        GEN: NAD  HEENT: normocephalic and atraumatic.   NECK: Supple.   LUNGS: Normal respiratory effort  HEART: Regular rate and rhythm   ABDOMEN: Soft, nontender, and nondistended.    EXTREMITIES: No leg edema.  NEUROLOGIC: grossly intact.  PSYCHIATRIC: Appropriate affect .        Labs:  01-05    142  |  113<H>  |  14  ----------------------------<  94  3.4<L>   |  28  |  0.83    Ca    8.6      05 Jan 2024 06:35    TPro  6.2  /  Alb  3.4  /  TBili  0.6  /  DBili  x   /  AST  14<L>  /  ALT  13  /  AlkPhos  111  01-05                          13.8   7.13  )-----------( 196      ( 05 Jan 2024 06:35 )             39.2       Urinalysis Basic - ( 05 Jan 2024 06:35 )    Color: x / Appearance: x / SG: x / pH: x  Gluc: 94 mg/dL / Ketone: x  / Bili: x / Urobili: x   Blood: x / Protein: x / Nitrite: x   Leuk Esterase: x / RBC: x / WBC x   Sq Epi: x / Non Sq Epi: x / Bacteria: x      LIVER FUNCTIONS - ( 05 Jan 2024 06:35 )  Alb: 3.4 g/dL / Pro: 6.2 g/dL / ALK PHOS: 111 U/L / ALT: 13 U/L / AST: 14 U/L / GGT: x             RECENT CULTURES:  01-04 @ 08:50 .Tissue Other, RIGHT FOOT HALLUX CULTURE     No growth    No polymorphonuclear cells seen per low power field  No organisms seen per oil power field      01-04 @ 08:48 .Tissue Other, RIGHT FOOT HALLUX BONE CULTURE     No growth    No polymorphonuclear cells seen per low power field  No organisms seen per oil power field      01-03 @ 12:20 .Blood Blood-Peripheral     No growth at 24 hours        01-03 @ 12:15 .Blood Blood-Peripheral     No growth at 24 hours              All imaging and data are reviewed.

## 2024-01-05 NOTE — CASE MANAGEMENT PROGRESS NOTE - NSCMPROGRESSNOTE_GEN_ALL_CORE
Patient is for transition home today. No Skilled home are needs. Patient is to follow up at wound care center withe Podiatry on Monday. Patient's mother verbalized understanding.

## 2024-01-08 ENCOUNTER — APPOINTMENT (OUTPATIENT)
Dept: WOUND CARE | Facility: HOSPITAL | Age: 19
End: 2024-01-08
Payer: MEDICAID

## 2024-01-08 ENCOUNTER — OUTPATIENT (OUTPATIENT)
Dept: OUTPATIENT SERVICES | Facility: HOSPITAL | Age: 19
LOS: 1 days | Discharge: HOME | End: 2024-01-08
Payer: MEDICAID

## 2024-01-08 VITALS
HEIGHT: 68 IN | SYSTOLIC BLOOD PRESSURE: 133 MMHG | DIASTOLIC BLOOD PRESSURE: 78 MMHG | TEMPERATURE: 98.3 F | WEIGHT: 125 LBS | BODY MASS INDEX: 18.94 KG/M2 | RESPIRATION RATE: 18 BRPM | HEART RATE: 116 BPM | OXYGEN SATURATION: 98 %

## 2024-01-08 DIAGNOSIS — L97.514 NON-PRESSURE CHRONIC ULCER OF OTHER PART OF RIGHT FOOT WITH NECROSIS OF BONE: ICD-10-CM

## 2024-01-08 DIAGNOSIS — Z91.012 ALLERGY TO EGGS: ICD-10-CM

## 2024-01-08 DIAGNOSIS — K59.09 OTHER CONSTIPATION: ICD-10-CM

## 2024-01-08 DIAGNOSIS — Z79.899 OTHER LONG TERM (CURRENT) DRUG THERAPY: ICD-10-CM

## 2024-01-08 DIAGNOSIS — Z83.49 FAMILY HISTORY OF OTHER ENDOCRINE, NUTRITIONAL AND METABOLIC DISEASES: ICD-10-CM

## 2024-01-08 DIAGNOSIS — M86.671 OTHER CHRONIC OSTEOMYELITIS, RIGHT ANKLE AND FOOT: ICD-10-CM

## 2024-01-08 DIAGNOSIS — Z98.890 OTHER SPECIFIED POSTPROCEDURAL STATES: ICD-10-CM

## 2024-01-08 DIAGNOSIS — Z91.010 ALLERGY TO PEANUTS: ICD-10-CM

## 2024-01-08 DIAGNOSIS — M86.9 OSTEOMYELITIS, UNSPECIFIED: Chronic | ICD-10-CM

## 2024-01-08 DIAGNOSIS — Z87.39 PERSONAL HISTORY OF OTHER DISEASES OF THE MUSCULOSKELETAL SYSTEM AND CONNECTIVE TISSUE: ICD-10-CM

## 2024-01-08 DIAGNOSIS — Z98.1 ARTHRODESIS STATUS: Chronic | ICD-10-CM

## 2024-01-08 LAB
CULTURE RESULTS: SIGNIFICANT CHANGE UP
SPECIMEN SOURCE: SIGNIFICANT CHANGE UP

## 2024-01-08 PROCEDURE — G0463: CPT

## 2024-01-08 PROCEDURE — 99024 POSTOP FOLLOW-UP VISIT: CPT

## 2024-01-08 NOTE — ASSESSMENT
[Verbal] : Verbal [Demo] : Demo [Good - alert, interested, motivated] : Good - alert, interested, motivated [Demonstrates independently] : demonstrates independently [Dressing changes] : dressing changes [Foot Care] : foot care [Skin Care] : skin care [Signs and symptoms of infection] : sign and symptoms of infection [Nutrition] : nutrition [How and When to Call] : how and when to call [Labs and Tests] : labs and tests [Off-loading] : off-loading [Patient responsibility to plan of care] : patient responsibility to plan of care [Stable] : stable [Home] : Home [Ambulatory] : Ambulatory [Not Applicable - Long Term Care/Home Health Agency] : Long Term Care/Home Health Agency: Not Applicable [] : No [FreeTextEntry2] : Infection Prevention Foot and nail care Nutrition and wound healing Pt Demonstrates use of both nonpharmacological and pharmacological pain relief strategies. Oral abt therapy [FreeTextEntry3] : S/P HOSPITALIZATION FOR INFECTED R GREAT TOE   1/3/24 - 1/5/24 [FreeTextEntry4] : Patient tolerating oral abx well. Patient's mother wishes to see Dr Irwin this week. Appt made for Wednesday 1/10/24 @ 4 PM

## 2024-01-08 NOTE — HISTORY OF PRESENT ILLNESS
[FreeTextEntry1] : s/p arthroplasty right hallux + OM , patient on oral antibiotics , sutures intact no soi ,

## 2024-01-08 NOTE — PLAN
[FreeTextEntry1] : continue wound care and post op dressing changes  .ptr 1 weekk Spent 20 minutes for patient care and medical decision making.

## 2024-01-08 NOTE — PHYSICAL EXAM
[4 x 4] : 4 x 4  [2+] : left 2+ [Ankle Swelling (On Exam)] : not present [Varicose Veins Of Lower Extremities] : not present [] : not present [Purpura] : no purpura  [Petechiae] : no petechiae [Skin Ulcer] : no ulcer [Skin Induration] : no induration [Alert] : alert [Oriented to Person] : oriented to person [Oriented to Place] : oriented to place [Oriented to Time] : oriented to time [Calm] : calm [de-identified] : calm, AOX3  [de-identified] : +OM of the right great toe  [de-identified] : Right plantar hallux no edema, no erythema, no drainage, no proximal streaking - s/p arthroplasty of the right great toe  and + OM  [FreeTextEntry1] : Right Great Toe s/p Arthroplasty, IP joint 1/4/24 [FreeTextEntry2] : 0.1 [FreeTextEntry3] : 7.5 [FreeTextEntry4] : 0.1 x suture line [de-identified] : small serosanguineous [de-identified] : none [de-identified] : surgical [de-identified] : none [de-identified] : intact [de-identified] : none [de-identified] : none [de-identified] : 100% [de-identified] : none [de-identified] : Alginate Ag [de-identified] : Mechanically cleansed with sterile gauze and normal saline 0.9% Dry Dressing [de-identified] : Primary Dressing [de-identified] : 3x Weekly

## 2024-01-08 NOTE — VITALS
[] : No [de-identified] : 0/10 [FreeTextEntry5] : cephalexin 500 mg oral tablet: 1 tab(s) orally every 6 hours

## 2024-01-08 NOTE — REVIEW OF SYSTEMS
[Fever] : no fever [Chills] : no chills [Eye Pain] : no eye pain [Red Eyes] : eyes not red [Earache] : no earache [Loss Of Hearing] : no hearing loss [Nosebleeds] : no nosebleeds [Chest Pain] : no chest pain [Shortness Of Breath] : no shortness of breath [Wheezing] : no wheezing [Cough] : no cough [Abdominal Pain] : no abdominal pain [Vomiting] : no vomiting [Diarrhea] : no diarrhea [Skin Wound] : skin wound [Confused] : no confusion [Dizziness] : no dizziness [Anxiety] : no anxiety [Easy Bleeding] : no tendency for easy bleeding [Negative] : Endocrine [de-identified] : Right great toe , s/p arthroplasty , + OM , sutures intact , no soi

## 2024-01-09 LAB
CULTURE RESULTS: SIGNIFICANT CHANGE UP
SPECIMEN SOURCE: SIGNIFICANT CHANGE UP

## 2024-01-10 ENCOUNTER — APPOINTMENT (OUTPATIENT)
Dept: WOUND CARE | Facility: HOSPITAL | Age: 19
End: 2024-01-10
Payer: MEDICAID

## 2024-01-10 ENCOUNTER — OUTPATIENT (OUTPATIENT)
Dept: OUTPATIENT SERVICES | Facility: HOSPITAL | Age: 19
LOS: 1 days | Discharge: HOME | End: 2024-01-10
Payer: MEDICAID

## 2024-01-10 VITALS
HEIGHT: 68 IN | HEART RATE: 105 BPM | SYSTOLIC BLOOD PRESSURE: 119 MMHG | TEMPERATURE: 99.1 F | WEIGHT: 125 LBS | RESPIRATION RATE: 18 BRPM | BODY MASS INDEX: 18.94 KG/M2 | DIASTOLIC BLOOD PRESSURE: 81 MMHG | OXYGEN SATURATION: 98 %

## 2024-01-10 DIAGNOSIS — L97.514 NON-PRESSURE CHRONIC ULCER OF OTHER PART OF RIGHT FOOT WITH NECROSIS OF BONE: ICD-10-CM

## 2024-01-10 DIAGNOSIS — M86.9 OSTEOMYELITIS, UNSPECIFIED: Chronic | ICD-10-CM

## 2024-01-10 DIAGNOSIS — Z98.1 ARTHRODESIS STATUS: Chronic | ICD-10-CM

## 2024-01-10 PROCEDURE — G0463: CPT

## 2024-01-10 PROCEDURE — 99024 POSTOP FOLLOW-UP VISIT: CPT

## 2024-01-11 DIAGNOSIS — Z83.49 FAMILY HISTORY OF OTHER ENDOCRINE, NUTRITIONAL AND METABOLIC DISEASES: ICD-10-CM

## 2024-01-11 DIAGNOSIS — L84 CORNS AND CALLOSITIES: ICD-10-CM

## 2024-01-11 DIAGNOSIS — Z98.890 OTHER SPECIFIED POSTPROCEDURAL STATES: ICD-10-CM

## 2024-01-11 DIAGNOSIS — K59.09 OTHER CONSTIPATION: ICD-10-CM

## 2024-01-11 DIAGNOSIS — Z91.012 ALLERGY TO EGGS: ICD-10-CM

## 2024-01-11 DIAGNOSIS — Z91.010 ALLERGY TO PEANUTS: ICD-10-CM

## 2024-01-11 DIAGNOSIS — Z87.39 PERSONAL HISTORY OF OTHER DISEASES OF THE MUSCULOSKELETAL SYSTEM AND CONNECTIVE TISSUE: ICD-10-CM

## 2024-01-11 DIAGNOSIS — L97.514 NON-PRESSURE CHRONIC ULCER OF OTHER PART OF RIGHT FOOT WITH NECROSIS OF BONE: ICD-10-CM

## 2024-01-11 DIAGNOSIS — Z79.899 OTHER LONG TERM (CURRENT) DRUG THERAPY: ICD-10-CM

## 2024-01-11 DIAGNOSIS — M86.671 OTHER CHRONIC OSTEOMYELITIS, RIGHT ANKLE AND FOOT: ICD-10-CM

## 2024-01-11 NOTE — REVIEW OF SYSTEMS
[Fever] : no fever [Chills] : no chills [Eye Pain] : no eye pain [Red Eyes] : eyes not red [Earache] : no earache [Loss Of Hearing] : no hearing loss [Nosebleeds] : no nosebleeds [Chest Pain] : no chest pain [Shortness Of Breath] : no shortness of breath [Wheezing] : no wheezing [Cough] : no cough [Abdominal Pain] : no abdominal pain [Vomiting] : no vomiting [Diarrhea] : no diarrhea [Skin Wound] : skin wound [Confused] : no confusion [Dizziness] : no dizziness [Anxiety] : no anxiety [Easy Bleeding] : no tendency for easy bleeding [Negative] : Endocrine [de-identified] : Right great toe , s/p arthroplasty , + OM , sutures intact , no soi

## 2024-01-11 NOTE — PLAN
[FreeTextEntry1] : Patient examined and evaluated at this time.  Discussed the treatment options and current treatment plan with patient and patient's mother. Patient remains a very high risk for infection, sepsis, limb loss, and death. Discussed conservative vs surgical treatment options.  Pre- fabricated device will not benefit patient due to the deformity custom inserts will be worn for longer than 6 months.  No guarantees given, discussed that patient is still high risk for possible surgery, amputation, loss of limb and loss of life. Referral for neurology given to assess and evaluate for any underlying neurological pathology causing the hammer toes. Patient has hx of idiopathic scoliosis with hx of surgery. Advised patient and mother the importance of  neurological consult, have scheduled for 20th of January. Discussed with patient and family that patient  will benefit patient wear  to offload the hallux and assess for wound healing. Discussed with patient and mother that wound still down to bone will need surgical intervention with possible partial hallux amputation vs arthroplasty of the IPJ of the hallux to offload the hallux and aid with healing RTC in one week or to ER for admission for surgical intervention.  All questions answered to satisfaction and patient and patient's mother verbalized understanding at this time. Spent 20 minutes on patient care and medical decision making.

## 2024-01-11 NOTE — ASSESSMENT
[Verbal] : Verbal [Demo] : Demo [Good - alert, interested, motivated] : Good - alert, interested, motivated [Demonstrates independently] : demonstrates independently [Dressing changes] : dressing changes [Foot Care] : foot care [Skin Care] : skin care [Signs and symptoms of infection] : sign and symptoms of infection [Nutrition] : nutrition [How and When to Call] : how and when to call [Labs and Tests] : labs and tests [Off-loading] : off-loading [Patient responsibility to plan of care] : patient responsibility to plan of care [Stable] : stable [Home] : Home [Ambulatory] : Ambulatory [Not Applicable - Long Term Care/Home Health Agency] : Long Term Care/Home Health Agency: Not Applicable [] : No [FreeTextEntry2] : Infection Prevention Foot and nail care Nutrition and wound healing Pt Demonstrates use of both nonpharmacological and pharmacological pain relief strategies. Oral abt therapy [FreeTextEntry3] : small area of dehiscence of surgical site  [FreeTextEntry4] : Patient tolerating oral abx well. F/U 1 week Pt mother will be changing dressings, pt mother demonstrates understanding of appropriate dressing change technique, supply order subvmitted.

## 2024-01-11 NOTE — HISTORY OF PRESENT ILLNESS
[FreeTextEntry1] : Patient is 18 year old male presenting for follow up for right plantar hallux wound with chronic OM is s/p IPJ arthroplasty of the right hallux as on 1/4/24. Patient is accompanied by his mother. Patient is on PO antibiotics after d/c from [facility].

## 2024-01-11 NOTE — PHYSICAL EXAM
[4 x 4] : 4 x 4  [2+] : left 2+ [Ankle Swelling (On Exam)] : not present [Varicose Veins Of Lower Extremities] : not present [] : not present [Purpura] : no purpura  [Petechiae] : no petechiae [Skin Ulcer] : no ulcer [Skin Induration] : no induration [Alert] : alert [Oriented to Person] : oriented to person [Oriented to Place] : oriented to place [Oriented to Time] : oriented to time [Calm] : calm [de-identified] : calm, AOX3  [de-identified] : +OM of the right great toe  [de-identified] : Right plantar hallux no edema, no erythema, no drainage, no proximal streaking - s/p arthroplasty of the right great toe  and + OM sutures intact  [FreeTextEntry1] : Right Great Toe s/p Arthroplasty, IP joint 1/4/24- small areas of dehiscence  [FreeTextEntry2] : 0.3 [FreeTextEntry3] : 7.5 [FreeTextEntry4] : 0.1 x suture line [de-identified] : moderate serosanguineous [de-identified] : none [de-identified] : surgical [de-identified] : none [de-identified] : intact [de-identified] : none [de-identified] : none [de-identified] : 100% [de-identified] : none [de-identified] : Alginate Ag [de-identified] : Mechanically cleansed with sterile gauze and normal saline 0.9% Dry Dressing [de-identified] : Every other day [de-identified] : Primary Dressing

## 2024-01-11 NOTE — ASSESSMENT
[Verbal] : Verbal [Demo] : Demo [Patient] : Patient [Good - alert, interested, motivated] : Good - alert, interested, motivated [Verbalizes knowledge/Understanding] : Verbalizes knowledge/understanding [Dressing changes] : dressing changes [Foot Care] : foot care [Skin Care] : skin care [Pressure relief] : pressure relief [Signs and symptoms of infection] : sign and symptoms of infection [Nutrition] : nutrition [How and When to Call] : how and when to call [Off-loading] : off-loading [Patient responsibility to plan of care] : patient responsibility to plan of care [Glycemic Control] : glycemic control [Stable] : stable [Home] : Home [Ambulatory] : Ambulatory [Not Applicable - Long Term Care/Home Health Agency] : Long Term Care/Home Health Agency: Not Applicable [] : No [FreeTextEntry2] : Infection prevention Localized wound care Promote optimal skin integrity  Maintain acceptable level of pain with use of pharmacological and nonpharmacological interventions Offloading / Pressure relief  Daily foot care / monitoring  [FreeTextEntry4] : Follow up for an assessment in two weeks  Photo was not taken due to IT error  Patient to be admitted next week for arthroplasty of the right hallux  Patient is able to provide own wound care.  Patient has supplies at home.

## 2024-01-11 NOTE — PHYSICAL EXAM
[2 x 2] : 2 x 2  [2+] : left 2+ [Ankle Swelling (On Exam)] : not present [Varicose Veins Of Lower Extremities] : not present [] : not present [Purpura] : no purpura  [Petechiae] : no petechiae [Skin Ulcer] : no ulcer [Alert] : alert [Oriented to Person] : oriented to person [Oriented to Place] : oriented to place [Oriented to Time] : oriented to time [Calm] : calm [de-identified] : calm, AOX3  [de-identified] : 5/5 muscle strength for all muscles and tendons crossing the foot and ankle joints, ankle joint and STJ ROM intact b/l. No pain with calf compression b/l. Flexion contracture of the digits 2,3 and 4 b/l and varus rotation of the 5th digits b/l. Plantarflexed 1st metatarsal when loded the 1st MPJ b/l, decreased bilateral medial arches  [de-identified] : Right plantar hallux wound down to bone with hyperkeratotic tissue overlying - no edema, no erythema, no drainage, no proximal streaking  [de-identified] : callus shaved by DPM bandage applied by DPM [FreeTextEntry1] : Right plantar hallux wound [FreeTextEntry2] : 0.2 [FreeTextEntry3] : 0.7 [FreeTextEntry4] : 0.2 [de-identified] : serosanguineous [de-identified] : calloused [de-identified] : Alginate Ag [de-identified] : Cleansed with 0.9% Normal Saline  Paper tape  [TWNoteComboBox4] : Small [TWNoteComboBox5] : No [TWNoteComboBox6] : Diabetic [de-identified] : No [de-identified] : None [de-identified] : None [de-identified] : 100% [de-identified] : No [de-identified] : 3x Weekly [de-identified] : Primary Dressing

## 2024-01-11 NOTE — REVIEW OF SYSTEMS
[Fever] : no fever [Chills] : no chills [Eye Pain] : no eye pain [Red Eyes] : eyes not red [Earache] : no earache [Loss Of Hearing] : no hearing loss [Nosebleeds] : no nosebleeds [Chest Pain] : no chest pain [Shortness Of Breath] : no shortness of breath [Wheezing] : no wheezing [Cough] : no cough [Abdominal Pain] : no abdominal pain [Vomiting] : no vomiting [Diarrhea] : no diarrhea [Skin Wound] : skin wound [Confused] : no confusion [Dizziness] : no dizziness [Anxiety] : no anxiety [Easy Bleeding] : no tendency for easy bleeding [Negative] : Endocrine [de-identified] : Right great toe wound - re-opened  down to bone, noted with slow progress of healing

## 2024-01-11 NOTE — PLAN
[FreeTextEntry1] : .Patient seen and evaluated. Discussed etiology and treatment plan. Patient verbalized understanding. Patient was not willing to stay in the hospital until pathology results. Discussed pathology (+) for chronic focal OM to the proximal phalanx.  Continue wound care and post op dressing changes. RTC in one week.   .Patient is high risk for further surgical intervention, sepsis, loss of limb and loss of life.  Spent 20 minutes for patient care and medical decision making.

## 2024-01-14 NOTE — REVIEW OF SYSTEMS
[Negative] : Heme/Lymph [Skin Lesions] : skin lesion [de-identified] : ulcer of right big toe w/necrosis

## 2024-01-14 NOTE — REASON FOR VISIT
[Follow-Up Visit] : a follow-up visit for [Parent] : parent [FreeTextEntry2] : Patient is being seen for R big toe blister and to review MRI results

## 2024-01-14 NOTE — ASSESSMENT
[FreeTextEntry1] : Pt was examined and evaluated. Discussed etiology Pt advised regarding possible etiology of symptoms and discussed nail plate biopsy. All questions answered to pt satisfaction and pt verbalized understanding. Nails 1-5 bilaterally debrided and nail plate specimen was obtained and sent to pathology for KOH prep and fungal culture. Rx for ciclopirox sent to pt's pharmacy. Discussed can continue treating conservatively but patient will be at risk of more proximal amputations in the future if the underlying bone infection is not treated. Informed patient to start topical antifungal medication with the directions of applying daily and removing coats weekly.  Follow up with neurology for neurological testing.  Highly recommended to sanitize shoes to prevent reinfection.  Spent 30 minutes for patient care and medical decision making. Follow up at wound care center for hyperbaric treatment.

## 2024-01-14 NOTE — PHYSICAL EXAM
[General Appearance - Alert] : alert [General Appearance - In No Acute Distress] : in no acute distress [2+] : left foot dorsalis pedis 2+ [No Joint Swelling] : no joint swelling [Normal Foot/Ankle] : Both lower extremities were exposed and visualized. Standing exam demonstrates normal foot posture and alignment. Hindfoot exam shows no hindfoot valgus or varus [Sensation] : the sensory exam was normal to light touch and pinprick [No Focal Deficits] : no focal deficits [Impaired Insight] : insight and judgment were intact [Oriented To Time, Place, And Person] : oriented to person, place, and time [Affect] : the affect was normal [Mood] : the mood was normal [Ankle Swelling (On Exam)] : not present [Varicose Veins Of Lower Extremities] : not present [] : not present [Delayed in the Right Toes] : capillary refills normal in right toes [Delayed in the Left Toes] : capillary refills normal in the left toes [de-identified] : 5/5 muscle strength for all muscles and tendons crossing the foot and ankle joints, ankle joint and STJ ROM intact b/l. No pain with calf compression b/l. Flexion contracture of the digits 2,3 and 4 b/l and varus rotation of the 5th digits b/l. Plantarflexed 1st metatarsal head  [FreeTextEntry1] :  Light touch sensation intact to the level of the digits b/l.

## 2024-01-14 NOTE — HISTORY OF PRESENT ILLNESS
[FreeTextEntry1] : 18yo M presents with PMH underlying osteomyelitis presents with chronic ulcer of right great toe with necrosis and to review MRI results. Patient has thickened , discolored great toe nails.  Patient's mom states that she has attempted to cut his nails but has difficulty as he has thick toenails. Patient's mom states that his toenails had always looked discolored, however, his toenails had not always been as thick as they are now. Patient has a h/o back surgery in June 2020 for scoliosis; treatment included wearing a brace throughout his childhood.

## 2024-01-15 DIAGNOSIS — Z91.012 ALLERGY TO EGGS: ICD-10-CM

## 2024-01-15 DIAGNOSIS — Z98.890 OTHER SPECIFIED POSTPROCEDURAL STATES: ICD-10-CM

## 2024-01-15 DIAGNOSIS — T81.89XD OTHER COMPLICATIONS OF PROCEDURES, NOT ELSEWHERE CLASSIFIED, SUBSEQUENT ENCOUNTER: ICD-10-CM
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
## 2024-01-15 DIAGNOSIS — Z79.899 OTHER LONG TERM (CURRENT) DRUG THERAPY: ICD-10-CM

## 2024-01-15 DIAGNOSIS — M86.671 OTHER CHRONIC OSTEOMYELITIS, RIGHT ANKLE AND FOOT: ICD-10-CM

## 2024-01-15 DIAGNOSIS — Z83.49 FAMILY HISTORY OF OTHER ENDOCRINE, NUTRITIONAL AND METABOLIC DISEASES: ICD-10-CM

## 2024-01-15 DIAGNOSIS — K59.09 OTHER CONSTIPATION: ICD-10-CM

## 2024-01-15 DIAGNOSIS — Z87.39 PERSONAL HISTORY OF OTHER DISEASES OF THE MUSCULOSKELETAL SYSTEM AND CONNECTIVE TISSUE: ICD-10-CM

## 2024-01-15 DIAGNOSIS — Z91.010 ALLERGY TO PEANUTS: ICD-10-CM

## 2024-01-15 DIAGNOSIS — L97.514 NON-PRESSURE CHRONIC ULCER OF OTHER PART OF RIGHT FOOT WITH NECROSIS OF BONE: ICD-10-CM

## 2024-01-16 ENCOUNTER — NON-APPOINTMENT (OUTPATIENT)
Age: 19
End: 2024-01-16

## 2024-01-18 ENCOUNTER — APPOINTMENT (OUTPATIENT)
Dept: WOUND CARE | Facility: HOSPITAL | Age: 19
End: 2024-01-18
Payer: MEDICAID

## 2024-01-18 ENCOUNTER — OUTPATIENT (OUTPATIENT)
Dept: OUTPATIENT SERVICES | Facility: HOSPITAL | Age: 19
LOS: 1 days | Discharge: HOME | End: 2024-01-18
Payer: MEDICAID

## 2024-01-18 VITALS
HEART RATE: 131 BPM | WEIGHT: 125 LBS | DIASTOLIC BLOOD PRESSURE: 83 MMHG | TEMPERATURE: 98.5 F | HEIGHT: 68 IN | OXYGEN SATURATION: 99 % | RESPIRATION RATE: 18 BRPM | BODY MASS INDEX: 18.94 KG/M2 | SYSTOLIC BLOOD PRESSURE: 125 MMHG

## 2024-01-18 DIAGNOSIS — Z98.1 ARTHRODESIS STATUS: Chronic | ICD-10-CM

## 2024-01-18 DIAGNOSIS — T81.89XD OTHER COMPLICATIONS OF PROCEDURES, NOT ELSEWHERE CLASSIFIED, SUBSEQUENT ENCOUNTER: ICD-10-CM

## 2024-01-18 DIAGNOSIS — M86.9 OSTEOMYELITIS, UNSPECIFIED: Chronic | ICD-10-CM

## 2024-01-18 PROCEDURE — G0463: CPT

## 2024-01-18 PROCEDURE — 99024 POSTOP FOLLOW-UP VISIT: CPT

## 2024-01-18 NOTE — ASSESSMENT
[Verbal] : Verbal [Demo] : Demo [Good - alert, interested, motivated] : Good - alert, interested, motivated [Demonstrates independently] : demonstrates independently [Dressing changes] : dressing changes [Foot Care] : foot care [Skin Care] : skin care [Signs and symptoms of infection] : sign and symptoms of infection [Nutrition] : nutrition [How and When to Call] : how and when to call [Labs and Tests] : labs and tests [Off-loading] : off-loading [Patient responsibility to plan of care] : patient responsibility to plan of care [Stable] : stable [Home] : Home [Ambulatory] : Ambulatory [Not Applicable - Long Term Care/Home Health Agency] : Long Term Care/Home Health Agency: Not Applicable [] : No [FreeTextEntry2] : Infection Prevention Foot and nail care Nutrition and wound healing Pt Demonstrates use of both nonpharmacological and pharmacological pain relief strategies. Oral abt therapy [FreeTextEntry3] : small area of dehiscence of surgical site, 2cm tracking noted  [FreeTextEntry4] : Pt to return Sat 1/20 and Mon 1/22 for dressing change and then Wed 1/24 for assessment Nutrition consult submitted

## 2024-01-18 NOTE — PHYSICAL EXAM
[4 x 4] : 4 x 4  [2+] : left 2+ [Ankle Swelling (On Exam)] : not present [Varicose Veins Of Lower Extremities] : not present [] : not present [Purpura] : no purpura  [Petechiae] : no petechiae [Skin Ulcer] : no ulcer [Skin Induration] : no induration [Alert] : alert [Oriented to Person] : oriented to person [Oriented to Place] : oriented to place [Oriented to Time] : oriented to time [Calm] : calm [de-identified] : calm, AOX3  [de-identified] : +OM of the right great toe  [de-identified] : Right plantar hallux with dehiscence noted to the medial surgical site,  maceration to the periwound area and mild serous drainage, no proximal streaking - s/p IPJ arthroplasty of the right great toe  and + OM [de-identified] : Some sutures removed [FreeTextEntry1] : Right Great Toe s/p Arthroplasty, IP joint 1/4/24- small areas of dehiscence  [FreeTextEntry2] : 0.3 [FreeTextEntry3] : 7.5 [FreeTextEntry4] : 2.0cm tracking [de-identified] : moderate serosanguineous [de-identified] : surgical [de-identified] : none [de-identified] : intact [de-identified] : none [de-identified] : none [de-identified] : 100% [de-identified] : none [de-identified] : Betadine soaked Adaptic [de-identified] : Mechanically cleansed with sterile gauze and normal saline 0.9% Dry Dressing [de-identified] : 3x Weekly [de-identified] : Primary Dressing

## 2024-01-18 NOTE — PLAN
[FreeTextEntry1] : .Patient seen and evaluated. Discussed etiology and treatment plan. Patient verbalized understanding. Patient was not willing to stay in the hospital until pathology results. Discussed pathology (+) for chronic focal OM to the proximal phalanx.  Patient and family refused PICC line and wanted PO abx and was given Keflex  per ID recommendations. Continue wound care, patient will return on Saturday and Wednesday for dressing changes   .Patient is high risk for further surgical intervention, sepsis, loss of limb and loss of life.  Spent 20 minutes for patient care and medical decision making.

## 2024-01-18 NOTE — HISTORY OF PRESENT ILLNESS
[FreeTextEntry1] : Patient is 18 year old male presenting for follow up for right plantar hallux wound with chronic OM is s/p IPJ arthroplasty of the right hallux as on 1/4/24 for chronic non healing wound to the hallux with underlying osteomyelitis. Patient is accompanied by his mother. Patient is on PO antibiotics after d/c from [facility].

## 2024-01-18 NOTE — REVIEW OF SYSTEMS
[Fever] : no fever [Chills] : no chills [Eye Pain] : no eye pain [Red Eyes] : eyes not red [Earache] : no earache [Loss Of Hearing] : no hearing loss [Nosebleeds] : no nosebleeds [Chest Pain] : no chest pain [Shortness Of Breath] : no shortness of breath [Wheezing] : no wheezing [Cough] : no cough [Abdominal Pain] : no abdominal pain [Diarrhea] : no diarrhea [Vomiting] : no vomiting [Skin Wound] : skin wound [Confused] : no confusion [Dizziness] : no dizziness [Anxiety] : no anxiety [Easy Bleeding] : no tendency for easy bleeding [Negative] : Endocrine [de-identified] : Right great toe , s/p arthroplasty , + OM , sutures intact , no soi

## 2024-01-19 ENCOUNTER — NON-APPOINTMENT (OUTPATIENT)
Age: 19
End: 2024-01-19

## 2024-01-20 ENCOUNTER — OUTPATIENT (OUTPATIENT)
Dept: OUTPATIENT SERVICES | Facility: HOSPITAL | Age: 19
LOS: 1 days | Discharge: HOME | End: 2024-01-20
Payer: MEDICAID

## 2024-01-20 ENCOUNTER — APPOINTMENT (OUTPATIENT)
Dept: WOUND CARE | Facility: HOSPITAL | Age: 19
End: 2024-01-20
Payer: MEDICAID

## 2024-01-20 VITALS
OXYGEN SATURATION: 99 % | HEIGHT: 68 IN | TEMPERATURE: 97.8 F | WEIGHT: 125 LBS | SYSTOLIC BLOOD PRESSURE: 136 MMHG | RESPIRATION RATE: 16 BRPM | DIASTOLIC BLOOD PRESSURE: 91 MMHG | HEART RATE: 107 BPM | BODY MASS INDEX: 18.94 KG/M2
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
## 2024-01-20 DIAGNOSIS — L97.514 NON-PRESSURE CHRONIC ULCER OF OTHER PART OF RIGHT FOOT WITH NECROSIS OF BONE: ICD-10-CM

## 2024-01-20 DIAGNOSIS — T81.89XD OTHER COMPLICATIONS OF PROCEDURES, NOT ELSEWHERE CLASSIFIED, SUBSEQUENT ENCOUNTER: ICD-10-CM

## 2024-01-20 DIAGNOSIS — M86.671 OTHER CHRONIC OSTEOMYELITIS, RIGHT ANKLE AND FOOT: ICD-10-CM
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
## 2024-01-20 DIAGNOSIS — Z98.1 ARTHRODESIS STATUS: Chronic | ICD-10-CM

## 2024-01-20 DIAGNOSIS — M86.9 OSTEOMYELITIS, UNSPECIFIED: Chronic | ICD-10-CM

## 2024-01-20 PROCEDURE — G0463: CPT

## 2024-01-20 PROCEDURE — ZZZZZ: CPT

## 2024-01-21 DIAGNOSIS — Z98.890 OTHER SPECIFIED POSTPROCEDURAL STATES: ICD-10-CM

## 2024-01-21 DIAGNOSIS — Z87.39 PERSONAL HISTORY OF OTHER DISEASES OF THE MUSCULOSKELETAL SYSTEM AND CONNECTIVE TISSUE: ICD-10-CM

## 2024-01-21 DIAGNOSIS — K59.09 OTHER CONSTIPATION: ICD-10-CM

## 2024-01-21 DIAGNOSIS — Z79.899 OTHER LONG TERM (CURRENT) DRUG THERAPY: ICD-10-CM

## 2024-01-21 DIAGNOSIS — T81.89XD OTHER COMPLICATIONS OF PROCEDURES, NOT ELSEWHERE CLASSIFIED, SUBSEQUENT ENCOUNTER: ICD-10-CM

## 2024-01-21 DIAGNOSIS — L97.514 NON-PRESSURE CHRONIC ULCER OF OTHER PART OF RIGHT FOOT WITH NECROSIS OF BONE: ICD-10-CM

## 2024-01-21 DIAGNOSIS — Z83.49 FAMILY HISTORY OF OTHER ENDOCRINE, NUTRITIONAL AND METABOLIC DISEASES: ICD-10-CM
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
## 2024-01-21 DIAGNOSIS — Z91.010 ALLERGY TO PEANUTS: ICD-10-CM
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
## 2024-01-21 DIAGNOSIS — Z91.012 ALLERGY TO EGGS: ICD-10-CM

## 2024-01-21 DIAGNOSIS — M86.671 OTHER CHRONIC OSTEOMYELITIS, RIGHT ANKLE AND FOOT: ICD-10-CM

## 2024-01-22 ENCOUNTER — OUTPATIENT (OUTPATIENT)
Dept: OUTPATIENT SERVICES | Facility: HOSPITAL | Age: 19
LOS: 1 days | Discharge: HOME | End: 2024-01-22
Payer: MEDICAID

## 2024-01-22 ENCOUNTER — APPOINTMENT (OUTPATIENT)
Dept: WOUND CARE | Facility: HOSPITAL | Age: 19
End: 2024-01-22
Payer: MEDICAID

## 2024-01-22 VITALS
HEIGHT: 68 IN | TEMPERATURE: 97.6 F | SYSTOLIC BLOOD PRESSURE: 129 MMHG | RESPIRATION RATE: 16 BRPM | DIASTOLIC BLOOD PRESSURE: 91 MMHG | BODY MASS INDEX: 18.94 KG/M2 | OXYGEN SATURATION: 99 % | HEART RATE: 113 BPM | WEIGHT: 125 LBS

## 2024-01-22 DIAGNOSIS — T81.89XD OTHER COMPLICATIONS OF PROCEDURES, NOT ELSEWHERE CLASSIFIED, SUBSEQUENT ENCOUNTER: ICD-10-CM

## 2024-01-22 PROCEDURE — ZZZZZ: CPT

## 2024-01-22 PROCEDURE — G0463: CPT

## 2024-01-23 ENCOUNTER — APPOINTMENT (OUTPATIENT)
Dept: NEUROLOGY | Facility: CLINIC | Age: 19
End: 2024-01-23

## 2024-01-24 ENCOUNTER — OUTPATIENT (OUTPATIENT)
Dept: OUTPATIENT SERVICES | Facility: HOSPITAL | Age: 19
LOS: 1 days | Discharge: HOME | End: 2024-01-24
Payer: MEDICAID

## 2024-01-24 ENCOUNTER — APPOINTMENT (OUTPATIENT)
Dept: WOUND CARE | Facility: HOSPITAL | Age: 19
End: 2024-01-24
Payer: MEDICAID

## 2024-01-24 VITALS
HEIGHT: 68 IN | HEART RATE: 120 BPM | BODY MASS INDEX: 18.94 KG/M2 | SYSTOLIC BLOOD PRESSURE: 124 MMHG | TEMPERATURE: 98.6 F | DIASTOLIC BLOOD PRESSURE: 73 MMHG | WEIGHT: 125 LBS | OXYGEN SATURATION: 99 % | RESPIRATION RATE: 18 BRPM

## 2024-01-24 DIAGNOSIS — Z98.1 ARTHRODESIS STATUS: Chronic | ICD-10-CM

## 2024-01-24 DIAGNOSIS — T81.89XD OTHER COMPLICATIONS OF PROCEDURES, NOT ELSEWHERE CLASSIFIED, SUBSEQUENT ENCOUNTER: ICD-10-CM

## 2024-01-24 DIAGNOSIS — M86.9 OSTEOMYELITIS, UNSPECIFIED: Chronic | ICD-10-CM

## 2024-01-24 PROCEDURE — G0463: CPT

## 2024-01-24 PROCEDURE — 99024 POSTOP FOLLOW-UP VISIT: CPT

## 2024-01-26 ENCOUNTER — APPOINTMENT (OUTPATIENT)
Dept: WOUND CARE | Facility: HOSPITAL | Age: 19
End: 2024-01-26
Payer: MEDICAID

## 2024-01-26 ENCOUNTER — OUTPATIENT (OUTPATIENT)
Dept: OUTPATIENT SERVICES | Facility: HOSPITAL | Age: 19
LOS: 1 days | Discharge: HOME | End: 2024-01-26
Payer: MEDICAID

## 2024-01-26 VITALS
OXYGEN SATURATION: 98 % | BODY MASS INDEX: 18.94 KG/M2 | TEMPERATURE: 99.2 F | DIASTOLIC BLOOD PRESSURE: 77 MMHG | WEIGHT: 125 LBS | SYSTOLIC BLOOD PRESSURE: 121 MMHG | RESPIRATION RATE: 18 BRPM | HEIGHT: 68 IN | HEART RATE: 127 BPM

## 2024-01-26 DIAGNOSIS — L97.514 NON-PRESSURE CHRONIC ULCER OF OTHER PART OF RIGHT FOOT WITH NECROSIS OF BONE: ICD-10-CM
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
## 2024-01-26 DIAGNOSIS — M86.671 OTHER CHRONIC OSTEOMYELITIS, RIGHT ANKLE AND FOOT: ICD-10-CM
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
## 2024-01-26 DIAGNOSIS — T81.89XD OTHER COMPLICATIONS OF PROCEDURES, NOT ELSEWHERE CLASSIFIED, SUBSEQUENT ENCOUNTER: ICD-10-CM

## 2024-01-26 DIAGNOSIS — Z98.1 ARTHRODESIS STATUS: Chronic | ICD-10-CM

## 2024-01-26 DIAGNOSIS — M86.9 OSTEOMYELITIS, UNSPECIFIED: Chronic | ICD-10-CM

## 2024-01-26 PROCEDURE — ZZZZZ: CPT

## 2024-01-26 PROCEDURE — G0463: CPT

## 2024-01-29 ENCOUNTER — NON-APPOINTMENT (OUTPATIENT)
Age: 19
End: 2024-01-29
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
## 2024-01-29 DIAGNOSIS — T81.89XD OTHER COMPLICATIONS OF PROCEDURES, NOT ELSEWHERE CLASSIFIED, SUBSEQUENT ENCOUNTER: ICD-10-CM

## 2024-01-29 DIAGNOSIS — M86.671 OTHER CHRONIC OSTEOMYELITIS, RIGHT ANKLE AND FOOT: ICD-10-CM

## 2024-01-29 DIAGNOSIS — L97.514 NON-PRESSURE CHRONIC ULCER OF OTHER PART OF RIGHT FOOT WITH NECROSIS OF BONE: ICD-10-CM
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
## 2024-01-31 ENCOUNTER — OUTPATIENT (OUTPATIENT)
Dept: OUTPATIENT SERVICES | Facility: HOSPITAL | Age: 19
LOS: 1 days | Discharge: HOME | End: 2024-01-31
Payer: MEDICAID

## 2024-01-31 ENCOUNTER — APPOINTMENT (OUTPATIENT)
Dept: WOUND CARE | Facility: HOSPITAL | Age: 19
End: 2024-01-31
Payer: MEDICAID

## 2024-01-31 VITALS
WEIGHT: 125 LBS | OXYGEN SATURATION: 98 % | BODY MASS INDEX: 18.94 KG/M2 | DIASTOLIC BLOOD PRESSURE: 79 MMHG | TEMPERATURE: 97.7 F | HEART RATE: 106 BPM | RESPIRATION RATE: 18 BRPM | HEIGHT: 68 IN | SYSTOLIC BLOOD PRESSURE: 111 MMHG

## 2024-01-31 DIAGNOSIS — Z79.899 OTHER LONG TERM (CURRENT) DRUG THERAPY: ICD-10-CM

## 2024-01-31 DIAGNOSIS — L97.514 NON-PRESSURE CHRONIC ULCER OF OTHER PART OF RIGHT FOOT WITH NECROSIS OF BONE: ICD-10-CM
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
## 2024-01-31 DIAGNOSIS — M86.671 OTHER CHRONIC OSTEOMYELITIS, RIGHT ANKLE AND FOOT: ICD-10-CM

## 2024-01-31 DIAGNOSIS — Z83.49 FAMILY HISTORY OF OTHER ENDOCRINE, NUTRITIONAL AND METABOLIC DISEASES: ICD-10-CM

## 2024-01-31 DIAGNOSIS — Z87.39 PERSONAL HISTORY OF OTHER DISEASES OF THE MUSCULOSKELETAL SYSTEM AND CONNECTIVE TISSUE: ICD-10-CM

## 2024-01-31 DIAGNOSIS — M86.9 OSTEOMYELITIS, UNSPECIFIED: Chronic | ICD-10-CM

## 2024-01-31 DIAGNOSIS — T81.89XD OTHER COMPLICATIONS OF PROCEDURES, NOT ELSEWHERE CLASSIFIED, SUBSEQUENT ENCOUNTER: ICD-10-CM

## 2024-01-31 DIAGNOSIS — Z91.012 ALLERGY TO EGGS: ICD-10-CM

## 2024-01-31 DIAGNOSIS — K59.09 OTHER CONSTIPATION: ICD-10-CM

## 2024-01-31 DIAGNOSIS — Z91.010 ALLERGY TO PEANUTS: ICD-10-CM

## 2024-01-31 DIAGNOSIS — Z98.890 OTHER SPECIFIED POSTPROCEDURAL STATES: ICD-10-CM
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
## 2024-01-31 DIAGNOSIS — Z98.1 ARTHRODESIS STATUS: Chronic | ICD-10-CM

## 2024-01-31 PROCEDURE — G0463: CPT

## 2024-01-31 PROCEDURE — 99024 POSTOP FOLLOW-UP VISIT: CPT

## 2024-01-31 NOTE — PLAN
[FreeTextEntry1] : .Patient seen and evaluated. Discussed etiology and treatment plan. Patient verbalized understanding. Patient was not willing to stay in the hospital until pathology results. Discussed pathology (+) for chronic focal OM to the proximal phalanx.  Patient and family refused PICC line and wanted PO abx and was given Keflex  per ID recommendations. Continue wound care and offloading.   .Patient is high risk for further surgical intervention, sepsis, loss of limb and loss of life.  Spent 20 minutes for patient care and medical decision making.

## 2024-01-31 NOTE — REVIEW OF SYSTEMS
[Fever] : no fever [Chills] : no chills [Eye Pain] : no eye pain [Red Eyes] : eyes not red [Earache] : no earache [Loss Of Hearing] : no hearing loss [Nosebleeds] : no nosebleeds [Chest Pain] : no chest pain [Shortness Of Breath] : no shortness of breath [Wheezing] : no wheezing [Cough] : no cough [Abdominal Pain] : no abdominal pain [Vomiting] : no vomiting [Diarrhea] : no diarrhea [Skin Wound] : skin wound [Confused] : no confusion [Dizziness] : no dizziness [Anxiety] : no anxiety [Easy Bleeding] : no tendency for easy bleeding [Negative] : Endocrine [de-identified] : Right great toe , s/p arthroplasty , + OM , no soi

## 2024-01-31 NOTE — ASSESSMENT
[Verbal] : Verbal [Demo] : Demo [Good - alert, interested, motivated] : Good - alert, interested, motivated [Demonstrates independently] : demonstrates independently [Dressing changes] : dressing changes [Foot Care] : foot care [Signs and symptoms of infection] : sign and symptoms of infection [Skin Care] : skin care [How and When to Call] : how and when to call [Nutrition] : nutrition [Labs and Tests] : labs and tests [Off-loading] : off-loading [Patient responsibility to plan of care] : patient responsibility to plan of care [Stable] : stable [Home] : Home [Ambulatory] : Ambulatory [Not Applicable - Long Term Care/Home Health Agency] : Long Term Care/Home Health Agency: Not Applicable [] : No [FreeTextEntry2] : Infection Prevention Foot and nail care Nutrition and wound healing Pt Demonstrates use of both nonpharmacological and pharmacological pain relief strategies. Oral abt therapy [FreeTextEntry3] : small area of dehiscence of surgical site  [FreeTextEntry4] : DPM assessed and advised swelling has decreased. Removed all stitches. Advised lateral aspect is all healed. Medial aspect still healing. Removed some of the callous surrounding the surgical area.  Pt mother will be changing dressings, pt mother demonstrates understanding of appropriate dressing change technique, supply order submitted.  DPM advised pt to hold off on going to college yet - stay with [employer] for now. DPM removed callus from left great hallux too. F/U 1 week

## 2024-01-31 NOTE — PHYSICAL EXAM
[4 x 4] : 4 x 4  [2+] : left 2+ [Ankle Swelling (On Exam)] : not present [Varicose Veins Of Lower Extremities] : not present [] : not present [Purpura] : no purpura  [Petechiae] : no petechiae [Skin Ulcer] : no ulcer [Skin Induration] : no induration [Alert] : alert [Oriented to Person] : oriented to person [Oriented to Place] : oriented to place [Oriented to Time] : oriented to time [Calm] : calm [de-identified] : calm, AOX3  [de-identified] : s/p right Pina arthroplasty , +OM of the right great toe  [de-identified] : Right plantar hallux with dehiscence noted to the medial surgical site, intact periwound area and mild serous drainage, no proximal streaking - s/p IPJ arthroplasty of the right great toe  and + OM [FreeTextEntry1] : Right Great Toe s/p Arthroplasty, IP joint 1/4/24- small areas of dehiscence  [FreeTextEntry2] : 0.3 [FreeTextEntry3] : 7.5 [FreeTextEntry4] : 0.3 [de-identified] : moderate serosanguineous [de-identified] : none [de-identified] : surgical [de-identified] : none [de-identified] : intact [de-identified] : none [de-identified] : none [de-identified] : 100% [de-identified] : none [de-identified] : Adaptic Touch & Alginate Ag [de-identified] : Mechanically cleansed with sterile gauze and normal saline 0.9% Dry Dressing  **Clean and dry w/ betadine prior to product application*** [de-identified] : Every other day [de-identified] : Primary Dressing

## 2024-01-31 NOTE — HISTORY OF PRESENT ILLNESS
[FreeTextEntry1] : Patient is 18 year old male presenting for follow up for right plantar hallux wound with chronic OM is s/p IPJ arthroplasty of the right hallux as on 1/4/24 for chronic non healing wound to the hallux with underlying osteomyelitis. Patient is accompanied by his mother. Patient is on PO antibiotics after d/c from [facility].

## 2024-02-07 ENCOUNTER — OUTPATIENT (OUTPATIENT)
Dept: OUTPATIENT SERVICES | Facility: HOSPITAL | Age: 19
LOS: 1 days | Discharge: HOME | End: 2024-02-07
Payer: MEDICAID

## 2024-02-07 ENCOUNTER — APPOINTMENT (OUTPATIENT)
Dept: WOUND CARE | Facility: HOSPITAL | Age: 19
End: 2024-02-07
Payer: MEDICAID

## 2024-02-07 VITALS
RESPIRATION RATE: 18 BRPM | DIASTOLIC BLOOD PRESSURE: 86 MMHG | OXYGEN SATURATION: 98 % | WEIGHT: 125 LBS | BODY MASS INDEX: 18.94 KG/M2 | HEIGHT: 68 IN | SYSTOLIC BLOOD PRESSURE: 127 MMHG | HEART RATE: 110 BPM | TEMPERATURE: 98.9 F

## 2024-02-07 DIAGNOSIS — T81.89XD OTHER COMPLICATIONS OF PROCEDURES, NOT ELSEWHERE CLASSIFIED, SUBSEQUENT ENCOUNTER: ICD-10-CM

## 2024-02-07 DIAGNOSIS — M86.9 OSTEOMYELITIS, UNSPECIFIED: Chronic | ICD-10-CM

## 2024-02-07 DIAGNOSIS — Z98.1 ARTHRODESIS STATUS: Chronic | ICD-10-CM

## 2024-02-07 PROCEDURE — G0463: CPT

## 2024-02-07 PROCEDURE — 99213 OFFICE O/P EST LOW 20 MIN: CPT

## 2024-02-11 NOTE — PLAN
[FreeTextEntry1] : .Patient seen and evaluated. Discussed etiology and treatment plan. Patient verbalized understanding. Patient was not willing to stay in the hospital until pathology results. Discussed pathology (+) for chronic focal OM to the proximal phalanx.  Patient and family refused PICC line and wanted PO abx and was given Keflex  per ID recommendations. Continue wound care and offloading.  All sutures removed.   .Patient is high risk for further surgical intervention, sepsis, loss of limb and loss of life.  Spent 20 minutes for patient care and medical decision making.

## 2024-02-11 NOTE — ASSESSMENT
[Verbal] : Verbal [Patient] : Patient [Family member] : Family member [Good - alert, interested, motivated] : Good - alert, interested, motivated [Demonstrates independently] : demonstrates independently [Foot Care] : foot care [Skin Care] : skin care [Signs and symptoms of infection] : sign and symptoms of infection [Nutrition] : nutrition [How and When to Call] : how and when to call [Off-loading] : off-loading [Patient responsibility to plan of care] : patient responsibility to plan of care [] : Yes [Stable] : stable [Home] : Home [Ambulatory] : Ambulatory [Not Applicable - Long Term Care/Home Health Agency] : Long Term Care/Home Health Agency: Not Applicable [FreeTextEntry2] : Infection Prevention Foot and nail care Nutrition and wound healing Pt Demonstrates use of both nonpharmacological and pharmacological pain relief strategies. [FreeTextEntry4] : Wound closed. Patient to transition back to regular shoes, awaiting insert from [facility] (Orthotist) Patient provided with letter to return to school regular activities  F/U 1 Week

## 2024-02-11 NOTE — PHYSICAL EXAM
[4 x 4] : 4 x 4  [Ankle Swelling (On Exam)] : not present [2+] : left 2+ [Purpura] : no purpura  [Varicose Veins Of Lower Extremities] : not present [] : not present [Skin Ulcer] : no ulcer [Petechiae] : no petechiae [Skin Induration] : no induration [Alert] : alert [Oriented to Person] : oriented to person [Oriented to Place] : oriented to place [Oriented to Time] : oriented to time [Calm] : calm [de-identified] : calm, AOX3  [de-identified] : s/p right Pina arthroplasty , +OM of the right great toe  [FreeTextEntry1] : Right Great Toe s/p Arthroplasty, IP joint 1/4/24- small areas of dehiscence  [de-identified] :  - s/p IPJ arthroplasty of the right great toe  and + OM; wound noted with progress of healing, removed all sutures  [FreeTextEntry3] : 3.2 [FreeTextEntry2] : 0.3 [FreeTextEntry4] : 0.3 [de-identified] : Small Serosanguineous [de-identified] : surgical [de-identified] : none [de-identified] : none [de-identified] : intact [de-identified] : none [de-identified] : none [de-identified] : none [de-identified] : 100% [de-identified] : Alginate Ag [de-identified] : Mechanically cleansed with sterile gauze and normal saline 0.9% Dry Dressing  **Clean and dry w/ betadine prior to product application*** [de-identified] : Every other day [de-identified] : Primary Dressing

## 2024-02-11 NOTE — HISTORY OF PRESENT ILLNESS
[FreeTextEntry1] : Patient is 18 year old male presenting for follow up for right plantar hallux wound with chronic OM is s/p IPJ arthroplasty of the right hallux as on 1/4/24 for chronic non healing wound to the hallux with underlying osteomyelitis. Patient is accompanied by his mother. Patient finished the course of PO antibiotics after d/c from [facility].

## 2024-02-11 NOTE — PHYSICAL EXAM
[2 x 2] : 2 x 2  [2+] : left 2+ [Varicose Veins Of Lower Extremities] : not present [Ankle Swelling (On Exam)] : not present [] : not present [Purpura] : no purpura  [Petechiae] : no petechiae [Skin Ulcer] : no ulcer [Skin Induration] : no induration [Alert] : alert [Oriented to Person] : oriented to person [Oriented to Time] : oriented to time [Oriented to Place] : oriented to place [de-identified] : calm, AOX3  [Calm] : calm [de-identified] :  - s/p IPJ arthroplasty of the right great toe  and + OM; wound - healed with no signs of infection  [de-identified] : s/p right Pina arthroplasty , +OM of the right great toe  [de-identified] : closed [FreeTextEntry1] : Right Great Toe s/p Arthroplasty, IP joint 1/4/24- [de-identified] : surgical [de-identified] : none [de-identified] : none [de-identified] : intact [de-identified] : Dry Dressing 1 x only [de-identified] : none [de-identified] : False

## 2024-02-11 NOTE — REVIEW OF SYSTEMS
[Fever] : no fever [Chills] : no chills [Eye Pain] : no eye pain [Earache] : no earache [Red Eyes] : eyes not red [Nosebleeds] : no nosebleeds [Loss Of Hearing] : no hearing loss [Shortness Of Breath] : no shortness of breath [Chest Pain] : no chest pain [Cough] : no cough [Wheezing] : no wheezing [Vomiting] : no vomiting [Abdominal Pain] : no abdominal pain [Skin Wound] : skin wound [Diarrhea] : no diarrhea [Confused] : no confusion [Dizziness] : no dizziness [Anxiety] : no anxiety [Easy Bleeding] : no tendency for easy bleeding [Negative] : Endocrine [de-identified] : Right great toe , s/p arthroplasty , + OM , no soi

## 2024-02-11 NOTE — REVIEW OF SYSTEMS
[Fever] : no fever [Chills] : no chills [Eye Pain] : no eye pain [Red Eyes] : eyes not red [Earache] : no earache [Loss Of Hearing] : no hearing loss [Nosebleeds] : no nosebleeds [Chest Pain] : no chest pain [Shortness Of Breath] : no shortness of breath [Wheezing] : no wheezing [Cough] : no cough [Vomiting] : no vomiting [Abdominal Pain] : no abdominal pain [Skin Wound] : skin wound [Diarrhea] : no diarrhea [Dizziness] : no dizziness [Confused] : no confusion [Easy Bleeding] : no tendency for easy bleeding [Anxiety] : no anxiety [Negative] : Endocrine [de-identified] : Right great toe , s/p arthroplasty , + OM , no soi

## 2024-02-11 NOTE — PLAN
[FreeTextEntry1] : .Patient seen and evaluated. Discussed etiology and treatment plan. Patient and family happy with the outcome. Patient wound has healed. Advised patient to transition into regular shoes and to resume routine activities. Patient and family advised that if patient notices any drainage or re- opening of the wound then to return to clinic earlier. RTc in one week.   .Patient is high risk for further surgical intervention, sepsis, loss of limb and loss of life.  Spent 20 minutes for patient care and medical decision making.

## 2024-02-11 NOTE — ASSESSMENT
[Verbal] : Verbal [Demo] : Demo [Good - alert, interested, motivated] : Good - alert, interested, motivated [Demonstrates independently] : demonstrates independently [Dressing changes] : dressing changes [Foot Care] : foot care [Skin Care] : skin care [Signs and symptoms of infection] : sign and symptoms of infection [Nutrition] : nutrition [How and When to Call] : how and when to call [Labs and Tests] : labs and tests [Off-loading] : off-loading [Patient responsibility to plan of care] : patient responsibility to plan of care [Stable] : stable [Home] : Home [Ambulatory] : Ambulatory [Not Applicable - Long Term Care/Home Health Agency] : Long Term Care/Home Health Agency: Not Applicable [FreeTextEntry2] : Infection Prevention Foot and nail care Nutrition and wound healing Pt Demonstrates use of both nonpharmacological and pharmacological pain relief strategies. Oral abt therapy [] : No [FreeTextEntry4] : DPM removed some of the callous surrounding the surgical area.  Wound is closed but skin is baby skin and very fragile. DPM reminded patient not to get the foot wet and walking on it will open it up again. Recommends staying home from school one more week - advised him if the wound stays the way it is he can go to school as long as he continues to wear the shoe and doesn't put any pressure on the foot. Pt mother will be changing dressings, pt mother demonstrates understanding of appropriate dressing change technique. Pts mother states she wants some supplies; give her a few to hold her over. F/U 1 week
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
## 2024-02-12 DIAGNOSIS — Z98.890 OTHER SPECIFIED POSTPROCEDURAL STATES: ICD-10-CM

## 2024-02-12 DIAGNOSIS — K59.09 OTHER CONSTIPATION: ICD-10-CM

## 2024-02-12 DIAGNOSIS — Z91.010 ALLERGY TO PEANUTS: ICD-10-CM

## 2024-02-12 DIAGNOSIS — Z83.49 FAMILY HISTORY OF OTHER ENDOCRINE, NUTRITIONAL AND METABOLIC DISEASES: ICD-10-CM

## 2024-02-12 DIAGNOSIS — M86.671 OTHER CHRONIC OSTEOMYELITIS, RIGHT ANKLE AND FOOT: ICD-10-CM

## 2024-02-12 DIAGNOSIS — Z91.012 ALLERGY TO EGGS: ICD-10-CM
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
## 2024-02-12 DIAGNOSIS — Z87.39 PERSONAL HISTORY OF OTHER DISEASES OF THE MUSCULOSKELETAL SYSTEM AND CONNECTIVE TISSUE: ICD-10-CM

## 2024-02-12 DIAGNOSIS — Z79.899 OTHER LONG TERM (CURRENT) DRUG THERAPY: ICD-10-CM

## 2024-02-12 DIAGNOSIS — T81.89XD OTHER COMPLICATIONS OF PROCEDURES, NOT ELSEWHERE CLASSIFIED, SUBSEQUENT ENCOUNTER: ICD-10-CM

## 2024-02-12 DIAGNOSIS — L97.514 NON-PRESSURE CHRONIC ULCER OF OTHER PART OF RIGHT FOOT WITH NECROSIS OF BONE: ICD-10-CM

## 2024-02-14 ENCOUNTER — OUTPATIENT (OUTPATIENT)
Dept: OUTPATIENT SERVICES | Facility: HOSPITAL | Age: 19
LOS: 1 days | Discharge: HOME | End: 2024-02-14
Payer: MEDICAID

## 2024-02-14 ENCOUNTER — APPOINTMENT (OUTPATIENT)
Dept: WOUND CARE | Facility: HOSPITAL | Age: 19
End: 2024-02-14
Payer: MEDICAID

## 2024-02-14 VITALS
OXYGEN SATURATION: 98 % | BODY MASS INDEX: 18.94 KG/M2 | DIASTOLIC BLOOD PRESSURE: 85 MMHG | RESPIRATION RATE: 15 BRPM | TEMPERATURE: 99.2 F | WEIGHT: 125 LBS | SYSTOLIC BLOOD PRESSURE: 123 MMHG | HEIGHT: 68 IN | HEART RATE: 112 BPM
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
## 2024-02-14 DIAGNOSIS — Z79.899 OTHER LONG TERM (CURRENT) DRUG THERAPY: ICD-10-CM

## 2024-02-14 DIAGNOSIS — T81.89XD OTHER COMPLICATIONS OF PROCEDURES, NOT ELSEWHERE CLASSIFIED, SUBSEQUENT ENCOUNTER: ICD-10-CM

## 2024-02-14 DIAGNOSIS — M86.671 OTHER CHRONIC OSTEOMYELITIS, RIGHT ANKLE AND FOOT: ICD-10-CM

## 2024-02-14 DIAGNOSIS — Z87.39 PERSONAL HISTORY OF OTHER DISEASES OF THE MUSCULOSKELETAL SYSTEM AND CONNECTIVE TISSUE: ICD-10-CM

## 2024-02-14 DIAGNOSIS — Z91.010 ALLERGY TO PEANUTS: ICD-10-CM

## 2024-02-14 DIAGNOSIS — L97.514 NON-PRESSURE CHRONIC ULCER OF OTHER PART OF RIGHT FOOT WITH NECROSIS OF BONE: ICD-10-CM

## 2024-02-14 DIAGNOSIS — Z83.49 FAMILY HISTORY OF OTHER ENDOCRINE, NUTRITIONAL AND METABOLIC DISEASES: ICD-10-CM

## 2024-02-14 DIAGNOSIS — Z91.012 ALLERGY TO EGGS: ICD-10-CM

## 2024-02-14 DIAGNOSIS — K59.09 OTHER CONSTIPATION: ICD-10-CM

## 2024-02-14 DIAGNOSIS — M86.9 OSTEOMYELITIS, UNSPECIFIED: Chronic | ICD-10-CM
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
## 2024-02-14 DIAGNOSIS — Z98.890 OTHER SPECIFIED POSTPROCEDURAL STATES: ICD-10-CM

## 2024-02-14 DIAGNOSIS — Z98.1 ARTHRODESIS STATUS: Chronic | ICD-10-CM

## 2024-02-14 PROCEDURE — G0463: CPT

## 2024-02-14 PROCEDURE — 87077 CULTURE AEROBIC IDENTIFY: CPT

## 2024-02-14 PROCEDURE — 87070 CULTURE OTHR SPECIMN AEROBIC: CPT

## 2024-02-14 PROCEDURE — 99213 OFFICE O/P EST LOW 20 MIN: CPT

## 2024-02-14 PROCEDURE — 87186 SC STD MICRODIL/AGAR DIL: CPT

## 2024-02-15 RX ORDER — CEPHALEXIN 500 MG/1
500 CAPSULE ORAL TWICE DAILY
Qty: 14 | Refills: 0 | Status: COMPLETED | COMMUNITY
Start: 2024-02-15 | End: 2024-02-22

## 2024-02-16 LAB
-  AMOXICILLIN/CLAVULANIC ACID: SIGNIFICANT CHANGE UP
-  AMPICILLIN/SULBACTAM: SIGNIFICANT CHANGE UP
-  AMPICILLIN: SIGNIFICANT CHANGE UP
-  AZTREONAM: SIGNIFICANT CHANGE UP
-  CEFAZOLIN: SIGNIFICANT CHANGE UP
-  CEFEPIME: SIGNIFICANT CHANGE UP
-  CEFOXITIN: SIGNIFICANT CHANGE UP
-  CEFTRIAXONE: SIGNIFICANT CHANGE UP
-  CIPROFLOXACIN: SIGNIFICANT CHANGE UP
-  ERTAPENEM: SIGNIFICANT CHANGE UP
-  GENTAMICIN: SIGNIFICANT CHANGE UP
-  LEVOFLOXACIN: SIGNIFICANT CHANGE UP
-  MEROPENEM: SIGNIFICANT CHANGE UP
-  PIPERACILLIN/TAZOBACTAM: SIGNIFICANT CHANGE UP
-  TOBRAMYCIN: SIGNIFICANT CHANGE UP
-  TRIMETHOPRIM/SULFAMETHOXAZOLE: SIGNIFICANT CHANGE UP
CULTURE RESULTS: ABNORMAL
METHOD TYPE: SIGNIFICANT CHANGE UP
ORGANISM # SPEC MICROSCOPIC CNT: ABNORMAL
ORGANISM # SPEC MICROSCOPIC CNT: SIGNIFICANT CHANGE UP
SPECIMEN SOURCE: SIGNIFICANT CHANGE UP

## 2024-02-20 DIAGNOSIS — Z91.012 ALLERGY TO EGGS: ICD-10-CM

## 2024-02-20 DIAGNOSIS — Z87.39 PERSONAL HISTORY OF OTHER DISEASES OF THE MUSCULOSKELETAL SYSTEM AND CONNECTIVE TISSUE: ICD-10-CM

## 2024-02-20 DIAGNOSIS — Z79.899 OTHER LONG TERM (CURRENT) DRUG THERAPY: ICD-10-CM

## 2024-02-20 DIAGNOSIS — M86.671 OTHER CHRONIC OSTEOMYELITIS, RIGHT ANKLE AND FOOT: ICD-10-CM

## 2024-02-20 DIAGNOSIS — Z83.49 FAMILY HISTORY OF OTHER ENDOCRINE, NUTRITIONAL AND METABOLIC DISEASES: ICD-10-CM

## 2024-02-20 DIAGNOSIS — T81.89XD OTHER COMPLICATIONS OF PROCEDURES, NOT ELSEWHERE CLASSIFIED, SUBSEQUENT ENCOUNTER: ICD-10-CM

## 2024-02-20 DIAGNOSIS — L97.514 NON-PRESSURE CHRONIC ULCER OF OTHER PART OF RIGHT FOOT WITH NECROSIS OF BONE: ICD-10-CM

## 2024-02-20 DIAGNOSIS — R23.8 OTHER SKIN CHANGES: ICD-10-CM
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
## 2024-02-20 DIAGNOSIS — K59.09 OTHER CONSTIPATION: ICD-10-CM

## 2024-02-20 DIAGNOSIS — Z91.010 ALLERGY TO PEANUTS: ICD-10-CM
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
## 2024-02-20 DIAGNOSIS — Z98.890 OTHER SPECIFIED POSTPROCEDURAL STATES: ICD-10-CM

## 2024-02-20 NOTE — ASSESSMENT
[Verbal] : Verbal [Patient] : Patient [Family member] : Family member [Good - alert, interested, motivated] : Good - alert, interested, motivated [Foot Care] : foot care [Skin Care] : skin care [Signs and symptoms of infection] : sign and symptoms of infection [Nutrition] : nutrition [How and When to Call] : how and when to call [Patient responsibility to plan of care] : patient responsibility to plan of care [Stable] : stable [Home] : Home [Ambulatory] : Ambulatory [Not Applicable - Long Term Care/Home Health Agency] : Long Term Care/Home Health Agency: Not Applicable [Written] : Written [Demo] : Demo [Verbalizes knowledge/Understanding] : Verbalizes knowledge/understanding [Dressing changes] : dressing changes [Pain Management] : pain management [] : No [FreeTextEntry2] : Infection Prevention Foot and nail care Nutrition and wound healing Pain management   [FreeTextEntry3] : patient presents with new wounds.  [FreeTextEntry4] : culture swab obtained and submitted to lab cephalexin E scribed  Patient has supplies at home and preforms his own dressing changes.  follow up in 1 week

## 2024-02-20 NOTE — REVIEW OF SYSTEMS
[Fever] : no fever [Chills] : no chills [Eye Pain] : no eye pain [Red Eyes] : eyes not red [Earache] : no earache [Loss Of Hearing] : no hearing loss [Nosebleeds] : no nosebleeds [Chest Pain] : no chest pain [Shortness Of Breath] : no shortness of breath [Wheezing] : no wheezing [Cough] : no cough [Abdominal Pain] : no abdominal pain [Vomiting] : no vomiting [Diarrhea] : no diarrhea [Skin Wound] : skin wound [Dizziness] : no dizziness [Confused] : no confusion [Anxiety] : no anxiety [Easy Bleeding] : no tendency for easy bleeding [Negative] : Endocrine [de-identified] : Right great toe , s/p arthroplasty , + OM , no soi ; new blister to the right hallux

## 2024-02-20 NOTE — PLAN
[FreeTextEntry1] : .Patient seen and evaluated. Discussed etiology and treatment plan. New blister to the right hallux at the IPJ with no soi. Rx for abx sent to pt's pharmacy. Patient wound has healed. RTC in one week.   .Patient is high risk for further surgical intervention, sepsis, loss of limb and loss of life.  Spent 20 minutes for patient care and medical decision making.

## 2024-02-20 NOTE — HISTORY OF PRESENT ILLNESS
[FreeTextEntry1] : Patient is 18 year old male presenting for follow up for right plantar hallux wound with chronic OM is s/p IPJ arthroplasty of the right hallux as on 1/4/24 for chronic non healing wound to the hallux with underlying osteomyelitis. Patient is accompanied by his mother. Patient finished the course of PO antibiotics after d/c from [facility]. Patient noted with blister to the right hallux. Patient has transitioned into regular shoes.

## 2024-02-20 NOTE — PHYSICAL EXAM
[2 x 2] : 2 x 2  [Ankle Swelling (On Exam)] : not present [2+] : left 2+ [Varicose Veins Of Lower Extremities] : not present [] : not present [Purpura] : no purpura  [Petechiae] : no petechiae [Skin Ulcer] : no ulcer [Skin Induration] : no induration [Oriented to Person] : oriented to person [Alert] : alert [Oriented to Place] : oriented to place [Oriented to Time] : oriented to time [Calm] : calm [de-identified] : calm, AOX3  [de-identified] : s/p right Pina arthroplasty , +OM of the right great toe  [de-identified] :  new blister to the dorsal IPJ of the right hallux ; deroofed with no underlying SOI - s/p IPJ arthroplasty of the right great toe  and + OM; wound - healed with no signs of infection  [de-identified] : closed [FreeTextEntry1] : Right medial hallux (New)  [FreeTextEntry2] : 0.2 [FreeTextEntry3] : 0.2 [FreeTextEntry4] : 0.1 [de-identified] : Purulent  [de-identified] : Silver alginate [de-identified] : Mechanically cleansed with sterile gauze and normal saline. Cloth tape  [FreeTextEntry7] : left foot dorsal 5th Metatarsal (New)  [FreeTextEntry8] : 0.8 [FreeTextEntry9] : 0.9 [de-identified] : 0.1 [de-identified] : scant Serous/sanguinous [de-identified] : Silver alginate [de-identified] : Mechanically cleansed with sterile gauze and normal saline. Cloth tape  [TWNoteComboBox4] : Small [TWNoteComboBox6] : Traumatic [de-identified] : Normal [de-identified] : None [de-identified] : None [de-identified] : 100% [de-identified] : No [de-identified] : Every other day [de-identified] : Primary Dressing [de-identified] : Traumatic [de-identified] : Normal [de-identified] : None [de-identified] : None [de-identified] : 100% [de-identified] : No [de-identified] : Every other day [de-identified] : Primary Dressing

## 2024-02-21 ENCOUNTER — OUTPATIENT (OUTPATIENT)
Dept: OUTPATIENT SERVICES | Facility: HOSPITAL | Age: 19
LOS: 1 days | Discharge: HOME | End: 2024-02-21
Payer: MEDICAID

## 2024-02-21 ENCOUNTER — APPOINTMENT (OUTPATIENT)
Dept: WOUND CARE | Facility: HOSPITAL | Age: 19
End: 2024-02-21
Payer: MEDICAID

## 2024-02-21 VITALS
RESPIRATION RATE: 18 BRPM | SYSTOLIC BLOOD PRESSURE: 128 MMHG | OXYGEN SATURATION: 99 % | HEIGHT: 68 IN | WEIGHT: 125 LBS | DIASTOLIC BLOOD PRESSURE: 85 MMHG | BODY MASS INDEX: 18.94 KG/M2 | HEART RATE: 102 BPM | TEMPERATURE: 98.2 F

## 2024-02-21 DIAGNOSIS — T81.89XD OTHER COMPLICATIONS OF PROCEDURES, NOT ELSEWHERE CLASSIFIED, SUBSEQUENT ENCOUNTER: ICD-10-CM

## 2024-02-21 DIAGNOSIS — M86.9 OSTEOMYELITIS, UNSPECIFIED: Chronic | ICD-10-CM

## 2024-02-21 DIAGNOSIS — Z98.1 ARTHRODESIS STATUS: Chronic | ICD-10-CM

## 2024-02-21 PROCEDURE — G0463: CPT

## 2024-02-21 PROCEDURE — 99213 OFFICE O/P EST LOW 20 MIN: CPT | Mod: 24

## 2024-02-24 NOTE — REVIEW OF SYSTEMS
[Skin Wound] : skin wound [Negative] : Endocrine [Fever] : no fever [Chills] : no chills [Eye Pain] : no eye pain [Red Eyes] : eyes not red [Earache] : no earache [Loss Of Hearing] : no hearing loss [Nosebleeds] : no nosebleeds [Chest Pain] : no chest pain [Shortness Of Breath] : no shortness of breath [Wheezing] : no wheezing [Cough] : no cough [Abdominal Pain] : no abdominal pain [Vomiting] : no vomiting [Diarrhea] : no diarrhea [Confused] : no confusion [Dizziness] : no dizziness [Anxiety] : no anxiety [Easy Bleeding] : no tendency for easy bleeding [FreeTextEntry9] : Hammer toes with plantar flexed 1st ray of the left foot  [de-identified] : Right great toe , s/p arthroplasty , + OM , no soi ; new blister to the right hallux - healed ; left dorsal 5th metatarsal head wound

## 2024-02-24 NOTE — PLAN
[FreeTextEntry1] : .Patient seen and evaluated. Discussed etiology and treatment plan. New blister to the right hallux at the IPJ with no soi - healed. c/w local wound care and offloading.  RTC in one week.   .Patient is high risk for further surgical intervention, sepsis, loss of limb and loss of life.  Spent 20 minutes for patient care and medical decision making.

## 2024-02-24 NOTE — HISTORY OF PRESENT ILLNESS
[FreeTextEntry1] : Patient is 18 year old male presenting for follow up for right plantar hallux wound with chronic OM is s/p IPJ arthroplasty of the right hallux as on 1/4/24 for chronic non healing wound to the hallux with underlying osteomyelitis. Patient is accompanied by his mother. Patient finished the course of PO antibiotics after d/c from [facility].  Patient has transitioned into regular shoes.

## 2024-02-24 NOTE — PHYSICAL EXAM
[2+] : left 2+ [Alert] : alert [Oriented to Person] : oriented to person [Oriented to Place] : oriented to place [Oriented to Time] : oriented to time [Calm] : calm [Ankle Swelling (On Exam)] : not present [Varicose Veins Of Lower Extremities] : not present [] : not present [Purpura] : no purpura  [Petechiae] : no petechiae [Skin Ulcer] : no ulcer [Skin Induration] : no induration [de-identified] : calm, AOX3  [de-identified] : s/p right Pina arthroplasty , +OM of the right great toe  [de-identified] :  new blister to the dorsal IPJ of the right hallux - healed ; left dorsal 5th metatarsal head wound down to fat.  s/p IPJ arthroplasty of the right great toe  and + OM; wound - healed with no signs of infection  [de-identified] : closed [FreeTextEntry1] : Right medial hallux- closed [de-identified] : none [de-identified] : none [de-identified] : Mechanically cleansed with sterile gauze and normal saline.  [FreeTextEntry7] : left foot dorsal 5th Metatarsal (New)  [FreeTextEntry8] : 0.3 [FreeTextEntry9] : 0.4 [de-identified] : 0.1 [de-identified] : scant Serous [de-identified] : red moist tissue  [de-identified] : betadine [de-identified] : Mechanically cleansed with sterile gauze and normal saline. Cloth tape  [TWNoteComboBox4] : None [TWNoteComboBox6] : Traumatic [de-identified] : Normal [de-identified] : None [de-identified] : None [de-identified] : None [de-identified] : No [de-identified] : Traumatic [de-identified] : Normal [de-identified] : None [de-identified] : None [de-identified] : 100% [de-identified] : No [de-identified] : 3x Weekly [de-identified] : Primary Dressing

## 2024-02-25 DIAGNOSIS — T81.89XD OTHER COMPLICATIONS OF PROCEDURES, NOT ELSEWHERE CLASSIFIED, SUBSEQUENT ENCOUNTER: ICD-10-CM

## 2024-02-25 DIAGNOSIS — Z91.010 ALLERGY TO PEANUTS: ICD-10-CM

## 2024-02-25 DIAGNOSIS — M86.671 OTHER CHRONIC OSTEOMYELITIS, RIGHT ANKLE AND FOOT: ICD-10-CM
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
## 2024-02-25 DIAGNOSIS — K59.09 OTHER CONSTIPATION: ICD-10-CM

## 2024-02-25 DIAGNOSIS — Z98.890 OTHER SPECIFIED POSTPROCEDURAL STATES: ICD-10-CM

## 2024-02-25 DIAGNOSIS — R23.8 OTHER SKIN CHANGES: ICD-10-CM

## 2024-02-25 DIAGNOSIS — Z87.39 PERSONAL HISTORY OF OTHER DISEASES OF THE MUSCULOSKELETAL SYSTEM AND CONNECTIVE TISSUE: ICD-10-CM

## 2024-02-25 DIAGNOSIS — Z91.012 ALLERGY TO EGGS: ICD-10-CM

## 2024-02-25 DIAGNOSIS — Z79.899 OTHER LONG TERM (CURRENT) DRUG THERAPY: ICD-10-CM

## 2024-02-25 DIAGNOSIS — L97.514 NON-PRESSURE CHRONIC ULCER OF OTHER PART OF RIGHT FOOT WITH NECROSIS OF BONE: ICD-10-CM

## 2024-02-25 DIAGNOSIS — Z83.49 FAMILY HISTORY OF OTHER ENDOCRINE, NUTRITIONAL AND METABOLIC DISEASES: ICD-10-CM

## 2024-02-28 ENCOUNTER — APPOINTMENT (OUTPATIENT)
Dept: WOUND CARE | Facility: HOSPITAL | Age: 19
End: 2024-02-28
Payer: MEDICAID

## 2024-02-28 ENCOUNTER — OUTPATIENT (OUTPATIENT)
Dept: OUTPATIENT SERVICES | Facility: HOSPITAL | Age: 19
LOS: 1 days | Discharge: HOME | End: 2024-02-28
Payer: MEDICAID

## 2024-02-28 VITALS
TEMPERATURE: 98.2 F | SYSTOLIC BLOOD PRESSURE: 127 MMHG | HEART RATE: 102 BPM | OXYGEN SATURATION: 100 % | WEIGHT: 125 LBS | HEIGHT: 68 IN | DIASTOLIC BLOOD PRESSURE: 84 MMHG | RESPIRATION RATE: 18 BRPM | BODY MASS INDEX: 18.94 KG/M2

## 2024-02-28 DIAGNOSIS — T81.89XD OTHER COMPLICATIONS OF PROCEDURES, NOT ELSEWHERE CLASSIFIED, SUBSEQUENT ENCOUNTER: ICD-10-CM

## 2024-02-28 DIAGNOSIS — M86.9 OSTEOMYELITIS, UNSPECIFIED: Chronic | ICD-10-CM

## 2024-02-28 DIAGNOSIS — Z98.1 ARTHRODESIS STATUS: Chronic | ICD-10-CM

## 2024-02-28 PROCEDURE — G0463: CPT

## 2024-02-28 PROCEDURE — 99213 OFFICE O/P EST LOW 20 MIN: CPT | Mod: 24

## 2024-02-28 RX ORDER — CEPHALEXIN 500 MG/1
500 TABLET ORAL EVERY 8 HOURS
Qty: 84 | Refills: 0 | Status: DISCONTINUED | COMMUNITY
Start: 2024-01-17 | End: 2024-02-28

## 2024-02-28 RX ORDER — CEPHALEXIN 500 MG/1
500 TABLET ORAL EVERY 6 HOURS
Qty: 56 | Refills: 0 | Status: DISCONTINUED | COMMUNITY
Start: 2023-02-28 | End: 2024-02-28

## 2024-03-11 DIAGNOSIS — Z91.012 ALLERGY TO EGGS: ICD-10-CM
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
## 2024-03-11 DIAGNOSIS — Z98.890 OTHER SPECIFIED POSTPROCEDURAL STATES: ICD-10-CM

## 2024-03-11 DIAGNOSIS — Z79.899 OTHER LONG TERM (CURRENT) DRUG THERAPY: ICD-10-CM

## 2024-03-11 DIAGNOSIS — M86.671 OTHER CHRONIC OSTEOMYELITIS, RIGHT ANKLE AND FOOT: ICD-10-CM

## 2024-03-11 DIAGNOSIS — T81.89XD OTHER COMPLICATIONS OF PROCEDURES, NOT ELSEWHERE CLASSIFIED, SUBSEQUENT ENCOUNTER: ICD-10-CM

## 2024-03-11 DIAGNOSIS — Z83.49 FAMILY HISTORY OF OTHER ENDOCRINE, NUTRITIONAL AND METABOLIC DISEASES: ICD-10-CM

## 2024-03-11 DIAGNOSIS — Z87.39 PERSONAL HISTORY OF OTHER DISEASES OF THE MUSCULOSKELETAL SYSTEM AND CONNECTIVE TISSUE: ICD-10-CM
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
## 2024-03-11 DIAGNOSIS — R23.8 OTHER SKIN CHANGES: ICD-10-CM

## 2024-03-11 DIAGNOSIS — L97.514 NON-PRESSURE CHRONIC ULCER OF OTHER PART OF RIGHT FOOT WITH NECROSIS OF BONE: ICD-10-CM

## 2024-03-11 DIAGNOSIS — Z91.010 ALLERGY TO PEANUTS: ICD-10-CM

## 2024-03-11 DIAGNOSIS — K59.09 OTHER CONSTIPATION: ICD-10-CM

## 2024-03-11 DIAGNOSIS — L84 CORNS AND CALLOSITIES: ICD-10-CM

## 2024-03-11 NOTE — REVIEW OF SYSTEMS
[Chills] : no chills [Fever] : no fever [Eye Pain] : no eye pain [Earache] : no earache [Red Eyes] : eyes not red [Nosebleeds] : no nosebleeds [Loss Of Hearing] : no hearing loss [Chest Pain] : no chest pain [Shortness Of Breath] : no shortness of breath [Wheezing] : no wheezing [Cough] : no cough [Abdominal Pain] : no abdominal pain [Diarrhea] : no diarrhea [Vomiting] : no vomiting [Confused] : no confusion [Skin Wound] : skin wound [Dizziness] : no dizziness [Anxiety] : no anxiety [Easy Bleeding] : no tendency for easy bleeding [Negative] : Endocrine [de-identified] : Right great toe , s/p arthroplasty , + OM , no soi ; new blister to the right hallux - healed ; left dorsal 5th metatarsal head wound  [FreeTextEntry9] : Hammer toes with plantar flexed 1st ray of the left foot

## 2024-03-11 NOTE — HISTORY OF PRESENT ILLNESS
[FreeTextEntry1] : Patient is 18 year old male presenting for follow up for right plantar hallux wound with chronic OM is s/p IPJ arthroplasty of the right hallux as on 1/4/24 for chronic non healing wound to the hallux with underlying osteomyelitis. Patient is accompanied by his mother. Patient has new wound to the left foot

## 2024-03-11 NOTE — PLAN
[FreeTextEntry1] : .Patient seen and evaluated. Discussed etiology and treatment plan. New blister to the right hallux at the IPJ with no soi - healed. c/w local wound care and offloading.  RTC in one week. Patient to monitor the left foot wound.   .Patient is high risk for further surgical intervention, sepsis, loss of limb and loss of life.  Spent 20 minutes for patient care and medical decision making.

## 2024-03-11 NOTE — PHYSICAL EXAM
[2 x 2] : 2 x 2  [2+] : right 2+ [Ankle Swelling (On Exam)] : not present [Varicose Veins Of Lower Extremities] : not present [] : not present [Petechiae] : no petechiae [Purpura] : no purpura  [Skin Induration] : no induration [Skin Ulcer] : no ulcer [Oriented to Person] : oriented to person [Alert] : alert [Oriented to Time] : oriented to time [Oriented to Place] : oriented to place [de-identified] : calm, AOX3  [Calm] : calm [de-identified] : s/p right Pina arthroplasty , +OM of the right great toe  [de-identified] :  new blister to the dorsal IPJ of the right hallux - healed ; left dorsal 5th metatarsal head wound down to fat.  s/p IPJ arthroplasty of the right great toe  and + OM; wound - healed with no signs of infection  [FreeTextEntry1] : Right medial hallux - Callus - no open wounds  [de-identified] : callus  [de-identified] : betadine  [de-identified] : Mechanically cleansed with sterile gauze and normal saline. paper tape  * Callus shaved by HERNANDO  [FreeTextEntry7] : Left foot dorsal 5thg Metatarsal  [de-identified] : 0.1 [FreeTextEntry8] : 0.2 26-Jan-2021 16:25 [FreeTextEntry9] : 0.2 [de-identified] : scant Serous/sanguinous [de-identified] : callus  [de-identified] : Betadine  [de-identified] : Mechanically cleansed with sterile gauze and normal saline. paper tape  [TWNoteComboBox4] : None [de-identified] : None [de-identified] : other [de-identified] : No [de-identified] : False [de-identified] : Every other day [de-identified] : Primary Dressing [de-identified] : other [de-identified] : None [de-identified] : 100% [de-identified] : None [de-identified] : No [de-identified] : Primary Dressing [de-identified] : Every other day

## 2024-03-11 NOTE — ASSESSMENT
[Written] : Written [Verbal] : Verbal [Demo] : Demo [Patient] : Patient [Family member] : Family member [Good - alert, interested, motivated] : Good - alert, interested, motivated [Verbalizes knowledge/Understanding] : Verbalizes knowledge/understanding [Dressing changes] : dressing changes [Foot Care] : foot care [Skin Care] : skin care [Signs and symptoms of infection] : sign and symptoms of infection [Labs and Tests] : labs and tests [How and When to Call] : how and when to call [Pain Management] : pain management [Patient responsibility to plan of care] : patient responsibility to plan of care [] : Yes [Stable] : stable [Ambulatory] : Ambulatory [Home] : Home [Not Applicable - Long Term Care/Home Health Agency] : Long Term Care/Home Health Agency: Not Applicable [FreeTextEntry2] : Infection prevention Localized wound care  Goal remaining pain free regarding wounds [FreeTextEntry4] : patient using custom orthotic shoes Pt completed his Keflex  Pt has supplies at home for his own dressing changes  Follow up in 2 weeks

## 2024-03-13 NOTE — ASSESSMENT
[No change from previous assessment] : No change from previous assessment [Patient prepared for dive] : Patient prepared for dive [Patient descended without problem for 9 minutes] : Patient descended without problem for 9 minutes [No dizziness or thirst] :  No dizziness or thirst [No ear problems] : No ear problems [Vital signs stable] : Vital signs stable [Tolerating dive well] : Tolerating dive well [No Chest Pain, shortness of breath] : No Chest Pain, shortness of breath [Respiratory Rate Stable] : Respiratory Rate Stable [Tolerated Ascent well] : Tolerated Ascent well [No chest pain, shortness of breath, or ear pain] :  No chest pain, shortness of breath, or ear pain  [Vital Signs stable] : Vital Signs stable [A physician was present throughout the entire HBOT] : A physician was present throughout the entire HBOT [No] : No [Continue Treatment Plan] : Continue treatment plan [Clinically Stable] : Clinically stable

## 2024-03-13 NOTE — ADDENDUM
[FreeTextEntry1] : PT ARRIVED AMBULATORY A&0X4. ALL VITALS WITHIN PARAMATERS FOR HBOT. PT DENIES PAIN 0/10 ON PAIN SCALE. NO DRAINAGE NOTED ON DRESSING PRIOR TO DESCENT. PT DECLINED DRESSING CHANGE BY  STAFF AND STATES HE WILL CHANGE THE DRESSING AS ORDERED AND HAS SUPPLIES TO DO SO. PT DESCENT TO RX TX DEPTH IN CHAMBER 1 WAS WITHOUT INCIDENT. PT RESTING AT DEPTH, CHEST RISE AND FALL OBSERVED. PT TOLERATED AIRBREAKS WELL. PT ASCENT WAS WITHOUT INICDENT. PT TOLERATED HBOT WELL

## 2024-03-13 NOTE — PROCEDURE
[] : No [FreeTextEntry4] : 100 MIN [FreeTextEntry6] : 8:13 [de-identified] : 8:53 [FreeTextEntry8] : 8:23 [de-identified] : 8:58 [de-identified] : 9:28 [de-identified] : 9:33 [de-identified] : 10;03 [de-identified] : 10:13 [de-identified] : 120 MIN

## 2024-03-15 ENCOUNTER — APPOINTMENT (OUTPATIENT)
Dept: WOUND CARE | Facility: HOSPITAL | Age: 19
End: 2024-03-15
Payer: MEDICAID

## 2024-03-15 ENCOUNTER — OUTPATIENT (OUTPATIENT)
Dept: OUTPATIENT SERVICES | Facility: HOSPITAL | Age: 19
LOS: 1 days | Discharge: HOME | End: 2024-03-15
Payer: MEDICAID

## 2024-03-15 VITALS
TEMPERATURE: 99.3 F | HEART RATE: 109 BPM | SYSTOLIC BLOOD PRESSURE: 114 MMHG | WEIGHT: 125 LBS | HEIGHT: 68 IN | DIASTOLIC BLOOD PRESSURE: 77 MMHG | OXYGEN SATURATION: 99 % | RESPIRATION RATE: 18 BRPM | BODY MASS INDEX: 18.94 KG/M2

## 2024-03-15 DIAGNOSIS — Z98.1 ARTHRODESIS STATUS: Chronic | ICD-10-CM

## 2024-03-15 DIAGNOSIS — M86.9 OSTEOMYELITIS, UNSPECIFIED: Chronic | ICD-10-CM

## 2024-03-15 DIAGNOSIS — T81.89XD OTHER COMPLICATIONS OF PROCEDURES, NOT ELSEWHERE CLASSIFIED, SUBSEQUENT ENCOUNTER: ICD-10-CM

## 2024-03-15 PROCEDURE — G0463: CPT

## 2024-03-15 PROCEDURE — 99213 OFFICE O/P EST LOW 20 MIN: CPT | Mod: 24

## 2024-03-20 NOTE — HISTORY OF PRESENT ILLNESS
[FreeTextEntry1] : Patient is 18 year old male presenting for follow up for right plantar hallux wound with chronic OM is s/p IPJ arthroplasty of the right hallux as on 1/4/24 for chronic non healing wound to the hallux with underlying osteomyelitis.  - healed. Patient with wound to the left foot at the dorsal 5th metatarsal head.

## 2024-03-20 NOTE — PHYSICAL EXAM
[2 x 2] : 2 x 2  [de-identified] : Mechanically cleansed with sterile gauze and normal saline. paper tape  * Callus shaved by HERNANDO  [FreeTextEntry8] : 0.2 [FreeTextEntry9] : 0.2 [de-identified] : 0.1 [de-identified] : scant Serous/sanguinous [de-identified] : Every other day [de-identified] : Primary Dressing [de-identified] : 100% [de-identified] : No [de-identified] : Every other day [de-identified] : Primary Dressing [2+] : left 2+ [Varicose Veins Of Lower Extremities] : not present [] : not present [Ankle Swelling (On Exam)] : not present [Purpura] : no purpura  [Petechiae] : no petechiae [Skin Induration] : no induration [Skin Ulcer] : no ulcer [Oriented to Person] : oriented to person [Alert] : alert [Oriented to Place] : oriented to place [Oriented to Time] : oriented to time [Calm] : calm [de-identified] : calm, AOX3  [de-identified] : s/p right Pina arthroplasty , +OM of the right great toe  [de-identified] :  new blister to the dorsal IPJ of the right hallux - healed ; left dorsal 5th metatarsal head wound down to fat -with hpk tissue.  s/p IPJ arthroplasty of the right great toe  and + OM; wound - healed with no signs of infection  [FreeTextEntry1] : Right medial hallux - Callus - CLOSED [de-identified] : callus  [de-identified] : NONE [FreeTextEntry7] : Left foot dorsal 5thg Metatarsal - CLOSED [de-identified] : callus  [de-identified] : NONE [de-identified] : Cleansed with 0.9% Normal Saline Bandaid  [TWNoteComboBox4] : None [de-identified] : other [de-identified] : None [de-identified] : No [de-identified] : None [de-identified] : None [de-identified] : other [de-identified] : None

## 2024-03-20 NOTE — REVIEW OF SYSTEMS
[Fever] : no fever [Chills] : no chills [Red Eyes] : eyes not red [Eye Pain] : no eye pain [Earache] : no earache [Loss Of Hearing] : no hearing loss [Nosebleeds] : no nosebleeds [Chest Pain] : no chest pain [Wheezing] : no wheezing [Shortness Of Breath] : no shortness of breath [Abdominal Pain] : no abdominal pain [Cough] : no cough [Vomiting] : no vomiting [Skin Wound] : skin wound [Diarrhea] : no diarrhea [Confused] : no confusion [Dizziness] : no dizziness [Anxiety] : no anxiety [Easy Bleeding] : no tendency for easy bleeding [FreeTextEntry9] : Hammer toes with plantar flexed 1st ray of the left foot  [Negative] : Endocrine [de-identified] : Right great toe , s/p arthroplasty , + OM , no soi ; new blister to the right hallux - healed ; left dorsal 5th metatarsal head wound

## 2024-03-20 NOTE — ASSESSMENT
[Demo] : Demo [Verbal] : Verbal [Verbalizes knowledge/Understanding] : Verbalizes knowledge/understanding [Good - alert, interested, motivated] : Good - alert, interested, motivated [Foot Care] : foot care [Skin Care] : skin care [Signs and symptoms of infection] : sign and symptoms of infection [Pressure relief] : pressure relief [How and When to Call] : how and when to call [Off-loading] : off-loading [Patient responsibility to plan of care] : patient responsibility to plan of care [Home] : Home [Stable] : stable [Ambulatory] : Ambulatory [Not Applicable - Long Term Care/Home Health Agency] : Long Term Care/Home Health Agency: Not Applicable [FreeTextEntry2] : Infection prevention Maintain optimal skin integrity  Maintain acceptable level of pain with use of pharmacological and nonpharmacological interventions Offloading / Pressure relief  Foot care / monitoring  [] : No [FreeTextEntry4] : Follow up for an assessment in three weeks No wound care needed at home.   No supplies required

## 2024-03-21 DIAGNOSIS — M86.671 OTHER CHRONIC OSTEOMYELITIS, RIGHT ANKLE AND FOOT: ICD-10-CM

## 2024-03-21 DIAGNOSIS — Z83.49 FAMILY HISTORY OF OTHER ENDOCRINE, NUTRITIONAL AND METABOLIC DISEASES: ICD-10-CM

## 2024-03-21 DIAGNOSIS — R23.8 OTHER SKIN CHANGES: ICD-10-CM

## 2024-03-21 DIAGNOSIS — L84 CORNS AND CALLOSITIES: ICD-10-CM

## 2024-03-21 DIAGNOSIS — Z87.39 PERSONAL HISTORY OF OTHER DISEASES OF THE MUSCULOSKELETAL SYSTEM AND CONNECTIVE TISSUE: ICD-10-CM

## 2024-03-21 DIAGNOSIS — Z91.012 ALLERGY TO EGGS: ICD-10-CM

## 2024-03-21 DIAGNOSIS — T81.89XD OTHER COMPLICATIONS OF PROCEDURES, NOT ELSEWHERE CLASSIFIED, SUBSEQUENT ENCOUNTER: ICD-10-CM

## 2024-03-21 DIAGNOSIS — L97.514 NON-PRESSURE CHRONIC ULCER OF OTHER PART OF RIGHT FOOT WITH NECROSIS OF BONE: ICD-10-CM
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
## 2024-03-21 DIAGNOSIS — K59.09 OTHER CONSTIPATION: ICD-10-CM

## 2024-03-21 DIAGNOSIS — Z79.899 OTHER LONG TERM (CURRENT) DRUG THERAPY: ICD-10-CM

## 2024-03-21 DIAGNOSIS — Z98.890 OTHER SPECIFIED POSTPROCEDURAL STATES: ICD-10-CM

## 2024-03-21 DIAGNOSIS — Z91.010 ALLERGY TO PEANUTS: ICD-10-CM

## 2024-03-25 ENCOUNTER — OUTPATIENT (OUTPATIENT)
Dept: OUTPATIENT SERVICES | Facility: HOSPITAL | Age: 19
LOS: 1 days | Discharge: HOME | End: 2024-03-25
Payer: MEDICAID

## 2024-03-25 ENCOUNTER — APPOINTMENT (OUTPATIENT)
Dept: WOUND CARE | Facility: HOSPITAL | Age: 19
End: 2024-03-25
Payer: MEDICAID

## 2024-03-25 VITALS
RESPIRATION RATE: 18 BRPM | DIASTOLIC BLOOD PRESSURE: 82 MMHG | TEMPERATURE: 98.7 F | HEART RATE: 108 BPM | BODY MASS INDEX: 18.94 KG/M2 | WEIGHT: 125 LBS | HEIGHT: 68 IN | SYSTOLIC BLOOD PRESSURE: 121 MMHG | OXYGEN SATURATION: 98 %

## 2024-03-25 DIAGNOSIS — M86.671 OTHER CHRONIC OSTEOMYELITIS, RIGHT ANKLE AND FOOT: ICD-10-CM

## 2024-03-25 DIAGNOSIS — S90.426A BLISTER (NONTHERMAL), UNSPECIFIED LESSER TOE(S), INITIAL ENCOUNTER: ICD-10-CM

## 2024-03-25 DIAGNOSIS — M86.9 OSTEOMYELITIS, UNSPECIFIED: Chronic | ICD-10-CM

## 2024-03-25 DIAGNOSIS — Z98.1 ARTHRODESIS STATUS: Chronic | ICD-10-CM

## 2024-03-25 PROCEDURE — 99213 OFFICE O/P EST LOW 20 MIN: CPT | Mod: 24

## 2024-03-25 PROCEDURE — 87070 CULTURE OTHR SPECIMN AEROBIC: CPT

## 2024-03-25 PROCEDURE — 73630 X-RAY EXAM OF FOOT: CPT | Mod: 26,RT

## 2024-03-25 PROCEDURE — G0463: CPT

## 2024-03-25 PROCEDURE — 73630 X-RAY EXAM OF FOOT: CPT

## 2024-03-27 LAB
CULTURE RESULTS: NO GROWTH — SIGNIFICANT CHANGE UP
SPECIMEN SOURCE: SIGNIFICANT CHANGE UP

## 2024-03-29 ENCOUNTER — NON-APPOINTMENT (OUTPATIENT)
Age: 19
End: 2024-03-29

## 2024-04-01 ENCOUNTER — NON-APPOINTMENT (OUTPATIENT)
Age: 19
End: 2024-04-01

## 2024-04-03 ENCOUNTER — APPOINTMENT (OUTPATIENT)
Dept: WOUND CARE | Facility: HOSPITAL | Age: 19
End: 2024-04-03

## 2024-04-03 ENCOUNTER — APPOINTMENT (OUTPATIENT)
Dept: WOUND CARE | Facility: HOSPITAL | Age: 19
End: 2024-04-03
Payer: MEDICAID

## 2024-04-03 ENCOUNTER — OUTPATIENT (OUTPATIENT)
Dept: OUTPATIENT SERVICES | Facility: HOSPITAL | Age: 19
LOS: 1 days | Discharge: HOME | End: 2024-04-03
Payer: MEDICAID

## 2024-04-03 VITALS
OXYGEN SATURATION: 98 % | RESPIRATION RATE: 18 BRPM | WEIGHT: 125 LBS | DIASTOLIC BLOOD PRESSURE: 78 MMHG | HEART RATE: 104 BPM | HEIGHT: 68 IN | SYSTOLIC BLOOD PRESSURE: 121 MMHG | BODY MASS INDEX: 18.94 KG/M2 | TEMPERATURE: 98.1 F

## 2024-04-03 DIAGNOSIS — Z98.1 ARTHRODESIS STATUS: Chronic | ICD-10-CM

## 2024-04-03 DIAGNOSIS — M86.9 OSTEOMYELITIS, UNSPECIFIED: Chronic | ICD-10-CM

## 2024-04-03 DIAGNOSIS — M86.671 OTHER CHRONIC OSTEOMYELITIS, RIGHT ANKLE AND FOOT: ICD-10-CM

## 2024-04-03 PROCEDURE — G0463: CPT

## 2024-04-03 PROCEDURE — 99213 OFFICE O/P EST LOW 20 MIN: CPT | Mod: 24

## 2024-04-04 ENCOUNTER — APPOINTMENT (OUTPATIENT)
Dept: CARDIOLOGY | Facility: CLINIC | Age: 19
End: 2024-04-04
Payer: MEDICAID

## 2024-04-04 ENCOUNTER — NON-APPOINTMENT (OUTPATIENT)
Age: 19
End: 2024-04-04

## 2024-04-04 VITALS
HEIGHT: 68 IN | WEIGHT: 125 LBS | SYSTOLIC BLOOD PRESSURE: 125 MMHG | BODY MASS INDEX: 18.94 KG/M2 | TEMPERATURE: 98.2 F | HEART RATE: 123 BPM | OXYGEN SATURATION: 99 % | DIASTOLIC BLOOD PRESSURE: 85 MMHG

## 2024-04-04 DIAGNOSIS — R00.0 TACHYCARDIA, UNSPECIFIED: ICD-10-CM

## 2024-04-04 DIAGNOSIS — R07.9 CHEST PAIN, UNSPECIFIED: ICD-10-CM

## 2024-04-04 PROCEDURE — 99203 OFFICE O/P NEW LOW 30 MIN: CPT | Mod: 25

## 2024-04-04 PROCEDURE — 93000 ELECTROCARDIOGRAM COMPLETE: CPT
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
## 2024-04-07 DIAGNOSIS — Z98.890 OTHER SPECIFIED POSTPROCEDURAL STATES: ICD-10-CM

## 2024-04-07 DIAGNOSIS — Z87.39 PERSONAL HISTORY OF OTHER DISEASES OF THE MUSCULOSKELETAL SYSTEM AND CONNECTIVE TISSUE: ICD-10-CM

## 2024-04-07 DIAGNOSIS — Z91.012 ALLERGY TO EGGS: ICD-10-CM

## 2024-04-07 DIAGNOSIS — L84 CORNS AND CALLOSITIES: ICD-10-CM

## 2024-04-07 DIAGNOSIS — S90.421A BLISTER (NONTHERMAL), RIGHT GREAT TOE, INITIAL ENCOUNTER: ICD-10-CM
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
## 2024-04-07 DIAGNOSIS — L97.514 NON-PRESSURE CHRONIC ULCER OF OTHER PART OF RIGHT FOOT WITH NECROSIS OF BONE: ICD-10-CM
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
## 2024-04-07 DIAGNOSIS — R23.8 OTHER SKIN CHANGES: ICD-10-CM
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
## 2024-04-07 DIAGNOSIS — Z79.899 OTHER LONG TERM (CURRENT) DRUG THERAPY: ICD-10-CM

## 2024-04-07 DIAGNOSIS — Z91.010 ALLERGY TO PEANUTS: ICD-10-CM

## 2024-04-07 DIAGNOSIS — M86.671 OTHER CHRONIC OSTEOMYELITIS, RIGHT ANKLE AND FOOT: ICD-10-CM

## 2024-04-07 DIAGNOSIS — K59.09 OTHER CONSTIPATION: ICD-10-CM

## 2024-04-07 DIAGNOSIS — Z83.49 FAMILY HISTORY OF OTHER ENDOCRINE, NUTRITIONAL AND METABOLIC DISEASES: ICD-10-CM

## 2024-04-07 DIAGNOSIS — T81.89XD OTHER COMPLICATIONS OF PROCEDURES, NOT ELSEWHERE CLASSIFIED, SUBSEQUENT ENCOUNTER: ICD-10-CM

## 2024-04-07 PROBLEM — S90.426A: Status: ACTIVE | Noted: 2023-06-22

## 2024-04-07 NOTE — REVIEW OF SYSTEMS
[Fever] : no fever [Chills] : no chills [Eye Pain] : no eye pain [Earache] : no earache [Red Eyes] : eyes not red [Loss Of Hearing] : no hearing loss [Nosebleeds] : no nosebleeds [Chest Pain] : no chest pain [Shortness Of Breath] : no shortness of breath [Wheezing] : no wheezing [Cough] : no cough [Abdominal Pain] : no abdominal pain [Vomiting] : no vomiting [Diarrhea] : no diarrhea [Skin Wound] : skin wound [Confused] : no confusion [Dizziness] : no dizziness [Anxiety] : no anxiety [Easy Bleeding] : no tendency for easy bleeding [Negative] : Endocrine [FreeTextEntry9] : Hammer toes with plantar flexed 1st ray of the left foot  [de-identified] : Right great toe , s/p arthroplasty , + OM , no soi ; new blister to the right hallux - reopened ; left dorsal 5th metatarsal head wound - healed

## 2024-04-07 NOTE — PHYSICAL EXAM
[4 x 4] : 4 x 4  [2+] : left 2+ [Ankle Swelling (On Exam)] : not present [Varicose Veins Of Lower Extremities] : not present [] : not present [Purpura] : no purpura  [Petechiae] : no petechiae [Skin Ulcer] : no ulcer [Skin Induration] : no induration [Alert] : alert [Oriented to Person] : oriented to person [Oriented to Place] : oriented to place [Oriented to Time] : oriented to time [Calm] : calm [de-identified] : s/p right IPJ arthroplasty right hallux , +OM of the right great toe  [de-identified] : calm, AOX3  [de-identified] : Wound to the plantar hallux  IPJ of the right hallux - reopened down to fat; left dorsal 5th metatarsal head wound down to fat -healed.   [FreeTextEntry1] : Right medial hallux - measurement within intact bridged skin post shaving by HERNANDO [FreeTextEntry2] : 2.0 [FreeTextEntry3] : 1.0 [FreeTextEntry4] : 0.2 [de-identified] : Scant serous [de-identified] :  Silver Alginate [de-identified] :  Mechanically cleansed with sterile gauze and normal saline 0.9% Toe sock [FreeTextEntry7] : Left foot dorsal 5th Metatarsal - CLOSED [de-identified] : callus  [de-identified] : No Product [TWNoteComboBox5] : No [TWNoteComboBox4] : False [TWNoteComboBox6] : Pressure [de-identified] : No [de-identified] : Normal [de-identified] : None [de-identified] : None [de-identified] : 100% [de-identified] : No [de-identified] : False [de-identified] : Daily [de-identified] : Primary Dressing [de-identified] : None [de-identified] : other [de-identified] : None [de-identified] : None

## 2024-04-07 NOTE — VITALS
[Pain related to present condition?] : The patient's  pain is not related to present condition. [] : No [de-identified] : 0/10

## 2024-04-07 NOTE — HISTORY OF PRESENT ILLNESS
[FreeTextEntry1] : Patient is 18 year old male presenting for follow up for right plantar hallux wound with chronic OM is s/p IPJ arthroplasty of the right hallux as on 1/4/24 for chronic non healing wound to the hallux with underlying osteomyelitis - reopened. Patient has started wearing regular shoes and states noticed a blister to the right hallux again and was concerned so returned sooner to clinic.  Patient with wound to the left foot at the dorsal 5th metatarsal head - healed.

## 2024-04-07 NOTE — PLAN
[FreeTextEntry1] : .Patient seen and evaluated. Discussed etiology and treatment plan. New blister to the right hallux at the IPJ - reopened.  Left dorsal 5th metatarsal head - healed. Ordered x- rays . RTC in one week. c/w local wound care and offloading. Also  recommended to remove the inserts to the shoe and to wear wide toes  shoes to alleviate compression from tight toe box.   .Patient is high risk for further surgical intervention, sepsis, loss of limb and loss of life.  Spent 20 minutes for patient care and medical decision making.

## 2024-04-07 NOTE — HISTORY OF PRESENT ILLNESS
[FreeTextEntry1] : Patient is 18 year old male presenting for follow up for right plantar hallux wound with chronic OM is s/p IPJ arthroplasty of the right hallux as on 1/4/24 for chronic non healing wound to the hallux with underlying osteomyelitis - reopened. Patient has started wearing regular shoes and is changing dressing as advised.  Patient with wound to the left foot at the dorsal 5th metatarsal head - healed.

## 2024-04-07 NOTE — PHYSICAL EXAM
[4 x 4] : 4 x 4  [2+] : left 2+ [Ankle Swelling (On Exam)] : not present [Varicose Veins Of Lower Extremities] : not present [] : not present [Purpura] : no purpura  [Petechiae] : no petechiae [Skin Ulcer] : no ulcer [Skin Induration] : no induration [Alert] : alert [Oriented to Person] : oriented to person [Oriented to Place] : oriented to place [Oriented to Time] : oriented to time [Calm] : calm [de-identified] : calm, AOX3  [de-identified] : s/p right IPJ arthroplasty right hallux , +OM of the right great toe  [de-identified] : Wound to the plantar hallux  IPJ of the right hallux - reopened down to fat; left dorsal 5th metatarsal head wound down to fat -healed.   [de-identified] : DPM opened blood filled blister, and shaved callused area that had fluid forming beneath the callus.  [FreeTextEntry1] : Right medial hallux - [FreeTextEntry2] : 2.0cm [FreeTextEntry3] : 1.0cm [FreeTextEntry4] : 0.5cm [de-identified] : serosanguinous drainage noted [de-identified] : blister on toe/callus [de-identified] : red, moist tissue [de-identified] :  Silver Alginate [de-identified] :  Mechanically cleansed with sterile gauze and normal saline 0.9% Toe sock [FreeTextEntry7] : Left foot dorsal 5th Metatarsal - CLOSED [de-identified] : callus  [de-identified] : NONE [TWNoteComboBox4] : Moderate [TWNoteComboBox5] : No [TWNoteComboBox6] : Pressure [de-identified] : Normal [de-identified] : No [de-identified] : None [de-identified] : None [de-identified] : 100% [de-identified] : No [de-identified] : Cultures obtained [de-identified] : Daily [de-identified] : Primary Dressing [de-identified] : None [de-identified] : other [de-identified] : None [de-identified] : None

## 2024-04-07 NOTE — REVIEW OF SYSTEMS
[Fever] : no fever [Chills] : no chills [Eye Pain] : no eye pain [Red Eyes] : eyes not red [Earache] : no earache [Loss Of Hearing] : no hearing loss [Nosebleeds] : no nosebleeds [Chest Pain] : no chest pain [Shortness Of Breath] : no shortness of breath [Wheezing] : no wheezing [Cough] : no cough [Abdominal Pain] : no abdominal pain [Vomiting] : no vomiting [Diarrhea] : no diarrhea [Skin Wound] : skin wound [Confused] : no confusion [Dizziness] : no dizziness [Anxiety] : no anxiety [Easy Bleeding] : no tendency for easy bleeding [Negative] : Endocrine [de-identified] : Right great toe , s/p arthroplasty , + OM , no soi ; new blister to the right hallux - reopened ; left dorsal 5th metatarsal head wound - healed  [FreeTextEntry9] : Hammer toes with plantar flexed 1st ray of the left foot

## 2024-04-07 NOTE — ASSESSMENT
[Verbal] : Verbal [Demo] : Demo [Patient] : Patient [Family member] : Family member [Good - alert, interested, motivated] : Good - alert, interested, motivated [Foot Care] : foot care [Verbalizes knowledge/Understanding] : Verbalizes knowledge/understanding [Skin Care] : skin care [Signs and symptoms of infection] : sign and symptoms of infection [Pressure relief] : pressure relief [Off-loading] : off-loading [How and When to Call] : how and when to call [Patient responsibility to plan of care] : patient responsibility to plan of care [Stable] : stable [Ambulatory] : Ambulatory [Not Applicable - Long Term Care/Home Health Agency] : Long Term Care/Home Health Agency: Not Applicable [Dressing changes] : dressing changes [Nutrition] : nutrition [Labs and Tests] : labs and tests [Pain Management] : pain management [Other: ____] : [unfilled] [] : No [FreeTextEntry2] : Infection prevention Wound care dressing Maintain optimal skin integrity  Maintain acceptable level of pain with use of pharmacological and nonpharmacological interventions Offloading / Pressure relief  Foot care / monitoring  Surgical intervention [FreeTextEntry3] : Patient presented with a new blister on the right medial hallux [FreeTextEntry4] : -F/U 1 week -Sent for X-RAY of the right foot to R/O re-occurring osteomyelitis. -Wound culture swab obtained from Right Hallux. -Small amount of supplies provided to prevent delay in care, mother will perform dressing changes. Supply order requested. DPM advised to changed dressing daily for monitoring of wound and S/S of infection. -Start use of Silver Alginate on right hallux wound. -Right hallux wound does not probe to bone at this time, there is a palpable tissue covering bone. DPM discussed surgical intervention for partial/distal amputation in the future to prevent reoccurrence and osteomyelitis. -Provided with surgical shoe, advised to purchase wider shoes.

## 2024-04-07 NOTE — ASSESSMENT
[Demo] : Demo [Verbal] : Verbal [Family member] : Family member [Patient] : Patient [Good - alert, interested, motivated] : Good - alert, interested, motivated [Verbalizes knowledge/Understanding] : Verbalizes knowledge/understanding [Dressing changes] : dressing changes [Foot Care] : foot care [Pressure relief] : pressure relief [Skin Care] : skin care [Signs and symptoms of infection] : sign and symptoms of infection [Nutrition] : nutrition [How and When to Call] : how and when to call [Labs and Tests] : labs and tests [Off-loading] : off-loading [Pain Management] : pain management [Patient responsibility to plan of care] : patient responsibility to plan of care [] : Yes [Stable] : stable [Other: ____] : [unfilled] [Not Applicable - Long Term Care/Home Health Agency] : Long Term Care/Home Health Agency: Not Applicable [Ambulatory] : Ambulatory [FreeTextEntry2] : Infection prevention Wound care dressing Maintain optimal skin integrity  Maintain acceptable level of pain with use of pharmacological and nonpharmacological interventions Offloading / Pressure relief  Foot care / monitoring  Surgical intervention [FreeTextEntry4] : Pt wearing sneakers and regular insert, has no pain and feels good wearing. DPM shaved callous on right hallux and left dorsal 5th metatarsal. Culture came back, shows no growth X-ray came back showing possibility for OM. DPM advised patient if wound doesn't heal within next 2 weeks surgery will be best option. Pt and mother understand. F/U 1 week -Right hallux wound does not probe to bone at this time, there is a palpable tissue covering bone. DPM discussed surgical intervention for partial/distal amputation in the future to prevent reoccurrence and osteomyelitis. F/U 1 week

## 2024-04-07 NOTE — VITALS
[Pain related to present condition?] : The patient's  pain is not related to present condition. [] : No [de-identified] : Patient reports pain 0/10

## 2024-04-07 NOTE — PLAN
[FreeTextEntry1] : .Patient seen and evaluated. Discussed etiology and treatment plan. New blister to the right hallux at the IPJ - reopened.  Left dorsal 5th metatarsal head - healed. Discussed with patient and mother to monitor for any acute signs of infection. discussed and reviewed x- rays noted with bone erosion with OM. Discussed and recommend surgical intervention for partial  hallux amputation if the wound does not heal in the next few weeks. RTC in one week.   .Patient is high risk for further surgical intervention, sepsis, loss of limb and loss of life.  Spent 20 minutes for patient care and medical decision making.

## 2024-04-08 PROBLEM — R00.0 SINUS TACHYCARDIA: Status: ACTIVE | Noted: 2024-04-08

## 2024-04-08 PROBLEM — R07.9 CHEST PAIN, UNSPECIFIED TYPE: Status: ACTIVE | Noted: 2024-04-08

## 2024-04-08 NOTE — DISCUSSION/SUMMARY
[FreeTextEntry1] : Noncardiac CP needs no further eval ST related to poor eating and drinking habits , anxious state and maybe infection  [EKG obtained to assist in diagnosis and management of assessed problem(s)] : EKG obtained to assist in diagnosis and management of assessed problem(s)

## 2024-04-08 NOTE — PHYSICAL EXAM

## 2024-04-08 NOTE — HISTORY OF PRESENT ILLNESS
[FreeTextEntry1] : Kelsey is here for assessment of intermittent CP and higher HR He appears very anxious and admits to being anxious intermittently. Prior syncope eval noted No LE edema No orthopnea Intermittent, nonexertional CP - lasting seconds

## 2024-04-11 ENCOUNTER — APPOINTMENT (OUTPATIENT)
Dept: WOUND CARE | Facility: HOSPITAL | Age: 19
End: 2024-04-11
Payer: MEDICAID

## 2024-04-11 ENCOUNTER — OUTPATIENT (OUTPATIENT)
Dept: OUTPATIENT SERVICES | Facility: HOSPITAL | Age: 19
LOS: 1 days | Discharge: HOME | End: 2024-04-11
Payer: MEDICAID

## 2024-04-11 VITALS
HEIGHT: 68 IN | TEMPERATURE: 98.1 F | RESPIRATION RATE: 18 BRPM | OXYGEN SATURATION: 98 % | WEIGHT: 125 LBS | SYSTOLIC BLOOD PRESSURE: 121 MMHG | DIASTOLIC BLOOD PRESSURE: 83 MMHG | BODY MASS INDEX: 18.94 KG/M2 | HEART RATE: 95 BPM

## 2024-04-11 DIAGNOSIS — M86.671 OTHER CHRONIC OSTEOMYELITIS, RIGHT ANKLE AND FOOT: ICD-10-CM

## 2024-04-11 DIAGNOSIS — M86.9 OSTEOMYELITIS, UNSPECIFIED: Chronic | ICD-10-CM

## 2024-04-11 DIAGNOSIS — Z98.1 ARTHRODESIS STATUS: Chronic | ICD-10-CM

## 2024-04-11 PROCEDURE — 99213 OFFICE O/P EST LOW 20 MIN: CPT

## 2024-04-11 PROCEDURE — G0463: CPT

## 2024-04-11 RX ORDER — CLOTRIMAZOLE AND BETAMETHASONE DIPROPIONATE 10; .5 MG/G; MG/G
1-0.05 CREAM TOPICAL TWICE DAILY
Qty: 1 | Refills: 1 | Status: COMPLETED | COMMUNITY
Start: 2024-04-11 | End: 2024-06-10

## 2024-04-12 NOTE — ASSESSMENT
[Verbal] : Verbal [Demo] : Demo [Patient] : Patient [Family member] : Family member [Good - alert, interested, motivated] : Good - alert, interested, motivated [Verbalizes knowledge/Understanding] : Verbalizes knowledge/understanding [Dressing changes] : dressing changes [Foot Care] : foot care [Skin Care] : skin care [Pressure relief] : pressure relief [Signs and symptoms of infection] : sign and symptoms of infection [Nutrition] : nutrition [How and When to Call] : how and when to call [Labs and Tests] : labs and tests [Pain Management] : pain management [Off-loading] : off-loading [Patient responsibility to plan of care] : patient responsibility to plan of care [Stable] : stable [Ambulatory] : Ambulatory [Not Applicable - Long Term Care/Home Health Agency] : Long Term Care/Home Health Agency: Not Applicable [] : No [FreeTextEntry2] : Infection prevention Wound care dressing Maintain optimal skin integrity  Maintain acceptable level of pain with use of pharmacological and nonpharmacological interventions Offloading / Pressure relief  Foot care / monitoring  Surgical intervention [FreeTextEntry4] : F/U 1 week MRI auth submitted Rx for lotrisone sent to pharmacy

## 2024-04-12 NOTE — REVIEW OF SYSTEMS
[Fever] : no fever [Chills] : no chills [Eye Pain] : no eye pain [Red Eyes] : eyes not red [Earache] : no earache [Loss Of Hearing] : no hearing loss [Nosebleeds] : no nosebleeds [Chest Pain] : no chest pain [Shortness Of Breath] : no shortness of breath [Wheezing] : no wheezing [Cough] : no cough [Abdominal Pain] : no abdominal pain [Vomiting] : no vomiting [Diarrhea] : no diarrhea [Skin Wound] : skin wound [Confused] : no confusion [Dizziness] : no dizziness [Anxiety] : no anxiety [Easy Bleeding] : no tendency for easy bleeding [Negative] : Endocrine [FreeTextEntry9] : Hammer toes with plantar flexed 1st ray of the left foot  [de-identified] : Right great toe , s/p arthroplasty , + OM , no soi ; new blister to the right hallux - reopened ; left dorsal 5th metatarsal head wound - healed

## 2024-04-12 NOTE — PHYSICAL EXAM
[4 x 4] : 4 x 4  [2+] : left 2+ [Ankle Swelling (On Exam)] : not present [Varicose Veins Of Lower Extremities] : not present [] : not present [Purpura] : no purpura  [Petechiae] : no petechiae [Skin Ulcer] : no ulcer [Skin Induration] : no induration [Alert] : alert [Oriented to Person] : oriented to person [Oriented to Place] : oriented to place [Oriented to Time] : oriented to time [Calm] : calm [de-identified] : calm, AOX3  [de-identified] : s/p right IPJ arthroplasty right hallux , +OM of the right great toe  [de-identified] : Wound to the plantar hallux  IPJ of the right hallux - reopened down to fat; left dorsal 5th metatarsal head wound down to fat -healed.   [FreeTextEntry1] : Right medial hallux- callus shaved by DPM- small ulcer present  [FreeTextEntry2] : 0.2 [FreeTextEntry3] : 0.5 [FreeTextEntry4] : 0.2 [de-identified] : Scant serousanguineous  [de-identified] : small areas of irritation on the dorsum of the foot- apply lotrisone daily as per dpm [de-identified] :  Silver Alginate [de-identified] :  Mechanically cleansed with sterile gauze and normal saline 0.9% Toe sock [FreeTextEntry7] : Left foot dorsal 5th Metatarsal - CLOSED [de-identified] : callus  [de-identified] : No Product [TWNoteComboBox5] : No [TWNoteComboBox6] : Pressure [de-identified] : No [de-identified] : other [de-identified] : None [de-identified] : None [de-identified] : 100% [de-identified] : No [de-identified] : Daily [de-identified] : Primary Dressing [de-identified] : None [de-identified] : other [de-identified] : None [de-identified] : None

## 2024-04-12 NOTE — PLAN
[FreeTextEntry1] : .Patient seen and evaluated. Discussed etiology and treatment plan. New blister to the right hallux at the IPJ - reopened.  Left dorsal 5th metatarsal head - healed. Discussed with patient and mother to monitor for any acute signs of infection. discussed and reviewed x- rays noted with bone erosion with OM. Discussed and recommend surgical intervention for partial  hallux amputation if the wound does not heal in the next few weeks. Ordered MRI for the right hallux .  Rx for Lotrisone sent to patient;s pharmacy for dry, scaly lesion to the right dorsal foot. RTC in one week.   .Patient is high risk for further surgical intervention, sepsis, loss of limb and loss of life.  Spent 20 minutes for patient care and medical decision making.

## 2024-04-12 NOTE — HISTORY OF PRESENT ILLNESS
[FreeTextEntry1] : Patient is 18 year old male presenting for follow up for right plantar hallux wound with chronic OM is s/p IPJ arthroplasty of the right hallux as on 1/4/24 for chronic non healing wound to the hallux with underlying osteomyelitis - reopened, noted with progress of healing. Patient has started wearing regular shoes and is changing dressing as advised.  Patient with wound to the left foot at the dorsal 5th metatarsal head - healed.

## 2024-04-14 DIAGNOSIS — Z91.010 ALLERGY TO PEANUTS: ICD-10-CM
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
## 2024-04-14 DIAGNOSIS — Z98.890 OTHER SPECIFIED POSTPROCEDURAL STATES: ICD-10-CM

## 2024-04-14 DIAGNOSIS — Z91.012 ALLERGY TO EGGS: ICD-10-CM

## 2024-04-14 DIAGNOSIS — L84 CORNS AND CALLOSITIES: ICD-10-CM

## 2024-04-14 DIAGNOSIS — T81.89XD OTHER COMPLICATIONS OF PROCEDURES, NOT ELSEWHERE CLASSIFIED, SUBSEQUENT ENCOUNTER: ICD-10-CM

## 2024-04-14 DIAGNOSIS — K59.09 OTHER CONSTIPATION: ICD-10-CM

## 2024-04-14 DIAGNOSIS — R23.8 OTHER SKIN CHANGES: ICD-10-CM

## 2024-04-14 DIAGNOSIS — M86.671 OTHER CHRONIC OSTEOMYELITIS, RIGHT ANKLE AND FOOT: ICD-10-CM

## 2024-04-14 DIAGNOSIS — Z83.49 FAMILY HISTORY OF OTHER ENDOCRINE, NUTRITIONAL AND METABOLIC DISEASES: ICD-10-CM

## 2024-04-14 DIAGNOSIS — L97.514 NON-PRESSURE CHRONIC ULCER OF OTHER PART OF RIGHT FOOT WITH NECROSIS OF BONE: ICD-10-CM

## 2024-04-14 DIAGNOSIS — Z87.39 PERSONAL HISTORY OF OTHER DISEASES OF THE MUSCULOSKELETAL SYSTEM AND CONNECTIVE TISSUE: ICD-10-CM
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
## 2024-04-14 DIAGNOSIS — Z79.899 OTHER LONG TERM (CURRENT) DRUG THERAPY: ICD-10-CM

## 2024-04-19 ENCOUNTER — APPOINTMENT (OUTPATIENT)
Dept: WOUND CARE | Facility: HOSPITAL | Age: 19
End: 2024-04-19
Payer: MEDICAID

## 2024-04-19 ENCOUNTER — APPOINTMENT (OUTPATIENT)
Dept: MRI IMAGING | Facility: CLINIC | Age: 19
End: 2024-04-19
Payer: MEDICAID

## 2024-04-19 ENCOUNTER — OUTPATIENT (OUTPATIENT)
Dept: OUTPATIENT SERVICES | Facility: HOSPITAL | Age: 19
LOS: 1 days | Discharge: HOME | End: 2024-04-19
Payer: MEDICAID

## 2024-04-19 VITALS
SYSTOLIC BLOOD PRESSURE: 117 MMHG | HEART RATE: 92 BPM | OXYGEN SATURATION: 98 % | HEIGHT: 68 IN | BODY MASS INDEX: 18.94 KG/M2 | DIASTOLIC BLOOD PRESSURE: 77 MMHG | WEIGHT: 125 LBS | TEMPERATURE: 98.6 F | RESPIRATION RATE: 18 BRPM

## 2024-04-19 DIAGNOSIS — M86.671 OTHER CHRONIC OSTEOMYELITIS, RIGHT ANKLE AND FOOT: ICD-10-CM

## 2024-04-19 DIAGNOSIS — M86.9 OSTEOMYELITIS, UNSPECIFIED: Chronic | ICD-10-CM

## 2024-04-19 DIAGNOSIS — Z98.1 ARTHRODESIS STATUS: Chronic | ICD-10-CM

## 2024-04-19 PROCEDURE — 99213 OFFICE O/P EST LOW 20 MIN: CPT

## 2024-04-19 PROCEDURE — 73718 MRI LOWER EXTREMITY W/O DYE: CPT | Mod: RT

## 2024-04-19 PROCEDURE — G0463: CPT

## 2024-04-22 NOTE — PLAN
[FreeTextEntry1] : .Patient seen and evaluated. Discussed etiology and treatment plan. New blister to the right hallux at the IPJ - reopened.  Left dorsal 5th metatarsal head - healed. Discussed with patient and mother to monitor for any acute signs of infection. discussed and reviewed x- rays noted with bone erosion with OM. Discussed and recommend surgical intervention for partial  hallux amputation if the wound does not heal in the next few weeks. Ordered MRI for the right hallux . Discussed MRI with OM t the distal phalanx and possible to te head of the proximal phalanx, pending official read.  Rx for Lotrisone sent to patient;s pharmacy for dry, scaly lesion to the right dorsal foot. RTC in one week.   .Patient is high risk for further surgical intervention, sepsis, loss of limb and loss of life.  Spent 20 minutes for patient care and medical decision making.

## 2024-04-22 NOTE — PHYSICAL EXAM
[2+] : left 2+ [Ankle Swelling (On Exam)] : not present [Varicose Veins Of Lower Extremities] : not present [] : not present [Purpura] : no purpura  [Petechiae] : no petechiae [Skin Ulcer] : no ulcer [Skin Induration] : no induration [Alert] : alert [Oriented to Person] : oriented to person [Oriented to Place] : oriented to place [Oriented to Time] : oriented to time [Calm] : calm [de-identified] : calm, AOX3  [de-identified] : s/p right IPJ arthroplasty right hallux , +OM of the right great toe  [de-identified] : Wound to the plantar hallux  IPJ of the right hallux - reopened down to fat and noted with callus overlying, no edema, no erythema , no purulence ; left dorsal 5th metatarsal head wound down to fat -healed.   [FreeTextEntry1] : Right medial hallux - small ulcer present  [FreeTextEntry2] : 0.5 [FreeTextEntry3] : 0.3 [FreeTextEntry4] : 0.0 [de-identified] : Callous [de-identified] :  Silver Alginate [de-identified] : Mechanically cleansed with sterile gauze and normal saline 0.9% Dry Dressing Paper Tape (Pt doesn't like cloth tape) [FreeTextEntry7] : Left foot dorsal 5th Metatarsal - CLOSED [de-identified] : No Product [TWNoteComboBox4] : None [TWNoteComboBox5] : No [TWNoteComboBox6] : Pressure [de-identified] : No [de-identified] : other [de-identified] : None [de-identified] : None [de-identified] : 100% [de-identified] : No [de-identified] : Daily [de-identified] : Primary Dressing [de-identified] : False [de-identified] : False [de-identified] : False [de-identified] : False

## 2024-04-22 NOTE — HISTORY OF PRESENT ILLNESS
[FreeTextEntry1] : Patient is 18 year old male presenting for follow up for right plantar hallux wound with chronic OM is s/p IPJ arthroplasty of the right hallux as on 1/4/24 for chronic non healing wound to the hallux with underlying osteomyelitis - reopened, noted with progress of healing. Patient has started wearing regular shoes and is changing dressing as advised.  Patient with wound to the left foot at the dorsal 5th metatarsal head - healed. Patient is accompanied by his mother.

## 2024-04-22 NOTE — ASSESSMENT
[] : No [FreeTextEntry2] : Infection prevention Wound care dressing Maintain optimal skin integrity  Maintain acceptable level of pain with use of pharmacological and nonpharmacological interventions Offloading / Pressure relief  Foot care / monitoring  Surgical intervention [FreeTextEntry4] : MRI done today. DPM will look at MRI even though its' not officially read yet. DPM assessed callous, did not remove, just removed the surrounding tissue.  DPM advised there is still a very small opening. Advised patient can return to normal activities; however, must be mindful if drainage return sooner. Patient off from school from May through September.  F/U 2 weeks

## 2024-04-22 NOTE — REVIEW OF SYSTEMS
[Fever] : no fever [Chills] : no chills [Eye Pain] : no eye pain [Red Eyes] : eyes not red [Earache] : no earache [Loss Of Hearing] : no hearing loss [Nosebleeds] : no nosebleeds [Chest Pain] : no chest pain [Shortness Of Breath] : no shortness of breath [Wheezing] : no wheezing [Cough] : no cough [Abdominal Pain] : no abdominal pain [Vomiting] : no vomiting [Diarrhea] : no diarrhea [Skin Wound] : skin wound [Confused] : no confusion [Dizziness] : no dizziness [Anxiety] : no anxiety [Easy Bleeding] : no tendency for easy bleeding [Negative] : Endocrine [FreeTextEntry9] : Hammer toes with plantar flexed 1st ray of the left foot  [de-identified] : Right great toe , s/p arthroplasty , + OM , no soi ; new blister to the right hallux - reopened ; left dorsal 5th metatarsal head wound - healed

## 2024-04-25 ENCOUNTER — OUTPATIENT (OUTPATIENT)
Dept: OUTPATIENT SERVICES | Facility: HOSPITAL | Age: 19
LOS: 1 days | Discharge: HOME | End: 2024-04-25
Payer: MEDICAID

## 2024-04-25 ENCOUNTER — APPOINTMENT (OUTPATIENT)
Dept: WOUND CARE | Facility: HOSPITAL | Age: 19
End: 2024-04-25
Payer: MEDICAID

## 2024-04-25 VITALS
WEIGHT: 125 LBS | DIASTOLIC BLOOD PRESSURE: 79 MMHG | HEART RATE: 96 BPM | SYSTOLIC BLOOD PRESSURE: 116 MMHG | RESPIRATION RATE: 18 BRPM | BODY MASS INDEX: 18.94 KG/M2 | TEMPERATURE: 97.5 F | OXYGEN SATURATION: 98 % | HEIGHT: 68 IN

## 2024-04-25 DIAGNOSIS — M86.9 OSTEOMYELITIS, UNSPECIFIED: Chronic | ICD-10-CM

## 2024-04-25 DIAGNOSIS — Z98.1 ARTHRODESIS STATUS: Chronic | ICD-10-CM

## 2024-04-25 DIAGNOSIS — M86.671 OTHER CHRONIC OSTEOMYELITIS, RIGHT ANKLE AND FOOT: ICD-10-CM

## 2024-04-25 PROCEDURE — 99213 OFFICE O/P EST LOW 20 MIN: CPT

## 2024-04-25 PROCEDURE — G0463: CPT

## 2024-04-27 DIAGNOSIS — T81.89XD OTHER COMPLICATIONS OF PROCEDURES, NOT ELSEWHERE CLASSIFIED, SUBSEQUENT ENCOUNTER: ICD-10-CM

## 2024-04-27 DIAGNOSIS — M86.671 OTHER CHRONIC OSTEOMYELITIS, RIGHT ANKLE AND FOOT: ICD-10-CM

## 2024-04-27 DIAGNOSIS — Z91.010 ALLERGY TO PEANUTS: ICD-10-CM

## 2024-04-27 DIAGNOSIS — Z87.39 PERSONAL HISTORY OF OTHER DISEASES OF THE MUSCULOSKELETAL SYSTEM AND CONNECTIVE TISSUE: ICD-10-CM

## 2024-04-27 DIAGNOSIS — R23.8 OTHER SKIN CHANGES: ICD-10-CM

## 2024-04-27 DIAGNOSIS — Z91.012 ALLERGY TO EGGS: ICD-10-CM
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
## 2024-04-27 DIAGNOSIS — Z79.899 OTHER LONG TERM (CURRENT) DRUG THERAPY: ICD-10-CM

## 2024-04-27 DIAGNOSIS — Z83.49 FAMILY HISTORY OF OTHER ENDOCRINE, NUTRITIONAL AND METABOLIC DISEASES: ICD-10-CM

## 2024-04-27 DIAGNOSIS — K59.09 OTHER CONSTIPATION: ICD-10-CM

## 2024-04-27 DIAGNOSIS — L84 CORNS AND CALLOSITIES: ICD-10-CM
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
## 2024-04-27 DIAGNOSIS — Z98.890 OTHER SPECIFIED POSTPROCEDURAL STATES: ICD-10-CM

## 2024-04-27 DIAGNOSIS — L97.514 NON-PRESSURE CHRONIC ULCER OF OTHER PART OF RIGHT FOOT WITH NECROSIS OF BONE: ICD-10-CM

## 2024-04-29 DIAGNOSIS — L84 CORNS AND CALLOSITIES: ICD-10-CM

## 2024-04-29 DIAGNOSIS — T81.89XD OTHER COMPLICATIONS OF PROCEDURES, NOT ELSEWHERE CLASSIFIED, SUBSEQUENT ENCOUNTER: ICD-10-CM

## 2024-04-29 DIAGNOSIS — M86.671 OTHER CHRONIC OSTEOMYELITIS, RIGHT ANKLE AND FOOT: ICD-10-CM
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
## 2024-04-29 DIAGNOSIS — Z91.010 ALLERGY TO PEANUTS: ICD-10-CM

## 2024-04-29 DIAGNOSIS — L97.514 NON-PRESSURE CHRONIC ULCER OF OTHER PART OF RIGHT FOOT WITH NECROSIS OF BONE: ICD-10-CM

## 2024-04-29 DIAGNOSIS — Z87.39 PERSONAL HISTORY OF OTHER DISEASES OF THE MUSCULOSKELETAL SYSTEM AND CONNECTIVE TISSUE: ICD-10-CM

## 2024-04-29 DIAGNOSIS — Z91.012 ALLERGY TO EGGS: ICD-10-CM

## 2024-04-29 DIAGNOSIS — Z98.890 OTHER SPECIFIED POSTPROCEDURAL STATES: ICD-10-CM

## 2024-04-29 DIAGNOSIS — R23.8 OTHER SKIN CHANGES: ICD-10-CM

## 2024-04-29 DIAGNOSIS — Z83.49 FAMILY HISTORY OF OTHER ENDOCRINE, NUTRITIONAL AND METABOLIC DISEASES: ICD-10-CM

## 2024-04-29 DIAGNOSIS — Z79.899 OTHER LONG TERM (CURRENT) DRUG THERAPY: ICD-10-CM

## 2024-04-29 DIAGNOSIS — K59.09 OTHER CONSTIPATION: ICD-10-CM
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
## 2024-04-29 NOTE — REVIEW OF SYSTEMS
[Fever] : no fever [Chills] : no chills [Eye Pain] : no eye pain [Red Eyes] : eyes not red [Earache] : no earache [Loss Of Hearing] : no hearing loss [Nosebleeds] : no nosebleeds [Chest Pain] : no chest pain [Shortness Of Breath] : no shortness of breath [Wheezing] : no wheezing [Cough] : no cough [Abdominal Pain] : no abdominal pain [Vomiting] : no vomiting [Diarrhea] : no diarrhea [Skin Wound] : skin wound [Confused] : no confusion [Anxiety] : no anxiety [Dizziness] : no dizziness [Easy Bleeding] : no tendency for easy bleeding [Negative] : Endocrine [FreeTextEntry9] : Hammer toes with plantar flexed 1st ray of the left foot  [de-identified] : Right great toe , s/p arthroplasty , + OM , no soi ; new blister to the right hallux - reopened ; left dorsal 5th metatarsal head wound - healed

## 2024-04-29 NOTE — ASSESSMENT
[Verbal] : Verbal [Demo] : Demo [Patient] : Patient [Family member] : Family member [Good - alert, interested, motivated] : Good - alert, interested, motivated [Verbalizes knowledge/Understanding] : Verbalizes knowledge/understanding [Dressing changes] : dressing changes [Foot Care] : foot care [Skin Care] : skin care [Pressure relief] : pressure relief [Signs and symptoms of infection] : sign and symptoms of infection [Nutrition] : nutrition [How and When to Call] : how and when to call [Pain Management] : pain management [Off-loading] : off-loading [Patient responsibility to plan of care] : patient responsibility to plan of care [Stable] : stable [Home] : Home [Ambulatory] : Ambulatory [Not Applicable - Long Term Care/Home Health Agency] : Long Term Care/Home Health Agency: Not Applicable [Labs and Tests] : labs and tests [] : No [FreeTextEntry2] : Infection prevention Wound care dressing Maintain optimal skin integrity  Maintain acceptable level of pain with use of pharmacological and nonpharmacological interventions Offloading / Pressure relief  Foot care / monitoring  Surgical intervention [FreeTextEntry4] : MRI results says possible OM but may be an early bone infection - basically means they can't say for sure if there is bone infection. DPM does not recommend doing another bone biopsy; however, if the wound doesn't heal we may have to determine if surgery is warranted. DPM recommends leaving as is for now, but if patient returns to normal activity and toes starts swelling and discharge is present then they have to decide what to do. Infection is suspected though. DPM advised patient to return to normal activities. DPM removed some callous. Patient off from school from May through September.  F/U 1 month

## 2024-04-29 NOTE — PLAN
[FreeTextEntry1] : .Patient seen and evaluated. Discussed etiology and treatment plan. Patient verbalized understanding. Discussed official MRI - early OM in the  right hallux. Discussed with patient and mother that patient may need surgical intervention at some point if the wound does not heal or gets acutely infected. Patient to c/w local wound care and offloading. Recommend bone biopsy to r/o definitive OM. Patient wants to c/w conservative treatment.  RTC in one week.   .Patient is high risk for further surgical intervention, sepsis, loss of limb and loss of life.  Spent 20 minutes for patient care and medical decision making.

## 2024-04-29 NOTE — PHYSICAL EXAM
[4 x 4] : 4 x 4  [2+] : left 2+ [Ankle Swelling (On Exam)] : not present [Varicose Veins Of Lower Extremities] : not present [] : not present [Purpura] : no purpura  [Petechiae] : no petechiae [Skin Ulcer] : no ulcer [Skin Induration] : no induration [Alert] : alert [Oriented to Person] : oriented to person [Oriented to Place] : oriented to place [Oriented to Time] : oriented to time [Calm] : calm [de-identified] : calm, AOX3  [de-identified] : s/p right IPJ arthroplasty right hallux , +OM of the right great toe  [de-identified] : Wound to the plantar hallux  IPJ of the right hallux - down to fat noted with progress of healing  [FreeTextEntry2] : 0.8 [FreeTextEntry1] : Right medial hallux - small ulcer present  [FreeTextEntry3] : 0.3 [FreeTextEntry4] : 0.0 [de-identified] : Callous [de-identified] : Marium [de-identified] : Mechanically cleansed with sterile gauze and normal saline 0.9% Dry Dressing Paper Tape (Pt doesn't like cloth tape) [FreeTextEntry7] : Left foot dorsal 5th Metatarsal - CLOSED [de-identified] : No Product [TWNoteComboBox4] : None [TWNoteComboBox5] : No [TWNoteComboBox6] : Pressure [de-identified] : No [de-identified] : None [de-identified] : other [de-identified] : None [de-identified] : 100% [de-identified] : No [de-identified] : Every other day [de-identified] : Primary Dressing

## 2024-04-29 NOTE — HISTORY OF PRESENT ILLNESS
[FreeTextEntry1] : Patient is 18 year old male presenting for follow up for right plantar hallux wound with chronic OM is s/p IPJ arthroplasty of the right hallux as on 1/4/24 for chronic non healing wound to the hallux with underlying osteomyelitis - reopened, noted with progress of healing. Patient has started wearing regular shoes and is changing dressing as advised. Patient is accompanied by his mother. Patient relates he has been changing the dressing to the wound himself.

## 2024-05-03 ENCOUNTER — NON-APPOINTMENT (OUTPATIENT)
Age: 19
End: 2024-05-03

## 2024-05-06 ENCOUNTER — OUTPATIENT (OUTPATIENT)
Dept: OUTPATIENT SERVICES | Facility: HOSPITAL | Age: 19
LOS: 1 days | Discharge: HOME | End: 2024-05-06
Payer: MEDICAID

## 2024-05-06 ENCOUNTER — APPOINTMENT (OUTPATIENT)
Dept: WOUND CARE | Facility: HOSPITAL | Age: 19
End: 2024-05-06
Payer: MEDICAID

## 2024-05-06 VITALS
BODY MASS INDEX: 18.94 KG/M2 | HEART RATE: 92 BPM | HEIGHT: 68 IN | DIASTOLIC BLOOD PRESSURE: 80 MMHG | SYSTOLIC BLOOD PRESSURE: 119 MMHG | WEIGHT: 125 LBS | TEMPERATURE: 98.6 F | OXYGEN SATURATION: 94 % | RESPIRATION RATE: 18 BRPM

## 2024-05-06 DIAGNOSIS — Z98.1 ARTHRODESIS STATUS: Chronic | ICD-10-CM

## 2024-05-06 DIAGNOSIS — M86.9 OSTEOMYELITIS, UNSPECIFIED: Chronic | ICD-10-CM

## 2024-05-06 DIAGNOSIS — T81.89XD OTHER COMPLICATIONS OF PROCEDURES, NOT ELSEWHERE CLASSIFIED, SUBSEQUENT ENCOUNTER: ICD-10-CM

## 2024-05-06 PROCEDURE — G0463: CPT

## 2024-05-06 PROCEDURE — 99213 OFFICE O/P EST LOW 20 MIN: CPT

## 2024-05-06 NOTE — PHYSICAL EXAM
[Normal Breath Sounds] : Normal breath sounds [Normal Heart Sounds] : normal heart sounds [2+] : left 2+ [Ankle Swelling (On Exam)] : not present [Ankle Swelling Bilaterally] : bilaterally  [Varicose Veins Of Lower Extremities] : bilaterally [] : bilaterally [Alert] : alert [Oriented to Person] : oriented to person [Oriented to Place] : oriented to place [Oriented to Time] : oriented to time [Calm] : calm [de-identified] : Well-developed, Well-nourished in no acute distress. [de-identified] : Within normal limits. [de-identified] : Within normal limits. [de-identified] : Within normal limits. [de-identified] : Right Hallux with callous, no wound, no drainage, no acute infection. [de-identified] : wound closed  callous shaved by MD  [FreeTextEntry1] : Right PLANTAR Hallux [de-identified] : None [de-identified] : none [de-identified] : other [de-identified] : none [de-identified] : calloused - shaved by MD  [de-identified] : none [de-identified] : none [de-identified] : Mechanically cleansed with sterile gauze and normal saline 0.9% Dry Dressing

## 2024-05-06 NOTE — ASSESSMENT
[Verbal] : Verbal [Demo] : Demo [Patient] : Patient [Family member] : Family member [Good - alert, interested, motivated] : Good - alert, interested, motivated [Verbalizes knowledge/Understanding] : Verbalizes knowledge/understanding [Dressing changes] : dressing changes [Foot Care] : foot care [Skin Care] : skin care [Signs and symptoms of infection] : sign and symptoms of infection [Nutrition] : nutrition [How and When to Call] : how and when to call [Off-loading] : off-loading [Patient responsibility to plan of care] : patient responsibility to plan of care [] : Yes [Stable] : stable [Home] : Home [Ambulatory] : Ambulatory [FreeTextEntry2] : Infection Prevention Foot and nail care Nutrition and wound healing Pt Demonstrates use of both nonpharmacological and pharmacological pain relief strategies. [FreeTextEntry3] : wound closed  [FreeTextEntry4] : F/U 1 Week [FreeTextEntry1] : Right Hallux wound is closed, callous forming, no acute infection

## 2024-05-06 NOTE — PLAN
[FreeTextEntry1] : Callous was shaved, no wound noted. return to office in two weeks to see Dr. MARINELLI. 25 minutes spent for patient care and medical decision making.

## 2024-05-07 DIAGNOSIS — Z83.49 FAMILY HISTORY OF OTHER ENDOCRINE, NUTRITIONAL AND METABOLIC DISEASES: ICD-10-CM

## 2024-05-07 DIAGNOSIS — Z98.890 OTHER SPECIFIED POSTPROCEDURAL STATES: ICD-10-CM

## 2024-05-07 DIAGNOSIS — L84 CORNS AND CALLOSITIES: ICD-10-CM

## 2024-05-07 DIAGNOSIS — Z91.010 ALLERGY TO PEANUTS: ICD-10-CM
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
## 2024-05-07 DIAGNOSIS — Z87.39 PERSONAL HISTORY OF OTHER DISEASES OF THE MUSCULOSKELETAL SYSTEM AND CONNECTIVE TISSUE: ICD-10-CM

## 2024-05-07 DIAGNOSIS — L97.514 NON-PRESSURE CHRONIC ULCER OF OTHER PART OF RIGHT FOOT WITH NECROSIS OF BONE: ICD-10-CM

## 2024-05-07 DIAGNOSIS — K59.09 OTHER CONSTIPATION: ICD-10-CM

## 2024-05-07 DIAGNOSIS — M86.671 OTHER CHRONIC OSTEOMYELITIS, RIGHT ANKLE AND FOOT: ICD-10-CM

## 2024-05-07 DIAGNOSIS — Z91.012 ALLERGY TO EGGS: ICD-10-CM

## 2024-05-07 DIAGNOSIS — Z79.899 OTHER LONG TERM (CURRENT) DRUG THERAPY: ICD-10-CM
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
## 2024-05-07 DIAGNOSIS — T81.89XD OTHER COMPLICATIONS OF PROCEDURES, NOT ELSEWHERE CLASSIFIED, SUBSEQUENT ENCOUNTER: ICD-10-CM

## 2024-05-13 ENCOUNTER — OUTPATIENT (OUTPATIENT)
Dept: OUTPATIENT SERVICES | Facility: HOSPITAL | Age: 19
LOS: 1 days | Discharge: HOME | End: 2024-05-13
Payer: MEDICAID

## 2024-05-13 ENCOUNTER — APPOINTMENT (OUTPATIENT)
Dept: WOUND CARE | Facility: HOSPITAL | Age: 19
End: 2024-05-13
Payer: MEDICAID

## 2024-05-13 VITALS
WEIGHT: 125 LBS | OXYGEN SATURATION: 98 % | BODY MASS INDEX: 18.94 KG/M2 | HEART RATE: 86 BPM | DIASTOLIC BLOOD PRESSURE: 82 MMHG | RESPIRATION RATE: 18 BRPM | TEMPERATURE: 98 F | HEIGHT: 68 IN | SYSTOLIC BLOOD PRESSURE: 119 MMHG

## 2024-05-13 DIAGNOSIS — T81.89XD OTHER COMPLICATIONS OF PROCEDURES, NOT ELSEWHERE CLASSIFIED, SUBSEQUENT ENCOUNTER: ICD-10-CM

## 2024-05-13 DIAGNOSIS — M86.9 OSTEOMYELITIS, UNSPECIFIED: Chronic | ICD-10-CM

## 2024-05-13 DIAGNOSIS — M20.40 OTHER HAMMER TOE(S) (ACQUIRED), UNSPECIFIED FOOT: ICD-10-CM

## 2024-05-13 DIAGNOSIS — S81.012A LACERATION W/OUT FOREIGN BODY, LEFT KNEE, INITIAL ENCOUNTER: ICD-10-CM

## 2024-05-13 DIAGNOSIS — Z98.1 ARTHRODESIS STATUS: Chronic | ICD-10-CM

## 2024-05-13 PROCEDURE — 99203 OFFICE O/P NEW LOW 30 MIN: CPT

## 2024-05-13 PROCEDURE — 99213 OFFICE O/P EST LOW 20 MIN: CPT

## 2024-05-13 PROCEDURE — G0463: CPT

## 2024-05-14 PROBLEM — S81.012A KNEE LACERATION, LEFT, INITIAL ENCOUNTER: Status: ACTIVE | Noted: 2024-05-14

## 2024-05-14 NOTE — PHYSICAL EXAM
[2 x 2] : 2 x 2  [JVD] : no jugular venous distention  [Normal Thyroid] : the thyroid was normal [Normal Breath Sounds] : Normal breath sounds [Normal Heart Sounds] : normal heart sounds [Normal Rate and Rhythm] : normal rate and rhythm [Abdomen Masses] : No abdominal massess [Abdomen Tenderness] : ~T ~M No abdominal tenderness [Tender] : nontender [Enlarged] : not enlarged [Alert] : alert [Oriented to Person] : oriented to person [Oriented to Place] : oriented to place [Oriented to Time] : oriented to time [Calm] : calm [de-identified] : young adult M, NAD, alert ,Ox3. [FreeTextEntry1] : Right PLANTAR Hallux- fissure [FreeTextEntry2] : 0.2cm [FreeTextEntry3] : 0.1cm [FreeTextEntry4] : 0.1cm [de-identified] : Scant serous drainage [de-identified] : none [de-identified] : none [de-identified] : calloused - shaved by HERNANDO [de-identified] : none [de-identified] : none [de-identified] : fissure under callus [de-identified] :  Silver Alginate [de-identified] : Mechanically cleansed with sterile gauze and normal saline 0.9% Dry Dressing [FreeTextEntry7] : Knee [FreeTextEntry8] : 1.3cm [FreeTextEntry9] : 0.7cm [de-identified] : 0.1cm [de-identified] :  Silver Alginate [de-identified] :  Mechanically cleansed with sterile gauze and normal saline 0.9% Secure with cloth tape [TWNoteComboBox6] : Pressure [de-identified] : 100% [de-identified] : No [de-identified] : 3x Weekly [de-identified] : Primary Dressing [TWNoteComboBox9] : Left [de-identified] : Small [de-identified] : No [de-identified] : Traumatic [de-identified] : No [de-identified] : Normal [de-identified] : None [de-identified] : None [de-identified] : 100% [de-identified] : No [de-identified] : 3x Weekly [de-identified] : Primary Dressing

## 2024-05-14 NOTE — VITALS
[Pain related to present condition?] : The patient's  pain is not related to present condition. [] : No [de-identified] : 0/10

## 2024-05-14 NOTE — HISTORY OF PRESENT ILLNESS
[FreeTextEntry1] : 17 yo M, being seen for right foot ulcer by podiatrist, Dr. Irwin. Asked to see pt for a 3 wks ol laceration to his left knee from a fall. Self treating. No SOI.

## 2024-05-14 NOTE — ASSESSMENT
[Verbal] : Verbal [Demo] : Demo [Patient] : Patient [Family member] : Family member [Good - alert, interested, motivated] : Good - alert, interested, motivated [Verbalizes knowledge/Understanding] : Verbalizes knowledge/understanding [Dressing changes] : dressing changes [Foot Care] : foot care [Skin Care] : skin care [Signs and symptoms of infection] : sign and symptoms of infection [Nutrition] : nutrition [How and When to Call] : how and when to call [Off-loading] : off-loading [Patient responsibility to plan of care] : patient responsibility to plan of care [Stable] : stable [Home] : Home [Ambulatory] : Ambulatory [Not Applicable - Long Term Care/Home Health Agency] : Long Term Care/Home Health Agency: Not Applicable [] : No [FreeTextEntry2] : Infection Prevention Foot and nail care Nutrition and wound healing Pt Demonstrates use of both nonpharmacological and pharmacological pain relief strategies. Surgical intervention  MRI was completed, reviewed on previous visit with patient. Patient is worried about infection and requesting to have another MRI performed prior to surgery. DPM educated patient on MRI results and active infection signs and symptoms. [FreeTextEntry3] : Fissure is present under the callus. New wound on his left knee (fell over 3 weeks ago, patient reports that he tripped) [FreeTextEntry4] : -F/U 1 Week, Monday, to discuss surgical intervention for Left Hallux partial arthroplasty. -New wound on left knee, double encounter performed. Fall risk assessment completed, patient tripped and had a new wound on his left knee. -Patient's mother assists with dressing changes at home, patient has enough supplies.

## 2024-05-19 PROBLEM — M20.40 HAMMER TOE, ACQUIRED: Status: ACTIVE | Noted: 2023-08-28

## 2024-05-19 PROBLEM — T81.89XD DELAYED SURGICAL WOUND HEALING, SUBSEQUENT ENCOUNTER: Status: ACTIVE | Noted: 2024-01-11

## 2024-05-19 NOTE — ASSESSMENT
[Verbal] : Verbal [Demo] : Demo [Patient] : Patient [Family member] : Family member [Good - alert, interested, motivated] : Good - alert, interested, motivated [Verbalizes knowledge/Understanding] : Verbalizes knowledge/understanding [Dressing changes] : dressing changes [Foot Care] : foot care [Skin Care] : skin care [Signs and symptoms of infection] : sign and symptoms of infection [Nutrition] : nutrition [How and When to Call] : how and when to call [Off-loading] : off-loading [Patient responsibility to plan of care] : patient responsibility to plan of care [Stable] : stable [Home] : Home [Ambulatory] : Ambulatory [Not Applicable - Long Term Care/Home Health Agency] : Long Term Care/Home Health Agency: Not Applicable [] : No [FreeTextEntry2] : Infection Prevention Foot and nail care Nutrition and wound healing Pt Demonstrates use of both nonpharmacological and pharmacological pain relief strategies. Surgical intervention  MRI was completed, reviewed on previous visit with patient. Patient is worried about infection and requesting to have another MRI performed prior to surgery. DPM educated patient on MRI results and active infection signs and symptoms. [FreeTextEntry3] : Fissure is present under the callus. New wound on his left knee (fell over 3 weeks ago, patient reports that he tripped) [FreeTextEntry4] : F/U 1 Week, Monday, to discuss surgical intervention for Left Hallux partial arthroplasty. New wound on left knee, double encounter performed. See MD [facility] wound care visit note.

## 2024-05-19 NOTE — HISTORY OF PRESENT ILLNESS
[FreeTextEntry1] : Patient is 18 year old male presenting for follow up for right plantar hallux wound with chronic OM is s/p IPJ arthroplasty of the right hallux as on 1/4/24 for chronic non healing wound to the hallux with underlying osteomyelitis - reopened, now with an overlying callus and underlying maceration and  scant serous drainage.  Patient has started wearing regular shoes and is changing dressing as advised. Patient is accompanied by his mother. Patient relates he has been changing the dressing to the wound himself.

## 2024-05-19 NOTE — REVIEW OF SYSTEMS
[Fever] : no fever [Chills] : no chills [Eye Pain] : no eye pain [Red Eyes] : eyes not red [Earache] : no earache [Loss Of Hearing] : no hearing loss [Nosebleeds] : no nosebleeds [Chest Pain] : no chest pain [Shortness Of Breath] : no shortness of breath [Wheezing] : no wheezing [Cough] : no cough [Abdominal Pain] : no abdominal pain [Vomiting] : no vomiting [Diarrhea] : no diarrhea [Skin Wound] : skin wound [Confused] : no confusion [Dizziness] : no dizziness [Anxiety] : no anxiety [Easy Bleeding] : no tendency for easy bleeding [Negative] : Endocrine [FreeTextEntry9] : Hammer toes with plantar flexed 1st ray of the left foot  [de-identified] : Right great toe , s/p arthroplasty , + OM , no soi ; new blister to the right hallux - reopened ; left dorsal 5th metatarsal head wound - healed

## 2024-05-19 NOTE — PLAN
[FreeTextEntry1] : .Patient seen and evaluated. Discussed etiology and treatment plan. Patient verbalized understanding. Discussed official MRI - early OM in the  right hallux. Discussed with patient and mother that patient may need surgical intervention at some point if the wound does not heal or gets acutely infected. Patient to c/w local wound care and offloading. Recommend bone biopsy to r/o definitive OM. Patient and mother would like to proceed with surgical intervention for partial toe amputation since the wound has had recurrence of opening, draining and with underlying OM. Discussed at length the risk, complications, pre/intra/ post- operative. Patient is high risk for further surgical intervention, sepsis, loss of limb and loss of life.   Spent 20 minutes for patient care and medical decision making.

## 2024-05-19 NOTE — PHYSICAL EXAM
[2 x 2] : 2 x 2  [2+] : left 2+ [Ankle Swelling (On Exam)] : not present [] : not present [Varicose Veins Of Lower Extremities] : not present [Purpura] : no purpura  [Petechiae] : no petechiae [Skin Ulcer] : no ulcer [Skin Induration] : no induration [Alert] : alert [Oriented to Person] : oriented to person [Oriented to Place] : oriented to place [Oriented to Time] : oriented to time [Calm] : calm [de-identified] : calm, AOX3  [de-identified] : s/p right IPJ arthroplasty right hallux , +OM of the right great toe  [de-identified] : Wound to the plantar hallux  IPJ of the right hallux - down to fat, recurrently reopening  and with serous drainage, overlying hpk tissue and maceration underlying  [FreeTextEntry1] : Right PLANTAR Hallux- fissure [FreeTextEntry2] : 0.2cm [FreeTextEntry3] : 0.1cm [de-identified] : Scant serous drainage [FreeTextEntry4] : 0.1cm [de-identified] : none [de-identified] : none [de-identified] : calloused - shaved by HERNANDO [de-identified] : none [de-identified] : none [de-identified] : fissure under callus [de-identified] :  Silver Alginate [de-identified] : Mechanically cleansed with sterile gauze and normal saline 0.9% Dry Dressing [FreeTextEntry7] : Knee- See MD [facility] wound care visit note [TWNoteComboBox6] : Pressure [de-identified] : 100% [de-identified] : No [de-identified] : 3x Weekly [de-identified] : Primary Dressing [TWNoteComboBox9] : Left

## 2024-05-19 NOTE — VITALS
[Pain related to present condition?] : The patient's  pain is not related to present condition. [] : No [de-identified] : 0/10

## 2024-05-20 ENCOUNTER — APPOINTMENT (OUTPATIENT)
Dept: WOUND CARE | Facility: HOSPITAL | Age: 19
End: 2024-05-20
Payer: MEDICAID

## 2024-05-20 ENCOUNTER — OUTPATIENT (OUTPATIENT)
Dept: OUTPATIENT SERVICES | Facility: HOSPITAL | Age: 19
LOS: 1 days | Discharge: HOME | End: 2024-05-20
Payer: MEDICAID

## 2024-05-20 VITALS
TEMPERATURE: 98.1 F | SYSTOLIC BLOOD PRESSURE: 120 MMHG | DIASTOLIC BLOOD PRESSURE: 74 MMHG | HEIGHT: 68 IN | RESPIRATION RATE: 18 BRPM | BODY MASS INDEX: 18.94 KG/M2 | HEART RATE: 85 BPM | WEIGHT: 125 LBS | OXYGEN SATURATION: 98 %
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
## 2024-05-20 DIAGNOSIS — M86.9 OSTEOMYELITIS, UNSPECIFIED: Chronic | ICD-10-CM

## 2024-05-20 DIAGNOSIS — Z91.010 ALLERGY TO PEANUTS: ICD-10-CM
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
## 2024-05-20 DIAGNOSIS — S81.012D LACERATION W/OUT FOREIGN BODY, LEFT KNEE, SUBSEQUENT ENCOUNTER: ICD-10-CM

## 2024-05-20 DIAGNOSIS — Z87.39 PERSONAL HISTORY OF OTHER DISEASES OF THE MUSCULOSKELETAL SYSTEM AND CONNECTIVE TISSUE: ICD-10-CM

## 2024-05-20 DIAGNOSIS — K59.09 OTHER CONSTIPATION: ICD-10-CM

## 2024-05-20 DIAGNOSIS — T81.89XD OTHER COMPLICATIONS OF PROCEDURES, NOT ELSEWHERE CLASSIFIED, SUBSEQUENT ENCOUNTER: ICD-10-CM

## 2024-05-20 DIAGNOSIS — Z98.890 OTHER SPECIFIED POSTPROCEDURAL STATES: ICD-10-CM
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
## 2024-05-20 DIAGNOSIS — R23.8 OTHER SKIN CHANGES: ICD-10-CM
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
## 2024-05-20 DIAGNOSIS — S81.012A LACERATION WITHOUT FOREIGN BODY, LEFT KNEE, INITIAL ENCOUNTER: ICD-10-CM

## 2024-05-20 DIAGNOSIS — Z83.49 FAMILY HISTORY OF OTHER ENDOCRINE, NUTRITIONAL AND METABOLIC DISEASES: ICD-10-CM

## 2024-05-20 DIAGNOSIS — Z91.012 ALLERGY TO EGGS: ICD-10-CM

## 2024-05-20 DIAGNOSIS — L84 CORNS AND CALLOSITIES: ICD-10-CM
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
## 2024-05-20 DIAGNOSIS — Z79.899 OTHER LONG TERM (CURRENT) DRUG THERAPY: ICD-10-CM

## 2024-05-20 DIAGNOSIS — L97.514 NON-PRESSURE CHRONIC ULCER OF OTHER PART OF RIGHT FOOT WITH NECROSIS OF BONE: ICD-10-CM

## 2024-05-20 DIAGNOSIS — Z98.1 ARTHRODESIS STATUS: Chronic | ICD-10-CM

## 2024-05-20 DIAGNOSIS — M86.671 OTHER CHRONIC OSTEOMYELITIS, RIGHT ANKLE AND FOOT: ICD-10-CM

## 2024-05-20 PROCEDURE — G0463: CPT

## 2024-05-20 PROCEDURE — 99213 OFFICE O/P EST LOW 20 MIN: CPT

## 2024-05-20 NOTE — PHYSICAL EXAM
[Normal Breath Sounds] : Normal breath sounds [Normal Heart Sounds] : normal heart sounds [2+] : left 2+ [Ankle Swelling Bilaterally] : bilaterally  [Alert] : alert [Oriented to Person] : oriented to person [Oriented to Place] : oriented to place [Oriented to Time] : oriented to time [Calm] : calm [Ankle Swelling (On Exam)] : not present [Varicose Veins Of Lower Extremities] : not present [] : not present [de-identified] : Well-developed, Well-nourished in no acute distress. [de-identified] : Within normal limits. [de-identified] : Within normal limits. [de-identified] : Left anterior knee wound is clean, base is viable, no drainage, no odor, no acute infection, periwound skin is intact with no cellulitis. [2 x 2] : 2 x 2  [FreeTextEntry1] : Right PLANTAR Hallux- fissure [FreeTextEntry2] : 0.2cm [FreeTextEntry3] : 0.1cm [FreeTextEntry4] : 0.1cm [de-identified] : Scant serous drainage [de-identified] : none [de-identified] : none [de-identified] : calloused - shaved by HERNANDO [de-identified] : none [de-identified] : none [de-identified] : fissure under callus [de-identified] :  Silver Alginate [de-identified] : Mechanically cleansed with sterile gauze and normal saline 0.9% Dry Dressing [FreeTextEntry7] : Knee [FreeTextEntry8] : 0.7cm [FreeTextEntry9] : 0.4cm [de-identified] : 0.1cm [de-identified] : scant serous drainage [de-identified] : Xeroform [de-identified] :  Mechanically cleansed with sterile gauze and normal saline 0.9% Secure with cloth tape [TWNoteComboBox6] : Pressure [de-identified] : 100% [de-identified] : No [de-identified] : 3x Weekly [de-identified] : Primary Dressing [TWNoteComboBox9] : Left [de-identified] : No [de-identified] : Traumatic [de-identified] : No [de-identified] : Normal [de-identified] : None [de-identified] : None [de-identified] : 100% [de-identified] : No [de-identified] : 3x Weekly [de-identified] : Primary Dressing

## 2024-05-20 NOTE — VITALS
[Pain related to present condition?] : The patient's  pain is not related to present condition. [] : No [de-identified] : 0/10

## 2024-05-20 NOTE — ASSESSMENT
[Verbal] : Verbal [Demo] : Demo [Patient] : Patient [Family member] : Family member [Good - alert, interested, motivated] : Good - alert, interested, motivated [Verbalizes knowledge/Understanding] : Verbalizes knowledge/understanding [Dressing changes] : dressing changes [Foot Care] : foot care [Skin Care] : skin care [Signs and symptoms of infection] : sign and symptoms of infection [Nutrition] : nutrition [How and When to Call] : how and when to call [Off-loading] : off-loading [Patient responsibility to plan of care] : patient responsibility to plan of care [] : No [FreeTextEntry2] : Infection Prevention Foot and nail care Nutrition and wound healing Pt Demonstrates use of both nonpharmacological and pharmacological pain relief strategies. [FreeTextEntry3] : Fissure is present under the callus. Knee wound is improving [FreeTextEntry4] : Patient performs dressing changes independently at home, small amount of supplies provided. F/U after hospitalization for knee wound assessment (plan for admittance to [facility] for bone biopsy on Tuesday 5/28) [Stable] : stable [Home] : Home [Ambulatory] : Ambulatory [Not Applicable - Long Term Care/Home Health Agency] : Long Term Care/Home Health Agency: Not Applicable [FreeTextEntry1] : Left anterior knee wound is clean, stable, no acute infection.

## 2024-05-20 NOTE — PLAN
[FreeTextEntry1] : Xeroform, Dry dressing, return to office in two weeks. 25 minutes spent for patient care and medical decision making.

## 2024-05-28 PROBLEM — R23.8 BLISTER OF PERIWOUND SKIN: Status: ACTIVE | Noted: 2024-02-20

## 2024-05-28 NOTE — HISTORY OF PRESENT ILLNESS
[FreeTextEntry1] : Patient is 18 year old male presenting for follow up for right plantar hallux wound with chronic OM is s/p IPJ arthroplasty of the right hallux as on 1/4/24 for chronic non healing wound to the hallux with underlying osteomyelitis - reopened, now with an overlying callus and underlying maceration and  scant serous drainage.  Patient has started wearing regular shoes and is changing dressing as advised. Patient is accompanied by his mother. Patient relates he has been changing the dressing to the wound himself. Patient and mother presenting for discussion for surgical intervention.

## 2024-05-28 NOTE — PHYSICAL EXAM
[2 x 2] : 2 x 2  [2+] : left 2+ [Alert] : alert [Oriented to Person] : oriented to person [Oriented to Place] : oriented to place [Oriented to Time] : oriented to time [Calm] : calm [Ankle Swelling (On Exam)] : not present [Varicose Veins Of Lower Extremities] : not present [] : not present [Purpura] : no purpura  [Petechiae] : no petechiae [Skin Ulcer] : no ulcer [Skin Induration] : no induration [de-identified] : calm, AOX3  [de-identified] : s/p right IPJ arthroplasty right hallux , +OM of the right great toe  [de-identified] : Wound to the plantar hallux  IPJ of the right hallux - down to fat, recurrently reopening  and with serous drainage, overlying hpk tissue and maceration underlying  [FreeTextEntry1] : Right PLANTAR Hallux- fissure [FreeTextEntry2] : 0.2cm [FreeTextEntry3] : 0.1cm [FreeTextEntry4] : 0.1cm [de-identified] : Scant serous drainage [de-identified] : none [de-identified] : none [de-identified] : callous [de-identified] : none [de-identified] : none [de-identified] : fissure under callus [de-identified] :  Silver Alginate [de-identified] : Mechanically cleansed with sterile gauze and normal saline 0.9% Dry Dressing [FreeTextEntry7] : Knee- See MD Jasso wound care visit note [TWNoteComboBox6] : Pressure [de-identified] : 100% [de-identified] : No [de-identified] : 3x Weekly [de-identified] : Primary Dressing [TWNoteComboBox9] : Left

## 2024-05-28 NOTE — PLAN
[FreeTextEntry1] : .Patient seen and evaluated. Discussed etiology and treatment plan. Patient verbalized understanding. Discussed official MRI - early OM in the  right hallux. Discussed at length with patient and mother that patient may need surgical intervention since the  wound does is still not healed.  Patient to c/w local wound care and offloading. Recommend bone biopsy to r/o definitive OM and followed by partial hallux amputation. Patient advised to be admistted at [facility] for further disgnostic testing and surgical intervention next week. Patient and mother would like to proceed with surgical intervention for bone biopsy followed partial toe amputation since the wound has had recurrence of opening, draining and with underlying OM. Discussed at length the risk, complications, pre/intra/ post- operative. Patient is high risk for further surgical intervention, sepsis, loss of limb and loss of life. RTC in 1 week.  Spent 20 minutes for patient care and medical decision making.

## 2024-05-28 NOTE — ASSESSMENT
[Verbal] : Verbal [Demo] : Demo [Patient] : Patient [Family member] : Family member [Good - alert, interested, motivated] : Good - alert, interested, motivated [Verbalizes knowledge/Understanding] : Verbalizes knowledge/understanding [Dressing changes] : dressing changes [Foot Care] : foot care [Skin Care] : skin care [Signs and symptoms of infection] : sign and symptoms of infection [Nutrition] : nutrition [How and When to Call] : how and when to call [Off-loading] : off-loading [Patient responsibility to plan of care] : patient responsibility to plan of care [Stable] : stable [Home] : Home [Ambulatory] : Ambulatory [Not Applicable - Long Term Care/Home Health Agency] : Long Term Care/Home Health Agency: Not Applicable [Surgery] : surgery [Pain Management] : pain management [] : No [FreeTextEntry2] : Infection Prevention Foot and nail care Nutrition and wound healing Pt Demonstrates use of both nonpharmacological and pharmacological pain relief strategies. Surgical intervention  Patient is worried about infection and requesting to have another MRI performed prior to surgery. DPM educated patient on MRI results and active infection signs and symptoms. [FreeTextEntry3] : No change in foot wound, improvement in knee wound [FreeTextEntry4] : Plan to return Tuesday 5/28 to be admitted to [facility] for bone biopsy procedure, if biopsy is positive patient agrees to have hallux arthroplasty during hospitalization.  Double encounter, see MD Jasso note for left knee wound. Patient performs dressing changes independently at home, small amount of supplies provided to prevent delay in care. F/U at [facility] after hospitalization

## 2024-05-28 NOTE — VITALS
[Pain related to present condition?] : The patient's  pain is not related to present condition. [] : No [de-identified] : 0/10

## 2024-05-28 NOTE — REVIEW OF SYSTEMS
[Skin Wound] : skin wound [Negative] : Endocrine [Fever] : no fever [Chills] : no chills [Eye Pain] : no eye pain [Red Eyes] : eyes not red [Earache] : no earache [Loss Of Hearing] : no hearing loss [Nosebleeds] : no nosebleeds [Chest Pain] : no chest pain [Shortness Of Breath] : no shortness of breath [Wheezing] : no wheezing [Cough] : no cough [Abdominal Pain] : no abdominal pain [Vomiting] : no vomiting [Diarrhea] : no diarrhea [Confused] : no confusion [Dizziness] : no dizziness [Anxiety] : no anxiety [Easy Bleeding] : no tendency for easy bleeding [FreeTextEntry9] : Hammer toes with plantar flexed 1st ray of the left foot  [de-identified] : Right great toe , s/p arthroplasty , + OM , no soi ; new blister to the right hallux - reopened ; left dorsal 5th metatarsal head wound - healed

## 2024-05-30 ENCOUNTER — NON-APPOINTMENT (OUTPATIENT)
Age: 19
End: 2024-05-30

## 2024-05-31 ENCOUNTER — APPOINTMENT (OUTPATIENT)
Dept: WOUND CARE | Facility: HOSPITAL | Age: 19
End: 2024-05-31
Payer: MEDICAID

## 2024-05-31 ENCOUNTER — OUTPATIENT (OUTPATIENT)
Dept: OUTPATIENT SERVICES | Facility: HOSPITAL | Age: 19
LOS: 1 days | Discharge: HOME | End: 2024-05-31
Payer: MEDICAID

## 2024-05-31 VITALS
SYSTOLIC BLOOD PRESSURE: 116 MMHG | DIASTOLIC BLOOD PRESSURE: 79 MMHG | TEMPERATURE: 98.1 F | WEIGHT: 125 LBS | HEART RATE: 105 BPM | OXYGEN SATURATION: 98 % | HEIGHT: 68 IN | RESPIRATION RATE: 20 BRPM | BODY MASS INDEX: 18.94 KG/M2

## 2024-05-31 DIAGNOSIS — T81.89XD OTHER COMPLICATIONS OF PROCEDURES, NOT ELSEWHERE CLASSIFIED, SUBSEQUENT ENCOUNTER: ICD-10-CM

## 2024-05-31 DIAGNOSIS — M20.40 OTHER HAMMER TOE(S) (ACQUIRED), UNSPECIFIED FOOT: ICD-10-CM

## 2024-05-31 DIAGNOSIS — L84 CORNS AND CALLOSITIES: ICD-10-CM

## 2024-05-31 DIAGNOSIS — M86.671 OTHER CHRONIC OSTEOMYELITIS, RIGHT ANKLE AND FOOT: ICD-10-CM

## 2024-05-31 DIAGNOSIS — L97.514 NON-PRESSURE CHRONIC ULCER OF OTHER PART OF RIGHT FOOT WITH NECROSIS OF BONE: ICD-10-CM

## 2024-05-31 DIAGNOSIS — M86.9 OSTEOMYELITIS, UNSPECIFIED: Chronic | ICD-10-CM

## 2024-05-31 DIAGNOSIS — Z98.1 ARTHRODESIS STATUS: Chronic | ICD-10-CM

## 2024-05-31 PROCEDURE — G0463: CPT

## 2024-05-31 PROCEDURE — 99213 OFFICE O/P EST LOW 20 MIN: CPT

## 2024-06-01 DIAGNOSIS — S81.012D LACERATION WITHOUT FOREIGN BODY, LEFT KNEE, SUBSEQUENT ENCOUNTER: ICD-10-CM

## 2024-06-01 DIAGNOSIS — R23.8 OTHER SKIN CHANGES: ICD-10-CM

## 2024-06-01 DIAGNOSIS — Z79.899 OTHER LONG TERM (CURRENT) DRUG THERAPY: ICD-10-CM
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
## 2024-06-01 DIAGNOSIS — Z98.890 OTHER SPECIFIED POSTPROCEDURAL STATES: ICD-10-CM
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
## 2024-06-01 DIAGNOSIS — K59.09 OTHER CONSTIPATION: ICD-10-CM

## 2024-06-01 DIAGNOSIS — Z83.49 FAMILY HISTORY OF OTHER ENDOCRINE, NUTRITIONAL AND METABOLIC DISEASES: ICD-10-CM

## 2024-06-01 DIAGNOSIS — L97.514 NON-PRESSURE CHRONIC ULCER OF OTHER PART OF RIGHT FOOT WITH NECROSIS OF BONE: ICD-10-CM

## 2024-06-01 DIAGNOSIS — L84 CORNS AND CALLOSITIES: ICD-10-CM
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
## 2024-06-01 DIAGNOSIS — Z91.012 ALLERGY TO EGGS: ICD-10-CM

## 2024-06-01 DIAGNOSIS — Z87.39 PERSONAL HISTORY OF OTHER DISEASES OF THE MUSCULOSKELETAL SYSTEM AND CONNECTIVE TISSUE: ICD-10-CM

## 2024-06-01 DIAGNOSIS — M86.671 OTHER CHRONIC OSTEOMYELITIS, RIGHT ANKLE AND FOOT: ICD-10-CM

## 2024-06-01 DIAGNOSIS — Z91.010 ALLERGY TO PEANUTS: ICD-10-CM
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
## 2024-06-01 PROBLEM — M20.40 ACQUIRED HAMMER TOE: Status: ACTIVE | Noted: 2023-08-23

## 2024-06-01 NOTE — HISTORY OF PRESENT ILLNESS
[FreeTextEntry1] : Patient is 18 year old male presenting for follow up for right plantar hallux wound with chronic OM is s/p IPJ arthroplasty of the right hallux as on 1/4/24 for chronic non healing wound to the hallux with underlying osteomyelitis - reopened down to fat. Patient has  wearing regular shoes and is changing dressing as advised. Patient is accompanied by his mother. Patient relates he has been changing the dressing to the wound himself. Patient and mother presenting for discussion for surgical intervention.

## 2024-06-01 NOTE — REVIEW OF SYSTEMS
[Fever] : no fever [Chills] : no chills [Eye Pain] : no eye pain [Red Eyes] : eyes not red [Earache] : no earache [Loss Of Hearing] : no hearing loss [Nosebleeds] : no nosebleeds [Chest Pain] : no chest pain [Shortness Of Breath] : no shortness of breath [Wheezing] : no wheezing [Cough] : no cough [Abdominal Pain] : no abdominal pain [Vomiting] : no vomiting [Diarrhea] : no diarrhea [Skin Wound] : skin wound [Confused] : no confusion [Dizziness] : no dizziness [Anxiety] : no anxiety [Easy Bleeding] : no tendency for easy bleeding [Negative] : Endocrine [FreeTextEntry9] : Hammer toes with plantar flexed 1st ray of the left foot  [de-identified] : Right great toe , s/p arthroplasty , + OM , no soi ; new blister to the right hallux - reopened ; left dorsal 5th metatarsal head wound - healed

## 2024-06-01 NOTE — PLAN
[FreeTextEntry1] : .Patient seen and evaluated. Discussed etiology and treatment plan. Patient verbalized understanding. Discussed official MRI - early OM in the right hallux. Discussed at length with patient and mother that patient may need surgical intervention since the wound does is still not healed. Patient to c/w local wound care and offloading. Recommend bone biopsy to r/o definitive OM and followed by partial hallux amputation. Patient advised to be admitted at [facility] for further diagnostic testing and surgical intervention next week. Patient and mother would like to proceed with surgical intervention for bone biopsy followed partial toe amputation since the wound has had recurrence of opening, draining and with underlying OM. Discussed at length the risk, complications, pre/intra/ post- operative. Patient is high risk for further surgical intervention, sepsis, loss of limb and loss of life. RTC in 1 week.  Spent 20 minutes for patient care and medical decision making.

## 2024-06-01 NOTE — PHYSICAL EXAM
[2 x 2] : 2 x 2  [2+] : left 2+ [Ankle Swelling (On Exam)] : not present [Varicose Veins Of Lower Extremities] : not present [] : not present [Purpura] : no purpura  [Petechiae] : no petechiae [Skin Ulcer] : no ulcer [Skin Induration] : no induration [Alert] : alert [Oriented to Person] : oriented to person [Oriented to Place] : oriented to place [Oriented to Time] : oriented to time [Calm] : calm [de-identified] : calm, AOX3  [de-identified] : s/p right IPJ arthroplasty right hallux , +OM of the right great toe  [de-identified] : Wound to the plantar hallux  IPJ of the right hallux - down to fat, recurrently reopening  and with serous drainage, overlying hpk tissue and maceration underlying  [FreeTextEntry1] : Right PLANTAR Hallux- fissure [FreeTextEntry2] : 1.1 [FreeTextEntry3] : 0.4 [FreeTextEntry4] : 0.3 [de-identified] : Scant serous drainage [de-identified] : none [de-identified] : none [de-identified] : callous [de-identified] : none [de-identified] : none [de-identified] : fissure under callus [de-identified] :  Silver Alginate [de-identified] : Mechanically cleansed with sterile gauze and normal saline 0.9% Dry Dressing [FreeTextEntry7] : Knee- closed  [de-identified] : none [TWNoteComboBox6] : Pressure [de-identified] : 100% [de-identified] : No [de-identified] : 3x Weekly [TWNoteComboBox9] : Left [de-identified] : Primary Dressing

## 2024-06-01 NOTE — ASSESSMENT
[Verbal] : Verbal [Demo] : Demo [Patient] : Patient [Family member] : Family member [Good - alert, interested, motivated] : Good - alert, interested, motivated [Verbalizes knowledge/Understanding] : Verbalizes knowledge/understanding [Dressing changes] : dressing changes [Foot Care] : foot care [Skin Care] : skin care [Signs and symptoms of infection] : sign and symptoms of infection [Surgery] : surgery [Nutrition] : nutrition [How and When to Call] : how and when to call [Pain Management] : pain management [Off-loading] : off-loading [Patient responsibility to plan of care] : patient responsibility to plan of care [Stable] : stable [Home] : Home [Ambulatory] : Ambulatory [Not Applicable - Long Term Care/Home Health Agency] : Long Term Care/Home Health Agency: Not Applicable [] : No [FreeTextEntry2] : Infection Prevention Foot and nail care Nutrition and wound healing Pt Demonstrates use of both nonpharmacological and pharmacological pain relief strategies. Surgical intervention   [FreeTextEntry3] : wound larger  [FreeTextEntry4] : Plan to return Monday 6/3 to be admitted to [facility] for bone biopsy procedure, if biopsy is positive patient agrees to have hallux arthroplasty during hospitalization.  Patient performs dressing changes independently at home, pt has an adequate amount of supplies Pt to F/U after hospitalization

## 2024-06-03 ENCOUNTER — INPATIENT (INPATIENT)
Facility: HOSPITAL | Age: 19
LOS: 2 days | Discharge: HOME | DRG: 914 | End: 2024-06-06
Attending: STUDENT IN AN ORGANIZED HEALTH CARE EDUCATION/TRAINING PROGRAM | Admitting: STUDENT IN AN ORGANIZED HEALTH CARE EDUCATION/TRAINING PROGRAM
Payer: MEDICAID

## 2024-06-03 ENCOUNTER — TRANSCRIPTION ENCOUNTER (OUTPATIENT)
Age: 19
End: 2024-06-03

## 2024-06-03 VITALS
HEART RATE: 99 BPM | SYSTOLIC BLOOD PRESSURE: 108 MMHG | RESPIRATION RATE: 16 BRPM | WEIGHT: 125 LBS | HEIGHT: 69 IN | OXYGEN SATURATION: 99 % | TEMPERATURE: 98 F | DIASTOLIC BLOOD PRESSURE: 75 MMHG

## 2024-06-03 DIAGNOSIS — Z98.890 OTHER SPECIFIED POSTPROCEDURAL STATES: Chronic | ICD-10-CM

## 2024-06-03 DIAGNOSIS — Z98.1 ARTHRODESIS STATUS: Chronic | ICD-10-CM

## 2024-06-03 DIAGNOSIS — L97.514 NON-PRESSURE CHRONIC ULCER OF OTHER PART OF RIGHT FOOT WITH NECROSIS OF BONE: ICD-10-CM

## 2024-06-03 DIAGNOSIS — T14.8XXA OTHER INJURY OF UNSPECIFIED BODY REGION, INITIAL ENCOUNTER: ICD-10-CM

## 2024-06-03 DIAGNOSIS — Z29.9 ENCOUNTER FOR PROPHYLACTIC MEASURES, UNSPECIFIED: ICD-10-CM

## 2024-06-03 DIAGNOSIS — M86.671 OTHER CHRONIC OSTEOMYELITIS, RIGHT ANKLE AND FOOT: ICD-10-CM

## 2024-06-03 DIAGNOSIS — E87.8 OTHER DISORDERS OF ELECTROLYTE AND FLUID BALANCE, NOT ELSEWHERE CLASSIFIED: ICD-10-CM

## 2024-06-03 DIAGNOSIS — M86.9 OSTEOMYELITIS, UNSPECIFIED: Chronic | ICD-10-CM

## 2024-06-03 LAB
ALBUMIN SERPL ELPH-MCNC: 3 G/DL — LOW (ref 3.3–5)
ALP SERPL-CCNC: 118 U/L — SIGNIFICANT CHANGE UP (ref 40–120)
ALT FLD-CCNC: 18 U/L — SIGNIFICANT CHANGE UP (ref 12–78)
ANION GAP SERPL CALC-SCNC: 5 MMOL/L — SIGNIFICANT CHANGE UP (ref 5–17)
APTT BLD: 34.5 SEC — SIGNIFICANT CHANGE UP (ref 24.5–35.6)
AST SERPL-CCNC: 19 U/L — SIGNIFICANT CHANGE UP (ref 15–37)
BASOPHILS # BLD AUTO: 0.02 K/UL — SIGNIFICANT CHANGE UP (ref 0–0.2)
BASOPHILS NFR BLD AUTO: 0.3 % — SIGNIFICANT CHANGE UP (ref 0–2)
BILIRUB SERPL-MCNC: 0.6 MG/DL — SIGNIFICANT CHANGE UP (ref 0.2–1.2)
BUN SERPL-MCNC: 15 MG/DL — SIGNIFICANT CHANGE UP (ref 7–23)
CALCIUM SERPL-MCNC: 6.8 MG/DL — LOW (ref 8.5–10.1)
CHLORIDE SERPL-SCNC: 116 MMOL/L — HIGH (ref 96–108)
CO2 SERPL-SCNC: 24 MMOL/L — SIGNIFICANT CHANGE UP (ref 22–31)
CREAT SERPL-MCNC: 0.5 MG/DL — SIGNIFICANT CHANGE UP (ref 0.5–1.3)
EGFR: 150 ML/MIN/1.73M2 — SIGNIFICANT CHANGE UP
EOSINOPHIL # BLD AUTO: 0.07 K/UL — SIGNIFICANT CHANGE UP (ref 0–0.5)
EOSINOPHIL NFR BLD AUTO: 1.1 % — SIGNIFICANT CHANGE UP (ref 0–6)
ERYTHROCYTE [SEDIMENTATION RATE] IN BLOOD: 5 MM/HR — SIGNIFICANT CHANGE UP (ref 0–15)
GLUCOSE SERPL-MCNC: 74 MG/DL — SIGNIFICANT CHANGE UP (ref 70–99)
HCT VFR BLD CALC: 48.5 % — SIGNIFICANT CHANGE UP (ref 39–50)
HGB BLD-MCNC: 16.7 G/DL — SIGNIFICANT CHANGE UP (ref 13–17)
IMM GRANULOCYTES NFR BLD AUTO: 0.3 % — SIGNIFICANT CHANGE UP (ref 0–0.9)
INR BLD: 0.96 RATIO — SIGNIFICANT CHANGE UP (ref 0.85–1.18)
LYMPHOCYTES # BLD AUTO: 1 K/UL — SIGNIFICANT CHANGE UP (ref 1–3.3)
LYMPHOCYTES # BLD AUTO: 15 % — SIGNIFICANT CHANGE UP (ref 13–44)
MCHC RBC-ENTMCNC: 30.4 PG — SIGNIFICANT CHANGE UP (ref 27–34)
MCHC RBC-ENTMCNC: 34.4 GM/DL — SIGNIFICANT CHANGE UP (ref 32–36)
MCV RBC AUTO: 88.2 FL — SIGNIFICANT CHANGE UP (ref 80–100)
MONOCYTES # BLD AUTO: 0.57 K/UL — SIGNIFICANT CHANGE UP (ref 0–0.9)
MONOCYTES NFR BLD AUTO: 8.6 % — SIGNIFICANT CHANGE UP (ref 2–14)
NEUTROPHILS # BLD AUTO: 4.98 K/UL — SIGNIFICANT CHANGE UP (ref 1.8–7.4)
NEUTROPHILS NFR BLD AUTO: 74.7 % — SIGNIFICANT CHANGE UP (ref 43–77)
NRBC # BLD: 0 /100 WBCS — SIGNIFICANT CHANGE UP (ref 0–0)
PLATELET # BLD AUTO: 231 K/UL — SIGNIFICANT CHANGE UP (ref 150–400)
POTASSIUM SERPL-MCNC: 3.1 MMOL/L — LOW (ref 3.5–5.3)
POTASSIUM SERPL-SCNC: 3.1 MMOL/L — LOW (ref 3.5–5.3)
PROT SERPL-MCNC: 5.7 G/DL — LOW (ref 6–8.3)
PROTHROM AB SERPL-ACNC: 11.2 SEC — SIGNIFICANT CHANGE UP (ref 9.5–13)
RBC # BLD: 5.5 M/UL — SIGNIFICANT CHANGE UP (ref 4.2–5.8)
RBC # FLD: 13.2 % — SIGNIFICANT CHANGE UP (ref 10.3–14.5)
SODIUM SERPL-SCNC: 145 MMOL/L — SIGNIFICANT CHANGE UP (ref 135–145)
WBC # BLD: 6.66 K/UL — SIGNIFICANT CHANGE UP (ref 3.8–10.5)
WBC # FLD AUTO: 6.66 K/UL — SIGNIFICANT CHANGE UP (ref 3.8–10.5)

## 2024-06-03 PROCEDURE — 73630 X-RAY EXAM OF FOOT: CPT | Mod: 26,RT

## 2024-06-03 PROCEDURE — 99221 1ST HOSP IP/OBS SF/LOW 40: CPT

## 2024-06-03 PROCEDURE — 99285 EMERGENCY DEPT VISIT HI MDM: CPT

## 2024-06-03 PROCEDURE — 99222 1ST HOSP IP/OBS MODERATE 55: CPT | Mod: GC

## 2024-06-03 RX ORDER — ONDANSETRON 8 MG/1
4 TABLET, FILM COATED ORAL EVERY 8 HOURS
Refills: 0 | Status: DISCONTINUED | OUTPATIENT
Start: 2024-06-03 | End: 2024-06-04

## 2024-06-03 RX ORDER — POTASSIUM CHLORIDE 20 MEQ
40 PACKET (EA) ORAL EVERY 4 HOURS
Refills: 0 | Status: COMPLETED | OUTPATIENT
Start: 2024-06-03 | End: 2024-06-04

## 2024-06-03 RX ORDER — SODIUM CHLORIDE 9 MG/ML
1750 INJECTION INTRAMUSCULAR; INTRAVENOUS; SUBCUTANEOUS ONCE
Refills: 0 | Status: COMPLETED | OUTPATIENT
Start: 2024-06-03 | End: 2024-06-03

## 2024-06-03 RX ORDER — CEFEPIME 1 G/1
1000 INJECTION, POWDER, FOR SOLUTION INTRAMUSCULAR; INTRAVENOUS ONCE
Refills: 0 | Status: DISCONTINUED | OUTPATIENT
Start: 2024-06-03 | End: 2024-06-03

## 2024-06-03 RX ORDER — LANOLIN ALCOHOL/MO/W.PET/CERES
3 CREAM (GRAM) TOPICAL AT BEDTIME
Refills: 0 | Status: DISCONTINUED | OUTPATIENT
Start: 2024-06-03 | End: 2024-06-04

## 2024-06-03 RX ORDER — ACETAMINOPHEN 500 MG
650 TABLET ORAL EVERY 6 HOURS
Refills: 0 | Status: DISCONTINUED | OUTPATIENT
Start: 2024-06-03 | End: 2024-06-04

## 2024-06-03 RX ORDER — VANCOMYCIN HCL 1 G
1000 VIAL (EA) INTRAVENOUS ONCE
Refills: 0 | Status: DISCONTINUED | OUTPATIENT
Start: 2024-06-03 | End: 2024-06-03

## 2024-06-03 RX ADMIN — SODIUM CHLORIDE 1750 MILLILITER(S): 9 INJECTION INTRAMUSCULAR; INTRAVENOUS; SUBCUTANEOUS at 19:30

## 2024-06-03 RX ADMIN — Medication 40 MILLIEQUIVALENT(S): at 20:52

## 2024-06-03 RX ADMIN — SODIUM CHLORIDE 1750 MILLILITER(S): 9 INJECTION INTRAMUSCULAR; INTRAVENOUS; SUBCUTANEOUS at 18:30

## 2024-06-03 NOTE — ED PROVIDER NOTE - OBJECTIVE STATEMENT
18-year-old male sent in from wound care center for admission for wound biopsy tomorrow.  Patient has had a nonhealing wound to the right first toe x 1 year.  As per mom patient has undergone hyperbaric treatment, antibiotics with no improvement of symptoms.  Patient denies any fevers.

## 2024-06-03 NOTE — H&P ADULT - NSHPPHYSICALEXAM_GEN_ALL_CORE
T(C): 36.8 (06-03-24 @ 15:12), Max: 36.8 (06-03-24 @ 15:12)  HR: 99 (06-03-24 @ 15:12) (99 - 99)  BP: 108/75 (06-03-24 @ 15:12) (108/75 - 108/75)  RR: 16 (06-03-24 @ 15:12) (16 - 16)  SpO2: 99% (06-03-24 @ 15:12) (99% - 99%)    GENERAL: patient appears well, no acute distress, appropriate, pleasant  EYES: sclera clear, no exudates  ENMT: oropharynx clear without erythema, no exudates, moist mucous membranes  NECK: supple, soft, no thyromegaly noted  LUNGS: good air entry bilaterally, clear to auscultation, symmetric breath sounds, no wheezing or rhonchi appreciated  HEART: soft S1/S2, regular rate and rhythm, no murmurs noted, no lower extremity edema  GASTROINTESTINAL: abdomen is soft, nontender, nondistended, normoactive bowel sounds, no palpable masses  INTEGUMENT: +1cm eschar on plantar aspect of R hallux, +1cm open laceration below eschar. No drainage or malodor   MUSCULOSKELETAL: no clubbing or cyanosis, no obvious deformity  NEUROLOGIC: awake, alert, oriented x3, good muscle tone in 4 extremities, no obvious sensory deficits  PSYCHIATRIC: mood is good, affect is congruent, linear and logical thought process  HEME/LYMPH: no palpable supraclavicular nodules, no obvious ecchymosis or petechiae

## 2024-06-03 NOTE — ED ADULT NURSE NOTE - OBJECTIVE STATEMENT
pte from wound care center, was sent to ED for chronic wound on right hallux and IV antibiotic treatment.

## 2024-06-03 NOTE — H&P ADULT - ASSESSMENT
17 y/o M w. PMHx of scoliosis, right hallux ulcer w/ chronic OM (s/p IPJ arthroplasty) presenting to [facility] for surgical biopsy of R. big toe ulcer. Admitted for elective R hallux biopsy and possible bone resection.

## 2024-06-03 NOTE — ED ADULT NURSE NOTE - CHIEF COMPLAINT QUOTE
pt ambulated into triage from wound care center, was sent to ED for chronic wound on right hallux and IV antibiotic treatment.

## 2024-06-03 NOTE — ED PROVIDER NOTE - SKIN LOCATION #1
SUBJECTIVE:  Alessandra is a 52 year old year old female,          , Patient's last menstrual period was 2014.  who presents today for annual checkup. Current complaints include none. Menstrual history includes absent menses. Her last Pap was 3 year(s) ago, normal pap with negative HPV testing.  Pap smear history includes No previous abnormalities.     Past Medical History:   Diagnosis Date   • Arthritis    • Cervical disc disease    • Depression    • Dysplasia of cervix (uteri)     Dysplasia - [facility]   • Pneumonia    • Sarcoidosis    • Sciatica 2008    CONNIE x 1; R w numbness and foot drop per pt; Anesth, Dr. Melendez       Past Surgical History:   Procedure Laterality Date   • Biopsy/removal, lymph node(s)  2013    Dr. Amor   • Colposcopy bx cervix endocerv curr  , 09    Colposcopy   • Conization cervix,loop electrd      LEEP   • Echo heart stress  5/3/12    Echocardiac,  DObutamine Stress - normal   • Hand finger surgery unlisted  2007    surgery for torn ligament in right hand Dr. Cuate Edmond   • Laminectomy,lumbar  2009    Laminectomy, lumbar   • Laparoscopy,abdm,lesly,oment,dx  02/10/04    Laparoscopy with right ovarian cystectomy   • Removal gallbladder     • Removal of heel spur      top of foot   • Removal of tonsils,10+ y/o  1984    Tonsillectomy (as adult)   • Rotator cuff repair  2013    Right -Dr. Gonzalo Cardoso   • Shoulder surg proc unlisted      Rotator cuff right shoulder(Dr. Cardoso); and also in ?   • Total knee replacement Right 2017   • Total knee replacement Left 2018       Social History     Tobacco Use   • Smoking status: Never Smoker   • Smokeless tobacco: Never Used   Substance Use Topics   • Alcohol use: Yes     Alcohol/week: 1.7 standard drinks     Comment: Rarely       Current Outpatient Medications   Medication Sig Dispense Refill   • norethindrone-ethinyl estradiol (Jinteli) 1-5 MG-MCG Tab Take 1 tablet by mouth daily.  90 tablet 3   • busPIRone (BUSPAR) 5 MG tablet Take 1 tablet by mouth 2 times daily. 180 tablet 1   • buPROPion (WELLBUTRIN) 100 MG tablet TAKE 2 TABLETS BY MOUTH IN  THE MORNING AND 1 TABLET BY MOUTH AT NIGHT 270 tablet 3   • tretinoin (RETIN-A) 0.025 % gel Apply to affected areas face nightly. 45 g 3   • tretinoin (RETIN-A) 0.01 % gel Apply to affected areas face nightly 45 g 3   • Cetirizine HCl (ZyrTEC Allergy) 10 MG Cap Take 1 PO daily 30 capsule 0   • tretinoin (RETIN-A) 0.025 % cream Apply to affected areas face nightly. 45 g 3   • mupirocin (BACTROBAN) 2 % ointment Spot treat to affected areas on face twice a day until resolved 22 g 1   • B Complex Vitamins (B COMPLEX 1) TABS Take  by mouth daily.     • Omega-3 Fatty Acids (FISH OIL) 500 MG capsule Take 1,000 mg by mouth daily.     • DICLOFENAC SODIUM 75 MG PO TBEC 1 tab 2x/day after meals 0 0   • CALCIUM CARBONATE-VITAMIN D 600-400 MG-UNIT PO TABS 1 tab 2x/day 0 0   • MULTIVITAMINS PO TABS 1 tab every AM 0 0   • influenza virus quadrivalent vaccine inactivated, PRESERVATIVE FREE, (Flulaval Quadrivalent) 0.5 ML injection Inject 0.5 mLs into the muscle 1 time for 1 dose 0.5 mL 0     No current facility-administered medications for this visit.       Allergies as of 2021 - Reviewed 2021   Allergen Reaction Noted   • Ciprofloxacin  2004   • Erythromycin base  10/30/2000   • Ketek [telithromycin] HIVES 2007   • Nitrofuran derivatives  03/15/2001   • Penicillins  10/30/2000   • Sulfa antibiotics  10/30/2000   • Tetracycline  10/30/2000       OB History    Para Term  AB Living   0 0 0 0 0 0   SAB TAB Ectopic Molar Multiple Live Births   0 0 0 0 0 0       OBJECTIVE:  Blood pressure 108/80, height 5' 5\" (1.651 m), weight 98 kg (216 lb 0.8 oz), last menstrual period 2014.  General: no apparent distress  HEENT: normal  LUNGS: clear to auscultation  HEART: regular rate and rhythm without murmur  ABDOMEN: benign, soft,  nontender, no palpable masses  EXTREMITIES: no edema  BREAST: symmetric, no dominant or suspicious masses, no skin or nipple changes, no axillary adenopathy.    PELVIC EXAMINATION:  Vulva: grossly Unremarkable, no lesions or masses, urethra without lesions or tenderness.  Vagina: moist, without palpable lesions.  Cervix: without palpable lesions, os closed.   Uterus: nontender, mobile, normal size and shape.  Bladder: without tenderness.  Adnexa: unremarkable. without masses or fullness, nontender.  RECTAL: smooth, regular, sphincter intact and good tone    ASSESSMENT:  Annual  PLAN: Pelvic and breast exam performed.   > Encouraged to continue to obtain pelvic exams/paps on a regular basis and to perform monthly SBE (self- breast exams)..  > Refill on HRT times 1 year.   > All questions answered.  To call the office at anytime with questions.     toe...

## 2024-06-03 NOTE — H&P ADULT - NSHPSOCIALHISTORY_GEN_ALL_CORE
Denies tobacco, alcohol, recreational drug use    Lives: with family at home  ADLs: independent  Ambulation: independent

## 2024-06-03 NOTE — ED PROVIDER NOTE - PRO INTERPRETER NEED 2
English Infliximab Counseling:  I discussed with the patient the risks of infliximab including but not limited to myelosuppression, immunosuppression, autoimmune hepatitis, demyelinating diseases, lymphoma, and serious infections.  The patient understands that monitoring is required including a PPD at baseline and must alert us or the primary physician if symptoms of infection or other concerning signs are noted.

## 2024-06-03 NOTE — CONSULT NOTE ADULT - PROBLEM SELECTOR RECOMMENDATION 9
Discussed with patient and family bed side that this is a recurring issue and that patient is at a higher risk of sepsis and other acute infections.   Discussed at length that patient will need surgical intervention with bone biopsy followed by possible partial hallux amputation. Will order MRI and if negative for OM will re- discuss further surgical intervention.  Patient and family agree with the plan  Procedure discussed at length with patient regarding risks, complications, benefits, as well as pre/intra/post-operative expectations. Patient verbally consents to procedure and understood discussion regarding procedure and all questions were answered to patient's satisfaction at this time.  Patient scheduled for right foot bone biopsy tomorrow as an add on 6/4/24. Discussed with patient and family that patient is still high risk for more proximal amputation, loss of limb, sepsis and loss of life.   Request medical  optimization and risk stratification  Please keep patient NPO after midnight for surgery scheduled tomorrow

## 2024-06-03 NOTE — H&P ADULT - NSICDXPASTSURGICALHX_GEN_ALL_CORE_FT
PAST SURGICAL HISTORY:  H/O toe surgery     History of spinal fusion for scoliosis     Toe osteomyelitis, right

## 2024-06-03 NOTE — H&P ADULT - PROBLEM SELECTOR PLAN 2
Patient is ambulatory. SCDs Hypokalemic and hypocalcemic on admission. Poor PO intake today  - s/p IVF in ED  - Potassium repletion   - Trend metabolic panel

## 2024-06-03 NOTE — ED PROVIDER NOTE - CLINICAL SUMMARY MEDICAL DECISION MAKING FREE TEXT BOX
18-year-old male with nonhealing chronic wound to the right toe scheduled for surgical biopsy tomorrow.  Preop labs, IV antibiotics, admit for further evaluation and treatment.

## 2024-06-03 NOTE — H&P ADULT - HISTORY OF PRESENT ILLNESS
17 y/o M w. PMHx of scoliosis, right hallux ulcer w/ chronic OM (s/p IPJ arthroplasty) presenting to [facility] for surgical biopsy of R. big toe ulcer. Patient sustained a laceration to his R big toe about 1 year ago and has had a chronic non-healing wound since. Multiple hospital admissions for surgical management and IV abx, most recently in Jan 2024. Patient has been regularly following-up with Podiatry / Wound care, most recently on 6/1 when decision was made for patient to be admitted to [facility] for elective bone biopsy to r/o definitive OM followed by partial hallux amputation. Reports occasional serosanguinous drainage from plantar aspect of R hallux. Denies pain, erythema, malodor, fevers, chills, cold extremities. Denies h/o DM, not on blood thinners.     ED course    Vitals: wnl  Labs: K 3..1, Ca 6.8    XR right foot  Again noted is a bone resection around the IP joint of the great toe.   There are some dystrophic calcifications evident. At the outer aspect of   the surgery from March 25 this year.    There is no signs of fracture or active bone infection.   19 y/o M PMHx of scoliosis, right hallux ulcer w/ chronic OM (s/p IPJ arthroplasty) presenting to [facility] for surgical biopsy of R. big toe ulcer. Patient sustained a laceration to his R big toe about 1 year ago and has had a chronic non-healing wound since. Multiple hospital admissions for surgical management and IV abx, most recently in Jan 2024. Patient has been regularly following-up with Podiatry / Wound care, most recently on 6/1 when decision was made for patient to be admitted to [facility] for elective bone biopsy to r/o definitive OM followed by partial hallux amputation. Reports occasional serosanguinous drainage from plantar aspect of R hallux. Denies pain, erythema, malodor, fevers, chills, cold extremities. Denies h/o DM, not on blood thinners.     ED course    Vitals: wnl  Labs: K 3.1, Ca 6.8    XR right foot  Again noted is a bone resection around the IP joint of the great toe.   There are some dystrophic calcifications evident. At the outer aspect of   the surgery from March 25 this year.    There is no signs of fracture or active bone infection.

## 2024-06-03 NOTE — H&P ADULT - NSHPREVIEWOFSYSTEMS_GEN_ALL_CORE
CONSTITUTIONAL: denies fever, chills, fatigue, weakness  HEENT: denies blurred vision, sore throat  SKIN: +chronic R toe wound with serosanguinous drainage  CARDIOVASCULAR: denies chest pain, chest pressure, palpitations  RESPIRATORY: denies shortness of breath, sputum production  GASTROINTESTINAL: denies nausea, vomiting, diarrhea, abdominal pain  GENITOURINARY: denies dysuria, discharge  NEUROLOGICAL: denies numbness, headache, focal weakness  MUSCULOSKELETAL: denies new joint pain, muscle aches  HEMATOLOGIC: denies gross bleeding, bruising  LYMPHATICS: denies enlarged lymph nodes, extremity swelling  PSYCHIATRIC: denies recent changes in anxiety, depression  ENDOCRINOLOGIC: denies sweating, cold or heat intolerance

## 2024-06-03 NOTE — H&P ADULT - PROBLEM SELECTOR PLAN 1
Non-healing wound on plantar aspect of R hallus x1 year. Failed outpatient abx  - Non-toxic appearing, good extremity perfusion   - XR Foot: neg for acute fx or infection. post-operative changes again visualized   - Tissue bx from Jan 2024: MDR Serratia marcescens  - s/p Vanc and Cefepime in ED. Hold abx until after procedure or pending Podiatry recs  - Pain control: acetaminophen prn   - Podiatry (Dr. Irwin) following Non-healing wound on plantar aspect of R hallus x1 year. Failed outpatient abx  - Non-toxic appearing, good extremity perfusion   - XR Foot: neg for acute fx or infection. post-operative changes again visualized   - Tissue bx from Jan 2024: MDR Serratia marcescens  - s/p Vanc and Cefepime in ED. Hold abx until after procedure or pending Podiatry recs  - NPO after midnight  - Pain control: acetaminophen prn   - Podiatry (Dr. Irwin) following

## 2024-06-03 NOTE — H&P ADULT - ATTENDING COMMENTS
pt was seen and examined at bedside.   This is a 19 y/o M PMHx of scoliosis, right hallux ulcer w/ chronic OM (s/p IPJ arthroplasty) presenting [facility] [place] for surgical biopsy of R. big toe ulcer. Patient sustained a laceration to his R big toe about 1 year ago and has had a chronic non-healing wound since. Multiple hospital admissions for surgical management and IV abx, most recently in Jan 2024. pt is being admitted to medicine for a surgical biopsy by his Podiatrist tomorrow.    Gen: AAOx3, not in acute distress  CVS; s1s2 heard, no murmers  Pulm: clear to auscultation b/l, no rales or rhonchi  EXT: there is a an ulcer with eschar on top on the medial Right big toe. non tender to palpation.     Plan  1) right big toe Ulcer:   No antibiotics now-   NPO after MN for possible OR for surgical biopsy   correct electrolytes  IV fluids   wound care as per podiatry recommendations    Case discussed with resident and agree with plan above.

## 2024-06-04 LAB
ALBUMIN SERPL ELPH-MCNC: 3.4 G/DL — SIGNIFICANT CHANGE UP (ref 3.3–5)
ALP SERPL-CCNC: 138 U/L — HIGH (ref 40–120)
ALT FLD-CCNC: 17 U/L — SIGNIFICANT CHANGE UP (ref 12–78)
ANION GAP SERPL CALC-SCNC: 2 MMOL/L — LOW (ref 5–17)
AST SERPL-CCNC: 15 U/L — SIGNIFICANT CHANGE UP (ref 15–37)
BASOPHILS # BLD AUTO: 0.02 K/UL — SIGNIFICANT CHANGE UP (ref 0–0.2)
BASOPHILS NFR BLD AUTO: 0.3 % — SIGNIFICANT CHANGE UP (ref 0–2)
BILIRUB SERPL-MCNC: 0.8 MG/DL — SIGNIFICANT CHANGE UP (ref 0.2–1.2)
BUN SERPL-MCNC: 16 MG/DL — SIGNIFICANT CHANGE UP (ref 7–23)
CALCIUM SERPL-MCNC: 9.2 MG/DL — SIGNIFICANT CHANGE UP (ref 8.5–10.1)
CHLORIDE SERPL-SCNC: 108 MMOL/L — SIGNIFICANT CHANGE UP (ref 96–108)
CO2 SERPL-SCNC: 30 MMOL/L — SIGNIFICANT CHANGE UP (ref 22–31)
CREAT SERPL-MCNC: 0.61 MG/DL — SIGNIFICANT CHANGE UP (ref 0.5–1.3)
CRP SERPL-MCNC: 24 MG/L — HIGH
EGFR: 141 ML/MIN/1.73M2 — SIGNIFICANT CHANGE UP
EOSINOPHIL # BLD AUTO: 0.16 K/UL — SIGNIFICANT CHANGE UP (ref 0–0.5)
EOSINOPHIL NFR BLD AUTO: 2.4 % — SIGNIFICANT CHANGE UP (ref 0–6)
GLUCOSE SERPL-MCNC: 91 MG/DL — SIGNIFICANT CHANGE UP (ref 70–99)
HCT VFR BLD CALC: 43.8 % — SIGNIFICANT CHANGE UP (ref 39–50)
HGB BLD-MCNC: 14.9 G/DL — SIGNIFICANT CHANGE UP (ref 13–17)
IMM GRANULOCYTES NFR BLD AUTO: 0.4 % — SIGNIFICANT CHANGE UP (ref 0–0.9)
LYMPHOCYTES # BLD AUTO: 1.73 K/UL — SIGNIFICANT CHANGE UP (ref 1–3.3)
LYMPHOCYTES # BLD AUTO: 25.6 % — SIGNIFICANT CHANGE UP (ref 13–44)
MCHC RBC-ENTMCNC: 30.5 PG — SIGNIFICANT CHANGE UP (ref 27–34)
MCHC RBC-ENTMCNC: 34 GM/DL — SIGNIFICANT CHANGE UP (ref 32–36)
MCV RBC AUTO: 89.6 FL — SIGNIFICANT CHANGE UP (ref 80–100)
MONOCYTES # BLD AUTO: 0.75 K/UL — SIGNIFICANT CHANGE UP (ref 0–0.9)
MONOCYTES NFR BLD AUTO: 11.1 % — SIGNIFICANT CHANGE UP (ref 2–14)
NEUTROPHILS # BLD AUTO: 4.08 K/UL — SIGNIFICANT CHANGE UP (ref 1.8–7.4)
NEUTROPHILS NFR BLD AUTO: 60.2 % — SIGNIFICANT CHANGE UP (ref 43–77)
NRBC # BLD: 0 /100 WBCS — SIGNIFICANT CHANGE UP (ref 0–0)
PLATELET # BLD AUTO: 214 K/UL — SIGNIFICANT CHANGE UP (ref 150–400)
POTASSIUM SERPL-MCNC: 4.3 MMOL/L — SIGNIFICANT CHANGE UP (ref 3.5–5.3)
POTASSIUM SERPL-SCNC: 4.3 MMOL/L — SIGNIFICANT CHANGE UP (ref 3.5–5.3)
PROT SERPL-MCNC: 6.9 G/DL — SIGNIFICANT CHANGE UP (ref 6–8.3)
RBC # BLD: 4.89 M/UL — SIGNIFICANT CHANGE UP (ref 4.2–5.8)
RBC # FLD: 13.3 % — SIGNIFICANT CHANGE UP (ref 10.3–14.5)
SODIUM SERPL-SCNC: 140 MMOL/L — SIGNIFICANT CHANGE UP (ref 135–145)
WBC # BLD: 6.77 K/UL — SIGNIFICANT CHANGE UP (ref 3.8–10.5)
WBC # FLD AUTO: 6.77 K/UL — SIGNIFICANT CHANGE UP (ref 3.8–10.5)

## 2024-06-04 PROCEDURE — 99222 1ST HOSP IP/OBS MODERATE 55: CPT

## 2024-06-04 PROCEDURE — 11044 DBRDMT BONE 1ST 20 SQ CM/<: CPT | Mod: T5

## 2024-06-04 PROCEDURE — 88304 TISSUE EXAM BY PATHOLOGIST: CPT | Mod: 26

## 2024-06-04 PROCEDURE — 99233 SBSQ HOSP IP/OBS HIGH 50: CPT

## 2024-06-04 PROCEDURE — 88311 DECALCIFY TISSUE: CPT | Mod: 26

## 2024-06-04 PROCEDURE — 93010 ELECTROCARDIOGRAM REPORT: CPT

## 2024-06-04 PROCEDURE — 73630 X-RAY EXAM OF FOOT: CPT | Mod: 26,RT

## 2024-06-04 DEVICE — SURGICEL NU-KNIT 6 X 9": Type: IMPLANTABLE DEVICE | Site: RIGHT | Status: FUNCTIONAL

## 2024-06-04 RX ORDER — ACETAMINOPHEN 500 MG
650 TABLET ORAL EVERY 6 HOURS
Refills: 0 | Status: DISCONTINUED | OUTPATIENT
Start: 2024-06-04 | End: 2024-06-06

## 2024-06-04 RX ORDER — ONDANSETRON 8 MG/1
4 TABLET, FILM COATED ORAL ONCE
Refills: 0 | Status: DISCONTINUED | OUTPATIENT
Start: 2024-06-04 | End: 2024-06-04

## 2024-06-04 RX ORDER — SODIUM CHLORIDE 9 MG/ML
1000 INJECTION, SOLUTION INTRAVENOUS
Refills: 0 | Status: DISCONTINUED | OUTPATIENT
Start: 2024-06-04 | End: 2024-06-04

## 2024-06-04 RX ORDER — ONDANSETRON 8 MG/1
4 TABLET, FILM COATED ORAL EVERY 8 HOURS
Refills: 0 | Status: DISCONTINUED | OUTPATIENT
Start: 2024-06-04 | End: 2024-06-06

## 2024-06-04 RX ORDER — HYDROMORPHONE HYDROCHLORIDE 2 MG/ML
0.5 INJECTION INTRAMUSCULAR; INTRAVENOUS; SUBCUTANEOUS
Refills: 0 | Status: DISCONTINUED | OUTPATIENT
Start: 2024-06-04 | End: 2024-06-04

## 2024-06-04 RX ORDER — LANOLIN ALCOHOL/MO/W.PET/CERES
3 CREAM (GRAM) TOPICAL AT BEDTIME
Refills: 0 | Status: DISCONTINUED | OUTPATIENT
Start: 2024-06-04 | End: 2024-06-06

## 2024-06-04 RX ADMIN — Medication 40 MILLIEQUIVALENT(S): at 01:00

## 2024-06-04 NOTE — CARE COORDINATION ASSESSMENT. - FACILITY OF [facility]IDENCE [facility]
No Complex Repair And Bilobe Flap Text: The defect edges were debeveled with a #15 scalpel blade.  The primary defect was closed partially with a complex linear closure.  Given the location of the remaining defect, shape of the defect and the proximity to free margins a bilobe flap was deemed most appropriate for complete closure of the defect.  Using a sterile surgical marker, an appropriate advancement flap was drawn incorporating the defect and placing the expected incisions within the relaxed skin tension lines where possible.    The area thus outlined was incised deep to adipose tissue with a #15 scalpel blade.  The skin margins were undermined to an appropriate distance in all directions utilizing iris scissors.

## 2024-06-04 NOTE — PRE-OP CHECKLIST - AICD PRESENT
no [Follow-up] : a follow-up of an existing diagnosis [FreeTextEntry1] : crampy abdominal pain, epigastric pain

## 2024-06-04 NOTE — CARE COORDINATION ASSESSMENT. - PRO ARRIVE FROM
CM met w pt and father Oleksandr Esteban. Pt provided verbal consent to speak with dad at bedside. Per pt lives with family was independent w ADL, ambulation, driving and med management prior to admission. Pt drives to appointments, has 4 stairs to enter home, 13 steps inside.  Pt has used [facility] [phone] in the past and wants to continue with services after discharge if recommended. denies DME or need for usage. CM verified: PCP: Dr. Bloomberg Pharmacy: [facility]. [place]: stated no. Pt stated his dad will  when medically safe for discharge./home

## 2024-06-04 NOTE — PATIENT CHOICE NOTE. - NSPTCHOICENOTES_GEN_ALL_CORE
D/C resource folder provided at bedside this includes lists for both rehab facilities and home care agencies Per pt used [facility] [phone] in the past and would like to utilize them again

## 2024-06-04 NOTE — CARE COORDINATION ASSESSMENT. - NSCAREPROVIDERS_GEN_ALL_CORE_FT
CARE PROVIDERS:  Accepting Physician: Cherise Richards  Access Services: Lion Villa  Access Services: Macario Rebolledo  Administration: Melinda Perera  Administration: Arpan Zamarripa  Administration: Genaro Ribeiro  Administration: Jose Mota  Admitting: Cherise Richards  Attending: Cherise Richards  Case Management: Preet Leonard  Consultant: Kadie Ospina  Consultant: Jennifer Irwin  ED Attending: Greta Bustamante  ED Nurse: Sami Shaver  ED Nurse 2: Sami Shaver  Emergency Medicine: Cecy Christopher  Emergency Medicine: Greta Bustamante  Nurse: Rosy Hernández  Nurse: Janelle De Luna  Nurse: Viet Boateng  Nurse: Judi Pierson  Override: Lynette Segal  Override: Colette Warner  PCA/Nursing Assistant: Sunday Simms  PCA/Nursing Assistant: Aide Ruiz  PCA/Nursing Assistant: Lo Boogie  Primary Team: Oleksandr Ribeiro  Primary Team: Adolph Romero  Primary Team: Lynette Ko  Primary Team: Tera Ernst  Registered Dietitian: Prema Smith

## 2024-06-04 NOTE — CASE MANAGEMENT PROGRESS NOTE - NSCMPROGRESSNOTE_GEN_ALL_CORE
CM consult for stairs at home noted and reviewed. CM will continue to collaborate with interdisciplinary team, remain available to assist and monitor for home care needs.

## 2024-06-04 NOTE — CARE COORDINATION ASSESSMENT. - NSPASTMEDSURGHISTORY_GEN_ALL_CORE_FT
PAST MEDICAL & SURGICAL HISTORY:  Food allergy  egg, peanuts      Scoliosis      Toe osteomyelitis, right      History of spinal fusion for scoliosis      Toe osteomyelitis, right      H/O toe surgery

## 2024-06-04 NOTE — PATIENT PROFILE ADULT - ARRIVAL FROM
Chief Complaint   Patient presents with   • Establish Care     PCP referred, palpitations   • Palpitations     has had rare palpitations for years, recently becoming more frequent, now occuring 1-2 days a week, will last up to a couple minutes. No other symptoms associated. Recently diagnosed with hyperthyroidism. To see endocrinologist June 29th.    • Cardiac history      ER visit to [facility] for chest pain in 2018, panic attack, recent EKG in chart from PCP   • Labs     most recent in chart.    • Aspirin     does not take a daily aspirin   • Exercise     pt wanting to start Insanity workout, asking if ok to start or should he wait         CARDIAC COMPLAINTS  palpitations      Subjective   Matti Kay is a 31 y.o. male came in today for his initial cardiac evaluation.  He has no previously diagnosed cardiac history.  He has been having palpitation on and off for many years but recently it has been occurring much more frequent.  It is occurring 1 to 2 days a week it occurs suddenly without any precipitating factors.  It last for few minutes and subsides on its own.  It is not associated with any chest pain or shortness of breath.  It is not associated with any dizziness or lightheadedness.  She also has been having episodes of anxiety attacks.  He has been to the emergency room few years ago with chest pain and shortness of breath at that time it was felt to be anxiety attack.  His lab work was within normal limit.  No cardiac work-up was done at that time.  He did undergo lab work at your office.  His TSH was slightly abnormal at 0.430.  His cholesterol is 152 with the LDL of 99.  His electrolytes were normal his hemoglobin was normal.  He at this time does not have any symptoms of thyrotoxicosis.  He is going to see endocrinologist next month regarding this.  He rings about 3 or 4 cups of coffee a day.  He does not drink that much pop.  His mother has some kind of arrhythmia and she also has hypertension  hypercholesterolemia.  She never had any electrophysiology study.  His maternal grandfather did  at the age of 49 with an MI.  He smokes about 5 to 6 cigarettes a day and is been smoking only for 1 year.  He also does not eat regularly.  He eats maybe once or twice a day.  There is no change in his appetite.    History reviewed. No pertinent surgical history.    Current Outpatient Medications   Medication Sig Dispense Refill   • acetaminophen (TYLENOL) 500 MG tablet Take 500 mg by mouth Every 6 (Six) Hours As Needed for Mild Pain .       No current facility-administered medications for this visit.           ALLERGIES:  Patient has no known allergies.    Past Medical History:   Diagnosis Date   • Anxiety        Social History     Tobacco Use   Smoking Status Current Some Day Smoker   • Packs/day: 0.25   • Years: 1.00   • Pack years: 0.25   • Types: Cigarettes   Smokeless Tobacco Never Used   Tobacco Comment    started about a year ago, only smoking 1-2 cig a day          Family History   Problem Relation Age of Onset   • Arrhythmia Mother    • Hypertension Mother    • Hyperlipidemia Mother    • Hypertension Father    • No Known Problems Brother    • No Known Problems Maternal Grandmother    • Heart attack Maternal Grandfather          in his 40's with MI   • Cancer Paternal Grandmother    • Cancer Paternal Grandfather    • No Known Problems Daughter        Review of Systems   Constitutional: Negative for decreased appetite and malaise/fatigue.   HENT: Negative for congestion and sore throat.    Eyes: Negative for blurred vision.   Cardiovascular: Positive for palpitations. Negative for chest pain.   Respiratory: Negative for shortness of breath and snoring.    Endocrine: Negative for cold intolerance and heat intolerance.   Hematologic/Lymphatic: Negative for adenopathy. Does not bruise/bleed easily.   Skin: Negative for itching, nail changes and skin cancer.   Musculoskeletal: Negative for arthritis and  "myalgias.   Gastrointestinal: Negative for abdominal pain, dysphagia and heartburn.   Genitourinary: Negative for bladder incontinence and frequency.   Neurological: Negative for dizziness, light-headedness, seizures and vertigo.   Psychiatric/Behavioral: Negative for altered mental status.   Allergic/Immunologic: Negative for environmental allergies and hives.       Diabetes- No  Thyroid- abnormal    Objective     /78 (BP Location: Left arm)   Pulse 67   Temp 97.8 °F (36.6 °C)   Ht 188 cm (74\")   Wt 87.1 kg (192 lb)   BMI 24.65 kg/m²     Vitals and nursing note reviewed.   Constitutional:       Appearance: Healthy appearance. Not in distress.   Eyes:      Conjunctiva/sclera: Conjunctivae normal.      Pupils: Pupils are equal, round, and reactive to light.   HENT:      Head: Normocephalic.   Pulmonary:      Effort: Pulmonary effort is normal.      Breath sounds: Normal breath sounds.   Cardiovascular:      PMI at left midclavicular line. Normal rate. Regular rhythm.      Murmurs: There is a systolic murmur.   Abdominal:      General: Bowel sounds are normal.      Palpations: Abdomen is soft.   Musculoskeletal: Normal range of motion.      Cervical back: Normal range of motion and neck supple. Skin:     General: Skin is warm and dry.   Neurological:      Mental Status: Alert, oriented to person, place, and time and oriented to person, place and time.           ECG 12 Lead    Date/Time: 5/17/2021 12:30 PM  Performed by: Aung Santos MD  Authorized by: Aung Santos MD   Comparison: compared with previous ECG from 5/5/2021  Comparison to previous ECG: TN is shorter  Rhythm: sinus rhythm  Rate: normal  Q waves: V1 and V2    QRS axis: normal    Clinical impression: abnormal EKG              Assessment/Plan   Patient's Body mass index is 24.65 kg/m². indicating that he is within normal range (BMI 18.5-24.9). No BMI management plan needed..     Diagnoses and all orders for this visit:    1. " Palpitations (Primary)  -     Adult Transthoracic Echo Complete W/ Cont if Necessary Per Protocol; Future  -     Holter Monitor - 72 Hour Up To 15 Days; Future    2. Hyperthyroidism    3. Smoking    4. Murmur, cardiac  -     Adult Transthoracic Echo Complete W/ Cont if Necessary Per Protocol; Future    5. Anxiety    At baseline his heart rate is stable.  His blood pressure is normal.  His BMI is normal at less than 25.  His EKG done today shows sinus rhythm with a short MA interval.  He does have a Q-wave in the anterior leads but appears to be non pathological.  His clinical examination is unremarkable other than a short systolic murmur at the mitral area.    Regarding the palpitation, he apparently has been getting it at least few times a week.  I did place a 10-day Holter monitor to see what kind of arrhythmia he has.  It could be benign as PAC's or PVC but I cannot rule out PSVT especially with him having a short MA interval.    Regarding his history of hyperparathyroidism, he is going to see a endocrinologist soon.  He may need to have his free T3 and T4 level checked and also recheck the TSH level.  I did explain to him that if he does have definite evidence of hypothyroidism sometimes correcting that should help with the palpitation also    Regarding the cardiac murmur, it appears to be secondary to mitral regurgitation.  I scheduled him to undergo an echocardiogram to rule out any structural heart disease.  I also want to check his valvular structures as well as the PA pressure.  Because he has abnormal EKG I also want to rule out any regional wall motion abnormality.    Regarding the smoking history, I had a long talk with him about the family history and the borderline elevated lipids.  I did give him papers to help him quit smoking.  I did talk to him about the increased risk of malignancy.    Regarding his anxiety, if the arrhythmia seems to be more of a tachyarrhythmia, low-dose of beta-blockers may be  helpful for the anxiety.  If not then he may need to consider other forms in the form of Paxil or Lexapro.    Regarding his exercise, he wants to know whether he can start insanity workout.  If there is no significant arrhythmia, he should be able to do the workout    Based on the results, further recommendations will be made.               Electronically signed by Aung Santos MD May 17, 2021 10:59 EDT        Home

## 2024-06-04 NOTE — BRIEF OPERATIVE NOTE - NSICDXBRIEFPREOP_GEN_ALL_CORE_FT
PRE-OP DIAGNOSIS:  Chronic ulcer of toes, right, with necrosis of bone 04-Jun-2024 14:28:10  Jennifer Irwin

## 2024-06-04 NOTE — ED ADULT NURSE REASSESSMENT NOTE - NS ED NURSE REASSESS COMMENT FT1
report given, pt ready for transport to unit
medications administered per eMAR, pt denies any pain or discomfort at this time. Pt denies any needs or complains.

## 2024-06-04 NOTE — PATIENT PROFILE ADULT - FALL HARM RISK - UNIVERSAL INTERVENTIONS
Bed in lowest position, wheels locked, appropriate side rails in place/Call bell, personal items and telephone in reach/Instruct patient to call for assistance before getting out of bed or chair/Non-slip footwear when patient is out of bed/[place] to call system/Physically safe environment - no spills, clutter or unnecessary equipment/Purposeful Proactive Rounding/Room/bathroom lighting operational, light cord in reach

## 2024-06-04 NOTE — CARE COORDINATION ASSESSMENT. - OTHER PERTINENT DISCHARGE PLANNING INFORMATION:
Met patient at bedside.  Explained role of CM, pt verbalized understanding. Pt was made aware a CM will remain available through hospitalization.  Contact information given in discharge/ transitions resource folder. CM met w pt and father Oleksandr Etseban. Pt provided verbal consent to speak with dad at bedside. Per pt lives with family was independent w ADL, ambulation, driving and med management prior to admission. Pt drives to appointments, has 4 stairs to enter home, 13 steps inside.  Pt has used [facility] [place] [phone] in the past and wants to continue with services after discharge if recommended. denies DME or need for usage. CM verified: PCP: Dr. Bloomberg Pharmacy: [facility]. [place]: stated no. Pt stated his dad will  when medically safe for discharge.

## 2024-06-04 NOTE — CASE MANAGEMENT PROGRESS NOTE - NSCMPROGRESSNOTE_GEN_ALL_CORE
Discussed pt on rounds, pt remains acute, awaiting further testing pending biopsy right big toe. CM will continue to collaborate with interdisciplinary team and remain available to assist.

## 2024-06-04 NOTE — CONSULT NOTE ADULT - ASSESSMENT
17 y/o male with scoliosis, right hallux ulcer w/ chronic OM (s/p IPJ arthroplasty), who presented for surgical biopsy of R. big toe ulcer. Multiple hospital admissions for surgical management and IV abx, most recently in Jan 2024. Patient has been regularly following-up with Podiatry / Wound care, most recently on 6/1 when decision was made for patient to be admitted to [facility] for elective bone biopsy to r/o definitive OM followed by partial hallux amputation.     No obvious signs of acute skin and soft tissue infection on exam. He has no fevers and no leukocytosis. Baseline CRP 24. Most recent wound cultures grew serratia, sensitivities reviewed.    #R 1st toe chronic osteomyelitis  #R 1st toe chronic ulcer    -observe off antibiotics, post op can start ciprofloxacin 500mg PO BID pending cultures and path  -vida-operative antibiotics per Podiatry  -send OR cultures and path  -discussed with father at bedside    Thank you for courtesy of this consult.     Will follow.  Discussed with the primary team.     Kadie Ospina MD  Division of Infectious Diseases   Cell [phone] between 8am and 6pm   After 6pm and weekends please call ID service at [phone].     55 minutes spent on total encounter assessing patient, examination, chart review, counseling and coordinating care by the attending physician/nurse/care manager.

## 2024-06-04 NOTE — BRIEF OPERATIVE NOTE - NSICDXBRIEFPOSTOP_GEN_ALL_CORE_FT
POST-OP DIAGNOSIS:  Chronic toe ulcer, right, with necrosis of bone 04-Jun-2024 14:27:56  Jennifer Irwin

## 2024-06-04 NOTE — CONSULT NOTE ADULT - SUBJECTIVE AND OBJECTIVE BOX
[facility]  INFECTIOUS DISEASES - [address], [place] [address]  Tel: [phone]     Fax: [phone]  =======================================================    [id number]  YARITZA MERRILL     CC: Patient is a 18y old  Male who presents with a chief complaint of Right great toe chronic non healing wound with OM (03 Jun 2024 19:54)    HPI:  19 y/o male with scoliosis, right hallux ulcer w/ chronic OM (s/p IPJ arthroplasty), who presented for surgical biopsy of R. big toe ulcer. Multiple hospital admissions for surgical management and IV abx, most recently in Jan 2024. Patient has been regularly following-up with Podiatry / Wound care, most recently on 6/1 when decision was made for patient to be admitted to [facility] [place] for elective bone biopsy to r/o definitive OM followed by partial hallux amputation. Reported occasional serosanguinous drainage from plantar aspect of R hallux. Denies pain, erythema, malodor, fevers, chills, cold extremities. Denies any worsening swelling of toe.    PAST MEDICAL & SURGICAL HISTORY:  Scoliosis      Food allergy  egg, peanuts      Toe osteomyelitis, right      History of spinal fusion for scoliosis      Toe osteomyelitis, right      H/O toe surgery          Social Hx:     FAMILY HISTORY:  FH: hypothyroidism (Mother)    FH: anemia (Mother)        Allergies    eggs (Anaphylaxis)  sulfa drugs (Rash)  peanuts (Anaphylaxis)    Intolerances        Antibiotics:  MEDICATIONS  (STANDING):    MEDICATIONS  (PRN):  acetaminophen     Tablet .. 650 milliGRAM(s) Oral every 6 hours PRN Temp greater or equal to 38C (100.4F), Mild Pain (1 - 3)  aluminum hydroxide/magnesium hydroxide/simethicone Suspension 30 milliLiter(s) Oral every 4 hours PRN Dyspepsia  melatonin 3 milliGRAM(s) Oral at bedtime PRN Insomnia  ondansetron Injectable 4 milliGRAM(s) IV Push every 8 hours PRN Nausea and/or Vomiting       REVIEW OF SYSTEMS:  CONSTITUTIONAL:  No Fever or chills  HEENT:  No sore throat or runny nose.  CARDIOVASCULAR:  No chest pain or SOB.  RESPIRATORY:  No cough, shortness of breath  GASTROINTESTINAL:  No nausea, vomiting or diarrhea.  GENITOURINARY:  No dysuria  MUSCULOSKELETAL:  no joint aches, no muscle pain  SKIN:  see history  NEUROLOGIC:  No headache or dizziness  PSYCHIATRIC:  No disorder of thought or mood.    Physical Exam:  Vital Signs Last 24 Hrs  T(C): 36.5 (04 Jun 2024 11:48), Max: 36.9 (04 Jun 2024 00:03)  T(F): 97.7 (04 Jun 2024 11:48), Max: 98.4 (04 Jun 2024 00:03)  HR: 71 (04 Jun 2024 11:48) (70 - 99)  BP: 116/65 (04 Jun 2024 11:48) (98/65 - 116/74)  BP(mean): --  RR: 13 (04 Jun 2024 11:48) (13 - 24)  SpO2: 98% (04 Jun 2024 11:48) (96% - 99%)    Parameters below as of 04 Jun 2024 11:48  Patient On (Oxygen Delivery Method): room air      Height (cm): 175.3 (06-04 @ 11:46)  Weight (kg): 56.7 (06-04 @ 11:46)  BMI (kg/m2): 18.5 (06-04 @ 11:46)  BSA (m2): 1.69 (06-04 @ 11:46)  GEN: NAD  HEENT: normocephalic and atraumatic.   NECK: Supple.   LUNGS: Normal respiratory effort  HEART: Regular rate and rhythm   ABDOMEN: Soft, nontender, and nondistended.    EXTREMITIES: No leg edema.  NEUROLOGIC: grossly intact.  PSYCHIATRIC: Appropriate affect .  SKIN: (+) R 1st toe wound, no active drainage, surrounding skin changes appear chronic    Labs:  06-04    140  |  108  |  16  ----------------------------<  91  4.3   |  30  |  0.61    Ca    9.2      04 Jun 2024 07:26    TPro  6.9  /  Alb  3.4  /  TBili  0.8  /  DBili  x   /  AST  15  /  ALT  17  /  AlkPhos  138<H>  06-04                          14.9   6.77  )-----------( 214      ( 04 Jun 2024 07:26 )             43.8     PT/INR - ( 03 Jun 2024 17:30 )   PT: 11.2 sec;   INR: 0.96 ratio         PTT - ( 03 Jun 2024 17:30 )  PTT:34.5 sec  Urinalysis Basic - ( 04 Jun 2024 07:26 )    Color: x / Appearance: x / SG: x / pH: x  Gluc: 91 mg/dL / Ketone: x  / Bili: x / Urobili: x   Blood: x / Protein: x / Nitrite: x   Leuk Esterase: x / RBC: x / WBC x   Sq Epi: x / Non Sq Epi: x / Bacteria: x      LIVER FUNCTIONS - ( 04 Jun 2024 07:26 )  Alb: 3.4 g/dL / Pro: 6.9 g/dL / ALK PHOS: 138 U/L / ALT: 17 U/L / AST: 15 U/L / GGT: x                   C-Reactive Protein: 24 mg/L (06-03-24 @ 17:30)    Sedimentation Rate, Erythrocyte: 5 mm/hr (06-03-24 @ 17:30)        RECENT CULTURES:        All imaging and other data have been reviewed.    < from: Xray Foot AP + Lateral + Oblique, Right (06.03.24 @ 17:03) >  Again noted is a bone resection around the IP joint of the great toe.   There are some dystrophic calcifications evident. At the outer aspect of   the surgery from March 25 this year.    There is no signs of fracture or active bone infection.    IMPRESSION: No acute finding.    < end of copied text >  
HPI:      18y year old Male seen at [facility] for right great toe wound. Patient has had the wound since November 2022 and was first seen at [facility] in January of 2023. Patient underwent surgical wound debridement and bone biopsy (+) for OM with 2 rounds of PICC line abx with the most recent one in Jan 2024. Patient has had hyperbaric therapy, percutaneous  plantar fasciotomy and arthroplasty of the right hallux IPJ to aid with the healing process but the wound recurrently re-opens. Patient denies any pain to the wound. Patient has again noticed the wound re-open and was advised to proceed to the ER for further work up and to assess for underlying OM.   Denies any fever, chills, nausea, vomiting, chest pain, shortness of breath, or calf pain at this time.    REVIEW OF SYSTEMS    PAST MEDICAL & SURGICAL HISTORY:  Scoliosis      Food allergy  egg, peanuts      Toe osteomyelitis, right      History of spinal fusion for scoliosis      Toe osteomyelitis, right      H/O toe surgery          Allergies    eggs (Anaphylaxis)  sulfa drugs (Rash)  peanuts (Anaphylaxis)    Intolerances        MEDICATIONS  (STANDING):    MEDICATIONS  (PRN):      Social History:      FAMILY HISTORY:  FH: hypothyroidism (Mother)    FH: anemia (Mother)        Vital Signs Last 24 Hrs  T(C): 36.8 (03 Jun 2024 15:12), Max: 36.8 (03 Jun 2024 15:12)  T(F): 98.2 (03 Jun 2024 15:12), Max: 98.2 (03 Jun 2024 15:12)  HR: 99 (03 Jun 2024 15:12) (99 - 99)  BP: 108/75 (03 Jun 2024 15:12) (108/75 - 108/75)  BP(mean): --  RR: 16 (03 Jun 2024 15:12) (16 - 16)  SpO2: 99% (03 Jun 2024 15:12) (99% - 99%)    Parameters below as of 03 Jun 2024 15:12  Patient On (Oxygen Delivery Method): room air        PHYSICAL EXAM:  Vascular: DP & PT palpable bilaterally, Capillary refill 3 seconds, Digital hair present bilaterally  Neurological: Light touch sensation intact bilaterally  Musculoskeletal: 5/5 strength in all quadrants bilaterally, AJ & STJ ROM intact, s/p right hallux  IPJ arthroplasty   Dermatological: 1.0 x 0.5cm x down to bone , granular wound bed, no periwound erythema, no fluctuance, no malodor, no proximal streaking at this time    CBC Full  -  ( 03 Jun 2024 17:30 )  WBC Count : 6.66 K/uL  RBC Count : 5.50 M/uL  Hemoglobin : 16.7 g/dL  Hematocrit : 48.5 %  Platelet Count - Automated : 231 K/uL  Mean Cell Volume : 88.2 fl  Mean Cell Hemoglobin : 30.4 pg  Mean Cell Hemoglobin Concentration : 34.4 gm/dL  Auto Neutrophil # : 4.98 K/uL  Auto Lymphocyte # : 1.00 K/uL  Auto Monocyte # : 0.57 K/uL  Auto Eosinophil # : 0.07 K/uL  Auto Basophil # : 0.02 K/uL  Auto Neutrophil % : 74.7 %  Auto Lymphocyte % : 15.0 %  Auto Monocyte % : 8.6 %  Auto Eosinophil % : 1.1 %  Auto Basophil % : 0.3 %    06-03    145  |  116<H>  |  15  ----------------------------<  74  3.1<L>   |  24  |  0.50    Ca    6.8<L>      03 Jun 2024 17:30    TPro  5.7<L>  /  Alb  3.0<L>  /  TBili  0.6  /  DBili  x   /  AST  19  /  ALT  18  /  AlkPhos  118  06-03                            16.7   6.66  )-----------( 231      ( 03 Jun 2024 17:30 )             48.5       PT/INR - ( 03 Jun 2024 17:30 )   PT: 11.2 sec;   INR: 0.96 ratio         PTT - ( 03 Jun 2024 17:30 )  PTT:34.5 sec        Imaging: ----------

## 2024-06-04 NOTE — CONSULT NOTE ADULT - REASON FOR ADMISSION
Chronic Osteomyelitis
Right great toe chronic non healing wound with OM
In no apparent distress, appears well developed and well nourished.

## 2024-06-04 NOTE — PROGRESS NOTE ADULT - PROBLEM SELECTOR PLAN 1
Non-healing wound on plantar aspect of R hallus x1 year. Failed outpatient abx  - Non-toxic appearing, good extremity perfusion   - XR Foot: neg for acute fx or infection. post-operative changes again visualized   - Tissue bx from Jan 2024: MDR Serratia marcescens  - s/p Vanc and Cefepime in ED. Hold abx until after procedure or pending Podiatry/ID recs  - NPO for OR  - Pain control: acetaminophen prn   -ID consulted  - Podiatry (Dr. Irwin) following, discussed case, will attempt MRI prior to OR, if unable to be done, then will forego MRI and proceed with bone biopsy- f/u path results  -RCRI 0- patient is low risk for OR procedure and is medically optimized

## 2024-06-05 LAB
ANION GAP SERPL CALC-SCNC: 6 MMOL/L — SIGNIFICANT CHANGE UP (ref 5–17)
BUN SERPL-MCNC: 14 MG/DL — SIGNIFICANT CHANGE UP (ref 7–23)
CALCIUM SERPL-MCNC: 9.3 MG/DL — SIGNIFICANT CHANGE UP (ref 8.5–10.1)
CHLORIDE SERPL-SCNC: 109 MMOL/L — HIGH (ref 96–108)
CO2 SERPL-SCNC: 26 MMOL/L — SIGNIFICANT CHANGE UP (ref 22–31)
CREAT SERPL-MCNC: 0.58 MG/DL — SIGNIFICANT CHANGE UP (ref 0.5–1.3)
EGFR: 143 ML/MIN/1.73M2 — SIGNIFICANT CHANGE UP
GLUCOSE SERPL-MCNC: 91 MG/DL — SIGNIFICANT CHANGE UP (ref 70–99)
GRAM STN FLD: SIGNIFICANT CHANGE UP
GRAM STN FLD: SIGNIFICANT CHANGE UP
HCT VFR BLD CALC: 44.7 % — SIGNIFICANT CHANGE UP (ref 39–50)
HGB BLD-MCNC: 15.3 G/DL — SIGNIFICANT CHANGE UP (ref 13–17)
MCHC RBC-ENTMCNC: 30.4 PG — SIGNIFICANT CHANGE UP (ref 27–34)
MCHC RBC-ENTMCNC: 34.2 GM/DL — SIGNIFICANT CHANGE UP (ref 32–36)
MCV RBC AUTO: 88.9 FL — SIGNIFICANT CHANGE UP (ref 80–100)
NRBC # BLD: 0 /100 WBCS — SIGNIFICANT CHANGE UP (ref 0–0)
PLATELET # BLD AUTO: 216 K/UL — SIGNIFICANT CHANGE UP (ref 150–400)
POTASSIUM SERPL-MCNC: 3.5 MMOL/L — SIGNIFICANT CHANGE UP (ref 3.5–5.3)
POTASSIUM SERPL-SCNC: 3.5 MMOL/L — SIGNIFICANT CHANGE UP (ref 3.5–5.3)
RBC # BLD: 5.03 M/UL — SIGNIFICANT CHANGE UP (ref 4.2–5.8)
RBC # FLD: 13.3 % — SIGNIFICANT CHANGE UP (ref 10.3–14.5)
SODIUM SERPL-SCNC: 141 MMOL/L — SIGNIFICANT CHANGE UP (ref 135–145)
SPECIMEN SOURCE: SIGNIFICANT CHANGE UP
SPECIMEN SOURCE: SIGNIFICANT CHANGE UP
WBC # BLD: 6.63 K/UL — SIGNIFICANT CHANGE UP (ref 3.8–10.5)
WBC # FLD AUTO: 6.63 K/UL — SIGNIFICANT CHANGE UP (ref 3.8–10.5)

## 2024-06-05 PROCEDURE — 99232 SBSQ HOSP IP/OBS MODERATE 35: CPT

## 2024-06-05 PROCEDURE — 99024 POSTOP FOLLOW-UP VISIT: CPT

## 2024-06-05 RX ORDER — CIPROFLOXACIN LACTATE 400MG/40ML
1 VIAL (ML) INTRAVENOUS
Qty: 14 | Refills: 0
Start: 2024-06-05 | End: 2024-06-11

## 2024-06-05 RX ORDER — CEFEPIME 1 G/1
INJECTION, POWDER, FOR SOLUTION INTRAMUSCULAR; INTRAVENOUS
Refills: 0 | Status: DISCONTINUED | OUTPATIENT
Start: 2024-06-05 | End: 2024-06-05

## 2024-06-05 RX ORDER — CIPROFLOXACIN LACTATE 400MG/40ML
500 VIAL (ML) INTRAVENOUS EVERY 12 HOURS
Refills: 0 | Status: DISCONTINUED | OUTPATIENT
Start: 2024-06-05 | End: 2024-06-06

## 2024-06-05 RX ADMIN — Medication 500 MILLIGRAM(S): at 17:21

## 2024-06-05 NOTE — PROGRESS NOTE ADULT - PROBLEM SELECTOR PLAN 1
Non-healing wound on plantar aspect of R hallus x1 year. Failed outpatient abx  - Non-toxic appearing, good extremity perfusion   - XR Foot: neg for acute fx or infection. post-operative changes again visualized   - Tissue bx from Jan 2024: MDR Serratia marcescens  - s/p Vanc and Cefepime in ED. Per ID, start Cipro 500mg BID  - s/p bone biopsy on 6/4  - f/u OR pathology result   - Pain control: acetaminophen prn   - ID and Podiatry recs yariel Non-healing wound on plantar aspect of R hallus x1 year. Failed outpatient abx  - Non-toxic appearing, good extremity perfusion   - XR Foot: neg for acute fx or infection. post-operative changes again visualized   - Tissue bx from Jan 2024: MDR Serratia marcescens  - s/p Vanc and Cefepime in ED. Per ID, start Cipro 500mg BID  - s/p bone biopsy on 6/4  - f/u OR pathology result   - Pain control: acetaminophen prn   - ID and Podiatry recs yariel  - Wound care per podiatry

## 2024-06-05 NOTE — CASE MANAGEMENT PROGRESS NOTE - NSCMPROGRESSNOTE_GEN_ALL_CORE
Discussed pt on rounds, pt remains acute,  sp bone bx, pending pathology. CM will continue to collaborate with interdisciplinary team and remain available to assist.

## 2024-06-05 NOTE — PROGRESS NOTE ADULT - PROBLEM SELECTOR PLAN 1
Patient examined and evaluated.  Surgical site dressed with adaptic, Aquacel and dry, sterile dressing.  Patient is to be partial weight bearing to the right  heel  in a surgical shoe.  Surgical pathology and cultures pending at this time.  Antibiotics as per ID recommendations.  Will discuss if need for further surgical intervention is warranted   Patient to be strictly non weight bearing to the right lower extremity - will need physical therapy consult- patient states cannot tolerate crutches.     Wound Care Instructions:  -Keep dressing clean, dry, and intact to the right foot  -Change dressing to the right foot every other day  - Apply silver alginate and cover with sterile gauze, abd pad and shira   -Patient must non weight bearing to the right foot at all times   -Monitor for any signs of infection including redness, swelling in the leg above the bandage, nausea/vomiting/fever/chills/chest pain/shortness of breath, if any are present patient must report to the emergency department immediately  -Patient is to follow up with Dr. Campos/Dr. Rashid/ Dr. Irwin  within 5 days after discharge at [facility]. Please call to make an appointment [phone]

## 2024-06-06 ENCOUNTER — TRANSCRIPTION ENCOUNTER (OUTPATIENT)
Age: 19
End: 2024-06-06

## 2024-06-06 VITALS — WEIGHT: 125 LBS

## 2024-06-06 LAB
ANION GAP SERPL CALC-SCNC: 4 MMOL/L — LOW (ref 5–17)
BUN SERPL-MCNC: 16 MG/DL — SIGNIFICANT CHANGE UP (ref 7–23)
CALCIUM SERPL-MCNC: 9 MG/DL — SIGNIFICANT CHANGE UP (ref 8.5–10.1)
CHLORIDE SERPL-SCNC: 106 MMOL/L — SIGNIFICANT CHANGE UP (ref 96–108)
CO2 SERPL-SCNC: 30 MMOL/L — SIGNIFICANT CHANGE UP (ref 22–31)
CREAT SERPL-MCNC: 0.66 MG/DL — SIGNIFICANT CHANGE UP (ref 0.5–1.3)
EGFR: 138 ML/MIN/1.73M2 — SIGNIFICANT CHANGE UP
GLUCOSE SERPL-MCNC: 95 MG/DL — SIGNIFICANT CHANGE UP (ref 70–99)
GRAM STN FLD: ABNORMAL
HCT VFR BLD CALC: 43.3 % — SIGNIFICANT CHANGE UP (ref 39–50)
HGB BLD-MCNC: 14.8 G/DL — SIGNIFICANT CHANGE UP (ref 13–17)
MCHC RBC-ENTMCNC: 30.6 PG — SIGNIFICANT CHANGE UP (ref 27–34)
MCHC RBC-ENTMCNC: 34.2 GM/DL — SIGNIFICANT CHANGE UP (ref 32–36)
MCV RBC AUTO: 89.6 FL — SIGNIFICANT CHANGE UP (ref 80–100)
NRBC # BLD: 0 /100 WBCS — SIGNIFICANT CHANGE UP (ref 0–0)
PLATELET # BLD AUTO: 219 K/UL — SIGNIFICANT CHANGE UP (ref 150–400)
POTASSIUM SERPL-MCNC: 3.8 MMOL/L — SIGNIFICANT CHANGE UP (ref 3.5–5.3)
POTASSIUM SERPL-SCNC: 3.8 MMOL/L — SIGNIFICANT CHANGE UP (ref 3.5–5.3)
RBC # BLD: 4.83 M/UL — SIGNIFICANT CHANGE UP (ref 4.2–5.8)
RBC # FLD: 13.1 % — SIGNIFICANT CHANGE UP (ref 10.3–14.5)
SODIUM SERPL-SCNC: 140 MMOL/L — SIGNIFICANT CHANGE UP (ref 135–145)
WBC # BLD: 5.99 K/UL — SIGNIFICANT CHANGE UP (ref 3.8–10.5)
WBC # FLD AUTO: 5.99 K/UL — SIGNIFICANT CHANGE UP (ref 3.8–10.5)

## 2024-06-06 PROCEDURE — 99232 SBSQ HOSP IP/OBS MODERATE 35: CPT

## 2024-06-06 PROCEDURE — 87077 CULTURE AEROBIC IDENTIFY: CPT

## 2024-06-06 PROCEDURE — 87186 SC STD MICRODIL/AGAR DIL: CPT

## 2024-06-06 PROCEDURE — 86140 C-REACTIVE PROTEIN: CPT

## 2024-06-06 PROCEDURE — 80053 COMPREHEN METABOLIC PANEL: CPT

## 2024-06-06 PROCEDURE — 99285 EMERGENCY DEPT VISIT HI MDM: CPT

## 2024-06-06 PROCEDURE — 97162 PT EVAL MOD COMPLEX 30 MIN: CPT

## 2024-06-06 PROCEDURE — 85730 THROMBOPLASTIN TIME PARTIAL: CPT

## 2024-06-06 PROCEDURE — 85652 RBC SED RATE AUTOMATED: CPT

## 2024-06-06 PROCEDURE — 85027 COMPLETE CBC AUTOMATED: CPT

## 2024-06-06 PROCEDURE — 85025 COMPLETE CBC W/AUTO DIFF WBC: CPT

## 2024-06-06 PROCEDURE — 87040 BLOOD CULTURE FOR BACTERIA: CPT

## 2024-06-06 PROCEDURE — 80048 BASIC METABOLIC PNL TOTAL CA: CPT

## 2024-06-06 PROCEDURE — 85610 PROTHROMBIN TIME: CPT

## 2024-06-06 PROCEDURE — 96360 HYDRATION IV INFUSION INIT: CPT

## 2024-06-06 PROCEDURE — 88304 TISSUE EXAM BY PATHOLOGIST: CPT

## 2024-06-06 PROCEDURE — 87075 CULTR BACTERIA EXCEPT BLOOD: CPT

## 2024-06-06 PROCEDURE — 93005 ELECTROCARDIOGRAM TRACING: CPT

## 2024-06-06 PROCEDURE — 99239 HOSP IP/OBS DSCHRG MGMT >30: CPT

## 2024-06-06 PROCEDURE — 87070 CULTURE OTHR SPECIMN AEROBIC: CPT

## 2024-06-06 PROCEDURE — 88311 DECALCIFY TISSUE: CPT

## 2024-06-06 PROCEDURE — 73630 X-RAY EXAM OF FOOT: CPT

## 2024-06-06 PROCEDURE — 36415 COLL VENOUS BLD VENIPUNCTURE: CPT

## 2024-06-06 RX ADMIN — Medication 500 MILLIGRAM(S): at 05:37

## 2024-06-06 NOTE — PROGRESS NOTE ADULT - PROBLEM SELECTOR PROBLEM 2
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
Electrolyte abnormality

## 2024-06-06 NOTE — PROGRESS NOTE ADULT - PROBLEM SELECTOR PLAN 1
Non-healing wound on plantar aspect of R hallus x1 year. Failed outpatient abx  - Non-toxic appearing, good extremity perfusion   - XR Foot: neg for acute fx or infection. post-operative changes again visualized   - Tissue bx from Jan 2024: MDR Serratia marcescens  - s/p Vanc and Cefepime in ED. Per ID,   currently on Cipro 500mg BID  - s/p bone biopsy on 6/4  - f/u OR pathology result for d/c planning Non-healing wound on plantar aspect of R hallus x1 year. Failed outpatient abx  - Non-toxic appearing, good extremity perfusion   - XR Foot: neg for acute fx or infection. post-operative changes again visualized   - Tissue bx from Jan 2024: MDR Serratia marcescens  - s/p Vanc and Cefepime in ED. Per ID,   currently on Cipro 500mg BID  - s/p bone biopsy on 6/4  - f/u OR pathology result for d/c planning    Wound Care Instructions upon   -Keep dressing clean, dry, and intact to the right foot  -Change dressing to the right foot every other day  - Apply silver alginate and cover with sterile gauze, abd pad and shira   -Patient must non weight bearing to the right foot at all times   -Monitor for any signs of infection including redness, swelling in the leg above the bandage, nausea/vomiting/fever/chills/chest pain/shortness of breath, if any are present patient must report to the emergency department immediately  -Patient is to follow up with Dr. Campos/Dr. Rashid/ Dr. Irwin  within 5 days after discharge at [facility] [place] [place]. Please call to make an appointment [phone].

## 2024-06-06 NOTE — DIETITIAN INITIAL EVALUATION ADULT - ADD RECOMMEND
1) Continue regular diet as tolerated; honor food preferences to optimize po intake/tolerance  2) Recommend ensure plus HP daily (350kcal, 20gm protein per 8 fl oz serving); MD made aware diet rx pending verification   3) Recommend MVI daily and Vit C 500mg daily  4) Monitor po intake, diet tolerance, weight trends, labs, GI function, skin integrity

## 2024-06-06 NOTE — DISCHARGE NOTE PROVIDER - CARE PROVIDERS DIRECT ADDRESSES
[email or web address]

## 2024-06-06 NOTE — PROGRESS NOTE ADULT - PROBLEM SELECTOR PLAN 2
- Potassium repleted  - Trend metabolic panel
- Potassium repleted  - Trend metabolic panel
hypokalemia   resolved
(0) independent

## 2024-06-06 NOTE — PROGRESS NOTE ADULT - PROBLEM SELECTOR PLAN 3
Patient is ambulatory. SCDs    Dispo: pending ID recs and surg OR path
Patient is ambulatory. SCDs
Patient is ambulatory. SCDs    Dispo: pending ID recs and surg OR path

## 2024-06-06 NOTE — DIETITIAN INITIAL EVALUATION ADULT - PROBLEM SELECTOR PLAN 1
Non-healing wound on plantar aspect of R hallus x1 year. Failed outpatient abx  - Non-toxic appearing, good extremity perfusion   - XR Foot: neg for acute fx or infection. post-operative changes again visualized   - Tissue bx from Jan 2024: MDR Serratia marcescens  - s/p Vanc and Cefepime in ED. Hold abx until after procedure or pending Podiatry recs  - NPO after midnight  - Pain control: acetaminophen prn   - Podiatry (Dr. Irwin) following

## 2024-06-06 NOTE — PROGRESS NOTE ADULT - REASON FOR ADMISSION
Chronic Osteomyelitis

## 2024-06-06 NOTE — DIETITIAN INITIAL EVALUATION ADULT - PERTINENT LABORATORY DATA
06-06    140  |  106  |  16  ----------------------------<  95  3.8   |  30  |  0.66    Ca    9.0      06 Jun 2024 06:06

## 2024-06-06 NOTE — DIETITIAN INITIAL EVALUATION ADULT - WEIGHT FOR BMI (KG)
LEFT MESSAGE FOR PHARMACY    SPOKE WITH PT ALSO
Prescription Refill     Medication Name:  [facility]  Pharmacy: [facility]  Patient Contact Number:      [facility] [address] TO VERIFY A MEDICATION. DR Nayan Weiss SENT IN A SCRIPT FOR PREDNISONE 10 MG, AND THEN HE SENT IN A SCRIPT FOR PREDNISONE 20 MG. SHE'D LIKE TO BE CALLED BACK [phone] FOR CLARIFICATION.
56.7

## 2024-06-06 NOTE — PROGRESS NOTE ADULT - SUBJECTIVE AND OBJECTIVE BOX
[facility]  INFECTIOUS DISEASES - Tommie Combs, [address], [place] [address]  Tel: [phone]     Fax: [phone]  =======================================================    YARITZA MERRILL [id number]    Follow up: No fevers. Had 2 loose stool today--mixed with solid. Otherwise denies any new complaints.    Allergies:  eggs (Anaphylaxis)  sulfa drugs (Rash)  peanuts (Anaphylaxis)      Antibiotics:  acetaminophen     Tablet .. 650 milliGRAM(s) Oral every 6 hours PRN  aluminum hydroxide/magnesium hydroxide/simethicone Suspension 30 milliLiter(s) Oral every 4 hours PRN  ciprofloxacin     Tablet 500 milliGRAM(s) Oral every 12 hours  melatonin 3 milliGRAM(s) Oral at bedtime PRN  ondansetron Injectable 4 milliGRAM(s) IV Push every 8 hours PRN       REVIEW OF SYSTEMS:  CONSTITUTIONAL:  No Fever or chills  HEENT:  No sore throat or runny nose.  CARDIOVASCULAR:  No chest pain or SOB.  RESPIRATORY:  No cough, shortness of breath  GASTROINTESTINAL:  No nausea, vomiting or abdominal pain.  GENITOURINARY:  No dysuria  MUSCULOSKELETAL:  no joint aches, no muscle pain  NEUROLOGIC:  No headache or dizziness  PSYCHIATRIC:  No disorder of thought or mood.     Physical Exam:  ICU Vital Signs Last 24 Hrs  T(C): 36.8 (06 Jun 2024 11:20), Max: 36.8 (06 Jun 2024 11:20)  T(F): 98.3 (06 Jun 2024 11:20), Max: 98.3 (06 Jun 2024 11:20)  HR: 76 (06 Jun 2024 11:20) (75 - 76)  BP: 131/79 (06 Jun 2024 11:20) (95/63 - 131/79)  BP(mean): --  ABP: --  ABP(mean): --  RR: 18 (06 Jun 2024 11:20) (18 - 18)  SpO2: 95% (06 Jun 2024 11:20) (95% - 96%)    O2 Parameters below as of 06 Jun 2024 11:20  Patient On (Oxygen Delivery Method): room air      GEN: NAD  HEENT: normocephalic and atraumatic.   NECK: Supple.   LUNGS: Normal respiratory effort  HEART: Regular rate and rhythm   ABDOMEN: Soft, nontender, and nondistended.    EXTREMITIES: No leg edema.  NEUROLOGIC: grossly intact.  PSYCHIATRIC: Appropriate affect .  SKIN: R hallux surgical wound with sutures    Labs:  06-06    140  |  106  |  16  ----------------------------<  95  3.8   |  30  |  0.66    Ca    9.0      06 Jun 2024 06:06                            14.8   5.99  )-----------( 219      ( 06 Jun 2024 06:06 )             43.3       Urinalysis Basic - ( 06 Jun 2024 06:06 )    Color: x / Appearance: x / SG: x / pH: x  Gluc: 95 mg/dL / Ketone: x  / Bili: x / Urobili: x   Blood: x / Protein: x / Nitrite: x   Leuk Esterase: x / RBC: x / WBC x   Sq Epi: x / Non Sq Epi: x / Bacteria: x          RECENT CULTURES:  06-04 @ 13:40 .Tissue Other, RIGHT HALLUX PROXIMAL PHALANX     No growth    No polymorphonuclear cells seen per low power field  No organisms seen per oil power field      06-03 @ 17:45 .Blood Blood-Peripheral     No growth at 48 Hours        06-03 @ 17:30 .Blood Blood-Peripheral     No growth at 48 Hours              All imaging and data are reviewed.   
Patient is a 18y old  Male who presents with a chief complaint of Chronic Osteomyelitis (05 Jun 2024 22:29)        HPI:  19 y/o M PMHx of scoliosis, right hallux ulcer w/ chronic OM (s/p IPJ arthroplasty) presenting to [facility] [place] for surgical biopsy of R. big toe ulcer. Patient sustained a laceration to his R big toe about 1 year ago and has had a chronic non-healing wound since. Multiple hospital admissions for surgical management and IV abx, most recently in Jan 2024. Patient has been regularly following-up with Podiatry / Wound care, most recently on 6/1 when decision was made for patient to be admitted to [facility] [place] for elective bone biopsy to r/o definitive OM followed by partial hallux amputation. Reports occasional serosanguinous drainage from plantar aspect of R hallux. Denies pain, erythema, malodor, fevers, chills, cold extremities. Denies h/o DM, not on blood thinners.     ED course    Vitals: wnl  Labs: K 3.1, Ca 6.8    XR right foot  Again noted is a bone resection around the IP joint of the great toe.   There are some dystrophic calcifications evident. At the outer aspect of   the surgery from March 25 this year.    There is no signs of fracture or active bone infection.   (03 Jun 2024 19:34)      SUBJECTIVE & OBJECTIVE: Pt seen and examined at bedside. nad    PHYSICAL EXAM:  T(C): 36.4 (06-06-24 @ 05:30), Max: 36.7 (06-05-24 @ 11:23)  HR: 75 (06-06-24 @ 05:30) (75 - 94)  BP: 95/63 (06-06-24 @ 05:30) (95/63 - 115/69)  RR: 18 (06-06-24 @ 05:30) (17 - 18)  SpO2: 96% (06-06-24 @ 05:30) (96% - 97%)  Wt(kg): --   GENERAL: NAD, well-groomed, well-developed  HEAD:  Atraumatic, Normocephalic  NECK: Supple, No JVD  NERVOUS SYSTEM:  Alert & Oriented X3,   CHEST/LUNG: Clear to auscultation bilaterally; No rales, rhonchi, wheezing, or rubs  HEART: Regular rate and rhythm; No murmurs, rubs, or gallops  ABDOMEN: Soft, Nontender, Nondistended; Bowel sounds present  EXTREMITIES:  2+ Peripheral Pulses, No clubbing, cyanosis, or edema        MEDICATIONS  (STANDING):  ciprofloxacin     Tablet 500 milliGRAM(s) Oral every 12 hours    MEDICATIONS  (PRN):  acetaminophen     Tablet .. 650 milliGRAM(s) Oral every 6 hours PRN Temp greater or equal to 38C (100.4F), Mild Pain (1 - 3)  aluminum hydroxide/magnesium hydroxide/simethicone Suspension 30 milliLiter(s) Oral every 4 hours PRN Dyspepsia  melatonin 3 milliGRAM(s) Oral at bedtime PRN Insomnia  ondansetron Injectable 4 milliGRAM(s) IV Push every 8 hours PRN Nausea and/or Vomiting      LABS:                        14.8   5.99  )-----------( 219      ( 06 Jun 2024 06:06 )             43.3     06-06    140  |  106  |  16  ----------------------------<  95  3.8   |  30  |  0.66    Ca    9.0      06 Jun 2024 06:06        Urinalysis Basic - ( 06 Jun 2024 06:06 )    Color: x / Appearance: x / SG: x / pH: x  Gluc: 95 mg/dL / Ketone: x  / Bili: x / Urobili: x   Blood: x / Protein: x / Nitrite: x   Leuk Esterase: x / RBC: x / WBC x   Sq Epi: x / Non Sq Epi: x / Bacteria: x        CAPILLARY BLOOD GLUCOSE          CAPILLARY BLOOD GLUCOSE        CAPILLARY BLOOD GLUCOSE                RECENT CULTURES:      RADIOLOGY & ADDITIONAL TESTS:                        DVT/GI ppx  Discussed with pt @ bedside
Jone Marks M.D.    Patient is a 18y old  Male who presents with a chief complaint of Chronic Osteomyelitis (04 Jun 2024 12:16)      SUBJECTIVE / OVERNIGHT EVENTS: no event overnight. Reports foot pain controlled.     Patient denies chest pain, SOB, abd pain, N/V, fever, chills, dysuria or any other complaints. All remainder ROS negative.     MEDICATIONS  (STANDING):    MEDICATIONS  (PRN):  acetaminophen     Tablet .. 650 milliGRAM(s) Oral every 6 hours PRN Temp greater or equal to 38C (100.4F), Mild Pain (1 - 3)  aluminum hydroxide/magnesium hydroxide/simethicone Suspension 30 milliLiter(s) Oral every 4 hours PRN Dyspepsia  melatonin 3 milliGRAM(s) Oral at bedtime PRN Insomnia  ondansetron Injectable 4 milliGRAM(s) IV Push every 8 hours PRN Nausea and/or Vomiting      I&O's Summary    04 Jun 2024 07:01  -  05 Jun 2024 07:00  --------------------------------------------------------  IN: 330 mL / OUT: 375 mL / NET: -45 mL        PHYSICAL EXAM:  Vital Signs Last 24 Hrs  T(C): 36.8 (05 Jun 2024 05:14), Max: 36.9 (04 Jun 2024 14:16)  T(F): 98.3 (05 Jun 2024 05:14), Max: 98.4 (04 Jun 2024 14:16)  HR: 74 (05 Jun 2024 05:14) (71 - 96)  BP: 103/68 (05 Jun 2024 05:14) (102/67 - 116/75)  BP(mean): --  RR: 18 (05 Jun 2024 05:14) (13 - 24)  SpO2: 95% (05 Jun 2024 05:14) (94% - 98%)    Parameters below as of 05 Jun 2024 05:14  Patient On (Oxygen Delivery Method): room air        CONSTITUTIONAL: NAD, well-groomed  RESPIRATORY: Normal respiratory effort; lungs are clear to auscultation bilaterally  CARDIOVASCULAR: Regular rate and rhythm, no LE edema  ABDOMEN: Nontender to palpation, normoactive bowel sounds  PSYCH: A+O x3; affect appropriate    LABS:                        15.3   6.63  )-----------( 216      ( 05 Jun 2024 07:25 )             44.7     06-05    141  |  109<H>  |  14  ----------------------------<  91  3.5   |  26  |  0.58    Ca    9.3      05 Jun 2024 07:25    TPro  6.9  /  Alb  3.4  /  TBili  0.8  /  DBili  x   /  AST  15  /  ALT  17  /  AlkPhos  138<H>  06-04    PT/INR - ( 03 Jun 2024 17:30 )   PT: 11.2 sec;   INR: 0.96 ratio         PTT - ( 03 Jun 2024 17:30 )  PTT:34.5 sec      Urinalysis Basic - ( 05 Jun 2024 07:25 )    Color: x / Appearance: x / SG: x / pH: x  Gluc: 91 mg/dL / Ketone: x  / Bili: x / Urobili: x   Blood: x / Protein: x / Nitrite: x   Leuk Esterase: x / RBC: x / WBC x   Sq Epi: x / Non Sq Epi: x / Bacteria: x        Culture - Tissue with Gram Stain (collected 04 Jun 2024 13:40)  Source: .Tissue Other, RIGHT HALLUX DISTAL PHALANX  Gram Stain (05 Jun 2024 00:31):    No polymorphonuclear cells seen per low power field    No organisms seen per oil power field    Culture - Tissue with Gram Stain (collected 04 Jun 2024 13:40)  Source: .Tissue Other, RIGHT HALLUX PROXIMAL PHALANX  Gram Stain (05 Jun 2024 00:30):    No polymorphonuclear cells seen per low power field    No organisms seen per oil power field    Culture - Blood (collected 03 Jun 2024 17:45)  Source: .Blood Blood-Peripheral  Preliminary Report (05 Jun 2024 01:03):    No growth at 24 hours    Culture - Blood (collected 03 Jun 2024 17:30)  Source: .Blood Blood-Peripheral  Preliminary Report (05 Jun 2024 01:03):    No growth at 24 hours      CAPILLARY BLOOD GLUCOSE          RADIOLOGY & ADDITIONAL TESTS:  Results Reviewed:   Imaging Personally Reviewed:  Electrocardiogram Personally Reviewed:
[facility]  INFECTIOUS DISEASES - Tommie Combs, [place] [address]  Tel: [phone]     Fax: [phone]  =======================================================    YARITZA MERRILL [id number]    Follow up: No fevers. Denies any pain or new complaints.    Allergies:  eggs (Anaphylaxis)  sulfa drugs (Rash)  peanuts (Anaphylaxis)      Antibiotics:  acetaminophen     Tablet .. 650 milliGRAM(s) Oral every 6 hours PRN  aluminum hydroxide/magnesium hydroxide/simethicone Suspension 30 milliLiter(s) Oral every 4 hours PRN  ciprofloxacin     Tablet 500 milliGRAM(s) Oral every 12 hours  melatonin 3 milliGRAM(s) Oral at bedtime PRN  ondansetron Injectable 4 milliGRAM(s) IV Push every 8 hours PRN       REVIEW OF SYSTEMS:  CONSTITUTIONAL:  No Fever or chills  HEENT:  No sore throat or runny nose.  CARDIOVASCULAR:  No chest pain or SOB.  RESPIRATORY:  No cough, shortness of breath  GASTROINTESTINAL:  No nausea, vomiting or diarrhea.  GENITOURINARY:  No dysuria  MUSCULOSKELETAL:  no joint aches, no muscle pain  NEUROLOGIC:  No headache or dizziness  PSYCHIATRIC:  No disorder of thought or mood.     Physical Exam:  ICU Vital Signs Last 24 Hrs  T(C): 36.7 (05 Jun 2024 11:23), Max: 36.8 (04 Jun 2024 17:21)  T(F): 98.1 (05 Jun 2024 11:23), Max: 98.3 (04 Jun 2024 17:21)  HR: 85 (05 Jun 2024 11:23) (74 - 96)  BP: 115/69 (05 Jun 2024 11:23) (102/67 - 116/75)  BP(mean): --  ABP: --  ABP(mean): --  RR: 17 (05 Jun 2024 11:23) (17 - 22)  SpO2: 96% (05 Jun 2024 11:23) (94% - 98%)    O2 Parameters below as of 05 Jun 2024 11:23  Patient On (Oxygen Delivery Method): room air      GEN: NAD  HEENT: normocephalic and atraumatic.   NECK: Supple.   LUNGS: Normal respiratory effort  HEART: Regular rate and rhythm   ABDOMEN: Soft, nontender, and nondistended.    EXTREMITIES: No leg edema.  NEUROLOGIC: grossly intact.  PSYCHIATRIC: Appropriate affect .  SKIN: R foot covered with dressing    Labs:  06-05    141  |  109<H>  |  14  ----------------------------<  91  3.5   |  26  |  0.58    Ca    9.3      05 Jun 2024 07:25    TPro  6.9  /  Alb  3.4  /  TBili  0.8  /  DBili  x   /  AST  15  /  ALT  17  /  AlkPhos  138<H>  06-04                          15.3   6.63  )-----------( 216      ( 05 Jun 2024 07:25 )             44.7     PT/INR - ( 03 Jun 2024 17:30 )   PT: 11.2 sec;   INR: 0.96 ratio         PTT - ( 03 Jun 2024 17:30 )  PTT:34.5 sec  Urinalysis Basic - ( 05 Jun 2024 07:25 )    Color: x / Appearance: x / SG: x / pH: x  Gluc: 91 mg/dL / Ketone: x  / Bili: x / Urobili: x   Blood: x / Protein: x / Nitrite: x   Leuk Esterase: x / RBC: x / WBC x   Sq Epi: x / Non Sq Epi: x / Bacteria: x      LIVER FUNCTIONS - ( 04 Jun 2024 07:26 )  Alb: 3.4 g/dL / Pro: 6.9 g/dL / ALK PHOS: 138 U/L / ALT: 17 U/L / AST: 15 U/L / GGT: x             RECENT CULTURES:  06-04 @ 13:40 .Tissue Other, RIGHT HALLUX PROXIMAL PHALANX       No polymorphonuclear cells seen per low power field  No organisms seen per oil power field      06-03 @ 17:45 .Blood Blood-Peripheral     No growth at 24 hours        06-03 @ 17:30 .Blood Blood-Peripheral     No growth at 24 hours              All imaging and data are reviewed.     
18y year old Male seen at [facility] 2EAS 201 D1 s/p right hallux bone biopsy of the distal and proximal phalanx.  Patient relates no overnight events and states that they are doing well at this time.  Denies any fever, chills, nausea, vomiting, chest pain, shortness of breath, or calf pain at this time.    Allergies    eggs (Anaphylaxis)  sulfa drugs (Rash)  peanuts (Anaphylaxis)    Intolerances        MEDICATIONS  (STANDING):  ciprofloxacin     Tablet 500 milliGRAM(s) Oral every 12 hours    MEDICATIONS  (PRN):  acetaminophen     Tablet .. 650 milliGRAM(s) Oral every 6 hours PRN Temp greater or equal to 38C (100.4F), Mild Pain (1 - 3)  aluminum hydroxide/magnesium hydroxide/simethicone Suspension 30 milliLiter(s) Oral every 4 hours PRN Dyspepsia  melatonin 3 milliGRAM(s) Oral at bedtime PRN Insomnia  ondansetron Injectable 4 milliGRAM(s) IV Push every 8 hours PRN Nausea and/or Vomiting      Vital Signs Last 24 Hrs  T(C): 36.7 (05 Jun 2024 19:24), Max: 36.8 (05 Jun 2024 05:14)  T(F): 98 (05 Jun 2024 19:24), Max: 98.3 (05 Jun 2024 05:14)  HR: 94 (05 Jun 2024 19:24) (74 - 94)  BP: 111/73 (05 Jun 2024 19:24) (103/68 - 115/69)  BP(mean): --  RR: 18 (05 Jun 2024 19:24) (17 - 18)  SpO2: 97% (05 Jun 2024 19:24) (95% - 97%)    Parameters below as of 05 Jun 2024 19:24  Patient On (Oxygen Delivery Method): room air        PHYSICAL EXAM:  Vascular: DP & PT palpable bilaterally, Capillary refill 3 seconds, Digital hair present bilaterally  Neurological: Light touch sensation intact bilaterally  Musculoskeletal: 5/5 strength in all quadrants bilaterally, AJ & STJ ROM intact, s/p right hallux  IPJ arthroplasty   Dermatological: 1.0 x 0.5cm x down to bone , granular wound bed, no periwound erythema, no fluctuance, no malodor, no proximal streaking at this time  Dermatological: Incision site on the medial aspect of the RIGHT foot noted with intact sutures, no dehiscence, mild vida-incision erythema, no proximal streaking, no fluctuance, no malodor, no signs of infection at this time.  Plantar hallux wound tracking down to bone  1.0 x 0.5cm x 0.6 cm , granular wound    CBC Full  -  ( 05 Jun 2024 07:25 )  WBC Count : 6.63 K/uL  RBC Count : 5.03 M/uL  Hemoglobin : 15.3 g/dL  Hematocrit : 44.7 %  Platelet Count - Automated : 216 K/uL  Mean Cell Volume : 88.9 fl  Mean Cell Hemoglobin : 30.4 pg  Mean Cell Hemoglobin Concentration : 34.2 gm/dL  Auto Neutrophil # : x  Auto Lymphocyte # : x  Auto Monocyte # : x  Auto Eosinophil # : x  Auto Basophil # : x  Auto Neutrophil % : x  Auto Lymphocyte % : x  Auto Monocyte % : x  Auto Eosinophil % : x  Auto Basophil % : x    06-05    141  |  109<H>  |  14  ----------------------------<  91  3.5   |  26  |  0.58    Ca    9.3      05 Jun 2024 07:25    TPro  6.9  /  Alb  3.4  /  TBili  0.8  /  DBili  x   /  AST  15  /  ALT  17  /  AlkPhos  138<H>  06-04                          15.3   6.63  )-----------( 216      ( 05 Jun 2024 07:25 )             44.7       Culture - Tissue with Gram Stain (collected 04 Jun 2024 13:40)  Source: .Tissue Other, RIGHT HALLUX DISTAL PHALANX  Gram Stain (05 Jun 2024 00:31):    No polymorphonuclear cells seen per low power field    No organisms seen per oil power field    Culture - Tissue with Gram Stain (collected 04 Jun 2024 13:40)  Source: .Tissue Other, RIGHT HALLUX PROXIMAL PHALANX  Gram Stain (05 Jun 2024 00:30):    No polymorphonuclear cells seen per low power field    No organisms seen per oil power field  Preliminary Report (05 Jun 2024 17:53):    No growth    Culture - Blood (collected 03 Jun 2024 17:45)  Source: .Blood Blood-Peripheral  Preliminary Report (05 Jun 2024 01:03):    No growth at 24 hours    Culture - Blood (collected 03 Jun 2024 17:30)  Source: .Blood Blood-Peripheral  Preliminary Report (05 Jun 2024 01:03):    No growth at 24 hours        
INTERVAL HPI/OVERNIGHT EVENTS:  Patient seen and examined at bedside. States he is feeling well, plan for bone biopsy of R grat toe with Podiatry today. Patient denies any chest pain, SOB, abd pain, pain in R great toe.     ROS: All other review of systems is negative unless indicated above.    MEDICATIONS  (STANDING):    MEDICATIONS  (PRN):  acetaminophen     Tablet .. 650 milliGRAM(s) Oral every 6 hours PRN Temp greater or equal to 38C (100.4F), Mild Pain (1 - 3)  aluminum hydroxide/magnesium hydroxide/simethicone Suspension 30 milliLiter(s) Oral every 4 hours PRN Dyspepsia  melatonin 3 milliGRAM(s) Oral at bedtime PRN Insomnia  ondansetron Injectable 4 milliGRAM(s) IV Push every 8 hours PRN Nausea and/or Vomiting      Allergies    eggs (Anaphylaxis)  sulfa drugs (Rash)  peanuts (Anaphylaxis)    Intolerances        Vital Signs Last 24 Hrs  T(C): 36.8 (04 Jun 2024 19:25), Max: 36.9 (04 Jun 2024 00:03)  T(F): 98.2 (04 Jun 2024 19:25), Max: 98.4 (04 Jun 2024 00:03)  HR: 93 (04 Jun 2024 19:25) (70 - 96)  BP: 102/67 (04 Jun 2024 19:25) (98/65 - 116/75)  BP(mean): --  RR: 18 (04 Jun 2024 19:25) (13 - 24)  SpO2: 97% (04 Jun 2024 19:25) (94% - 98%)    Parameters below as of 04 Jun 2024 19:25  Patient On (Oxygen Delivery Method): room air        06-04 @ 07:01  -  06-04 @ 22:20  --------------------------------------------------------  IN: 330 mL / OUT: 375 mL / NET: -45 mL      Physical Exam:  General: laying comfortably in bed, NAD  Neurology: A&Ox3, nonfocal, JOLLEY x 4  Respiratory: CTA B/L, no w/r/r  CV: RRR, S1S2, no murmurs, rubs or gallops  Abdominal: Soft, NT, ND +BS  Extremities: +wound R hallux, + peripheral pulses      LABS:                        14.9   6.77  )-----------( 214      ( 04 Jun 2024 07:26 )             43.8     06-04    140  |  108  |  16  ----------------------------<  91  4.3   |  30  |  0.61    Ca    9.2      04 Jun 2024 07:26    TPro  6.9  /  Alb  3.4  /  TBili  0.8  /  DBili  x   /  AST  15  /  ALT  17  /  AlkPhos  138<H>  06-04    PT/INR - ( 03 Jun 2024 17:30 )   PT: 11.2 sec;   INR: 0.96 ratio         PTT - ( 03 Jun 2024 17:30 )  PTT:34.5 sec  Urinalysis Basic - ( 04 Jun 2024 07:26 )    Color: x / Appearance: x / SG: x / pH: x  Gluc: 91 mg/dL / Ketone: x  / Bili: x / Urobili: x   Blood: x / Protein: x / Nitrite: x   Leuk Esterase: x / RBC: x / WBC x   Sq Epi: x / Non Sq Epi: x / Bacteria: x        RADIOLOGY & ADDITIONAL TESTS:

## 2024-06-06 NOTE — DISCHARGE NOTE PROVIDER - CARE PROVIDER_API CALL
Naresh Campos  Podiatric Medicine and Surgery  [address] [place] [id number]  Phone: [phone]  Fax: [phone]  Follow Up Time:

## 2024-06-06 NOTE — DIETITIAN INITIAL EVALUATION ADULT - ORAL INTAKE PTA/DIET HISTORY
Pt lives at home with family. Reports consuming 3 meals/day. Regular diet at home. Does not consume beef. Does not consume oral nutritional supplements at home.

## 2024-06-06 NOTE — DISCHARGE NOTE NURSING/CASE MANAGEMENT/SOCIAL WORK - PATIENT PORTAL LINK FT
You can access the FollowMyHealth Patient Portal offered by [facility] by registering at the following website: [email or web address]. By joining [employer]’s FollowMyHealth portal, you will also be able to view your health information using other applications (apps) compatible with our system.

## 2024-06-06 NOTE — PHYSICAL THERAPY INITIAL EVALUATION ADULT - ADDITIONAL COMMENTS
Patient lives in private home with parents, +CAROLINA and 1 flight to second floor bedroom. Patient was independent in all ADLs and ambulated independently without device.

## 2024-06-06 NOTE — DIETITIAN INITIAL EVALUATION ADULT - PERTINENT MEDS FT
MEDICATIONS  (STANDING):  ciprofloxacin     Tablet 500 milliGRAM(s) Oral every 12 hours    MEDICATIONS  (PRN):  acetaminophen     Tablet .. 650 milliGRAM(s) Oral every 6 hours PRN Temp greater or equal to 38C (100.4F), Mild Pain (1 - 3)  aluminum hydroxide/magnesium hydroxide/simethicone Suspension 30 milliLiter(s) Oral every 4 hours PRN Dyspepsia  melatonin 3 milliGRAM(s) Oral at bedtime PRN Insomnia  ondansetron Injectable 4 milliGRAM(s) IV Push every 8 hours PRN Nausea and/or Vomiting

## 2024-06-06 NOTE — DIETITIAN INITIAL EVALUATION ADULT - OTHER INFO
Pt is a "17 y/o M w. PMHx of scoliosis, right hallux ulcer w/ chronic OM (s/p IPJ arthroplasty) presenting to [facility] for surgical biopsy of R. big toe ulcer. Admitted for elective R hallux biopsy and possible bone resection."    Visited pt at bedside this am. Pt reports fair appetite/intake. At time of visit this am, pt just waking up; did not get to consume breakfast meal at this time yet. PO intakes 50-75% per nursing documentation. Egg and peanut allergy noted; confirmed with patient. No chewing/swallowing difficulties. Denies N/V. +BM 6/5 per pt report. CBW on admission 125#. Pt reports UBW of 125#; denies weight changes. No edema noted. Skin: R first toe wound. Pt currently on regular diet. Discussed importance of adequate protein intake for wound healing. Pt agreeable to receive ensure plus HP for additional kcal/protein. Pt offers no food preferences/meal requests at this time. RD remains available.

## 2024-06-06 NOTE — PROGRESS NOTE ADULT - ASSESSMENT
19 y/o male with scoliosis, right hallux ulcer w/ chronic OM (s/p IPJ arthroplasty), who presented for surgical biopsy of R. big toe ulcer. Multiple hospital admissions for surgical management and IV abx, most recently in Jan 2024. Patient has been regularly following-up with Podiatry / Wound care, most recently on 6/1 when decision was made for patient to be admitted to [facility] for elective bone biopsy to r/o definitive OM followed by partial hallux amputation.     S/p bone biopsy on 6/4/24. He has no fevers and no leukocytosis. Baseline CRP 24. Blood cultures currently no growth.    #R 1st toe chronic osteomyelitis  #R 1st toe chronic ulcer    -continue ciprofloxacin 500mg PO BID pending cultures and path  -follow up OR cultures and path  -discussed with mother at bedside  -discussed with Dr. Alida Ospina MD  Division of Infectious Diseases   Cell [phone] between 8am and 6pm   After 6pm and weekends please call ID service at [phone].     35 minutes spent on total encounter assessing patient, examination, chart review, counseling and coordinating care by the attending physician/nurse/care manager.   
17 y/o M w. PMHx of scoliosis, right hallux ulcer w/ chronic OM (s/p IPJ arthroplasty) presenting to [facility] for surgical biopsy of R. big toe ulcer. Admitted for elective R hallux biopsy and possible bone resection. 
17 y/o M w. PMHx of scoliosis, right hallux ulcer w/ chronic OM (s/p IPJ arthroplasty) presenting to [facility] for surgical biopsy of R. big toe ulcer. Admitted for elective R hallux biopsy and possible bone resection. 
17 y/o male with scoliosis, right hallux ulcer w/ chronic OM (s/p IPJ arthroplasty), who presented for surgical biopsy of R. big toe ulcer. Multiple hospital admissions for surgical management and IV abx, most recently in Jan 2024. Patient has been regularly following-up with Podiatry / Wound care, most recently on 6/1 when decision was made for patient to be admitted to [facility] for elective bone biopsy to r/o definitive OM.    S/p bone biopsy on 6/4/24. Path negative for osteomyelitis, but one of the bone cultures growing staph saphropyticus. Given this cannot rule out concern for underlying infection. No plan for current further surgical intervention, but patient and mother declined PICC and IV antibiotics despite discussion that this is likely better for osteomyelitis than oral antibiotics. Benefits vs risks explained. Otherwise he has no fevers and no leukocytosis. Baseline CRP 24. Blood cultures remain no growth.    #R 1st toe chronic osteomyelitis  #R 1st toe chronic ulcer    -continue ciprofloxacin 500mg PO BID x 1 week for now pending final cultures  -follow cultures to completion  -if develops worsening diarrhea will need stool testing  -follow up with ID as outpatient at wound care center  -discussed with mother at bedside  -discussed with Dr. Irwin and Dr. Vinnie Ospina MD  Division of Infectious Diseases   Cell [phone] between 8am and 6pm   After 6pm and weekends please call ID service at [phone].     35 minutes spent on total encounter assessing patient, examination, chart review, counseling and coordinating care by the attending physician/nurse/care manager.     
19 y/o M w. PMHx of scoliosis, right hallux ulcer w/ chronic OM (s/p IPJ arthroplasty) presenting to [facility] for surgical biopsy of R. big toe ulcer. Admitted for elective R hallux biopsy and possible bone resection.

## 2024-06-06 NOTE — DISCHARGE NOTE NURSING/CASE MANAGEMENT/SOCIAL WORK - NSDCPEFALRISK_GEN_ALL_CORE
For information on Fall & Injury Prevention, visit: [email or web address] OR  [email or web address] OR  https://www.cdc.gov/steadi/patient.html

## 2024-06-06 NOTE — DIETITIAN INITIAL EVALUATION ADULT - PROBLEM SELECTOR PLAN 2
Hypokalemic and hypocalcemic on admission. Poor PO intake today  - s/p IVF in ED  - Potassium repletion   - Trend metabolic panel

## 2024-06-06 NOTE — DISCHARGE NOTE PROVIDER - HOSPITAL COURSE
19 y/o M w. PMHx of scoliosis, right hallux ulcer w/ chronic OM (s/p IPJ arthroplasty) presenting to [facility] for surgical biopsy of R. big toe ulcer. Admitted for elective R hallux biopsy and possible bone resection.       acute on  Chronic osteomyelitis of toe, right.   ·  Plan: Non-healing wound on plantar aspect of R hallus x1 year. Failed outpatient abx  - Non-toxic appearing, good extremity perfusion   - XR Foot: neg for acute fx or infection. post-operative changes again visualized   - Tissue bx from Jan 2024: MDR Serratia marcescens, that patient is at a higher risk of sepsis and other acute infections.   Discussed at length that patient will need surgical intervention with bone biopsy followed by possible partial hallux amputation.  currently on Cipro 500mg BID  - s/p bone biopsy on 6/4      Wound Care Instructions upon   -Keep dressing clean, dry, and intact to the right foot  -Change dressing to the right foot every other day  - Apply silver alginate and cover with sterile gauze, abd pad and shira   -Patient must non weight bearing to the right foot at all times   -Monitor for any signs of infection including redness, swelling in the leg above the bandage, nausea/vomiting/fever/chills/chest pain/shortness of breath, if any are present patient must report to the emergency department immediately  -Patient is to follow up with Dr. Campos/Dr. Rashid/ Dr. Irwin  within 5 days after discharge at [facility]. Please call to make an appointment [phone].     17 y/o M w. PMHx of scoliosis, right hallux ulcer w/ chronic OM (s/p IPJ arthroplasty) presenting to [facility] for surgical biopsy of R. big toe ulcer. Admitted for elective R hallux biopsy and possible bone resection.       acute on  Chronic osteomyelitis of toe, right.   ·  Plan: Non-healing wound on plantar aspect of R hallus x1 year. Failed outpatient abx  - Non-toxic appearing, good extremity perfusion   - XR Foot: neg for acute fx or infection. post-operative changes again visualized   - Tissue bx from Jan 2024: MDR Serratia marcescens, that patient is at a higher risk of sepsis and other acute infections.   Discussed at length that patient will need surgical intervention with bone biopsy followed by possible partial hallux amputation.  currently on Cipro 500mg BID  - s/p bone biopsy on 6/4  pathology came cleared no sign Of OM   will need to f/u podiatry clinic       Wound Care Instructions upon   -Keep dressing clean, dry, and intact to the right foot  -Change dressing to the right foot every other day  - Apply silver alginate and cover with sterile gauze, abd pad and shira   -Patient must non weight bearing to the right foot at all times   -Monitor for any signs of infection including redness, swelling in the leg above the bandage, nausea/vomiting/fever/chills/chest pain/shortness of breath, if any are present patient must report to the emergency department immediately  -Patient is to follow up with Dr. Campos/Dr. Rashid/ Dr. Irwin  within 5 days after discharge at [facility]. Please call to make an appointment [phone].

## 2024-06-06 NOTE — DISCHARGE NOTE PROVIDER - NSDCCPCAREPLAN_GEN_ALL_CORE_FT
PRINCIPAL DISCHARGE DIAGNOSIS  Diagnosis: Chronic osteomyelitis of toe, right  Assessment and Plan of Treatment: f/u podiaty as outpt  follow wound care drressing  Wound Care Instructions upon   -Keep dressing clean, dry, and intact to the right foot  -Change dressing to the right foot every other day  - Apply silver alginate and cover with sterile gauze, abd pad and shira   -Patient must non weight bearing to the right foot at all times   -Monitor for any signs of infection including redness, swelling in the leg above the bandage, nausea/vomiting/fever/chills/chest pain/shortness of breath, if any are present patient must report to the emergency department immediately  -Patient is to follow up with Dr. Campos/Dr. Rashid/ Dr. Irwin  within 5 days after discharge at [facility]. Please call to make an appointment [phone].      SECONDARY DISCHARGE DIAGNOSES  Diagnosis: Chronic osteomyelitis of toe, right  Assessment and Plan of Treatment:

## 2024-06-06 NOTE — PROGRESS NOTE ADULT - PROBLEM SELECTOR PROBLEM 1
Chronic osteomyelitis of toe, right

## 2024-06-06 NOTE — CASE MANAGEMENT PROGRESS NOTE - NSCMPROGRESSNOTE_GEN_ALL_CORE
Per MD pt is medically cleared for discharge today 6/6/24. CM met with pt and mother Basil. Pt is to be transitioned to home with [facility] [place] [phone]. CM confirmed with homecare agency pt case is being accepted and was made aware of DC plan today 6/6/24. Pt aware of plan and in agreement.  Discharge notice discussed / given. Pt verbalized understanding. Confirmed pharmacy is [facility]. [facility] MD is Dr Bloomberg. Pt and mother stated they would make pt follow up appointments. DMEs include walker from [facility] [phone] from this admission that was delivered to pt bedside.  Pt mom stated she will transport pt home. CM discussed plan with MD and RN. CM to remain available

## 2024-06-06 NOTE — DIETITIAN INITIAL EVALUATION ADULT - NS FNS DIET ORDER
[facility] Medicine Daily Progress Note    Date of Service  4/9/2019    Chief Complaint  89 y.o. female admitted 11/13/2018 with fall and STEMI.    Hospital Course    She was admitted to ICU on medical management. Family elected for comfort care.      Interval Problem Update    Patient remains comfortable with no acute changes overnight.     Consultants/Specialty  Critical care signed off  Cardiology signed off    Code Status  Comfort care, DNR    Disposition  Hospice, pending group home placement. Difficult placement due to no family available to make a choice.  Working on possible assisted living facility transfer versus group home.    Review of Systems  Review of Systems   Unable to perform ROS: Medical condition        Physical Exam  Temp:  [36.3 °C (97.3 °F)-37 °C (98.6 °F)] 36.3 °C (97.3 °F)  Pulse:  [73-78] 73  Resp:  [14-17] 16  BP: (109-122)/(57-68) 122/68  SpO2:  [91 %-93 %] 91 %    Physical Exam   Constitutional: She appears cachectic. She has a sickly appearance. She appears ill.   Frail   Chronically ill appearing   HENT:   Head: Normocephalic and atraumatic.   Eyes: Pupils are equal, round, and reactive to light.   Neck: No thyromegaly present.   Cardiovascular: Normal rate and regular rhythm.    Pulmonary/Chest: Effort normal. No respiratory distress. She has decreased breath sounds.   Abdominal: Soft. She exhibits no distension.   Musculoskeletal: Normal range of motion.   Neurological: She is alert.   Skin: Skin is dry.   Psychiatric: She is slowed and withdrawn.   Vitals reviewed.  No interval change in physical exam as noted above    Fluids    Intake/Output Summary (Last 24 hours) at 04/09/19 1434  Last data filed at 04/08/19 2000   Gross per 24 hour   Intake              480 ml   Output                0 ml   Net              480 ml       Laboratory                        Imaging  DX-CHEST-PORTABLE (1 VIEW)   Final Result         1.  Hazy interstitial left pulmonary opacities suggests interstitial  edema or infiltrate, similar to prior.   2.  Trace left pleural effusion   3.  Hyperexpansion of lungs favors changes of COPD.      DX-CHEST-PORTABLE (1 VIEW)   Final Result         1.  Interstitial infiltrates or edema, stable.   2.  Atherosclerosis      DX-CHEST-PORTABLE (1 VIEW)   Final Result         1.  Interstitial infiltrates or edema.   2.  Atherosclerosis      DX-CHEST-PORTABLE (1 VIEW)   Final Result      Moderate interstitial edema or interstitial lung disease and small left pleural effusion similar to previous.      DX-CHEST-PORTABLE (1 VIEW)   Final Result      Stable interstitial edema and/or interstitial lung disease with probable small pleural fluid      DX-CHEST-PORTABLE (1 VIEW)   Final Result      Ill-defined infrahilar interstitial opacities, atelectasis versus mild edema. No significant pleural effusions.      DX-CHEST-PORTABLE (1 VIEW)   Final Result      Mild left basilar atelectasis, improved since prior. No new consolidation or large pleural effusions.      DX-CHEST-PORTABLE (1 VIEW)   Final Result      1.  Left basilar opacification may be secondary to atelectasis. Developing pneumonia cannot be excluded.         DX-CHEST-PORTABLE (1 VIEW)   Final Result      1.  Unchanged mild interstitial pulmonary edema   2.  Unchanged bibasilar atelectasis, alveolar edema or pneumonia      DX-CHEST-PORTABLE (1 VIEW)   Final Result      1.  Decreased pulmonary edema   2.  Unchanged bibasilar atelectasis, alveolar edema or pneumonia      DX-CHEST-PORTABLE (1 VIEW)   Final Result      1.  There is diffuse interstitial and alveolar density in the right mid and lower lung zone. This is increased since the previous study. This may represent mild pulmonary edema/consolidation.   2.  Mildly enlarged cardiomediastinal silhouette when compared with the previous chest x-ray.      DX-CHEST-PORTABLE (1 VIEW)   Final Result      Stable mild pulmonary edema.   New left basilar atelectasis.      DX-CHEST-PORTABLE (1  VIEW)   Final Result      Stable chest appearance compared with 11/16.      DX-CHEST-PORTABLE (1 VIEW)   Final Result      No acute cardiopulmonary abnormality identified.      DX-CHEST-PORTABLE (1 VIEW)   Final Result      No acute cardiopulmonary abnormality. No interval change.      DX-CHEST-PORTABLE (1 VIEW)   Final Result      No acute cardiopulmonary disease.      CT-HEAD W/O   Final Result      1.  No evidence of acute intracranial process.      2.  There is again seen interstitial pulmonary edema and bibasilar atelectasis.      CT-HIP W/O PLUS RECONS LEFT   Final Result      1.  No evidence of acute fracture or malalignment. No evidence of hardware failure or complication.   2.  Postsurgical changes of the left femur with broken screw fragment redemonstrated.   3.  Demineralization.   4.  Scattered colonic diverticula.   5.  Atherosclerosis.   6.  Small fat-containing umbilical hernia.      EC-ECHOCARDIOGRAM COMPLETE W/O CONT   Final Result      DX-HIP-COMPLETE - UNILATERAL 2+ LEFT   Final Result      1.  No definite acute osseous abnormality. In setting of demineralization, an occult fracture is difficult to exclude. If there is strong clinical suspicion, CT or MRI could be obtained for further evaluation.   2.  Postsurgical changes of the left femur with broken screw fragment redemonstrated.      DX-CHEST-PORTABLE (1 VIEW)   Final Result      No acute cardiopulmonary process is seen.      Atherosclerotic plaque.           Assessment/Plan  * Comfort measures only status- (present on admission)   Assessment & Plan    No signs of distress, continue comfort care options  -- the patient appears comfortable.     ST elevation myocardial infarction involving right coronary artery (HCC)- (present on admission)   Assessment & Plan    Declines any intervention or therapeutic modality  continue with comfort care     Positive QuantiFERON-TB Gold test   Assessment & Plan      AFB negative        Advanced directives,  counseling/discussion- (present on admission)   Assessment & Plan    Placement is an issue for the patient continued on comfort care  Searching for group home placement.  [facility] assisting.  Difficult discharge  -awaiting for placement     Chronic kidney disease (CKD), stage III (moderate) (HCC)- (present on admission)   Assessment & Plan    Continue comfort care.       Essential hypertension- (present on admission)   Assessment & Plan    Comfort care     Fall- (present on admission)   Assessment & Plan    Continue fall precautions        on comfort care. No change from previous day.  Awaiting for placement.  Discussed discharge planning with .      VTE prophylaxis: comfort care       Diet, Regular:   No Beef  No Nuts (06-04-24 @ 14:25) [Active]

## 2024-06-06 NOTE — DISCHARGE NOTE NURSING/CASE MANAGEMENT/SOCIAL WORK - NSDCFUADDAPPT_GEN_ALL_CORE_FT
It is advisable to follow up with your primary care provider within the next 1 week. You have stated you will make your own follow up appointments.

## 2024-06-06 NOTE — DIETITIAN INITIAL EVALUATION ADULT - ETIOLOGY
related to presumed inadequate energy intake related to increased demand for nutrient (kcal/protein, micronutrients)

## 2024-06-07 PROBLEM — M86.9 OSTEOMYELITIS, UNSPECIFIED: Chronic | Status: ACTIVE | Noted: 2024-06-03

## 2024-06-07 LAB
-  CLINDAMYCIN: SIGNIFICANT CHANGE UP
-  ERYTHROMYCIN: SIGNIFICANT CHANGE UP
-  GENTAMICIN: SIGNIFICANT CHANGE UP
-  OXACILLIN: SIGNIFICANT CHANGE UP
-  RIFAMPIN: SIGNIFICANT CHANGE UP
-  TETRACYCLINE: SIGNIFICANT CHANGE UP
-  TRIMETHOPRIM/SULFAMETHOXAZOLE: SIGNIFICANT CHANGE UP
-  VANCOMYCIN: SIGNIFICANT CHANGE UP
METHOD TYPE: SIGNIFICANT CHANGE UP

## 2024-06-09 LAB
CULTURE RESULTS: ABNORMAL
CULTURE RESULTS: SIGNIFICANT CHANGE UP
ORGANISM # SPEC MICROSCOPIC CNT: ABNORMAL
ORGANISM # SPEC MICROSCOPIC CNT: SIGNIFICANT CHANGE UP
SPECIMEN SOURCE: SIGNIFICANT CHANGE UP

## 2024-06-10 ENCOUNTER — OUTPATIENT (OUTPATIENT)
Dept: OUTPATIENT SERVICES | Facility: HOSPITAL | Age: 19
LOS: 1 days | End: 2024-06-10
Payer: MEDICAID

## 2024-06-10 ENCOUNTER — APPOINTMENT (OUTPATIENT)
Dept: WOUND CARE | Facility: HOSPITAL | Age: 19
End: 2024-06-10

## 2024-06-10 VITALS
SYSTOLIC BLOOD PRESSURE: 117 MMHG | OXYGEN SATURATION: 98 % | HEART RATE: 113 BPM | WEIGHT: 125 LBS | TEMPERATURE: 99 F | HEIGHT: 68 IN | DIASTOLIC BLOOD PRESSURE: 77 MMHG | BODY MASS INDEX: 18.94 KG/M2 | RESPIRATION RATE: 20 BRPM

## 2024-06-10 DIAGNOSIS — M86.9 OSTEOMYELITIS, UNSPECIFIED: Chronic | ICD-10-CM

## 2024-06-10 DIAGNOSIS — M86.671 OTHER CHRONIC OSTEOMYELITIS, RIGHT ANKLE AND FOOT: ICD-10-CM

## 2024-06-10 DIAGNOSIS — Z98.1 ARTHRODESIS STATUS: Chronic | ICD-10-CM

## 2024-06-10 PROCEDURE — 99213 OFFICE O/P EST LOW 20 MIN: CPT

## 2024-06-10 PROCEDURE — G0463: CPT

## 2024-06-17 ENCOUNTER — OUTPATIENT (OUTPATIENT)
Dept: OUTPATIENT SERVICES | Facility: HOSPITAL | Age: 19
LOS: 1 days | Discharge: HOME | End: 2024-06-17
Payer: MEDICAID

## 2024-06-17 ENCOUNTER — APPOINTMENT (OUTPATIENT)
Dept: WOUND CARE | Facility: HOSPITAL | Age: 19
End: 2024-06-17

## 2024-06-17 VITALS
HEIGHT: 69 IN | OXYGEN SATURATION: 98 % | DIASTOLIC BLOOD PRESSURE: 79 MMHG | SYSTOLIC BLOOD PRESSURE: 120 MMHG | WEIGHT: 125 LBS | BODY MASS INDEX: 18.51 KG/M2 | HEART RATE: 100 BPM | TEMPERATURE: 98.7 F | RESPIRATION RATE: 14 BRPM
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
## 2024-06-17 DIAGNOSIS — Z91.012 ALLERGY TO EGGS: ICD-10-CM

## 2024-06-17 DIAGNOSIS — M86.671 OTHER CHRONIC OSTEOMYELITIS, RIGHT ANKLE AND FOOT: ICD-10-CM

## 2024-06-17 DIAGNOSIS — T81.89XD OTHER COMPLICATIONS OF PROCEDURES, NOT ELSEWHERE CLASSIFIED, SUBSEQUENT ENCOUNTER: ICD-10-CM

## 2024-06-17 DIAGNOSIS — Z91.010 ALLERGY TO PEANUTS: ICD-10-CM
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
## 2024-06-17 DIAGNOSIS — L97.514 NON-PRESSURE CHRONIC ULCER OF OTHER PART OF RIGHT FOOT WITH NECROSIS OF BONE: ICD-10-CM

## 2024-06-17 DIAGNOSIS — Z79.899 OTHER LONG TERM (CURRENT) DRUG THERAPY: ICD-10-CM

## 2024-06-17 DIAGNOSIS — Z87.39 PERSONAL HISTORY OF OTHER DISEASES OF THE MUSCULOSKELETAL SYSTEM AND CONNECTIVE TISSUE: ICD-10-CM

## 2024-06-17 DIAGNOSIS — K59.09 OTHER CONSTIPATION: ICD-10-CM

## 2024-06-17 DIAGNOSIS — Z98.890 OTHER SPECIFIED POSTPROCEDURAL STATES: Chronic | ICD-10-CM

## 2024-06-17 DIAGNOSIS — Z83.49 FAMILY HISTORY OF OTHER ENDOCRINE, NUTRITIONAL AND METABOLIC DISEASES: ICD-10-CM

## 2024-06-17 DIAGNOSIS — Z98.890 OTHER SPECIFIED POSTPROCEDURAL STATES: ICD-10-CM

## 2024-06-17 PROCEDURE — G0463: CPT

## 2024-06-17 PROCEDURE — 99213 OFFICE O/P EST LOW 20 MIN: CPT

## 2024-06-17 NOTE — ASSESSMENT
[Verbal] : Verbal [Written] : Written [Patient] : Patient [Family member] : Family member [Good - alert, interested, motivated] : Good - alert, interested, motivated [Demonstrates independently] : demonstrates independently [Dressing changes] : dressing changes [Foot Care] : foot care [Skin Care] : skin care [Signs and symptoms of infection] : sign and symptoms of infection [Nutrition] : nutrition [How and When to Call] : how and when to call [Labs and Tests] : labs and tests [Off-loading] : off-loading [Patient responsibility to plan of care] : patient responsibility to plan of care [] : No [FreeTextEntry2] : Infection Prevention Foot and nail care Nutrition and wound healing Pt Demonstrates use of both nonpharmacological and pharmacological pain relief strategies.  [FreeTextEntry3] : S/P Hospitalization Chronic Osteomyelitis 06/03/2024 - 06/06/2024 [FreeTextEntry4] : Patient registered with [facility] [place]. Supply Rx provided to patient. Patient to remain non weight bearing on R Foot F/U 1 Week [Stable] : stable [Home] : Home [Walker] : Walker [Faxed - Long Term Care/Home Health Agency] : Long Term Care/Home Health Agency: Faxed [FreeTextEntry1] : [facility] [place]

## 2024-06-17 NOTE — PHYSICAL EXAM
[4 x 4] : 4 x 4  [FreeTextEntry1] : CARMEN fletcher biopsy 06/04/2024 [FreeTextEntry2] : 0.9 [FreeTextEntry3] : 0.4 [FreeTextEntry4] : 0.3 [de-identified] : moderate serous drainage noted [de-identified] : none [de-identified] : surgical [de-identified] : none [de-identified] : intact [de-identified] : none [de-identified] : none [de-identified] : 100% [de-identified] : none [de-identified] : Alginate Ag [de-identified] : Mechanically cleansed with sterile gauze and normal saline 0.9% Dry Dressing    [TWNoteComboBox5] : No [de-identified] : None [de-identified] : 3x Weekly [de-identified] : Primary Dressing

## 2024-06-21 ENCOUNTER — OUTPATIENT (OUTPATIENT)
Dept: OUTPATIENT SERVICES | Facility: HOSPITAL | Age: 19
LOS: 1 days | End: 2024-06-21

## 2024-06-21 DIAGNOSIS — M86.9 OSTEOMYELITIS, UNSPECIFIED: Chronic | ICD-10-CM

## 2024-06-21 DIAGNOSIS — Z98.890 OTHER SPECIFIED POSTPROCEDURAL STATES: Chronic | ICD-10-CM

## 2024-06-23 ENCOUNTER — NON-APPOINTMENT (OUTPATIENT)
Age: 19
End: 2024-06-23

## 2024-06-24 ENCOUNTER — APPOINTMENT (OUTPATIENT)
Dept: WOUND CARE | Facility: HOSPITAL | Age: 19
End: 2024-06-24

## 2024-06-24 ENCOUNTER — OUTPATIENT (OUTPATIENT)
Dept: OUTPATIENT SERVICES | Facility: HOSPITAL | Age: 19
LOS: 1 days | Discharge: HOME | End: 2024-06-24
Payer: MEDICAID

## 2024-06-24 VITALS
SYSTOLIC BLOOD PRESSURE: 110 MMHG | WEIGHT: 125 LBS | HEIGHT: 69 IN | RESPIRATION RATE: 18 BRPM | BODY MASS INDEX: 18.51 KG/M2 | TEMPERATURE: 98.2 F | HEART RATE: 97 BPM | OXYGEN SATURATION: 99 % | DIASTOLIC BLOOD PRESSURE: 73 MMHG

## 2024-06-24 DIAGNOSIS — Z98.890 OTHER SPECIFIED POSTPROCEDURAL STATES: Chronic | ICD-10-CM

## 2024-06-24 DIAGNOSIS — M86.671 OTHER CHRONIC OSTEOMYELITIS, RIGHT ANKLE AND FOOT: ICD-10-CM

## 2024-06-24 DIAGNOSIS — M86.9 OSTEOMYELITIS, UNSPECIFIED: Chronic | ICD-10-CM

## 2024-06-24 DIAGNOSIS — Z98.1 ARTHRODESIS STATUS: Chronic | ICD-10-CM

## 2024-06-24 PROCEDURE — 99213 OFFICE O/P EST LOW 20 MIN: CPT

## 2024-06-24 PROCEDURE — G0463: CPT

## 2024-06-25 NOTE — PLAN
[FreeTextEntry1] : .Patient seen and evaluated. Discussed etiology and treatment plan. c/w local wound care and offloading. Spent 20 minutes for patient care and medical decision making.

## 2024-06-25 NOTE — ASSESSMENT
[Verbal] : Verbal [Demo] : Demo [Patient] : Patient [Family member] : Family member [Good - alert, interested, motivated] : Good - alert, interested, motivated [Verbalizes knowledge/Understanding] : Verbalizes knowledge/understanding [Dressing changes] : dressing changes [Foot Care] : foot care [Skin Care] : skin care [Signs and symptoms of infection] : sign and symptoms of infection [Nutrition] : nutrition [How and When to Call] : how and when to call [Off-loading] : off-loading [Patient responsibility to plan of care] : patient responsibility to plan of care [Stable] : stable [Home] : Home [] : Yes [Walker] : Walker [Faxed - Long Term Care/Home Health Agency] : Long Term Care/Home Health Agency: Faxed [FreeTextEntry2] : Infection Prevention Foot and nail care Nutrition and wound healing Pt Demonstrates use of both nonpharmacological and pharmacological pain relief strategies. Surgical intervention   [FreeTextEntry4] : Pt had bone biopsy procedure at [facility] on 6/3/24 which came back negative for OM. DPM assessed wound and advised the wound looks good. Plans to remove stitches next week. Pt has [facility] for home care. Doesn't have enough kerlix or silver alginate - some given and order placed with signed WCC that were faxed over. F/U 1 week [FreeTextEntry1] : [facility]

## 2024-06-25 NOTE — PHYSICAL EXAM
[4 x 4] : 4 x 4  [2+] : left 2+ [Alert] : alert [Oriented to Person] : oriented to person [Oriented to Place] : oriented to place [Oriented to Time] : oriented to time [Calm] : calm [Ankle Swelling (On Exam)] : not present [Varicose Veins Of Lower Extremities] : not present [] : not present [Purpura] : no purpura  [Petechiae] : no petechiae [Skin Ulcer] : no ulcer [Skin Induration] : no induration [de-identified] : calm, AOX3  [de-identified] : s/p right IPJ arthroplasty right hallux , +OM of the right great toe  [de-identified] : Wound to the plantar hallux plantar and medial aspect with sutures intact; mild edema and erythema  [FreeTextEntry1] : Right PLANTAR Hallux- post bone biopsy (6/6/24) sutures CDI [FreeTextEntry2] : 3.5 [FreeTextEntry3] : 0.1 [FreeTextEntry4] : Sutured [de-identified] : Scant sanguinous [de-identified] : Bruising [de-identified] : Silver Alginate [de-identified] : Mechanically cleansed with sterile gauze and 0.9% normal saline. Dry Dressing Kerlix Stockingette [FreeTextEntry7] : Knee- closed  [de-identified] : none [TWNoteComboBox5] : No [de-identified] : No [de-identified] : other [de-identified] : None [de-identified] : None [de-identified] : No [de-identified] : 3x Weekly [de-identified] : Primary Dressing [TWNoteComboBox9] : Left

## 2024-06-25 NOTE — REVIEW OF SYSTEMS
[Skin Wound] : skin wound [Negative] : Endocrine [Fever] : no fever [Chills] : no chills [Eye Pain] : no eye pain [Red Eyes] : eyes not red [Earache] : no earache [Loss Of Hearing] : no hearing loss [Nosebleeds] : no nosebleeds [Chest Pain] : no chest pain [Shortness Of Breath] : no shortness of breath [Wheezing] : no wheezing [Cough] : no cough [Abdominal Pain] : no abdominal pain [Vomiting] : no vomiting [Diarrhea] : no diarrhea [Confused] : no confusion [Dizziness] : no dizziness [Anxiety] : no anxiety [Easy Bleeding] : no tendency for easy bleeding [FreeTextEntry9] : Hammer toes with plantar flexed 1st ray of the left foot  [de-identified] : right great toe with sutures intact  on the medial and plantar aspecrt, left dorsal 5th metatarsal head wound - healed

## 2024-06-25 NOTE — HISTORY OF PRESENT ILLNESS
[FreeTextEntry1] : Patient is 18 year old male presenting for f/u for chronic non healing wound to the right hallux. Patient was recently d/c from [facility] s/p wound debridement and bone biopsy (-) for OM at this time.

## 2024-07-01 ENCOUNTER — OUTPATIENT (OUTPATIENT)
Dept: OUTPATIENT SERVICES | Facility: HOSPITAL | Age: 19
LOS: 1 days | Discharge: HOME | End: 2024-07-01
Payer: MEDICAID

## 2024-07-01 ENCOUNTER — APPOINTMENT (OUTPATIENT)
Dept: WOUND CARE | Facility: HOSPITAL | Age: 19
End: 2024-07-01
Payer: MEDICAID

## 2024-07-01 VITALS
WEIGHT: 125 LBS | OXYGEN SATURATION: 98 % | BODY MASS INDEX: 18.51 KG/M2 | HEART RATE: 105 BPM | TEMPERATURE: 99 F | RESPIRATION RATE: 18 BRPM | SYSTOLIC BLOOD PRESSURE: 119 MMHG | HEIGHT: 69 IN | DIASTOLIC BLOOD PRESSURE: 77 MMHG

## 2024-07-01 DIAGNOSIS — Z98.890 OTHER SPECIFIED POSTPROCEDURAL STATES: Chronic | ICD-10-CM

## 2024-07-01 DIAGNOSIS — M86.671 OTHER CHRONIC OSTEOMYELITIS, RIGHT ANKLE AND FOOT: ICD-10-CM

## 2024-07-01 PROCEDURE — 99213 OFFICE O/P EST LOW 20 MIN: CPT

## 2024-07-01 PROCEDURE — G0463: CPT

## 2024-07-08 ENCOUNTER — APPOINTMENT (OUTPATIENT)
Dept: WOUND CARE | Facility: HOSPITAL | Age: 19
End: 2024-07-08
Payer: MEDICAID

## 2024-07-08 ENCOUNTER — APPOINTMENT (OUTPATIENT)
Dept: GASTROENTEROLOGY | Facility: CLINIC | Age: 19
End: 2024-07-08

## 2024-07-08 ENCOUNTER — OUTPATIENT (OUTPATIENT)
Dept: OUTPATIENT SERVICES | Facility: HOSPITAL | Age: 19
LOS: 1 days | Discharge: HOME | End: 2024-07-08
Payer: MEDICAID

## 2024-07-08 VITALS
BODY MASS INDEX: 18.51 KG/M2 | TEMPERATURE: 98.4 F | HEIGHT: 69 IN | WEIGHT: 125 LBS | DIASTOLIC BLOOD PRESSURE: 77 MMHG | SYSTOLIC BLOOD PRESSURE: 118 MMHG | OXYGEN SATURATION: 98 % | RESPIRATION RATE: 18 BRPM | HEART RATE: 104 BPM

## 2024-07-08 DIAGNOSIS — M86.671 OTHER CHRONIC OSTEOMYELITIS, RIGHT ANKLE AND FOOT: ICD-10-CM

## 2024-07-08 DIAGNOSIS — Z91.012 ALLERGY TO EGGS: ICD-10-CM

## 2024-07-08 DIAGNOSIS — Z79.899 OTHER LONG TERM (CURRENT) DRUG THERAPY: ICD-10-CM

## 2024-07-08 DIAGNOSIS — Z87.39 PERSONAL HISTORY OF OTHER DISEASES OF THE MUSCULOSKELETAL SYSTEM AND CONNECTIVE TISSUE: ICD-10-CM

## 2024-07-08 DIAGNOSIS — Z98.890 OTHER SPECIFIED POSTPROCEDURAL STATES: ICD-10-CM
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
## 2024-07-08 DIAGNOSIS — M86.9 OSTEOMYELITIS, UNSPECIFIED: Chronic | ICD-10-CM
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
## 2024-07-08 DIAGNOSIS — Z83.49 FAMILY HISTORY OF OTHER ENDOCRINE, NUTRITIONAL AND METABOLIC DISEASES: ICD-10-CM

## 2024-07-08 DIAGNOSIS — K59.09 OTHER CONSTIPATION: ICD-10-CM

## 2024-07-08 DIAGNOSIS — T81.89XD OTHER COMPLICATIONS OF PROCEDURES, NOT ELSEWHERE CLASSIFIED, SUBSEQUENT ENCOUNTER: ICD-10-CM

## 2024-07-08 DIAGNOSIS — Z98.890 OTHER SPECIFIED POSTPROCEDURAL STATES: Chronic | ICD-10-CM

## 2024-07-08 DIAGNOSIS — L97.514 NON-PRESSURE CHRONIC ULCER OF OTHER PART OF RIGHT FOOT WITH NECROSIS OF BONE: ICD-10-CM

## 2024-07-08 DIAGNOSIS — Z98.1 ARTHRODESIS STATUS: Chronic | ICD-10-CM

## 2024-07-08 DIAGNOSIS — Z91.010 ALLERGY TO PEANUTS: ICD-10-CM

## 2024-07-08 PROCEDURE — G0463: CPT

## 2024-07-08 PROCEDURE — 99213 OFFICE O/P EST LOW 20 MIN: CPT

## 2024-07-11 ENCOUNTER — APPOINTMENT (OUTPATIENT)
Dept: GASTROENTEROLOGY | Facility: CLINIC | Age: 19
End: 2024-07-11

## 2024-07-13 ENCOUNTER — APPOINTMENT (OUTPATIENT)
Dept: GASTROENTEROLOGY | Facility: CLINIC | Age: 19
End: 2024-07-13

## 2024-07-15 ENCOUNTER — OUTPATIENT (OUTPATIENT)
Dept: OUTPATIENT SERVICES | Facility: HOSPITAL | Age: 19
LOS: 1 days | Discharge: HOME | End: 2024-07-15
Payer: MEDICAID

## 2024-07-15 ENCOUNTER — APPOINTMENT (OUTPATIENT)
Dept: WOUND CARE | Facility: HOSPITAL | Age: 19
End: 2024-07-15
Payer: MEDICAID

## 2024-07-15 VITALS
BODY MASS INDEX: 18.51 KG/M2 | RESPIRATION RATE: 15 BRPM | WEIGHT: 125 LBS | HEART RATE: 108 BPM | SYSTOLIC BLOOD PRESSURE: 106 MMHG | TEMPERATURE: 99.2 F | OXYGEN SATURATION: 98 % | DIASTOLIC BLOOD PRESSURE: 76 MMHG | HEIGHT: 69 IN

## 2024-07-15 DIAGNOSIS — M86.671 OTHER CHRONIC OSTEOMYELITIS, RIGHT ANKLE AND FOOT: ICD-10-CM

## 2024-07-15 DIAGNOSIS — M86.9 OSTEOMYELITIS, UNSPECIFIED: Chronic | ICD-10-CM

## 2024-07-15 DIAGNOSIS — Z98.890 OTHER SPECIFIED POSTPROCEDURAL STATES: Chronic | ICD-10-CM

## 2024-07-15 DIAGNOSIS — L97.801 NON-PRESSURE CHRONIC ULCER OF OTHER PART OF UNSPECIFIED LOWER LEG LIMITED TO BREAKDOWN OF SKIN: ICD-10-CM

## 2024-07-15 PROCEDURE — 99213 OFFICE O/P EST LOW 20 MIN: CPT

## 2024-07-15 PROCEDURE — G0463: CPT

## 2024-07-16 DIAGNOSIS — T81.89XD OTHER COMPLICATIONS OF PROCEDURES, NOT ELSEWHERE CLASSIFIED, SUBSEQUENT ENCOUNTER: ICD-10-CM

## 2024-07-16 DIAGNOSIS — K59.09 OTHER CONSTIPATION: ICD-10-CM

## 2024-07-16 DIAGNOSIS — Z87.39 PERSONAL HISTORY OF OTHER DISEASES OF THE MUSCULOSKELETAL SYSTEM AND CONNECTIVE TISSUE: ICD-10-CM

## 2024-07-16 DIAGNOSIS — L97.514 NON-PRESSURE CHRONIC ULCER OF OTHER PART OF RIGHT FOOT WITH NECROSIS OF BONE: ICD-10-CM
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
## 2024-07-16 DIAGNOSIS — Z91.012 ALLERGY TO EGGS: ICD-10-CM

## 2024-07-16 DIAGNOSIS — Z91.010 ALLERGY TO PEANUTS: ICD-10-CM
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
## 2024-07-16 DIAGNOSIS — Z98.890 OTHER SPECIFIED POSTPROCEDURAL STATES: ICD-10-CM

## 2024-07-16 DIAGNOSIS — Z79.899 OTHER LONG TERM (CURRENT) DRUG THERAPY: ICD-10-CM

## 2024-07-16 DIAGNOSIS — Z83.49 FAMILY HISTORY OF OTHER ENDOCRINE, NUTRITIONAL AND METABOLIC DISEASES: ICD-10-CM

## 2024-07-19 ENCOUNTER — APPOINTMENT (OUTPATIENT)
Dept: GASTROENTEROLOGY | Facility: CLINIC | Age: 19
End: 2024-07-19
Payer: MEDICAID

## 2024-07-19 VITALS
HEIGHT: 69 IN | OXYGEN SATURATION: 98 % | BODY MASS INDEX: 18.51 KG/M2 | WEIGHT: 125 LBS | SYSTOLIC BLOOD PRESSURE: 122 MMHG | DIASTOLIC BLOOD PRESSURE: 78 MMHG | HEART RATE: 100 BPM

## 2024-07-19 DIAGNOSIS — Z79.899 OTHER LONG TERM (CURRENT) DRUG THERAPY: ICD-10-CM

## 2024-07-19 DIAGNOSIS — L97.514 NON-PRESSURE CHRONIC ULCER OF OTHER PART OF RIGHT FOOT WITH NECROSIS OF BONE: ICD-10-CM

## 2024-07-19 DIAGNOSIS — K60.2 ANAL FISSURE, UNSPECIFIED: ICD-10-CM

## 2024-07-19 DIAGNOSIS — R16.0 HEPATOMEGALY, NOT ELSEWHERE CLASSIFIED: ICD-10-CM

## 2024-07-19 DIAGNOSIS — Z87.39 PERSONAL HISTORY OF OTHER DISEASES OF THE MUSCULOSKELETAL SYSTEM AND CONNECTIVE TISSUE: ICD-10-CM

## 2024-07-19 DIAGNOSIS — K58.9 IRRITABLE BOWEL SYNDROME W/OUT DIARRHEA: ICD-10-CM

## 2024-07-19 DIAGNOSIS — T81.89XD OTHER COMPLICATIONS OF PROCEDURES, NOT ELSEWHERE CLASSIFIED, SUBSEQUENT ENCOUNTER: ICD-10-CM

## 2024-07-19 DIAGNOSIS — Z98.890 OTHER SPECIFIED POSTPROCEDURAL STATES: ICD-10-CM

## 2024-07-19 DIAGNOSIS — Z91.010 ALLERGY TO PEANUTS: ICD-10-CM

## 2024-07-19 DIAGNOSIS — K52.9 NONINFECTIVE GASTROENTERITIS AND COLITIS, UNSPECIFIED: ICD-10-CM

## 2024-07-19 DIAGNOSIS — K90.0 CELIAC DISEASE: ICD-10-CM

## 2024-07-19 DIAGNOSIS — R19.4 CHANGE IN BOWEL HABIT: ICD-10-CM

## 2024-07-19 DIAGNOSIS — Z83.49 FAMILY HISTORY OF OTHER ENDOCRINE, NUTRITIONAL AND METABOLIC DISEASES: ICD-10-CM

## 2024-07-19 DIAGNOSIS — Z91.012 ALLERGY TO EGGS: ICD-10-CM

## 2024-07-19 DIAGNOSIS — K59.00 CONSTIPATION, UNSPECIFIED: ICD-10-CM

## 2024-07-19 DIAGNOSIS — K59.09 OTHER CONSTIPATION: ICD-10-CM

## 2024-07-19 DIAGNOSIS — E03.9 HYPOTHYROIDISM, UNSPECIFIED: ICD-10-CM

## 2024-07-19 PROCEDURE — 99204 OFFICE O/P NEW MOD 45 MIN: CPT

## 2024-07-19 RX ORDER — HYDROCORTISONE 25 MG/G
2.5 CREAM TOPICAL DAILY
Qty: 30 | Refills: 2 | Status: ACTIVE | COMMUNITY
Start: 2024-07-19 | End: 1900-01-01

## 2024-07-19 RX ORDER — LACTULOSE 10 G/15ML
20 SOLUTION ORAL
Qty: 473 | Refills: 2 | Status: ACTIVE | COMMUNITY
Start: 2024-07-19 | End: 1900-01-01

## 2024-07-20 PROBLEM — K58.9 SPASTIC COLON: Status: ACTIVE | Noted: 2024-07-20

## 2024-07-20 PROBLEM — R19.4 CHANGE IN BOWEL HABITS: Status: ACTIVE | Noted: 2024-07-20

## 2024-07-23 ENCOUNTER — LABORATORY RESULT (OUTPATIENT)
Age: 19
End: 2024-07-23

## 2024-07-24 ENCOUNTER — APPOINTMENT (OUTPATIENT)
Dept: WOUND CARE | Facility: HOSPITAL | Age: 19
End: 2024-07-24
Payer: MEDICAID

## 2024-07-24 VITALS
DIASTOLIC BLOOD PRESSURE: 73 MMHG | WEIGHT: 125 LBS | SYSTOLIC BLOOD PRESSURE: 109 MMHG | HEIGHT: 69 IN | TEMPERATURE: 98.6 F | RESPIRATION RATE: 16 BRPM | HEART RATE: 101 BPM | OXYGEN SATURATION: 98 % | BODY MASS INDEX: 18.51 KG/M2

## 2024-07-24 DIAGNOSIS — T81.89XD OTHER COMPLICATIONS OF PROCEDURES, NOT ELSEWHERE CLASSIFIED, SUBSEQUENT ENCOUNTER: ICD-10-CM

## 2024-07-24 DIAGNOSIS — L97.514 NON-PRESSURE CHRONIC ULCER OF OTHER PART OF RIGHT FOOT WITH NECROSIS OF BONE: ICD-10-CM

## 2024-07-24 LAB
BAKER'S YEAST AB QL: 18.4 UNITS
BAKER'S YEAST IGA QL IA: 6.4 UNITS
BAKER'S YEAST IGA QN IA: NEGATIVE
BAKER'S YEAST IGG QN IA: NEGATIVE
HCT VFR BLD CALC: 49.5 %
HGB BLD-MCNC: 16.2 G/DL
MCHC RBC-ENTMCNC: 29.5 PG
MCHC RBC-ENTMCNC: 32.7 GM/DL
MCV RBC AUTO: 90.2 FL
PLATELET # BLD AUTO: 227 K/UL
RBC # BLD: 5.49 M/UL
RBC # FLD: 13.4 %
TSH SERPL-ACNC: 4.4 UIU/ML
WBC # FLD AUTO: 4.55 K/UL

## 2024-07-24 PROCEDURE — 99213 OFFICE O/P EST LOW 20 MIN: CPT

## 2024-07-25 ENCOUNTER — APPOINTMENT (OUTPATIENT)
Dept: ULTRASOUND IMAGING | Facility: CLINIC | Age: 19
End: 2024-07-25
Payer: MEDICAID

## 2024-07-25 PROCEDURE — 76700 US EXAM ABDOM COMPLETE: CPT

## 2024-07-26 LAB — CELIACPAN: NORMAL

## 2024-07-28 NOTE — PHYSICAL EXAM
[4 x 4] : 4 x 4  [de-identified] : 3x Weekly [de-identified] : Primary Dressing [2+] : left 2+ [Ankle Swelling (On Exam)] : not present [Varicose Veins Of Lower Extremities] : not present [Purpura] : no purpura  [] : not present [Petechiae] : no petechiae [Skin Ulcer] : no ulcer [Skin Induration] : no induration [Alert] : alert [Oriented to Person] : oriented to person [Oriented to Place] : oriented to place [Oriented to Time] : oriented to time [Calm] : calm [de-identified] : calm, AOX3  [de-identified] : s/p right IPJ arthroplasty right hallux , +OM of the right great toe  [de-identified] : Wound to the plantar hallux  IPJ of the right hallux - down to fat - healed with hpk overlying [FreeTextEntry1] : CARMEN fletcher biopsy 06/04/2024- hematoma drained by DPM [FreeTextEntry2] : 0.3 [FreeTextEntry3] : 0.1 [FreeTextEntry4] : 0.1 [de-identified] : none [de-identified] : none [de-identified] : surgical [de-identified] : none [de-identified] : intact [de-identified] : none [de-identified] : none [de-identified] : 100% [de-identified] : none [de-identified] : none [de-identified] : Mechanically cleansed with Sterile gauze and 0.9% Normal Saline    DD applied today in office  [TWNoteComboBox5] : No [de-identified] : None

## 2024-07-28 NOTE — PLAN
[FreeTextEntry1] : .Patient seen and evaluated. Discussed etiology and treatment plan. Patient verbalized understanding. Advised patient to start wearing regular shoe gear and to c/w routine activities. RTC in 1 week.  Spent 20 minutes for patient care and medical decision making.

## 2024-07-28 NOTE — REVIEW OF SYSTEMS
[Fever] : no fever [Chills] : no chills [Eye Pain] : no eye pain [Red Eyes] : eyes not red [Earache] : no earache [Loss Of Hearing] : no hearing loss [Nosebleeds] : no nosebleeds [Chest Pain] : no chest pain [Shortness Of Breath] : no shortness of breath [Wheezing] : no wheezing [Cough] : no cough [Abdominal Pain] : no abdominal pain [Vomiting] : no vomiting [Diarrhea] : no diarrhea [Skin Wound] : skin wound [Confused] : no confusion [Dizziness] : no dizziness [Anxiety] : no anxiety [Easy Bleeding] : no tendency for easy bleeding [Negative] : Endocrine [FreeTextEntry9] : Hammer toes with plantar flexed 1st ray of the left foot  [de-identified] : Right great toe wound with bone biopsy (-) for OM this time. Patient has had  s/p arthroplasty , + OM , no soi ; new blister to the right hallux - reopened ; left dorsal 5th metatarsal head wound - healed

## 2024-07-28 NOTE — PHYSICAL EXAM
[4 x 4] : 4 x 4  [de-identified] : 3x Weekly [de-identified] : Primary Dressing [2+] : left 2+ [Ankle Swelling (On Exam)] : not present [Varicose Veins Of Lower Extremities] : not present [Purpura] : no purpura  [] : not present [Petechiae] : no petechiae [Skin Ulcer] : no ulcer [Skin Induration] : no induration [Alert] : alert [Oriented to Person] : oriented to person [Oriented to Place] : oriented to place [Oriented to Time] : oriented to time [Calm] : calm [de-identified] : calm, AOX3  [de-identified] : s/p right IPJ arthroplasty right hallux , +OM of the right great toe  [de-identified] : Wound to the plantar hallux  IPJ of the right hallux - down to fat - healed with hpk overlying [FreeTextEntry1] : CARMEN fletcher biopsy 06/04/2024- hematoma drained by DPM [FreeTextEntry2] : 0.3 [FreeTextEntry3] : 0.1 [FreeTextEntry4] : 0.1 [de-identified] : none [de-identified] : none [de-identified] : surgical [de-identified] : none [de-identified] : intact [de-identified] : none [de-identified] : none [de-identified] : 100% [de-identified] : none [de-identified] : none [de-identified] : Mechanically cleansed with Sterile gauze and 0.9% Normal Saline    DD applied today in office  [TWNoteComboBox5] : No [de-identified] : None

## 2024-07-28 NOTE — HISTORY OF PRESENT ILLNESS
[FreeTextEntry1] : Patient is 18 year old male presenting for follow up for right plantar hallux wound s/p wound debridement and bone biopsy (-) for Om this time.   Patient relates has been applying dry dressing and did not notice any drainage. Patient denies any pain to the right foot.

## 2024-07-28 NOTE — REVIEW OF SYSTEMS
[Fever] : no fever [Chills] : no chills [Eye Pain] : no eye pain [Red Eyes] : eyes not red [Earache] : no earache [Loss Of Hearing] : no hearing loss [Nosebleeds] : no nosebleeds [Chest Pain] : no chest pain [Shortness Of Breath] : no shortness of breath [Wheezing] : no wheezing [Cough] : no cough [Abdominal Pain] : no abdominal pain [Vomiting] : no vomiting [Diarrhea] : no diarrhea [Skin Wound] : skin wound [Confused] : no confusion [Dizziness] : no dizziness [Anxiety] : no anxiety [Easy Bleeding] : no tendency for easy bleeding [Negative] : Endocrine [FreeTextEntry9] : Hammer toes with plantar flexed 1st ray of the left foot  [de-identified] : Right great toe wound with bone biopsy (-) for OM this time. Patient has had  s/p arthroplasty , + OM , no soi ; new blister to the right hallux - reopened ; left dorsal 5th metatarsal head wound - healed

## 2024-07-28 NOTE — ASSESSMENT
[Verbal] : Verbal [Written] : Written [Patient] : Patient [Family member] : Family member [Good - alert, interested, motivated] : Good - alert, interested, motivated [Demonstrates independently] : demonstrates independently [Dressing changes] : dressing changes [Foot Care] : foot care [Skin Care] : skin care [Pressure relief] : pressure relief [Signs and symptoms of infection] : sign and symptoms of infection [Nutrition] : nutrition [How and When to Call] : how and when to call [Pain Management] : pain management [Off-loading] : off-loading [Patient responsibility to plan of care] : patient responsibility to plan of care [Stable] : stable [Home] : Home [Ambulatory] : Ambulatory [Not Applicable - Long Term Care/Home Health Agency] : Long Term Care/Home Health Agency: Not Applicable [] : No [FreeTextEntry2] : Infection Prevention Foot and nail care Nutrition and wound healing Pt Demonstrates use of both nonpharmacological and pharmacological pain relief strategies.  [FreeTextEntry4] : WBAT, transition to normal footwear (wide toe box) as per DPM  F/U 1 week

## 2024-08-04 ENCOUNTER — NON-APPOINTMENT (OUTPATIENT)
Age: 19
End: 2024-08-04

## 2024-08-05 ENCOUNTER — OUTPATIENT (OUTPATIENT)
Dept: OUTPATIENT SERVICES | Facility: HOSPITAL | Age: 19
LOS: 1 days | Discharge: HOME | End: 2024-08-05
Payer: MEDICAID

## 2024-08-05 ENCOUNTER — APPOINTMENT (OUTPATIENT)
Dept: WOUND CARE | Facility: HOSPITAL | Age: 19
End: 2024-08-05

## 2024-08-05 DIAGNOSIS — Z98.1 ARTHRODESIS STATUS: Chronic | ICD-10-CM

## 2024-08-05 DIAGNOSIS — T81.89XD OTHER COMPLICATIONS OF PROCEDURES, NOT ELSEWHERE CLASSIFIED, SUBSEQUENT ENCOUNTER: ICD-10-CM

## 2024-08-05 DIAGNOSIS — M86.9 OSTEOMYELITIS, UNSPECIFIED: Chronic | ICD-10-CM

## 2024-08-05 DIAGNOSIS — Z98.890 OTHER SPECIFIED POSTPROCEDURAL STATES: Chronic | ICD-10-CM

## 2024-08-05 PROCEDURE — G0463: CPT

## 2024-08-05 PROCEDURE — 99213 OFFICE O/P EST LOW 20 MIN: CPT

## 2024-08-09 ENCOUNTER — APPOINTMENT (OUTPATIENT)
Dept: GASTROENTEROLOGY | Facility: CLINIC | Age: 19
End: 2024-08-09

## 2024-08-12 DIAGNOSIS — T81.89XD OTHER COMPLICATIONS OF PROCEDURES, NOT ELSEWHERE CLASSIFIED, SUBSEQUENT ENCOUNTER: ICD-10-CM

## 2024-08-12 DIAGNOSIS — Z83.49 FAMILY HISTORY OF OTHER ENDOCRINE, NUTRITIONAL AND METABOLIC DISEASES: ICD-10-CM

## 2024-08-12 DIAGNOSIS — Z91.012 ALLERGY TO EGGS: ICD-10-CM

## 2024-08-12 DIAGNOSIS — Z91.010 ALLERGY TO PEANUTS: ICD-10-CM
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
## 2024-08-12 DIAGNOSIS — L97.514 NON-PRESSURE CHRONIC ULCER OF OTHER PART OF RIGHT FOOT WITH NECROSIS OF BONE: ICD-10-CM

## 2024-08-12 DIAGNOSIS — Z87.39 PERSONAL HISTORY OF OTHER DISEASES OF THE MUSCULOSKELETAL SYSTEM AND CONNECTIVE TISSUE: ICD-10-CM

## 2024-08-12 DIAGNOSIS — K59.09 OTHER CONSTIPATION: ICD-10-CM

## 2024-08-12 DIAGNOSIS — Z98.890 OTHER SPECIFIED POSTPROCEDURAL STATES: ICD-10-CM

## 2024-08-12 DIAGNOSIS — Z79.899 OTHER LONG TERM (CURRENT) DRUG THERAPY: ICD-10-CM
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
## 2024-08-12 NOTE — PHYSICAL EXAM
[2+] : left 2+ [Ankle Swelling (On Exam)] : not present [Varicose Veins Of Lower Extremities] : not present [] : not present [Purpura] : no purpura  [Petechiae] : no petechiae [Skin Ulcer] : no ulcer [Skin Induration] : no induration [Alert] : alert [Oriented to Person] : oriented to person [Oriented to Place] : oriented to place [Oriented to Time] : oriented to time [Calm] : calm [de-identified] : s/p right IPJ arthroplasty right hallux , +OM of the right great toe  [de-identified] : calm, AOX3  [de-identified] : Wound to the plantar hallux  IPJ of the right hallux - down to fat - healed with hpk overlying [FreeTextEntry1] : CARMEN fletcher biopsy 06/04/2024- hematoma drained by DPM [FreeTextEntry2] : 0.3 [FreeTextEntry3] : 0.1 [de-identified] : none [FreeTextEntry4] : 0.1 [de-identified] : none [de-identified] : surgical [de-identified] : none [de-identified] : intact [de-identified] : none [de-identified] : none [de-identified] : 100% [de-identified] : none [de-identified] : none [de-identified] : Mechanically cleansed with Sterile gauze and 0.9% Normal Saline   [FreeTextEntry7] : right 2nd toe- new laceration now scabbed  [FreeTextEntry8] : 0.1 [FreeTextEntry9] : 0.4 [de-identified] : 0.1 [de-identified] : none [de-identified] : Mechanically cleansed with Sterile gauze and 0.9% Normal Saline dry dressing appplied  [TWNoteComboBox5] : No [de-identified] : None [de-identified] : None [de-identified] : Traumatic [de-identified] : No [de-identified] : No [de-identified] : Normal [de-identified] : None [de-identified] : None [de-identified] : None [de-identified] : No

## 2024-08-12 NOTE — HISTORY OF PRESENT ILLNESS
[FreeTextEntry1] : Patient is 18 year old male presenting for follow up for right plantar hallux wound s/p wound debridement and bone biopsy (-) for Om this time.   Patient has been wearing regular shoes and denies any new complaints.

## 2024-08-12 NOTE — ASSESSMENT
[Verbal] : Verbal [Written] : Written [Patient] : Patient [Family member] : Family member [Good - alert, interested, motivated] : Good - alert, interested, motivated [Demonstrates independently] : demonstrates independently [Dressing changes] : dressing changes [Foot Care] : foot care [Skin Care] : skin care [Pressure relief] : pressure relief [Signs and symptoms of infection] : sign and symptoms of infection [Nutrition] : nutrition [How and When to Call] : how and when to call [Pain Management] : pain management [Off-loading] : off-loading [Patient responsibility to plan of care] : patient responsibility to plan of care [Stable] : stable [Home] : Home [Ambulatory] : Ambulatory [Not Applicable - Long Term Care/Home Health Agency] : Long Term Care/Home Health Agency: Not Applicable [FreeTextEntry2] : Infection Prevention Foot and nail care Nutrition and wound healing Pt Demonstrates use of both nonpharmacological and pharmacological pain relief strategies.  [] : No [FreeTextEntry4] : WBAT, transition to normal footwear (wide toe box) as per DPM  F/U 1 week

## 2024-08-12 NOTE — PHYSICAL EXAM
[2+] : left 2+ [Ankle Swelling (On Exam)] : not present [Varicose Veins Of Lower Extremities] : not present [] : not present [Purpura] : no purpura  [Petechiae] : no petechiae [Skin Ulcer] : no ulcer [Skin Induration] : no induration [Alert] : alert [Oriented to Person] : oriented to person [Oriented to Place] : oriented to place [Oriented to Time] : oriented to time [Calm] : calm [de-identified] : s/p right IPJ arthroplasty right hallux , +OM of the right great toe  [de-identified] : calm, AOX3  [de-identified] : Wound to the plantar hallux  IPJ of the right hallux - down to fat - healed with hpk overlying [FreeTextEntry1] : CARMEN fletcher biopsy 06/04/2024- hematoma drained by DPM [FreeTextEntry2] : 0.3 [FreeTextEntry3] : 0.1 [de-identified] : none [FreeTextEntry4] : 0.1 [de-identified] : none [de-identified] : surgical [de-identified] : intact [de-identified] : none [de-identified] : none [de-identified] : none [de-identified] : 100% [de-identified] : none [de-identified] : none [de-identified] : Mechanically cleansed with Sterile gauze and 0.9% Normal Saline   [FreeTextEntry7] : right 2nd toe- new laceration now scabbed  [FreeTextEntry8] : 0.1 [FreeTextEntry9] : 0.4 [de-identified] : 0.1 [de-identified] : Mechanically cleansed with Sterile gauze and 0.9% Normal Saline dry dressing appplied  [de-identified] : none [TWNoteComboBox5] : No [de-identified] : None [de-identified] : None [de-identified] : Traumatic [de-identified] : No [de-identified] : No [de-identified] : Normal [de-identified] : None [de-identified] : None [de-identified] : None [de-identified] : No

## 2024-08-12 NOTE — REVIEW OF SYSTEMS
[Fever] : no fever [Chills] : no chills [Eye Pain] : no eye pain [Red Eyes] : eyes not red [Earache] : no earache [Loss Of Hearing] : no hearing loss [Nosebleeds] : no nosebleeds [Chest Pain] : no chest pain [Shortness Of Breath] : no shortness of breath [Wheezing] : no wheezing [Cough] : no cough [Abdominal Pain] : no abdominal pain [Vomiting] : no vomiting [Diarrhea] : no diarrhea [Skin Wound] : skin wound [Confused] : no confusion [Dizziness] : no dizziness [Anxiety] : no anxiety [Easy Bleeding] : no tendency for easy bleeding [Negative] : Endocrine [FreeTextEntry9] : Hammer toes with plantar flexed 1st ray of the left foot  [de-identified] : Right great toe wound with bone biopsy (-) for OM this time. Patient has had  s/p arthroplasty , + OM , no soi ; new blister to the right hallux - reopened ; left dorsal 5th metatarsal head wound - healed

## 2024-08-12 NOTE — REVIEW OF SYSTEMS
[Fever] : no fever [Chills] : no chills [Eye Pain] : no eye pain [Red Eyes] : eyes not red [Earache] : no earache [Loss Of Hearing] : no hearing loss [Nosebleeds] : no nosebleeds [Chest Pain] : no chest pain [Shortness Of Breath] : no shortness of breath [Wheezing] : no wheezing [Cough] : no cough [Abdominal Pain] : no abdominal pain [Vomiting] : no vomiting [Diarrhea] : no diarrhea [Skin Wound] : skin wound [Confused] : no confusion [Dizziness] : no dizziness [Anxiety] : no anxiety [Easy Bleeding] : no tendency for easy bleeding [Negative] : Endocrine [FreeTextEntry9] : Hammer toes with plantar flexed 1st ray of the left foot  [de-identified] : Right great toe wound with bone biopsy (-) for OM this time. Patient has had  s/p arthroplasty , + OM , no soi ; new blister to the right hallux - reopened ; left dorsal 5th metatarsal head wound - healed

## 2024-08-16 ENCOUNTER — APPOINTMENT (OUTPATIENT)
Dept: WOUND CARE | Facility: HOSPITAL | Age: 19
End: 2024-08-16

## 2024-08-16 VITALS
BODY MASS INDEX: 18.51 KG/M2 | TEMPERATURE: 98.2 F | WEIGHT: 125 LBS | HEART RATE: 82 BPM | OXYGEN SATURATION: 97 % | DIASTOLIC BLOOD PRESSURE: 76 MMHG | SYSTOLIC BLOOD PRESSURE: 113 MMHG | RESPIRATION RATE: 18 BRPM | HEIGHT: 69 IN

## 2024-08-16 PROCEDURE — 99213 OFFICE O/P EST LOW 20 MIN: CPT

## 2024-08-16 NOTE — ASSESSMENT
[Verbal] : Verbal [Written] : Written [Patient] : Patient [Family member] : Family member [Good - alert, interested, motivated] : Good - alert, interested, motivated [Verbalizes knowledge/Understanding] : Verbalizes knowledge/understanding [Dressing changes] : dressing changes [Foot Care] : foot care [Skin Care] : skin care [Pressure relief] : pressure relief [Signs and symptoms of infection] : sign and symptoms of infection [Nutrition] : nutrition [How and When to Call] : how and when to call [Pain Management] : pain management [Off-loading] : off-loading [Patient responsibility to plan of care] : patient responsibility to plan of care [Other: ____] : [unfilled] [Stable] : stable [Home] : Home [Ambulatory] : Ambulatory [Not Applicable - Long Term Care/Home Health Agency] : Long Term Care/Home Health Agency: Not Applicable [] : No [FreeTextEntry2] : Infection Prevention Foot and nail care Nutrition and wound healing Pt Demonstrates use of both nonpharmacological and pharmacological pain relief strategies.  [FreeTextEntry4] : Resume wearing surgical shoe. Limit weight bearing on the left foot Pt provided with small number of supplies to prevent delay of care. supply order requested F/U 1 week

## 2024-08-16 NOTE — PHYSICAL EXAM
[2 x 2] : 2 x 2  [4 x 4] : 4 x 4  [FreeTextEntry1] : Right hallux s/p hematoma drainage [FreeTextEntry2] : 0.3 [FreeTextEntry3] : 0.1 [FreeTextEntry4] : 0.2-0.3 [de-identified] : sanguinous [de-identified] : none [de-identified] : surgical [de-identified] : none [de-identified] : intact [de-identified] : none [de-identified] : none [de-identified] : 100% [de-identified] : none [de-identified] : Alginate w/ AG [de-identified] : Mechanically cleansed with Sterile gauze and 0.9% Normal Saline  Dry dressing [FreeTextEntry7] : Right 2nd toe  [FreeTextEntry8] : 0.1 [FreeTextEntry9] : 0.3 [de-identified] : scab [de-identified] : none [de-identified] : Mechanically cleansed with Sterile gauze and 0.9% Normal Saline dry dressing appplied  [TWNoteComboBox4] : Moderate [TWNoteComboBox5] : No [TWNoteComboBox6] : Pressure [de-identified] : None [de-identified] : 3x Weekly [de-identified] : Primary Dressing [de-identified] : None [de-identified] : No [de-identified] : Traumatic [de-identified] : No [de-identified] : Normal [de-identified] : None [de-identified] : None [de-identified] : None [de-identified] : No

## 2024-08-20 ENCOUNTER — APPOINTMENT (OUTPATIENT)
Dept: GASTROENTEROLOGY | Facility: CLINIC | Age: 19
End: 2024-08-20

## 2024-08-23 ENCOUNTER — OUTPATIENT (OUTPATIENT)
Dept: OUTPATIENT SERVICES | Facility: HOSPITAL | Age: 19
LOS: 1 days | Discharge: HOME | End: 2024-08-23
Payer: MEDICAID

## 2024-08-23 ENCOUNTER — APPOINTMENT (OUTPATIENT)
Dept: WOUND CARE | Facility: HOSPITAL | Age: 19
End: 2024-08-23
Payer: MEDICAID

## 2024-08-23 VITALS
SYSTOLIC BLOOD PRESSURE: 112 MMHG | BODY MASS INDEX: 18.51 KG/M2 | WEIGHT: 125 LBS | HEART RATE: 107 BPM | DIASTOLIC BLOOD PRESSURE: 73 MMHG | OXYGEN SATURATION: 99 % | TEMPERATURE: 97.9 F | HEIGHT: 69 IN | RESPIRATION RATE: 15 BRPM

## 2024-08-23 DIAGNOSIS — Z98.890 OTHER SPECIFIED POSTPROCEDURAL STATES: Chronic | ICD-10-CM

## 2024-08-23 DIAGNOSIS — M86.9 OSTEOMYELITIS, UNSPECIFIED: Chronic | ICD-10-CM

## 2024-08-23 DIAGNOSIS — T81.89XD OTHER COMPLICATIONS OF PROCEDURES, NOT ELSEWHERE CLASSIFIED, SUBSEQUENT ENCOUNTER: ICD-10-CM

## 2024-08-23 DIAGNOSIS — Z98.1 ARTHRODESIS STATUS: Chronic | ICD-10-CM

## 2024-08-23 PROCEDURE — G0463: CPT

## 2024-08-23 PROCEDURE — 99213 OFFICE O/P EST LOW 20 MIN: CPT

## 2024-08-25 DIAGNOSIS — Z83.49 FAMILY HISTORY OF OTHER ENDOCRINE, NUTRITIONAL AND METABOLIC DISEASES: ICD-10-CM

## 2024-08-25 DIAGNOSIS — Z91.012 ALLERGY TO EGGS: ICD-10-CM

## 2024-08-25 DIAGNOSIS — Z91.010 ALLERGY TO PEANUTS: ICD-10-CM

## 2024-08-25 DIAGNOSIS — Z98.890 OTHER SPECIFIED POSTPROCEDURAL STATES: ICD-10-CM
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
## 2024-08-25 DIAGNOSIS — Z79.899 OTHER LONG TERM (CURRENT) DRUG THERAPY: ICD-10-CM
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
## 2024-08-25 DIAGNOSIS — T81.89XD OTHER COMPLICATIONS OF PROCEDURES, NOT ELSEWHERE CLASSIFIED, SUBSEQUENT ENCOUNTER: ICD-10-CM

## 2024-08-25 DIAGNOSIS — K59.09 OTHER CONSTIPATION: ICD-10-CM

## 2024-08-25 DIAGNOSIS — Z87.39 PERSONAL HISTORY OF OTHER DISEASES OF THE MUSCULOSKELETAL SYSTEM AND CONNECTIVE TISSUE: ICD-10-CM

## 2024-08-25 DIAGNOSIS — L97.512 NON-PRESSURE CHRONIC ULCER OF OTHER PART OF RIGHT FOOT WITH FAT LAYER EXPOSED: ICD-10-CM

## 2024-08-25 NOTE — REVIEW OF SYSTEMS
[Fever] : no fever [Chills] : no chills [Eye Pain] : no eye pain [Red Eyes] : eyes not red [Earache] : no earache [Loss Of Hearing] : no hearing loss [Nosebleeds] : no nosebleeds [Chest Pain] : no chest pain [Shortness Of Breath] : no shortness of breath [Wheezing] : no wheezing [Cough] : no cough [Abdominal Pain] : no abdominal pain [Vomiting] : no vomiting [Diarrhea] : no diarrhea [Skin Wound] : skin wound [Confused] : no confusion [Dizziness] : no dizziness [Anxiety] : no anxiety [Easy Bleeding] : no tendency for easy bleeding [Negative] : Endocrine [FreeTextEntry9] : Hammer toes with plantar flexed 1st ray of the left foot  [de-identified] : Right great toe wound with bone biopsy (-) for OM this time. Patient has had  s/p arthroplasty , + OM , no soi ; new blister to the right hallux - reopened ; left dorsal 5th metatarsal head wound - healed

## 2024-08-25 NOTE — PHYSICAL EXAM
[2 x 2] : 2 x 2  [2+] : left 2+ [Ankle Swelling (On Exam)] : not present [Varicose Veins Of Lower Extremities] : not present [] : not present [Purpura] : no purpura  [Petechiae] : no petechiae [Skin Ulcer] : no ulcer [Skin Induration] : no induration [Alert] : alert [Oriented to Person] : oriented to person [Oriented to Place] : oriented to place [Oriented to Time] : oriented to time [Calm] : calm [de-identified] : s/p right IPJ arthroplasty right hallux , +OM of the right great toe  [de-identified] : calm, AOX3  [de-identified] : Wound to the plantar hallux  IPJ of the right hallux - down to fat - re- opened noted with heling progress  [FreeTextEntry1] : Right plantar hallux [de-identified] : none [de-identified] : surgical [de-identified] : none [de-identified] : intact [de-identified] : none [de-identified] : none [de-identified] : none [de-identified] : Dry Dressing, Cloth tape. [de-identified] : Mechanically cleansed with sterile gauze and normal saline 0.9% Dry Dressing [de-identified] : 3x Weekly [de-identified] : Primary Dressing

## 2024-08-25 NOTE — PHYSICAL EXAM
[2 x 2] : 2 x 2  [2+] : left 2+ [Ankle Swelling (On Exam)] : not present [Varicose Veins Of Lower Extremities] : not present [] : not present [Purpura] : no purpura  [Petechiae] : no petechiae [Skin Ulcer] : no ulcer [Skin Induration] : no induration [Alert] : alert [Oriented to Person] : oriented to person [Oriented to Place] : oriented to place [Oriented to Time] : oriented to time [Calm] : calm [de-identified] : calm, AOX3  [de-identified] : s/p right IPJ arthroplasty right hallux , +OM of the right great toe  [de-identified] : Wound to the plantar hallux  IPJ of the right hallux - down to fat - re- opened noted with heling progress  [FreeTextEntry1] : Right plantar hallux [de-identified] : none [de-identified] : surgical [de-identified] : none [de-identified] : intact [de-identified] : none [de-identified] : none [de-identified] : none [de-identified] : Dry Dressing, Cloth tape. [de-identified] : Mechanically cleansed with sterile gauze and normal saline 0.9% Dry Dressing [de-identified] : 3x Weekly [de-identified] : Primary Dressing

## 2024-08-25 NOTE — PLAN
[FreeTextEntry1] : .Patient seen and evaluated. Discussed etiology and treatment plan. Patient verbalized understanding. Advised patient to start wearing regular shoe gear and to c/w routine activities. Will closely monitor the wound. RTC in 1 week.  Spent 20 minutes for patient care and medical decision making.

## 2024-08-25 NOTE — HISTORY OF PRESENT ILLNESS
[FreeTextEntry1] : Patient is 18 year old male presenting for follow up for right plantar hallux wound s/p wound debridement and bone biopsy (-) for Om this time. Patient had been on vacation and walked a lot and noticed a blood filled blister to the area of previously healed wound. Patient has been performing dressing changes as advised. and is ambulating in sx shoe.   Patient has been wearing regular shoes and denies any new complaints.

## 2024-08-25 NOTE — ASSESSMENT
[Verbal] : Verbal [Demo] : Demo [Patient] : Patient [Family member] : Family member [Good - alert, interested, motivated] : Good - alert, interested, motivated [Demonstrates independently] : demonstrates independently [Foot Care] : foot care [Skin Care] : skin care [Signs and symptoms of infection] : sign and symptoms of infection [Nutrition] : nutrition [How and When to Call] : how and when to call [Off-loading] : off-loading [Patient responsibility to plan of care] : patient responsibility to plan of care [Stable] : stable [Home] : Home [Ambulatory] : Ambulatory [Not Applicable - Long Term Care/Home Health Agency] : Long Term Care/Home Health Agency: Not Applicable [] : No [FreeTextEntry2] : Infection Prevention Foot and nail care Nutrition and wound healing Pt Demonstrates use of both nonpharmacological and pharmacological pain relief strategies. [FreeTextEntry4] : OK'd to drive per DPM Alida OK'd to wear regular shoes OK'd to ambulate as tolerated. Return to regular activities.  F/U 1-2 Weeks

## 2024-08-25 NOTE — REVIEW OF SYSTEMS
[Fever] : no fever [Chills] : no chills [Eye Pain] : no eye pain [Red Eyes] : eyes not red [Earache] : no earache [Loss Of Hearing] : no hearing loss [Nosebleeds] : no nosebleeds [Chest Pain] : no chest pain [Shortness Of Breath] : no shortness of breath [Wheezing] : no wheezing [Cough] : no cough [Abdominal Pain] : no abdominal pain [Vomiting] : no vomiting [Diarrhea] : no diarrhea [Skin Wound] : skin wound [Confused] : no confusion [Dizziness] : no dizziness [Anxiety] : no anxiety [Easy Bleeding] : no tendency for easy bleeding [Negative] : Endocrine [FreeTextEntry9] : Hammer toes with plantar flexed 1st ray of the left foot  [de-identified] : Right great toe wound with bone biopsy (-) for OM this time. Patient has had  s/p arthroplasty , + OM , no soi ; new blister to the right hallux - reopened ; left dorsal 5th metatarsal head wound - healed

## 2024-08-30 ENCOUNTER — OUTPATIENT (OUTPATIENT)
Dept: OUTPATIENT SERVICES | Facility: HOSPITAL | Age: 19
LOS: 1 days | Discharge: HOME | End: 2024-08-30
Payer: MEDICAID

## 2024-08-30 ENCOUNTER — APPOINTMENT (OUTPATIENT)
Dept: WOUND CARE | Facility: HOSPITAL | Age: 19
End: 2024-08-30

## 2024-08-30 VITALS
DIASTOLIC BLOOD PRESSURE: 76 MMHG | BODY MASS INDEX: 18.51 KG/M2 | TEMPERATURE: 98.7 F | RESPIRATION RATE: 14 BRPM | SYSTOLIC BLOOD PRESSURE: 111 MMHG | WEIGHT: 125 LBS | HEART RATE: 89 BPM | HEIGHT: 69 IN | OXYGEN SATURATION: 99 %

## 2024-08-30 DIAGNOSIS — T81.89XD OTHER COMPLICATIONS OF PROCEDURES, NOT ELSEWHERE CLASSIFIED, SUBSEQUENT ENCOUNTER: ICD-10-CM

## 2024-08-30 DIAGNOSIS — Z98.890 OTHER SPECIFIED POSTPROCEDURAL STATES: Chronic | ICD-10-CM

## 2024-08-30 DIAGNOSIS — S91.102A UNSPECIFIED OPEN WOUND OF LEFT GREAT TOE W/OUT DAMAGE TO NAIL, INITIAL ENCOUNTER: ICD-10-CM

## 2024-08-30 DIAGNOSIS — M86.9 OSTEOMYELITIS, UNSPECIFIED: Chronic | ICD-10-CM

## 2024-08-30 DIAGNOSIS — L97.512 NON-PRESSURE CHRONIC ULCER OF OTHER PART OF RIGHT FOOT WITH FAT LAYER EXPOSED: ICD-10-CM

## 2024-08-30 DIAGNOSIS — Z98.1 ARTHRODESIS STATUS: Chronic | ICD-10-CM

## 2024-08-30 PROCEDURE — G0463: CPT

## 2024-08-30 PROCEDURE — 99213 OFFICE O/P EST LOW 20 MIN: CPT

## 2024-09-02 PROBLEM — S91.102A: Status: ACTIVE | Noted: 2024-09-02

## 2024-09-02 NOTE — PHYSICAL EXAM
[2+] : left 2+ [Ankle Swelling (On Exam)] : not present [] : not present [Varicose Veins Of Lower Extremities] : not present [Purpura] : no purpura  [Petechiae] : no petechiae [Skin Ulcer] : no ulcer [Skin Induration] : no induration [Alert] : alert [Oriented to Place] : oriented to place [Oriented to Person] : oriented to person [Oriented to Time] : oriented to time [Calm] : calm [de-identified] : calm, AOX3  [de-identified] : s/p right IPJ arthroplasty right hallux , +OM of the right great toe  [de-identified] : New wound to the left great toe down to fat. Wound to the plantar hallux  IPJ of the right hallux - down to fat - re- opened noted with heling progress  [FreeTextEntry2] : CLOSED  [FreeTextEntry1] : HALLUX [de-identified] : NONE  [FreeTextEntry7] : DORSAL HALLUX  [FreeTextEntry8] : 0.4 [FreeTextEntry9] : 0.4 [de-identified] : 0.2 [de-identified] : RUB [de-identified] : SCANT [de-identified] : MUPIROCIN [TWNoteComboBox1] : Right [TWNoteComboBox9] : Left [de-identified] : False [de-identified] : No [de-identified] : Normal [de-identified] : None [de-identified] : None [de-identified] : Daily

## 2024-09-02 NOTE — HISTORY OF PRESENT ILLNESS
[FreeTextEntry1] : Patient is 18 year old male presenting for follow up for right plantar hallux wound s/p wound debridement and bone biopsy (-) for OM this time. Patient had been on vacation and walked a lot and noticed a blood filled blister to the area of previously healed wound. Patient noticed the wound healed and has not been applying any dressings. Has transitioned into regular shoes. Patient has new wound to the left great toe from rubbing against some object. Patient denies any pain to the wound.

## 2024-09-02 NOTE — REVIEW OF SYSTEMS
[Fever] : no fever [Chills] : no chills [Eye Pain] : no eye pain [Red Eyes] : eyes not red [Earache] : no earache [Loss Of Hearing] : no hearing loss [Nosebleeds] : no nosebleeds [Chest Pain] : no chest pain [Shortness Of Breath] : no shortness of breath [Wheezing] : no wheezing [Cough] : no cough [Abdominal Pain] : no abdominal pain [Vomiting] : no vomiting [Diarrhea] : no diarrhea [Skin Wound] : skin wound [Confused] : no confusion [Dizziness] : no dizziness [Anxiety] : no anxiety [Easy Bleeding] : no tendency for easy bleeding [Negative] : Endocrine [FreeTextEntry9] : Hammer toes with plantar flexed 1st ray of the left foot  [de-identified] : Right great toe wound with bone biopsy (-) for OM this time. Patient has had  s/p arthroplasty , + OM , no soi ; new blister to the right hallux - reopened ; left dorsal 5th metatarsal head wound - healed

## 2024-09-02 NOTE — PLAN
[FreeTextEntry1] : .Patient seen and evaluated. Discussed etiology and treatment plan. Patient verbalized understanding. Advised patient to start wearing regular shoe gear and to c/w routine activities. Will closely monitor the wound. Start local wound care and offloading to the left great toe. RTC in 1 week.  Spent 20 minutes for patient care and medical decision making.

## 2024-09-02 NOTE — ASSESSMENT
[] : No [FreeTextEntry2] : PROPER NUTRITION  DRESSING CHANGES INSPECTION OF FEET SELF CARE [FreeTextEntry4] : RIGHT HALLUX WOUND RESOLVED.  DEVELOPED NEW WOUND DORSAL HALLUX LEFT FOOT FROM RUB.  PT IS SHY TO REPORT NEW WOUNDS AND ENCOURAGED TO VERBALIZE ANY NEW WOUNDS PROMPTLY.

## 2024-09-02 NOTE — PHYSICAL EXAM
[2+] : left 2+ [Ankle Swelling (On Exam)] : not present [] : not present [Varicose Veins Of Lower Extremities] : not present [Purpura] : no purpura  [Petechiae] : no petechiae [Skin Ulcer] : no ulcer [Skin Induration] : no induration [Alert] : alert [Oriented to Person] : oriented to person [Oriented to Place] : oriented to place [Oriented to Time] : oriented to time [Calm] : calm [de-identified] : calm, AOX3  [de-identified] : s/p right IPJ arthroplasty right hallux , +OM of the right great toe  [de-identified] : New wound to the left great toe down to fat. Wound to the plantar hallux  IPJ of the right hallux - down to fat - re- opened noted with heling progress  [FreeTextEntry2] : CLOSED  [FreeTextEntry1] : HALLUX [de-identified] : NONE  [FreeTextEntry8] : 0.4 [FreeTextEntry7] : DORSAL HALLUX  [FreeTextEntry9] : 0.4 [de-identified] : 0.2 [de-identified] : SCANT [de-identified] : RUB [de-identified] : MUPIROCIN [TWNoteComboBox1] : Right [TWNoteComboBox9] : Left [de-identified] : False [de-identified] : No [de-identified] : Normal [de-identified] : None [de-identified] : None [de-identified] : Daily

## 2024-09-02 NOTE — REVIEW OF SYSTEMS
[Fever] : no fever [Chills] : no chills [Eye Pain] : no eye pain [Red Eyes] : eyes not red [Earache] : no earache [Loss Of Hearing] : no hearing loss [Nosebleeds] : no nosebleeds [Chest Pain] : no chest pain [Shortness Of Breath] : no shortness of breath [Wheezing] : no wheezing [Cough] : no cough [Abdominal Pain] : no abdominal pain [Vomiting] : no vomiting [Diarrhea] : no diarrhea [Skin Wound] : skin wound [Confused] : no confusion [Dizziness] : no dizziness [Anxiety] : no anxiety [Easy Bleeding] : no tendency for easy bleeding [Negative] : Endocrine [FreeTextEntry9] : Hammer toes with plantar flexed 1st ray of the left foot  [de-identified] : Right great toe wound with bone biopsy (-) for OM this time. Patient has had  s/p arthroplasty , + OM , no soi ; new blister to the right hallux - reopened ; left dorsal 5th metatarsal head wound - healed

## 2024-09-03 DIAGNOSIS — Z91.010 ALLERGY TO PEANUTS: ICD-10-CM
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
## 2024-09-03 DIAGNOSIS — Z83.49 FAMILY HISTORY OF OTHER ENDOCRINE, NUTRITIONAL AND METABOLIC DISEASES: ICD-10-CM

## 2024-09-03 DIAGNOSIS — T81.89XD OTHER COMPLICATIONS OF PROCEDURES, NOT ELSEWHERE CLASSIFIED, SUBSEQUENT ENCOUNTER: ICD-10-CM

## 2024-09-03 DIAGNOSIS — K59.09 OTHER CONSTIPATION: ICD-10-CM
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
## 2024-09-03 DIAGNOSIS — Z98.890 OTHER SPECIFIED POSTPROCEDURAL STATES: ICD-10-CM
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
## 2024-09-03 DIAGNOSIS — Z91.012 ALLERGY TO EGGS: ICD-10-CM
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
## 2024-09-03 DIAGNOSIS — S91.102A UNSPECIFIED OPEN WOUND OF LEFT GREAT TOE WITHOUT DAMAGE TO NAIL, INITIAL ENCOUNTER: ICD-10-CM

## 2024-09-03 DIAGNOSIS — L97.512 NON-PRESSURE CHRONIC ULCER OF OTHER PART OF RIGHT FOOT WITH FAT LAYER EXPOSED: ICD-10-CM
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
## 2024-09-03 DIAGNOSIS — Z79.899 OTHER LONG TERM (CURRENT) DRUG THERAPY: ICD-10-CM

## 2024-09-03 DIAGNOSIS — Z87.39 PERSONAL HISTORY OF OTHER DISEASES OF THE MUSCULOSKELETAL SYSTEM AND CONNECTIVE TISSUE: ICD-10-CM

## 2024-09-20 ENCOUNTER — APPOINTMENT (OUTPATIENT)
Dept: WOUND CARE | Facility: HOSPITAL | Age: 19
End: 2024-09-20

## 2024-09-20 ENCOUNTER — OUTPATIENT (OUTPATIENT)
Dept: OUTPATIENT SERVICES | Facility: HOSPITAL | Age: 19
LOS: 1 days | Discharge: HOME | End: 2024-09-20
Payer: MEDICAID

## 2024-09-20 VITALS
TEMPERATURE: 98.9 F | HEIGHT: 69 IN | SYSTOLIC BLOOD PRESSURE: 115 MMHG | BODY MASS INDEX: 18.51 KG/M2 | DIASTOLIC BLOOD PRESSURE: 78 MMHG | OXYGEN SATURATION: 100 % | WEIGHT: 125 LBS | HEART RATE: 92 BPM | RESPIRATION RATE: 18 BRPM

## 2024-09-20 DIAGNOSIS — T81.89XD OTHER COMPLICATIONS OF PROCEDURES, NOT ELSEWHERE CLASSIFIED, SUBSEQUENT ENCOUNTER: ICD-10-CM

## 2024-09-20 DIAGNOSIS — Z98.1 ARTHRODESIS STATUS: Chronic | ICD-10-CM

## 2024-09-20 DIAGNOSIS — M86.9 OSTEOMYELITIS, UNSPECIFIED: Chronic | ICD-10-CM

## 2024-09-20 DIAGNOSIS — S91.102D: ICD-10-CM

## 2024-09-20 DIAGNOSIS — L97.512 NON-PRESSURE CHRONIC ULCER OF OTHER PART OF RIGHT FOOT WITH FAT LAYER EXPOSED: ICD-10-CM

## 2024-09-20 DIAGNOSIS — Z98.890 OTHER SPECIFIED POSTPROCEDURAL STATES: Chronic | ICD-10-CM

## 2024-09-20 PROCEDURE — G0463: CPT

## 2024-09-20 PROCEDURE — 99213 OFFICE O/P EST LOW 20 MIN: CPT

## 2024-09-28 PROBLEM — S91.102D: Status: ACTIVE | Noted: 2024-09-28

## 2024-09-28 NOTE — REVIEW OF SYSTEMS
[Fever] : no fever [Chills] : no chills [Eye Pain] : no eye pain [Red Eyes] : eyes not red [Earache] : no earache [Loss Of Hearing] : no hearing loss [Nosebleeds] : no nosebleeds [Chest Pain] : no chest pain [Shortness Of Breath] : no shortness of breath [Wheezing] : no wheezing [Cough] : no cough [Abdominal Pain] : no abdominal pain [Vomiting] : no vomiting [Diarrhea] : no diarrhea [Skin Wound] : skin wound [Confused] : no confusion [Dizziness] : no dizziness [Anxiety] : no anxiety [Easy Bleeding] : no tendency for easy bleeding [Negative] : Endocrine [FreeTextEntry9] : Hammer toes with plantar flexed 1st ray of the left foot  [de-identified] : Right great toe wound with bone biopsy (-) for OM this time. Patient has had  s/p arthroplasty , + OM , no soi ; new blister to the right hallux - reopened and healed ; left dorsal 5th metatarsal head wound - healed

## 2024-09-28 NOTE — ASSESSMENT
[Verbal] : Verbal [Written] : Written [Patient] : Patient [Good - alert, interested, motivated] : Good - alert, interested, motivated [Needs reinforcement] : needs reinforcement [Dressing changes] : dressing changes [Foot Care] : foot care [Skin Care] : skin care [Pressure relief] : pressure relief [Signs and symptoms of infection] : sign and symptoms of infection [Nutrition] : nutrition [How and When to Call] : how and when to call [Off-loading] : off-loading [Patient responsibility to plan of care] : patient responsibility to plan of care [Stable] : stable [Home] : Home [Ambulatory] : Ambulatory [Not Applicable - Long Term Care/Home Health Agency] : Long Term Care/Home Health Agency: Not Applicable [] : No [FreeTextEntry2] : Check Feet daily, report changes maintain skin integrity infection prevention shoes for pressure reduction [FreeTextEntry4] : F/U 2 weeks

## 2024-09-28 NOTE — PLAN
[FreeTextEntry1] : .Patient seen and evaluated. Discussed etiology and treatment plan. Patient verbalized understanding.  c/w routine activities and wearing regular shoes.  Start local wound care and offloading to the left great toe. RTC in 2 weeks. Spent 20 minutes for patient care and medical decision making.

## 2024-09-28 NOTE — HISTORY OF PRESENT ILLNESS
[FreeTextEntry1] : Patient is 18 year old male presenting for follow up for right plantar hallux wound s/p wound debridement and bone biopsy (-) for OM this time. Patient has started baseline activity. Patient denies any pain or new wounds to the foot.

## 2024-09-28 NOTE — PHYSICAL EXAM
[2 x 2] : 2 x 2  [2+] : left 2+ [Ankle Swelling (On Exam)] : not present [Varicose Veins Of Lower Extremities] : not present [] : not present [Purpura] : no purpura  [Petechiae] : no petechiae [Skin Ulcer] : no ulcer [Skin Induration] : no induration [Alert] : alert [Oriented to Person] : oriented to person [Oriented to Place] : oriented to place [Oriented to Time] : oriented to time [Calm] : calm [de-identified] : calm, AOX3  [de-identified] : s/p right IPJ arthroplasty right hallux , +OM of the right great toe  [de-identified] : New wound to the left great toe down to fat - healed. Wound to the plantar hallux  IPJ of the right hallux - down to fat - re- opened- healed with HPK tissue  [FreeTextEntry1] : HALLUX [FreeTextEntry2] : CLOSED  [de-identified] : NONE  [FreeTextEntry7] : DORSAL HALLUX  [FreeTextEntry8] : 0.3 [FreeTextEntry9] : 0.3 [de-identified] : 0.2 [de-identified] : SCANT sanguineous  [de-identified] : RUB [de-identified] : hyperpigmented/intact  [de-identified] : betadine  [de-identified] : Mechanically cleansed with Sterile gauze and 0.9% Normal Saline  Dressing applied by DPM [TWNoteComboBox1] : Right [TWNoteComboBox9] : Left [de-identified] : No [de-identified] : No [de-identified] : other [de-identified] : None [de-identified] : None [de-identified] : 100% [de-identified] : No [de-identified] : Daily [de-identified] : Primary Dressing

## 2024-09-28 NOTE — PHYSICAL EXAM
[2 x 2] : 2 x 2  [2+] : left 2+ [Ankle Swelling (On Exam)] : not present [Varicose Veins Of Lower Extremities] : not present [] : not present [Purpura] : no purpura  [Petechiae] : no petechiae [Skin Ulcer] : no ulcer [Skin Induration] : no induration [Alert] : alert [Oriented to Person] : oriented to person [Oriented to Place] : oriented to place [Oriented to Time] : oriented to time [Calm] : calm [de-identified] : calm, AOX3  [de-identified] : s/p right IPJ arthroplasty right hallux , +OM of the right great toe  [de-identified] : New wound to the left great toe down to fat - healed. Wound to the plantar hallux  IPJ of the right hallux - down to fat - re- opened- healed with HPK tissue  [FreeTextEntry1] : HALLUX [FreeTextEntry2] : CLOSED  [de-identified] : NONE  [FreeTextEntry7] : DORSAL HALLUX  [FreeTextEntry8] : 0.3 [FreeTextEntry9] : 0.3 [de-identified] : 0.2 [de-identified] : SCANT sanguineous  [de-identified] : RUB [de-identified] : hyperpigmented/intact  [de-identified] : betadine  [de-identified] : Mechanically cleansed with Sterile gauze and 0.9% Normal Saline  Dressing applied by DPM [TWNoteComboBox1] : Right [TWNoteComboBox9] : Left [de-identified] : No [de-identified] : No [de-identified] : other [de-identified] : None [de-identified] : None [de-identified] : 100% [de-identified] : No [de-identified] : Daily [de-identified] : Primary Dressing

## 2024-09-28 NOTE — REVIEW OF SYSTEMS
[Fever] : no fever [Chills] : no chills [Eye Pain] : no eye pain [Red Eyes] : eyes not red [Earache] : no earache [Loss Of Hearing] : no hearing loss [Nosebleeds] : no nosebleeds [Chest Pain] : no chest pain [Shortness Of Breath] : no shortness of breath [Wheezing] : no wheezing [Cough] : no cough [Abdominal Pain] : no abdominal pain [Vomiting] : no vomiting [Diarrhea] : no diarrhea [Skin Wound] : skin wound [Confused] : no confusion [Dizziness] : no dizziness [Anxiety] : no anxiety [Easy Bleeding] : no tendency for easy bleeding [Negative] : Endocrine [FreeTextEntry9] : Hammer toes with plantar flexed 1st ray of the left foot  [de-identified] : Right great toe wound with bone biopsy (-) for OM this time. Patient has had  s/p arthroplasty , + OM , no soi ; new blister to the right hallux - reopened and healed ; left dorsal 5th metatarsal head wound - healed

## 2024-09-28 NOTE — REVIEW OF SYSTEMS
[Fever] : no fever [Chills] : no chills [Eye Pain] : no eye pain [Red Eyes] : eyes not red [Earache] : no earache [Loss Of Hearing] : no hearing loss [Nosebleeds] : no nosebleeds [Chest Pain] : no chest pain [Shortness Of Breath] : no shortness of breath [Wheezing] : no wheezing [Cough] : no cough [Abdominal Pain] : no abdominal pain [Vomiting] : no vomiting [Diarrhea] : no diarrhea [Skin Wound] : skin wound [Confused] : no confusion [Dizziness] : no dizziness [Anxiety] : no anxiety [Easy Bleeding] : no tendency for easy bleeding [Negative] : Endocrine [FreeTextEntry9] : Hammer toes with plantar flexed 1st ray of the left foot  [de-identified] : Right great toe wound with bone biopsy (-) for OM this time. Patient has had  s/p arthroplasty , + OM , no soi ; new blister to the right hallux - reopened and healed ; left dorsal 5th metatarsal head wound - healed

## 2024-09-28 NOTE — PHYSICAL EXAM
[2 x 2] : 2 x 2  [2+] : left 2+ [Ankle Swelling (On Exam)] : not present [Varicose Veins Of Lower Extremities] : not present [] : not present [Purpura] : no purpura  [Petechiae] : no petechiae [Skin Ulcer] : no ulcer [Skin Induration] : no induration [Alert] : alert [Oriented to Person] : oriented to person [Oriented to Place] : oriented to place [Oriented to Time] : oriented to time [Calm] : calm [de-identified] : calm, AOX3  [de-identified] : s/p right IPJ arthroplasty right hallux , +OM of the right great toe  [de-identified] : New wound to the left great toe down to fat - healed. Wound to the plantar hallux  IPJ of the right hallux - down to fat - re- opened- healed with HPK tissue  [FreeTextEntry1] : HALLUX [FreeTextEntry2] : CLOSED  [de-identified] : NONE  [FreeTextEntry7] : DORSAL HALLUX  [FreeTextEntry8] : 0.3 [FreeTextEntry9] : 0.3 [de-identified] : 0.2 [de-identified] : SCANT sanguineous  [de-identified] : RUB [de-identified] : hyperpigmented/intact  [de-identified] : betadine  [de-identified] : Mechanically cleansed with Sterile gauze and 0.9% Normal Saline  Dressing applied by DPM [TWNoteComboBox1] : Right [TWNoteComboBox9] : Left [de-identified] : No [de-identified] : No [de-identified] : other [de-identified] : None [de-identified] : None [de-identified] : 100% [de-identified] : No [de-identified] : Daily [de-identified] : Primary Dressing

## 2024-09-29 DIAGNOSIS — Z91.010 ALLERGY TO PEANUTS: ICD-10-CM

## 2024-09-29 DIAGNOSIS — S91.102D UNSPECIFIED OPEN WOUND OF LEFT GREAT TOE WITHOUT DAMAGE TO NAIL, SUBSEQUENT ENCOUNTER: ICD-10-CM

## 2024-09-29 DIAGNOSIS — K59.09 OTHER CONSTIPATION: ICD-10-CM
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
## 2024-09-29 DIAGNOSIS — Z87.39 PERSONAL HISTORY OF OTHER DISEASES OF THE MUSCULOSKELETAL SYSTEM AND CONNECTIVE TISSUE: ICD-10-CM

## 2024-09-29 DIAGNOSIS — L97.512 NON-PRESSURE CHRONIC ULCER OF OTHER PART OF RIGHT FOOT WITH FAT LAYER EXPOSED: ICD-10-CM

## 2024-09-29 DIAGNOSIS — Z83.49 FAMILY HISTORY OF OTHER ENDOCRINE, NUTRITIONAL AND METABOLIC DISEASES: ICD-10-CM

## 2024-09-29 DIAGNOSIS — Z79.899 OTHER LONG TERM (CURRENT) DRUG THERAPY: ICD-10-CM

## 2024-09-29 DIAGNOSIS — T81.89XD OTHER COMPLICATIONS OF PROCEDURES, NOT ELSEWHERE CLASSIFIED, SUBSEQUENT ENCOUNTER: ICD-10-CM

## 2024-09-29 DIAGNOSIS — Z98.890 OTHER SPECIFIED POSTPROCEDURAL STATES: ICD-10-CM

## 2024-09-29 DIAGNOSIS — Z91.012 ALLERGY TO EGGS: ICD-10-CM

## 2024-10-03 ENCOUNTER — APPOINTMENT (OUTPATIENT)
Dept: WOUND CARE | Facility: HOSPITAL | Age: 19
End: 2024-10-03
Payer: MEDICAID

## 2024-10-03 ENCOUNTER — OUTPATIENT (OUTPATIENT)
Dept: OUTPATIENT SERVICES | Facility: HOSPITAL | Age: 19
LOS: 1 days | Discharge: HOME | End: 2024-10-03
Payer: MEDICAID

## 2024-10-03 VITALS
TEMPERATURE: 99.5 F | WEIGHT: 125 LBS | HEART RATE: 101 BPM | OXYGEN SATURATION: 97 % | BODY MASS INDEX: 18.51 KG/M2 | SYSTOLIC BLOOD PRESSURE: 129 MMHG | HEIGHT: 69 IN | RESPIRATION RATE: 18 BRPM | DIASTOLIC BLOOD PRESSURE: 85 MMHG

## 2024-10-03 DIAGNOSIS — Z98.1 ARTHRODESIS STATUS: Chronic | ICD-10-CM

## 2024-10-03 DIAGNOSIS — S91.102D: ICD-10-CM

## 2024-10-03 DIAGNOSIS — T81.89XD OTHER COMPLICATIONS OF PROCEDURES, NOT ELSEWHERE CLASSIFIED, SUBSEQUENT ENCOUNTER: ICD-10-CM

## 2024-10-03 DIAGNOSIS — Z98.890 OTHER SPECIFIED POSTPROCEDURAL STATES: Chronic | ICD-10-CM

## 2024-10-03 DIAGNOSIS — M86.9 OSTEOMYELITIS, UNSPECIFIED: Chronic | ICD-10-CM

## 2024-10-03 DIAGNOSIS — L97.512 NON-PRESSURE CHRONIC ULCER OF OTHER PART OF RIGHT FOOT WITH FAT LAYER EXPOSED: ICD-10-CM

## 2024-10-03 PROCEDURE — G0463: CPT

## 2024-10-03 PROCEDURE — 99213 OFFICE O/P EST LOW 20 MIN: CPT

## 2024-10-07 DIAGNOSIS — Z79.899 OTHER LONG TERM (CURRENT) DRUG THERAPY: ICD-10-CM
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
## 2024-10-07 DIAGNOSIS — Z91.010 ALLERGY TO PEANUTS: ICD-10-CM
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
## 2024-10-07 DIAGNOSIS — Z91.012 ALLERGY TO EGGS: ICD-10-CM

## 2024-10-07 DIAGNOSIS — S91.102D UNSPECIFIED OPEN WOUND OF LEFT GREAT TOE WITHOUT DAMAGE TO NAIL, SUBSEQUENT ENCOUNTER: ICD-10-CM
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
## 2024-10-07 DIAGNOSIS — L97.512 NON-PRESSURE CHRONIC ULCER OF OTHER PART OF RIGHT FOOT WITH FAT LAYER EXPOSED: ICD-10-CM

## 2024-10-07 DIAGNOSIS — Z98.890 OTHER SPECIFIED POSTPROCEDURAL STATES: ICD-10-CM

## 2024-10-07 DIAGNOSIS — K59.09 OTHER CONSTIPATION: ICD-10-CM

## 2024-10-07 DIAGNOSIS — Z87.39 PERSONAL HISTORY OF OTHER DISEASES OF THE MUSCULOSKELETAL SYSTEM AND CONNECTIVE TISSUE: ICD-10-CM

## 2024-10-07 DIAGNOSIS — T81.89XD OTHER COMPLICATIONS OF PROCEDURES, NOT ELSEWHERE CLASSIFIED, SUBSEQUENT ENCOUNTER: ICD-10-CM

## 2024-10-07 DIAGNOSIS — Z83.49 FAMILY HISTORY OF OTHER ENDOCRINE, NUTRITIONAL AND METABOLIC DISEASES: ICD-10-CM

## 2024-10-07 NOTE — REVIEW OF SYSTEMS
[Fever] : no fever [Chills] : no chills [Eye Pain] : no eye pain [Red Eyes] : eyes not red [Earache] : no earache [Loss Of Hearing] : no hearing loss [Nosebleeds] : no nosebleeds [Chest Pain] : no chest pain [Shortness Of Breath] : no shortness of breath [Wheezing] : no wheezing [Cough] : no cough [Abdominal Pain] : no abdominal pain [Vomiting] : no vomiting [Diarrhea] : no diarrhea [Skin Wound] : skin wound [Confused] : no confusion [Dizziness] : no dizziness [Anxiety] : no anxiety [Easy Bleeding] : no tendency for easy bleeding [Negative] : Endocrine [FreeTextEntry9] : Hammer toes with plantar flexed 1st ray of the left foot  [de-identified] : Right great toe wound with bone biopsy (-) for OM this time. Patient has had  s/p arthroplasty , + OM , no soi ; new blister to the right hallux - reopened and healed ; left dorsal 5th metatarsal head wound - healed

## 2024-10-07 NOTE — PHYSICAL EXAM
[2 x 2] : 2 x 2  [4 x 4] : 4 x 4  [2+] : left 2+ [Ankle Swelling (On Exam)] : not present [Varicose Veins Of Lower Extremities] : not present [] : not present [Purpura] : no purpura  [Petechiae] : no petechiae [Skin Ulcer] : no ulcer [Skin Induration] : no induration [Alert] : alert [Oriented to Person] : oriented to person [Oriented to Place] : oriented to place [Oriented to Time] : oriented to time [Calm] : calm [de-identified] : calm, AOX3  [de-identified] : s/p right IPJ arthroplasty right hallux , +OM of the right great toe  [de-identified] : New wound to the left great toe down to fat - healed.  Wound to the plantar hallux  IPJ of the right hallux - down to fat - re- opened- with blister that was deroofed.  [FreeTextEntry1] : HALLUX- new 2 small openings [FreeTextEntry2] : 0.2 [FreeTextEntry3] : 0.2 [FreeTextEntry4] : 0.2 [de-identified] : betadine to dry- Alginate w/ AG [de-identified] : Mechanically cleansed with Sterile gauze and 0.9% Normal Saline Alginate w/ AG Dressing applied by DPM [FreeTextEntry7] : DORSAL HALLUX  [de-identified] : closed [de-identified] : None [de-identified] : Mechanically cleansed with Sterile gauze and 0.9% Normal Saline  Dressing applied by DPM [TWNoteComboBox1] : Right [TWNoteComboBox4] : Small [TWNoteComboBox6] : Pressure [de-identified] : 3x Weekly [de-identified] : Primary Dressing [TWNoteComboBox9] : Left

## 2024-10-07 NOTE — ASSESSMENT
[Verbal] : Verbal [Written] : Written [Patient] : Patient [Good - alert, interested, motivated] : Good - alert, interested, motivated [Needs reinforcement] : needs reinforcement [Dressing changes] : dressing changes [Foot Care] : foot care [Skin Care] : skin care [Pressure relief] : pressure relief [Signs and symptoms of infection] : sign and symptoms of infection [Nutrition] : nutrition [How and When to Call] : how and when to call [Off-loading] : off-loading [Patient responsibility to plan of care] : patient responsibility to plan of care [Stable] : stable [Home] : Home [Ambulatory] : Ambulatory [Not Applicable - Long Term Care/Home Health Agency] : Long Term Care/Home Health Agency: Not Applicable [] : No [FreeTextEntry2] : Check Feet daily, report changes maintain skin integrity infection prevention shoes for pressure reduction [FreeTextEntry4] : Pt was provided with a small number of supplies to prevent lapse in care.  F/U 1 week

## 2024-10-07 NOTE — PLAN
[FreeTextEntry1] : .Patient seen and evaluated. Discussed etiology and treatment plan. Patient verbalized understanding.  C/w local wound care and offloading.  RTC in 1 week. . Spent 20 minutes for patient care and medical decision making.

## 2024-10-07 NOTE — PHYSICAL EXAM
[2 x 2] : 2 x 2  [4 x 4] : 4 x 4  [2+] : left 2+ [Ankle Swelling (On Exam)] : not present [Varicose Veins Of Lower Extremities] : not present [] : not present [Purpura] : no purpura  [Petechiae] : no petechiae [Skin Ulcer] : no ulcer [Skin Induration] : no induration [Alert] : alert [Oriented to Person] : oriented to person [Oriented to Place] : oriented to place [Oriented to Time] : oriented to time [Calm] : calm [de-identified] : calm, AOX3  [de-identified] : s/p right IPJ arthroplasty right hallux , +OM of the right great toe  [de-identified] : New wound to the left great toe down to fat - healed.  Wound to the plantar hallux  IPJ of the right hallux - down to fat - re- opened- with blister that was deroofed.  [FreeTextEntry1] : HALLUX- new 2 small openings [FreeTextEntry2] : 0.2 [FreeTextEntry3] : 0.2 [FreeTextEntry4] : 0.2 [de-identified] : betadine to dry- Alginate w/ AG [de-identified] : Mechanically cleansed with Sterile gauze and 0.9% Normal Saline Alginate w/ AG Dressing applied by DPM [FreeTextEntry7] : DORSAL HALLUX  [de-identified] : closed [de-identified] : None [de-identified] : Mechanically cleansed with Sterile gauze and 0.9% Normal Saline  Dressing applied by DPM [TWNoteComboBox1] : Right [TWNoteComboBox4] : Small [TWNoteComboBox6] : Pressure [de-identified] : 3x Weekly [de-identified] : Primary Dressing [TWNoteComboBox9] : Left

## 2024-10-07 NOTE — REVIEW OF SYSTEMS
[Fever] : no fever [Chills] : no chills [Eye Pain] : no eye pain [Red Eyes] : eyes not red [Earache] : no earache [Loss Of Hearing] : no hearing loss [Nosebleeds] : no nosebleeds [Chest Pain] : no chest pain [Shortness Of Breath] : no shortness of breath [Wheezing] : no wheezing [Cough] : no cough [Abdominal Pain] : no abdominal pain [Vomiting] : no vomiting [Diarrhea] : no diarrhea [Skin Wound] : skin wound [Confused] : no confusion [Dizziness] : no dizziness [Anxiety] : no anxiety [Easy Bleeding] : no tendency for easy bleeding [Negative] : Endocrine [FreeTextEntry9] : Hammer toes with plantar flexed 1st ray of the left foot  [de-identified] : Right great toe wound with bone biopsy (-) for OM this time. Patient has had  s/p arthroplasty , + OM , no soi ; new blister to the right hallux - reopened and healed ; left dorsal 5th metatarsal head wound - healed

## 2024-10-07 NOTE — HISTORY OF PRESENT ILLNESS
[FreeTextEntry1] : Patient is 18 year old male presenting for follow up for right plantar hallux wound s/p wound debridement and bone biopsy (-) for OM this time. Patient has started baseline activity. Patient complains of blister to the area of the previously healed wound.

## 2024-10-10 ENCOUNTER — APPOINTMENT (OUTPATIENT)
Dept: WOUND CARE | Facility: HOSPITAL | Age: 19
End: 2024-10-10
Payer: MEDICAID

## 2024-10-10 ENCOUNTER — NON-APPOINTMENT (OUTPATIENT)
Age: 19
End: 2024-10-10

## 2024-10-10 ENCOUNTER — OUTPATIENT (OUTPATIENT)
Dept: OUTPATIENT SERVICES | Facility: HOSPITAL | Age: 19
LOS: 1 days | Discharge: HOME | End: 2024-10-10
Payer: MEDICAID

## 2024-10-10 VITALS
SYSTOLIC BLOOD PRESSURE: 108 MMHG | TEMPERATURE: 98.2 F | DIASTOLIC BLOOD PRESSURE: 76 MMHG | OXYGEN SATURATION: 98 % | HEART RATE: 85 BPM | BODY MASS INDEX: 18.51 KG/M2 | RESPIRATION RATE: 18 BRPM | HEIGHT: 69 IN | WEIGHT: 125 LBS

## 2024-10-10 DIAGNOSIS — T81.89XD OTHER COMPLICATIONS OF PROCEDURES, NOT ELSEWHERE CLASSIFIED, SUBSEQUENT ENCOUNTER: ICD-10-CM

## 2024-10-10 DIAGNOSIS — S91.102D: ICD-10-CM

## 2024-10-10 DIAGNOSIS — Z98.890 OTHER SPECIFIED POSTPROCEDURAL STATES: Chronic | ICD-10-CM

## 2024-10-10 DIAGNOSIS — L97.512 NON-PRESSURE CHRONIC ULCER OF OTHER PART OF RIGHT FOOT WITH FAT LAYER EXPOSED: ICD-10-CM

## 2024-10-10 DIAGNOSIS — Z98.1 ARTHRODESIS STATUS: Chronic | ICD-10-CM

## 2024-10-10 DIAGNOSIS — M86.9 OSTEOMYELITIS, UNSPECIFIED: Chronic | ICD-10-CM

## 2024-10-10 PROCEDURE — G0463: CPT

## 2024-10-10 PROCEDURE — 99213 OFFICE O/P EST LOW 20 MIN: CPT

## 2024-10-14 DIAGNOSIS — Z87.39 PERSONAL HISTORY OF OTHER DISEASES OF THE MUSCULOSKELETAL SYSTEM AND CONNECTIVE TISSUE: ICD-10-CM
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
## 2024-10-14 DIAGNOSIS — Z98.890 OTHER SPECIFIED POSTPROCEDURAL STATES: ICD-10-CM

## 2024-10-14 DIAGNOSIS — Z79.899 OTHER LONG TERM (CURRENT) DRUG THERAPY: ICD-10-CM

## 2024-10-14 DIAGNOSIS — Z91.010 ALLERGY TO PEANUTS: ICD-10-CM
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
## 2024-10-14 DIAGNOSIS — L97.512 NON-PRESSURE CHRONIC ULCER OF OTHER PART OF RIGHT FOOT WITH FAT LAYER EXPOSED: ICD-10-CM

## 2024-10-14 DIAGNOSIS — K59.09 OTHER CONSTIPATION: ICD-10-CM

## 2024-10-14 DIAGNOSIS — T81.89XD OTHER COMPLICATIONS OF PROCEDURES, NOT ELSEWHERE CLASSIFIED, SUBSEQUENT ENCOUNTER: ICD-10-CM

## 2024-10-14 DIAGNOSIS — Z83.49 FAMILY HISTORY OF OTHER ENDOCRINE, NUTRITIONAL AND METABOLIC DISEASES: ICD-10-CM

## 2024-10-14 DIAGNOSIS — S91.102D UNSPECIFIED OPEN WOUND OF LEFT GREAT TOE WITHOUT DAMAGE TO NAIL, SUBSEQUENT ENCOUNTER: ICD-10-CM
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
## 2024-10-14 DIAGNOSIS — Z91.012 ALLERGY TO EGGS: ICD-10-CM
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
## 2024-10-18 ENCOUNTER — TRANSCRIPTION ENCOUNTER (OUTPATIENT)
Age: 19
End: 2024-10-18

## 2024-10-18 ENCOUNTER — OUTPATIENT (OUTPATIENT)
Dept: OUTPATIENT SERVICES | Facility: HOSPITAL | Age: 19
LOS: 1 days | Discharge: HOME | End: 2024-10-18

## 2024-10-18 DIAGNOSIS — L89.90 PRESSURE ULCER OF UNSPECIFIED SITE, UNSPECIFIED STAGE: ICD-10-CM

## 2024-10-18 DIAGNOSIS — M86.9 OSTEOMYELITIS, UNSPECIFIED: Chronic | ICD-10-CM

## 2024-10-18 DIAGNOSIS — Z98.890 OTHER SPECIFIED POSTPROCEDURAL STATES: Chronic | ICD-10-CM

## 2024-10-18 DIAGNOSIS — Z98.1 ARTHRODESIS STATUS: Chronic | ICD-10-CM

## 2024-10-21 DIAGNOSIS — M86.171 OTHER ACUTE OSTEOMYELITIS, RIGHT ANKLE AND FOOT: ICD-10-CM

## 2024-10-21 DIAGNOSIS — L97.511 NON-PRESSURE CHRONIC ULCER OF OTHER PART OF RIGHT FOOT LIMITED TO BREAKDOWN OF SKIN: ICD-10-CM

## 2024-10-25 ENCOUNTER — OUTPATIENT (OUTPATIENT)
Dept: OUTPATIENT SERVICES | Facility: HOSPITAL | Age: 19
LOS: 1 days | Discharge: HOME | End: 2024-10-25
Payer: MEDICAID

## 2024-10-25 ENCOUNTER — APPOINTMENT (OUTPATIENT)
Dept: WOUND CARE | Facility: HOSPITAL | Age: 19
End: 2024-10-25

## 2024-10-25 VITALS
DIASTOLIC BLOOD PRESSURE: 77 MMHG | TEMPERATURE: 98.5 F | HEART RATE: 95 BPM | HEIGHT: 69 IN | BODY MASS INDEX: 18.51 KG/M2 | OXYGEN SATURATION: 99 % | WEIGHT: 125 LBS | RESPIRATION RATE: 18 BRPM | SYSTOLIC BLOOD PRESSURE: 111 MMHG

## 2024-10-25 DIAGNOSIS — Z98.890 OTHER SPECIFIED POSTPROCEDURAL STATES: Chronic | ICD-10-CM

## 2024-10-25 DIAGNOSIS — Z98.1 ARTHRODESIS STATUS: Chronic | ICD-10-CM

## 2024-10-25 DIAGNOSIS — T81.89XD OTHER COMPLICATIONS OF PROCEDURES, NOT ELSEWHERE CLASSIFIED, SUBSEQUENT ENCOUNTER: ICD-10-CM

## 2024-10-25 DIAGNOSIS — M86.9 OSTEOMYELITIS, UNSPECIFIED: Chronic | ICD-10-CM

## 2024-10-25 DIAGNOSIS — S91.102D: ICD-10-CM

## 2024-10-25 DIAGNOSIS — L97.512 NON-PRESSURE CHRONIC ULCER OF OTHER PART OF RIGHT FOOT WITH FAT LAYER EXPOSED: ICD-10-CM

## 2024-10-25 PROCEDURE — 99213 OFFICE O/P EST LOW 20 MIN: CPT

## 2024-10-25 PROCEDURE — G0463: CPT
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
## 2024-11-02 DIAGNOSIS — Z79.899 OTHER LONG TERM (CURRENT) DRUG THERAPY: ICD-10-CM
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
## 2024-11-02 DIAGNOSIS — Z91.012 ALLERGY TO EGGS: ICD-10-CM

## 2024-11-02 DIAGNOSIS — T81.89XD OTHER COMPLICATIONS OF PROCEDURES, NOT ELSEWHERE CLASSIFIED, SUBSEQUENT ENCOUNTER: ICD-10-CM

## 2024-11-02 DIAGNOSIS — Z91.010 ALLERGY TO PEANUTS: ICD-10-CM

## 2024-11-02 DIAGNOSIS — K59.09 OTHER CONSTIPATION: ICD-10-CM
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
## 2024-11-02 DIAGNOSIS — Z87.39 PERSONAL HISTORY OF OTHER DISEASES OF THE MUSCULOSKELETAL SYSTEM AND CONNECTIVE TISSUE: ICD-10-CM
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
## 2024-11-02 DIAGNOSIS — Z98.890 OTHER SPECIFIED POSTPROCEDURAL STATES: ICD-10-CM

## 2024-11-02 DIAGNOSIS — Z83.49 FAMILY HISTORY OF OTHER ENDOCRINE, NUTRITIONAL AND METABOLIC DISEASES: ICD-10-CM

## 2024-11-02 DIAGNOSIS — S91.102D UNSPECIFIED OPEN WOUND OF LEFT GREAT TOE WITHOUT DAMAGE TO NAIL, SUBSEQUENT ENCOUNTER: ICD-10-CM

## 2024-11-02 DIAGNOSIS — L97.512 NON-PRESSURE CHRONIC ULCER OF OTHER PART OF RIGHT FOOT WITH FAT LAYER EXPOSED: ICD-10-CM

## 2024-11-18 NOTE — PLAN
[facility]   [address], [place] [address]  Monday through Friday 7 am to 10 pm, Saturday and Sunday 8 am to 4:40 pm  P: [phone]  [facility] [address] [place] [address]  Monday through Friday 7 am to 7 pm/ P: [phone]  [facility] [address] [place] [address]  Monday through Friday 7 am to 5 pm, Saturday 9 am to 1 pm/ P: [phone]  [facility] [address] [place] [address]  Monday through Friday 7 am to 6:30pm/ P: [phone]        Magnetic Resonance Imaging (MRI)  Magnetic resonance imaging (MRI) is a test that lets your doctor see detailed pictures of the inside of your body. MRI  combines the use of strong magnets and radio waves to form an MRI image.  How do I get ready for an MRI?  Follow any directions you are given for not eating or drinking before the test.  Ask your provider if you should stop taking any medicine before the test.  Follow your normal daily routine unless your provider tells you otherwise.  You'll be asked to remove your watch, jewelry, hearing aids, credit cards, pens, pocket knives, eyeglasses,  and other metal objects.  You may be asked to remove your makeup. Makeup may contain some metal.  Most MRI tests take 30 to 60 minutes. Depending on the type of MRI you are having, the test may take  longer. Give yourself extra time to check in.  You will be asked to hold very still during the scan.  MRI uses strong magnets. Metal is affected by magnets and can distort the image. The magnet  used in MRI can cause metal objects in your body to move. If you have a metal implant, you may  not be able to have an MRI unless the implant is certified as MRI safe. People with these implants  should not have an MRI:  Ear (cochlear) implants  Certain clips used for brain aneurysms  Certain metal coils put in blood vessels  Most defibrillators  Most pacemakers  Be sure to tell the radiologist or technologist  if you:  Have had any previous surgeries  Have a pacemaker, surgical clips, metal plate or pins, an artificial joint, staples or  screws, ear (cochlear) implants, or other implants  Wear a medicated adhesive patch  Have metal splinters in your body  Have implanted nerve stimulators or drug-infusion ports  Have tattoos or body piercings. Some tattoo inks contain metal.  Work with metal  Have braces. You must remove any dental work.  Have a bullet or other metal in your body  Also tell the radiologist or technologist if you:  Are pregnant or think you may be  Are afraid of small, enclosed spaces (claustrophobic)  Are allergic to X-ray dye (contrast medium), iodine, shellfish, or any medicines  Have other allergies  Are breastfeeding  Have a history of cancer  Have any serious health problems. This includes kidney disease or a liver transplant.  You may not be able to have the contrast material used for MRI  What happens during an MRI?  You may be asked to wear a hospital gown.  You may be given earplugs to wear if you need them.  You may be injected with a special dye (contrast) that improves the MRI image.  You'll lie down on a platform that slides into the magnet.  What happens after an MRI?  You can get back to normal activities right away. If you were given contrast, it will pass naturally through  your body within a day. You may be told to drink more water or other fluids during this time.  Your doctor will discuss the test results with you during a follow-up appointment.     [FreeTextEntry1] : .Patient seen and evaluated. Discussed etiology and treatment plan. New blister to the right hallux at the IPJ with no soi - healed. Left dorsal 5th metatarsal head - healed. RTC in 3 weeks.    .Patient is high risk for further surgical intervention, sepsis, loss of limb and loss of life.  Spent 20 minutes for patient care and medical decision making.

## 2024-11-22 ENCOUNTER — APPOINTMENT (OUTPATIENT)
Dept: WOUND CARE | Facility: HOSPITAL | Age: 19
End: 2024-11-22
Payer: MEDICAID

## 2024-11-22 ENCOUNTER — OUTPATIENT (OUTPATIENT)
Dept: OUTPATIENT SERVICES | Facility: HOSPITAL | Age: 19
LOS: 1 days | Discharge: HOME | End: 2024-11-22
Payer: MEDICAID

## 2024-11-22 VITALS
WEIGHT: 125 LBS | HEART RATE: 80 BPM | TEMPERATURE: 98.5 F | HEIGHT: 69 IN | RESPIRATION RATE: 18 BRPM | BODY MASS INDEX: 18.51 KG/M2 | OXYGEN SATURATION: 100 % | SYSTOLIC BLOOD PRESSURE: 106 MMHG | DIASTOLIC BLOOD PRESSURE: 73 MMHG

## 2024-11-22 DIAGNOSIS — Z98.1 ARTHRODESIS STATUS: Chronic | ICD-10-CM

## 2024-11-22 DIAGNOSIS — M20.41 OTHER HAMMER TOE(S) (ACQUIRED), RIGHT FOOT: ICD-10-CM

## 2024-11-22 DIAGNOSIS — M86.9 OSTEOMYELITIS, UNSPECIFIED: Chronic | ICD-10-CM

## 2024-11-22 DIAGNOSIS — Z98.890 OTHER SPECIFIED POSTPROCEDURAL STATES: Chronic | ICD-10-CM

## 2024-11-22 DIAGNOSIS — M20.42 OTHER HAMMER TOE(S) (ACQUIRED), RIGHT FOOT: ICD-10-CM

## 2024-11-22 DIAGNOSIS — L97.512 NON-PRESSURE CHRONIC ULCER OF OTHER PART OF RIGHT FOOT WITH FAT LAYER EXPOSED: ICD-10-CM

## 2024-11-22 DIAGNOSIS — T81.89XD OTHER COMPLICATIONS OF PROCEDURES, NOT ELSEWHERE CLASSIFIED, SUBSEQUENT ENCOUNTER: ICD-10-CM

## 2024-11-22 DIAGNOSIS — E11.621 TYPE 2 DIABETES MELLITUS WITH FOOT ULCER: ICD-10-CM

## 2024-11-22 PROCEDURE — G0463: CPT

## 2024-11-22 PROCEDURE — 99213 OFFICE O/P EST LOW 20 MIN: CPT

## 2024-11-24 PROBLEM — M20.41 HAMMER TOES OF BOTH FEET: Status: ACTIVE | Noted: 2024-11-24

## 2024-11-28 DIAGNOSIS — M20.41 OTHER HAMMER TOE(S) (ACQUIRED), RIGHT FOOT: ICD-10-CM

## 2024-11-28 DIAGNOSIS — Z83.49 FAMILY HISTORY OF OTHER ENDOCRINE, NUTRITIONAL AND METABOLIC DISEASES: ICD-10-CM

## 2024-11-28 DIAGNOSIS — Z91.010 ALLERGY TO PEANUTS: ICD-10-CM

## 2024-11-28 DIAGNOSIS — L84 CORNS AND CALLOSITIES: ICD-10-CM

## 2024-11-28 DIAGNOSIS — Z79.899 OTHER LONG TERM (CURRENT) DRUG THERAPY: ICD-10-CM

## 2024-11-28 DIAGNOSIS — T81.89XD OTHER COMPLICATIONS OF PROCEDURES, NOT ELSEWHERE CLASSIFIED, SUBSEQUENT ENCOUNTER: ICD-10-CM

## 2024-11-28 DIAGNOSIS — Z98.890 OTHER SPECIFIED POSTPROCEDURAL STATES: ICD-10-CM

## 2024-11-28 DIAGNOSIS — Z91.012 ALLERGY TO EGGS: ICD-10-CM

## 2024-11-28 DIAGNOSIS — Z87.39 PERSONAL HISTORY OF OTHER DISEASES OF THE MUSCULOSKELETAL SYSTEM AND CONNECTIVE TISSUE: ICD-10-CM

## 2024-11-28 DIAGNOSIS — L97.512 NON-PRESSURE CHRONIC ULCER OF OTHER PART OF RIGHT FOOT WITH FAT LAYER EXPOSED: ICD-10-CM

## 2024-11-28 DIAGNOSIS — K59.09 OTHER CONSTIPATION: ICD-10-CM
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
## 2024-12-04 ENCOUNTER — APPOINTMENT (OUTPATIENT)
Dept: WOUND CARE | Facility: HOSPITAL | Age: 19
End: 2024-12-04
Payer: MEDICAID

## 2024-12-04 ENCOUNTER — OUTPATIENT (OUTPATIENT)
Dept: OUTPATIENT SERVICES | Facility: HOSPITAL | Age: 19
LOS: 1 days | Discharge: HOME | End: 2024-12-04
Payer: MEDICAID

## 2024-12-04 ENCOUNTER — NON-APPOINTMENT (OUTPATIENT)
Age: 19
End: 2024-12-04

## 2024-12-04 VITALS
SYSTOLIC BLOOD PRESSURE: 117 MMHG | DIASTOLIC BLOOD PRESSURE: 81 MMHG | BODY MASS INDEX: 18.51 KG/M2 | WEIGHT: 125 LBS | OXYGEN SATURATION: 98 % | HEART RATE: 88 BPM | RESPIRATION RATE: 18 BRPM | TEMPERATURE: 98.1 F | HEIGHT: 69 IN

## 2024-12-04 DIAGNOSIS — T81.89XD OTHER COMPLICATIONS OF PROCEDURES, NOT ELSEWHERE CLASSIFIED, SUBSEQUENT ENCOUNTER: ICD-10-CM

## 2024-12-04 DIAGNOSIS — E11.621 TYPE 2 DIABETES MELLITUS WITH FOOT ULCER: ICD-10-CM

## 2024-12-04 DIAGNOSIS — M86.9 OSTEOMYELITIS, UNSPECIFIED: Chronic | ICD-10-CM

## 2024-12-04 DIAGNOSIS — Z98.890 OTHER SPECIFIED POSTPROCEDURAL STATES: Chronic | ICD-10-CM

## 2024-12-04 DIAGNOSIS — Z98.1 ARTHRODESIS STATUS: Chronic | ICD-10-CM

## 2024-12-04 PROCEDURE — G0463: CPT

## 2024-12-04 PROCEDURE — 99213 OFFICE O/P EST LOW 20 MIN: CPT

## 2024-12-06 ENCOUNTER — APPOINTMENT (OUTPATIENT)
Dept: RADIOLOGY | Facility: CLINIC | Age: 19
End: 2024-12-06

## 2024-12-06 PROCEDURE — 73630 X-RAY EXAM OF FOOT: CPT | Mod: RT

## 2024-12-09 DIAGNOSIS — R20.0 ANESTHESIA OF SKIN: ICD-10-CM

## 2024-12-09 DIAGNOSIS — K59.09 OTHER CONSTIPATION: ICD-10-CM

## 2024-12-09 DIAGNOSIS — Z91.010 ALLERGY TO PEANUTS: ICD-10-CM

## 2024-12-09 DIAGNOSIS — Z83.49 FAMILY HISTORY OF OTHER ENDOCRINE, NUTRITIONAL AND METABOLIC DISEASES: ICD-10-CM

## 2024-12-09 DIAGNOSIS — Z79.899 OTHER LONG TERM (CURRENT) DRUG THERAPY: ICD-10-CM

## 2024-12-09 DIAGNOSIS — Z91.012 ALLERGY TO EGGS: ICD-10-CM

## 2024-12-09 DIAGNOSIS — Z87.39 PERSONAL HISTORY OF OTHER DISEASES OF THE MUSCULOSKELETAL SYSTEM AND CONNECTIVE TISSUE: ICD-10-CM

## 2024-12-09 DIAGNOSIS — L84 CORNS AND CALLOSITIES: ICD-10-CM

## 2024-12-09 DIAGNOSIS — L97.512 NON-PRESSURE CHRONIC ULCER OF OTHER PART OF RIGHT FOOT WITH FAT LAYER EXPOSED: ICD-10-CM

## 2024-12-09 DIAGNOSIS — Z98.890 OTHER SPECIFIED POSTPROCEDURAL STATES: ICD-10-CM

## 2024-12-13 ENCOUNTER — APPOINTMENT (OUTPATIENT)
Dept: WOUND CARE | Facility: HOSPITAL | Age: 19
End: 2024-12-13
Payer: MEDICAID

## 2024-12-13 ENCOUNTER — OUTPATIENT (OUTPATIENT)
Dept: OUTPATIENT SERVICES | Facility: HOSPITAL | Age: 19
LOS: 1 days | Discharge: HOME | End: 2024-12-13
Payer: MEDICAID

## 2024-12-13 VITALS
RESPIRATION RATE: 18 BRPM | SYSTOLIC BLOOD PRESSURE: 119 MMHG | OXYGEN SATURATION: 99 % | HEART RATE: 99 BPM | BODY MASS INDEX: 18.51 KG/M2 | DIASTOLIC BLOOD PRESSURE: 78 MMHG | HEIGHT: 69 IN | TEMPERATURE: 97.6 F | WEIGHT: 125 LBS

## 2024-12-13 DIAGNOSIS — R20.0 ANESTHESIA OF SKIN: ICD-10-CM

## 2024-12-13 DIAGNOSIS — Z98.1 ARTHRODESIS STATUS: Chronic | ICD-10-CM

## 2024-12-13 DIAGNOSIS — L84 CORNS AND CALLOSITIES: ICD-10-CM

## 2024-12-13 DIAGNOSIS — L97.512 NON-PRESSURE CHRONIC ULCER OF OTHER PART OF RIGHT FOOT WITH FAT LAYER EXPOSED: ICD-10-CM

## 2024-12-13 DIAGNOSIS — Z98.890 OTHER SPECIFIED POSTPROCEDURAL STATES: Chronic | ICD-10-CM

## 2024-12-13 DIAGNOSIS — M86.9 OSTEOMYELITIS, UNSPECIFIED: Chronic | ICD-10-CM

## 2024-12-13 PROCEDURE — G0463: CPT

## 2024-12-13 PROCEDURE — 99213 OFFICE O/P EST LOW 20 MIN: CPT

## 2024-12-15 PROBLEM — R20.0 NUMBNESS OF TOES: Status: ACTIVE | Noted: 2024-12-04

## 2024-12-18 DIAGNOSIS — K59.09 OTHER CONSTIPATION: ICD-10-CM

## 2024-12-18 DIAGNOSIS — Z87.39 PERSONAL HISTORY OF OTHER DISEASES OF THE MUSCULOSKELETAL SYSTEM AND CONNECTIVE TISSUE: ICD-10-CM

## 2024-12-18 DIAGNOSIS — Z98.890 OTHER SPECIFIED POSTPROCEDURAL STATES: ICD-10-CM

## 2024-12-18 DIAGNOSIS — Z79.899 OTHER LONG TERM (CURRENT) DRUG THERAPY: ICD-10-CM

## 2024-12-18 DIAGNOSIS — R20.0 ANESTHESIA OF SKIN: ICD-10-CM

## 2024-12-18 DIAGNOSIS — Z91.010 ALLERGY TO PEANUTS: ICD-10-CM

## 2024-12-18 DIAGNOSIS — L97.512 NON-PRESSURE CHRONIC ULCER OF OTHER PART OF RIGHT FOOT WITH FAT LAYER EXPOSED: ICD-10-CM

## 2024-12-18 DIAGNOSIS — Z91.012 ALLERGY TO EGGS: ICD-10-CM

## 2024-12-18 DIAGNOSIS — L84 CORNS AND CALLOSITIES: ICD-10-CM

## 2024-12-18 DIAGNOSIS — Z83.49 FAMILY HISTORY OF OTHER ENDOCRINE, NUTRITIONAL AND METABOLIC DISEASES: ICD-10-CM

## 2024-12-19 ENCOUNTER — OUTPATIENT (OUTPATIENT)
Dept: OUTPATIENT SERVICES | Facility: HOSPITAL | Age: 19
LOS: 1 days | Discharge: HOME | End: 2024-12-19
Payer: MEDICAID

## 2024-12-19 ENCOUNTER — APPOINTMENT (OUTPATIENT)
Dept: WOUND CARE | Facility: HOSPITAL | Age: 19
End: 2024-12-19
Payer: MEDICAID

## 2024-12-19 VITALS
TEMPERATURE: 97.7 F | BODY MASS INDEX: 18.51 KG/M2 | WEIGHT: 125 LBS | HEART RATE: 85 BPM | OXYGEN SATURATION: 96 % | RESPIRATION RATE: 18 BRPM | SYSTOLIC BLOOD PRESSURE: 115 MMHG | DIASTOLIC BLOOD PRESSURE: 80 MMHG | HEIGHT: 69 IN

## 2024-12-19 DIAGNOSIS — M20.41 OTHER HAMMER TOE(S) (ACQUIRED), RIGHT FOOT: ICD-10-CM

## 2024-12-19 DIAGNOSIS — Z98.890 OTHER SPECIFIED POSTPROCEDURAL STATES: Chronic | ICD-10-CM

## 2024-12-19 DIAGNOSIS — L84 CORNS AND CALLOSITIES: ICD-10-CM

## 2024-12-19 DIAGNOSIS — R20.0 ANESTHESIA OF SKIN: ICD-10-CM

## 2024-12-19 DIAGNOSIS — Z98.1 ARTHRODESIS STATUS: Chronic | ICD-10-CM

## 2024-12-19 DIAGNOSIS — L97.512 NON-PRESSURE CHRONIC ULCER OF OTHER PART OF RIGHT FOOT WITH FAT LAYER EXPOSED: ICD-10-CM

## 2024-12-19 DIAGNOSIS — M86.9 OSTEOMYELITIS, UNSPECIFIED: Chronic | ICD-10-CM

## 2024-12-19 DIAGNOSIS — M20.42 OTHER HAMMER TOE(S) (ACQUIRED), RIGHT FOOT: ICD-10-CM

## 2024-12-19 PROCEDURE — G0463: CPT

## 2024-12-19 PROCEDURE — 99213 OFFICE O/P EST LOW 20 MIN: CPT

## 2024-12-23 DIAGNOSIS — M20.41 OTHER HAMMER TOE(S) (ACQUIRED), RIGHT FOOT: ICD-10-CM

## 2024-12-23 DIAGNOSIS — L84 CORNS AND CALLOSITIES: ICD-10-CM

## 2024-12-23 DIAGNOSIS — Z98.890 OTHER SPECIFIED POSTPROCEDURAL STATES: ICD-10-CM

## 2024-12-23 DIAGNOSIS — Z91.010 ALLERGY TO PEANUTS: ICD-10-CM

## 2024-12-23 DIAGNOSIS — Z79.899 OTHER LONG TERM (CURRENT) DRUG THERAPY: ICD-10-CM

## 2024-12-23 DIAGNOSIS — Z87.39 PERSONAL HISTORY OF OTHER DISEASES OF THE MUSCULOSKELETAL SYSTEM AND CONNECTIVE TISSUE: ICD-10-CM

## 2024-12-23 DIAGNOSIS — Z83.49 FAMILY HISTORY OF OTHER ENDOCRINE, NUTRITIONAL AND METABOLIC DISEASES: ICD-10-CM

## 2024-12-23 DIAGNOSIS — R20.0 ANESTHESIA OF SKIN: ICD-10-CM

## 2024-12-23 DIAGNOSIS — Z91.02 FOOD ADDITIVES ALLERGY STATUS: ICD-10-CM

## 2024-12-23 DIAGNOSIS — K59.09 OTHER CONSTIPATION: ICD-10-CM

## 2024-12-23 DIAGNOSIS — L97.512 NON-PRESSURE CHRONIC ULCER OF OTHER PART OF RIGHT FOOT WITH FAT LAYER EXPOSED: ICD-10-CM

## 2024-12-31 NOTE — PATIENT PROFILE ADULT - NSPROPTRIGHTBILLOFRIGHTS_GEN_A_NUR
Dialysis Access Care Coordination: General Outreach    REASON FOR VISIT:     REASON FOR VISIT: Clinic Care Coordination - Face To Face (Dialysis Access - fistula maturity check)                                   SITUATION/BACKROUND:     Patient presents to clinic for fistula maturity check. She had her 2nd stage fistula transposition on 11/20/24.    Neph Tracking Flowsheet Last Filled Values       Preferred Modality Home Hemodialysis    Patient's Referral Dates Auto Populate Patient's Referral Dates    Specialty Care Coordination Referral - Dialysis 7/26/2024    Journey Referral 7/18/24    Vein Mapping/US Order 8/19/24    Vein Mapping/US  08/19/24    Access Surgeon Referral Status  Referred    Dialysis Access Referral 07/26/24  Fistula vs PD consult with Dr. Walton    Access Surgical Consult 08/19/24  PLAN: LUE brachiobasilic (2 stage) AVF vs LUE AVG underger general anesthesia with nerve block.    Diaylsis Access Type AV Fistula  Brachiobasilic AVF    Dialysis Access Site TRINY    Dialysis Access Surgery 10/15/24  1st stage LUE brachiobasilic AVF, if ok at 2wk and 4 wk post op and US, ok for 2nd stage after 6 wks per Dr. Walton    Dialysis Access Surgeon Dr. Walton    Dialysis Access Revision Date 11/20/24  2nd stage transposition surgery with Dr. Walton    Dialysis Access Maturation --  appt 12/31/24    Transplant Evaluation Referral 6/21/24    Transplant Status  Referred          Dialysis History        Start End Type Center Comments    10/11/2024  Hemo-[facility] Center [facility] (ES[facility]) MWF                  Patient is followed by Solid Organ Transplant 2/2 Kidney Transplant Evaluation - 10/7/2024.  Coordinator: Selena Nuno    ASSESSMENT:     Recent Labs      Latest Ref Rng & Units 11/20/2024 10/15/2024 10/7/2024   Neph Labs   Sodium 135 - 145 mmol/L  140  137    GFR Estimate >60 mL/min/1.73m2  14  11    Potassium 3.4 - 5.3 mmol/L 3.4  4.1  4.0    Creatinine 0.51 - 0.95 mg/dL  3.55   4.40    Urea Nitrogen 8.0 - 23.0 mg/dL  28.1  49.0    Hemoglobin 11.7 - 15.7 g/dL  9.3  9.6    Hemoglobin A1C <5.7 %   6.0      Fistula has good bruit and thrill. She has enough space for one needle, but may have difficultly with placing the second needle due to the size of the fistula.  Ultrasound completed today, showing an area of stenosis mid-fistula at 2.6 mm. Flow is adequate, measured 700-1100 ml/min.    Discussed ultrasound results with Dr. Walton and ordered a fistulogram to address stenosis.    Dialysis Access Assessments:  Fistula / Graft  Fever/Chills: no  Redness/warmth: no  Swelling: no  Pain: none  Drainage: none  Steal Symptoms: decreased sensation (only on underside of forearm, slowly improving)  Thrill: positive  Bruit: positive  Dysfunction: none    PLAN:     Future Appts Next 180 days       Visit Type Date Time Department    US EXT ART [facility] DIAL ACC GRFT 12/31/2024 12:30 PM [facility] ULTRASOUND    NURSE ONLY 12/31/2024  1:45 PM [facility]          Follow Up:    IR referral order placed: fistulogram    Patient verbalized understanding and will follow up as recommended.    Patricia Haddad RN  Dialysis Access Care Coordinator  Phone: [phone]  Pool: P_Dialysis_Access_Nurse   patient

## 2025-01-03 ENCOUNTER — APPOINTMENT (OUTPATIENT)
Dept: PODIATRY | Facility: CLINIC | Age: 20
End: 2025-01-03
Payer: MEDICAID

## 2025-01-03 VITALS
TEMPERATURE: 97.9 F | HEART RATE: 123 BPM | OXYGEN SATURATION: 98 % | SYSTOLIC BLOOD PRESSURE: 117 MMHG | WEIGHT: 125 LBS | BODY MASS INDEX: 18.51 KG/M2 | DIASTOLIC BLOOD PRESSURE: 78 MMHG | HEIGHT: 69 IN

## 2025-01-03 DIAGNOSIS — L97.512 NON-PRESSURE CHRONIC ULCER OF OTHER PART OF RIGHT FOOT WITH FAT LAYER EXPOSED: ICD-10-CM

## 2025-01-03 DIAGNOSIS — L85.3 XEROSIS CUTIS: ICD-10-CM

## 2025-01-03 DIAGNOSIS — B35.1 TINEA UNGUIUM: ICD-10-CM

## 2025-01-03 DIAGNOSIS — R20.0 ANESTHESIA OF SKIN: ICD-10-CM

## 2025-01-03 DIAGNOSIS — M20.41 OTHER HAMMER TOE(S) (ACQUIRED), RIGHT FOOT: ICD-10-CM

## 2025-01-03 DIAGNOSIS — M20.42 OTHER HAMMER TOE(S) (ACQUIRED), RIGHT FOOT: ICD-10-CM

## 2025-01-03 PROCEDURE — 99213 OFFICE O/P EST LOW 20 MIN: CPT

## 2025-01-03 RX ORDER — CICLOPIROX 71.3 MG/ML
8 SOLUTION TOPICAL
Qty: 1 | Refills: 1 | Status: ACTIVE | COMMUNITY
Start: 2025-01-03 | End: 1900-01-01

## 2025-01-03 RX ORDER — AMMONIUM LACTATE 12 %
12 CREAM (GRAM) TOPICAL TWICE DAILY
Qty: 1 | Refills: 0 | Status: ACTIVE | COMMUNITY
Start: 2025-01-03 | End: 1900-01-01

## 2025-01-10 ENCOUNTER — APPOINTMENT (OUTPATIENT)
Dept: WOUND CARE | Facility: HOSPITAL | Age: 20
End: 2025-01-10

## 2025-01-10 ENCOUNTER — OUTPATIENT (OUTPATIENT)
Dept: OUTPATIENT SERVICES | Facility: HOSPITAL | Age: 20
LOS: 1 days | Discharge: HOME | End: 2025-01-10
Payer: MEDICAID

## 2025-01-10 VITALS
BODY MASS INDEX: 18.51 KG/M2 | WEIGHT: 125 LBS | TEMPERATURE: 98.2 F | HEART RATE: 65 BPM | HEIGHT: 69 IN | DIASTOLIC BLOOD PRESSURE: 77 MMHG | SYSTOLIC BLOOD PRESSURE: 125 MMHG | OXYGEN SATURATION: 98 % | RESPIRATION RATE: 18 BRPM

## 2025-01-10 DIAGNOSIS — L97.512 NON-PRESSURE CHRONIC ULCER OF OTHER PART OF RIGHT FOOT WITH FAT LAYER EXPOSED: ICD-10-CM

## 2025-01-10 DIAGNOSIS — M20.41 OTHER HAMMER TOE(S) (ACQUIRED), RIGHT FOOT: ICD-10-CM

## 2025-01-10 DIAGNOSIS — Z98.1 ARTHRODESIS STATUS: Chronic | ICD-10-CM

## 2025-01-10 DIAGNOSIS — M86.9 OSTEOMYELITIS, UNSPECIFIED: Chronic | ICD-10-CM

## 2025-01-10 DIAGNOSIS — M20.42 OTHER HAMMER TOE(S) (ACQUIRED), RIGHT FOOT: ICD-10-CM

## 2025-01-10 DIAGNOSIS — Z98.890 OTHER SPECIFIED POSTPROCEDURAL STATES: Chronic | ICD-10-CM

## 2025-01-10 DIAGNOSIS — L97.521 NON-PRESSURE CHRONIC ULCER OF OTHER PART OF LEFT FOOT LIMITED TO BREAKDOWN OF SKIN: ICD-10-CM

## 2025-01-10 DIAGNOSIS — R20.0 ANESTHESIA OF SKIN: ICD-10-CM

## 2025-01-10 PROCEDURE — 99213 OFFICE O/P EST LOW 20 MIN: CPT

## 2025-01-10 PROCEDURE — G0463: CPT

## 2025-01-17 ENCOUNTER — OUTPATIENT (OUTPATIENT)
Dept: OUTPATIENT SERVICES | Facility: HOSPITAL | Age: 20
LOS: 1 days | Discharge: HOME | End: 2025-01-17
Payer: MEDICAID

## 2025-01-17 ENCOUNTER — APPOINTMENT (OUTPATIENT)
Dept: WOUND CARE | Facility: HOSPITAL | Age: 20
End: 2025-01-17

## 2025-01-17 VITALS
TEMPERATURE: 98.2 F | BODY MASS INDEX: 18.51 KG/M2 | SYSTOLIC BLOOD PRESSURE: 103 MMHG | HEART RATE: 93 BPM | RESPIRATION RATE: 17 BRPM | WEIGHT: 125 LBS | DIASTOLIC BLOOD PRESSURE: 74 MMHG | OXYGEN SATURATION: 98 % | HEIGHT: 69 IN

## 2025-01-17 DIAGNOSIS — S90.822A BLISTER (NONTHERMAL), LEFT FOOT, INITIAL ENCOUNTER: ICD-10-CM

## 2025-01-17 DIAGNOSIS — R20.0 ANESTHESIA OF SKIN: ICD-10-CM

## 2025-01-17 DIAGNOSIS — Z98.890 OTHER SPECIFIED POSTPROCEDURAL STATES: Chronic | ICD-10-CM

## 2025-01-17 DIAGNOSIS — Z98.1 ARTHRODESIS STATUS: Chronic | ICD-10-CM

## 2025-01-17 DIAGNOSIS — M86.9 OSTEOMYELITIS, UNSPECIFIED: Chronic | ICD-10-CM

## 2025-01-17 PROCEDURE — 99214 OFFICE O/P EST MOD 30 MIN: CPT

## 2025-01-17 PROCEDURE — G0463: CPT

## 2025-01-19 DIAGNOSIS — Z91.010 ALLERGY TO PEANUTS: ICD-10-CM

## 2025-01-19 DIAGNOSIS — Z87.39 PERSONAL HISTORY OF OTHER DISEASES OF THE MUSCULOSKELETAL SYSTEM AND CONNECTIVE TISSUE: ICD-10-CM

## 2025-01-19 DIAGNOSIS — L97.512 NON-PRESSURE CHRONIC ULCER OF OTHER PART OF RIGHT FOOT WITH FAT LAYER EXPOSED: ICD-10-CM

## 2025-01-19 DIAGNOSIS — Z83.49 FAMILY HISTORY OF OTHER ENDOCRINE, NUTRITIONAL AND METABOLIC DISEASES: ICD-10-CM
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
## 2025-01-19 DIAGNOSIS — K59.09 OTHER CONSTIPATION: ICD-10-CM

## 2025-01-19 DIAGNOSIS — R20.0 ANESTHESIA OF SKIN: ICD-10-CM

## 2025-01-19 DIAGNOSIS — Z79.899 OTHER LONG TERM (CURRENT) DRUG THERAPY: ICD-10-CM

## 2025-01-19 DIAGNOSIS — Z91.012 ALLERGY TO EGGS: ICD-10-CM

## 2025-01-19 DIAGNOSIS — L97.521 NON-PRESSURE CHRONIC ULCER OF OTHER PART OF LEFT FOOT LIMITED TO BREAKDOWN OF SKIN: ICD-10-CM

## 2025-01-19 DIAGNOSIS — M20.41 OTHER HAMMER TOE(S) (ACQUIRED), RIGHT FOOT: ICD-10-CM
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
## 2025-01-19 DIAGNOSIS — S90.822A BLISTER (NONTHERMAL), LEFT FOOT, INITIAL ENCOUNTER: ICD-10-CM
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
## 2025-01-19 DIAGNOSIS — Z98.890 OTHER SPECIFIED POSTPROCEDURAL STATES: ICD-10-CM

## 2025-01-23 ENCOUNTER — APPOINTMENT (OUTPATIENT)
Dept: WOUND CARE | Facility: HOSPITAL | Age: 20
End: 2025-01-23
Payer: MEDICAID

## 2025-01-23 ENCOUNTER — OUTPATIENT (OUTPATIENT)
Dept: OUTPATIENT SERVICES | Facility: HOSPITAL | Age: 20
LOS: 1 days | Discharge: HOME | End: 2025-01-23
Payer: MEDICAID

## 2025-01-23 VITALS
TEMPERATURE: 97.4 F | DIASTOLIC BLOOD PRESSURE: 70 MMHG | RESPIRATION RATE: 17 BRPM | HEIGHT: 69 IN | OXYGEN SATURATION: 99 % | SYSTOLIC BLOOD PRESSURE: 102 MMHG | BODY MASS INDEX: 18.51 KG/M2 | WEIGHT: 125 LBS | HEART RATE: 90 BPM

## 2025-01-23 DIAGNOSIS — Z98.890 OTHER SPECIFIED POSTPROCEDURAL STATES: Chronic | ICD-10-CM

## 2025-01-23 DIAGNOSIS — M86.9 OSTEOMYELITIS, UNSPECIFIED: Chronic | ICD-10-CM

## 2025-01-23 DIAGNOSIS — L97.512 NON-PRESSURE CHRONIC ULCER OF OTHER PART OF RIGHT FOOT WITH FAT LAYER EXPOSED: ICD-10-CM

## 2025-01-23 DIAGNOSIS — S90.822D BLISTER (NONTHERMAL), LEFT FOOT, SUBSEQUENT ENCOUNTER: ICD-10-CM

## 2025-01-23 DIAGNOSIS — Z98.1 ARTHRODESIS STATUS: Chronic | ICD-10-CM

## 2025-01-23 PROCEDURE — G0463: CPT

## 2025-01-23 PROCEDURE — 99213 OFFICE O/P EST LOW 20 MIN: CPT

## 2025-01-24 ENCOUNTER — APPOINTMENT (OUTPATIENT)
Dept: NEUROLOGY | Facility: CLINIC | Age: 20
End: 2025-01-24
Payer: MEDICAID

## 2025-01-24 PROCEDURE — 95886 MUSC TEST DONE W/N TEST COMP: CPT

## 2025-01-24 PROCEDURE — 95885 MUSC TST DONE W/NERV TST LIM: CPT | Mod: 59

## 2025-01-24 PROCEDURE — 95911 NRV CNDJ TEST 9-10 STUDIES: CPT

## 2025-01-26 DIAGNOSIS — L97.512 NON-PRESSURE CHRONIC ULCER OF OTHER PART OF RIGHT FOOT WITH FAT LAYER EXPOSED: ICD-10-CM
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
## 2025-01-26 DIAGNOSIS — Z91.010 ALLERGY TO PEANUTS: ICD-10-CM

## 2025-01-26 DIAGNOSIS — Z91.012 ALLERGY TO EGGS: ICD-10-CM
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
## 2025-01-26 DIAGNOSIS — Z79.899 OTHER LONG TERM (CURRENT) DRUG THERAPY: ICD-10-CM
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
## 2025-01-26 DIAGNOSIS — S90.822D BLISTER (NONTHERMAL), LEFT FOOT, SUBSEQUENT ENCOUNTER: ICD-10-CM

## 2025-01-26 DIAGNOSIS — Z87.39 PERSONAL HISTORY OF OTHER DISEASES OF THE MUSCULOSKELETAL SYSTEM AND CONNECTIVE TISSUE: ICD-10-CM

## 2025-01-26 DIAGNOSIS — Z98.890 OTHER SPECIFIED POSTPROCEDURAL STATES: ICD-10-CM

## 2025-01-26 DIAGNOSIS — K59.09 OTHER CONSTIPATION: ICD-10-CM

## 2025-01-26 DIAGNOSIS — Z83.49 FAMILY HISTORY OF OTHER ENDOCRINE, NUTRITIONAL AND METABOLIC DISEASES: ICD-10-CM

## 2025-01-26 NOTE — ED PROVIDER NOTE - CPE EDP ENMT NORM
Chronic  - pt takes duloxetine 90mg daily  - would continue home medications  - pt uses alprazolam 0.25mg PRN, does not take often
normal...

## 2025-02-06 NOTE — ED ADULT NURSE NOTE - NS ED PATIENT SAFETY CONCERN
Small clean-based duodenal ulcer found during EGD.  No acute intervention at the time of procedure.  Pain resolving.  -Follow-up with GI on 4/2/2025        No

## 2025-02-07 ENCOUNTER — OUTPATIENT (OUTPATIENT)
Dept: OUTPATIENT SERVICES | Facility: HOSPITAL | Age: 20
LOS: 1 days | Discharge: HOME | End: 2025-02-07
Payer: MEDICAID

## 2025-02-07 ENCOUNTER — APPOINTMENT (OUTPATIENT)
Dept: WOUND CARE | Facility: HOSPITAL | Age: 20
End: 2025-02-07
Payer: MEDICAID

## 2025-02-07 VITALS
DIASTOLIC BLOOD PRESSURE: 76 MMHG | HEART RATE: 96 BPM | OXYGEN SATURATION: 99 % | WEIGHT: 125 LBS | SYSTOLIC BLOOD PRESSURE: 116 MMHG | TEMPERATURE: 98.2 F | RESPIRATION RATE: 18 BRPM | HEIGHT: 69 IN | BODY MASS INDEX: 18.51 KG/M2

## 2025-02-07 DIAGNOSIS — Z98.890 OTHER SPECIFIED POSTPROCEDURAL STATES: Chronic | ICD-10-CM

## 2025-02-07 DIAGNOSIS — Z98.1 ARTHRODESIS STATUS: Chronic | ICD-10-CM

## 2025-02-07 DIAGNOSIS — M20.41 OTHER HAMMER TOE(S) (ACQUIRED), RIGHT FOOT: ICD-10-CM

## 2025-02-07 DIAGNOSIS — L97.512 NON-PRESSURE CHRONIC ULCER OF OTHER PART OF RIGHT FOOT WITH FAT LAYER EXPOSED: ICD-10-CM

## 2025-02-07 DIAGNOSIS — M20.42 OTHER HAMMER TOE(S) (ACQUIRED), RIGHT FOOT: ICD-10-CM

## 2025-02-07 DIAGNOSIS — M86.9 OSTEOMYELITIS, UNSPECIFIED: Chronic | ICD-10-CM

## 2025-02-07 DIAGNOSIS — S90.822D BLISTER (NONTHERMAL), LEFT FOOT, SUBSEQUENT ENCOUNTER: ICD-10-CM

## 2025-02-07 DIAGNOSIS — B35.1 TINEA UNGUIUM: ICD-10-CM

## 2025-02-07 PROCEDURE — G0463: CPT

## 2025-02-07 PROCEDURE — 99213 OFFICE O/P EST LOW 20 MIN: CPT
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
## 2025-02-10 DIAGNOSIS — Z98.890 OTHER SPECIFIED POSTPROCEDURAL STATES: ICD-10-CM

## 2025-02-10 DIAGNOSIS — Z79.899 OTHER LONG TERM (CURRENT) DRUG THERAPY: ICD-10-CM

## 2025-02-10 DIAGNOSIS — L97.512 NON-PRESSURE CHRONIC ULCER OF OTHER PART OF RIGHT FOOT WITH FAT LAYER EXPOSED: ICD-10-CM

## 2025-02-10 DIAGNOSIS — B35.1 TINEA UNGUIUM: ICD-10-CM
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
## 2025-02-10 DIAGNOSIS — Z87.39 PERSONAL HISTORY OF OTHER DISEASES OF THE MUSCULOSKELETAL SYSTEM AND CONNECTIVE TISSUE: ICD-10-CM

## 2025-02-10 DIAGNOSIS — Z91.012 ALLERGY TO EGGS: ICD-10-CM

## 2025-02-10 DIAGNOSIS — S90.822D BLISTER (NONTHERMAL), LEFT FOOT, SUBSEQUENT ENCOUNTER: ICD-10-CM

## 2025-02-10 DIAGNOSIS — M20.41 OTHER HAMMER TOE(S) (ACQUIRED), RIGHT FOOT: ICD-10-CM

## 2025-02-10 DIAGNOSIS — K59.09 OTHER CONSTIPATION: ICD-10-CM

## 2025-02-10 DIAGNOSIS — Z91.010 ALLERGY TO PEANUTS: ICD-10-CM
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
## 2025-02-10 DIAGNOSIS — Z83.49 FAMILY HISTORY OF OTHER ENDOCRINE, NUTRITIONAL AND METABOLIC DISEASES: ICD-10-CM
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
## 2025-03-03 NOTE — PATIENT PROFILE ADULT - HISTORY OF COVID-19 VACCINATION
"Caller: Patient    Reason for Reschedule/Cancellation (please be detailed, any staff messages or encounters to note?):   Illness    Did you cancel or rescheduled an EUS procedure? No.    Is screening questionnaire older than 3 months from the reschedule date.   If Yes, please complete screening questionnaire. Yes    Prior to reschedule please review:  Ordering Provider: JOHNY GARRIDO  Sedation Determined: MAC  Does patient have any ASC Exclusions, please identify?: No    Notes on Cancelled Procedure:  Procedure: Lower Endoscopy [Colonoscopy]   Date: 3/4/2025  Location: [facility]; [address] [place] [address], [address] Floor, [place] [address]  Surgeon: Chelsie    Rescheduled: Yes,     Endoscopy Scheduling Screen    Have you had any respiratory illness or flu-like symptoms in the last 10 days?  No    What is your communication preference for Instructions and/or Bowel Prep?   MyChart    What insurance is in the chart?  Other:  [facility]     Ordering/Referring Provider: JOHNY GARRIDO   (If ordering provider performs procedure, schedule with ordering provider unless otherwise instructed. )    BMI: Estimated body mass index is 30.79 kg/m  as calculated from the following:    Height as of 3/2/25: 1.651 m (5' 5\").    Weight as of 3/2/25: 83.9 kg (185 lb).     Sedation Ordered  MAC/deep sedation.   BMI<= 45 45 < BMI <= 48 48 < BMI < = 50  BMI > 50   No Restrictions No [facility]  No [facility]  [facility] with exceptions Hospital Only OR Only       Do you have a history of malignant hyperthermia?  No    (Females) Are you currently pregnant?   No     Have you been diagnosed or told you have pulmonary hypertension?   No    Do you have an LVAD?  No    Have you been told you have moderate to severe sleep apnea?  No.    Have you been told you have COPD, asthma, or any other lung disease?  No    Has your doctor ordered any cardiac tests like echo, angiogram, stress test, ablation, or EKG, that you have not completed " "yet?  No    Do you  have a history of any heart conditions?  No     Have you ever had or are you waiting for an organ transplant?  No. Continue scheduling, no site restrictions.    Have you had a stroke or transient ischemic attack (TIA aka \"mini stroke\") in the last 2 years?   No.    Have you been diagnosed with or been told you have cirrhosis of the liver?   No.    Are you currently on dialysis?   No    Do you need assistance transferring?   No    BMI: Estimated body mass index is 30.79 kg/m  as calculated from the following:    Height as of 3/2/25: 1.651 m (5' 5\").    Weight as of 3/2/25: 83.9 kg (185 lb).     Is patients BMI > 40 and scheduling location UP?  No    Do you take an injectable or oral medication for weight loss or diabetes (excluding insulin)?  No    Do you take the medication Naltrexone?  No    Do you take blood thinners?  No       Prep   Are you currently on dialysis or do you have chronic kidney disease?  No    Do you have a diagnosis of diabetes?  No    Do you have a diagnosis of cystic fibrosis (CF)?  No    On a regular basis do you go 3 -5 days between bowel movements?  No    BMI > 40?  No    Preferred Pharmacy:      "[id number] - [place], [place] - [address] AT [facility] [address]  [address]  [place] [address]  Phone: [phone] Fax: [phone]      Final Scheduling Details     Procedure scheduled  Colonoscopy    Surgeon:  LO     Date of procedure:  5/6/2025     Pre-OP / PAC:   No - Not required for this site.    Location  [facility] - ASC - Patient preference.    Sedation   MAC/Deep Sedation - Per order.      Patient Reminders:   You will receive a call from a Nurse to review instructions and health history.  This assessment must be completed prior to your procedure.  Failure to complete the Nurse assessment may result in the procedure being cancelled.      On the day of your procedure, please designate an adult(s) who can drive you home stay with you for " the next 24 hours. The medicines used in the exam will make you sleepy. You will not be able to drive.      You cannot take public transportation, ride share services, or non-medical taxi service without a responsible caregiver.  Medical transport services are allowed with the requirement that a responsible caregiver will receive you at your destination.  We require that drivers and caregivers are confirmed prior to your procedure.     Yes

## 2025-03-07 ENCOUNTER — APPOINTMENT (OUTPATIENT)
Dept: WOUND CARE | Facility: HOSPITAL | Age: 20
End: 2025-03-07

## 2025-03-07 ENCOUNTER — OUTPATIENT (OUTPATIENT)
Dept: OUTPATIENT SERVICES | Facility: HOSPITAL | Age: 20
LOS: 1 days | Discharge: HOME | End: 2025-03-07
Payer: MEDICAID

## 2025-03-07 VITALS
OXYGEN SATURATION: 98 % | TEMPERATURE: 98.1 F | BODY MASS INDEX: 18.51 KG/M2 | DIASTOLIC BLOOD PRESSURE: 79 MMHG | SYSTOLIC BLOOD PRESSURE: 118 MMHG | HEART RATE: 96 BPM | RESPIRATION RATE: 18 BRPM | WEIGHT: 125 LBS | HEIGHT: 69 IN

## 2025-03-07 DIAGNOSIS — L97.512 NON-PRESSURE CHRONIC ULCER OF OTHER PART OF RIGHT FOOT WITH FAT LAYER EXPOSED: ICD-10-CM

## 2025-03-07 DIAGNOSIS — M86.9 OSTEOMYELITIS, UNSPECIFIED: Chronic | ICD-10-CM

## 2025-03-07 DIAGNOSIS — Z98.1 ARTHRODESIS STATUS: Chronic | ICD-10-CM

## 2025-03-07 DIAGNOSIS — Z98.890 OTHER SPECIFIED POSTPROCEDURAL STATES: Chronic | ICD-10-CM

## 2025-03-07 PROCEDURE — 99213 OFFICE O/P EST LOW 20 MIN: CPT

## 2025-03-07 PROCEDURE — G0463: CPT

## 2025-03-14 ENCOUNTER — APPOINTMENT (OUTPATIENT)
Dept: PODIATRY | Facility: CLINIC | Age: 20
End: 2025-03-14
Payer: MEDICAID

## 2025-03-14 VITALS
BODY MASS INDEX: 18.51 KG/M2 | TEMPERATURE: 98.3 F | WEIGHT: 125 LBS | HEIGHT: 69 IN | DIASTOLIC BLOOD PRESSURE: 75 MMHG | OXYGEN SATURATION: 100 % | HEART RATE: 101 BPM | SYSTOLIC BLOOD PRESSURE: 111 MMHG

## 2025-03-14 DIAGNOSIS — L84 CORNS AND CALLOSITIES: ICD-10-CM

## 2025-03-14 DIAGNOSIS — M20.41 OTHER HAMMER TOE(S) (ACQUIRED), RIGHT FOOT: ICD-10-CM

## 2025-03-14 DIAGNOSIS — R20.0 ANESTHESIA OF SKIN: ICD-10-CM

## 2025-03-14 DIAGNOSIS — L97.512 NON-PRESSURE CHRONIC ULCER OF OTHER PART OF RIGHT FOOT WITH FAT LAYER EXPOSED: ICD-10-CM

## 2025-03-14 DIAGNOSIS — M20.42 OTHER HAMMER TOE(S) (ACQUIRED), RIGHT FOOT: ICD-10-CM

## 2025-03-14 DIAGNOSIS — B35.1 TINEA UNGUIUM: ICD-10-CM

## 2025-03-14 DIAGNOSIS — L85.3 XEROSIS CUTIS: ICD-10-CM

## 2025-03-14 PROCEDURE — 11755 BIOPSY NAIL UNIT: CPT

## 2025-03-16 DIAGNOSIS — L97.512 NON-PRESSURE CHRONIC ULCER OF OTHER PART OF RIGHT FOOT WITH FAT LAYER EXPOSED: ICD-10-CM

## 2025-03-16 DIAGNOSIS — Z91.010 ALLERGY TO PEANUTS: ICD-10-CM
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
## 2025-03-16 DIAGNOSIS — K59.09 OTHER CONSTIPATION: ICD-10-CM

## 2025-03-16 DIAGNOSIS — M20.41 OTHER HAMMER TOE(S) (ACQUIRED), RIGHT FOOT: ICD-10-CM

## 2025-03-16 DIAGNOSIS — Z87.39 PERSONAL HISTORY OF OTHER DISEASES OF THE MUSCULOSKELETAL SYSTEM AND CONNECTIVE TISSUE: ICD-10-CM
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
## 2025-03-16 DIAGNOSIS — Z83.49 FAMILY HISTORY OF OTHER ENDOCRINE, NUTRITIONAL AND METABOLIC DISEASES: ICD-10-CM

## 2025-03-16 DIAGNOSIS — S90.822D BLISTER (NONTHERMAL), LEFT FOOT, SUBSEQUENT ENCOUNTER: ICD-10-CM

## 2025-03-16 DIAGNOSIS — Z79.899 OTHER LONG TERM (CURRENT) DRUG THERAPY: ICD-10-CM
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
## 2025-03-16 DIAGNOSIS — Z98.890 OTHER SPECIFIED POSTPROCEDURAL STATES: ICD-10-CM
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
## 2025-03-16 DIAGNOSIS — B35.1 TINEA UNGUIUM: ICD-10-CM

## 2025-03-16 DIAGNOSIS — Z91.012 ALLERGY TO EGGS: ICD-10-CM

## 2025-03-17 LAB — CALCOFLUOR WHITE SPEC: ABNORMAL

## 2025-03-19 ENCOUNTER — NON-APPOINTMENT (OUTPATIENT)
Age: 20
End: 2025-03-19

## 2025-03-21 ENCOUNTER — APPOINTMENT (OUTPATIENT)
Dept: PODIATRY | Facility: CLINIC | Age: 20
End: 2025-03-21

## 2025-03-21 VITALS
DIASTOLIC BLOOD PRESSURE: 73 MMHG | HEIGHT: 69 IN | HEART RATE: 97 BPM | BODY MASS INDEX: 18.51 KG/M2 | WEIGHT: 125 LBS | TEMPERATURE: 98 F | SYSTOLIC BLOOD PRESSURE: 114 MMHG | OXYGEN SATURATION: 98 %

## 2025-03-21 PROCEDURE — 99213 OFFICE O/P EST LOW 20 MIN: CPT

## 2025-03-22 LAB — FUNGUS SPEC CULT ORG #8: ABNORMAL

## 2025-03-26 ENCOUNTER — APPOINTMENT (OUTPATIENT)
Dept: NEUROLOGY | Facility: CLINIC | Age: 20
End: 2025-03-26
Payer: MEDICAID

## 2025-03-26 VITALS
HEART RATE: 99 BPM | WEIGHT: 125 LBS | HEIGHT: 69 IN | BODY MASS INDEX: 18.51 KG/M2 | DIASTOLIC BLOOD PRESSURE: 86 MMHG | SYSTOLIC BLOOD PRESSURE: 125 MMHG

## 2025-03-26 DIAGNOSIS — G60.9 HEREDITARY AND IDIOPATHIC NEUROPATHY, UNSPECIFIED: ICD-10-CM

## 2025-03-26 PROCEDURE — 99205 OFFICE O/P NEW HI 60 MIN: CPT

## 2025-03-26 PROCEDURE — 99417 PROLNG OP E/M EACH 15 MIN: CPT

## 2025-04-02 ENCOUNTER — OUTPATIENT (OUTPATIENT)
Dept: OUTPATIENT SERVICES | Facility: HOSPITAL | Age: 20
LOS: 1 days | End: 2025-04-02
Payer: MEDICAID

## 2025-04-02 ENCOUNTER — APPOINTMENT (OUTPATIENT)
Dept: WOUND CARE | Facility: HOSPITAL | Age: 20
End: 2025-04-02
Payer: MEDICAID

## 2025-04-02 VITALS
RESPIRATION RATE: 18 BRPM | OXYGEN SATURATION: 99 % | DIASTOLIC BLOOD PRESSURE: 86 MMHG | WEIGHT: 125 LBS | HEIGHT: 69 IN | SYSTOLIC BLOOD PRESSURE: 123 MMHG | TEMPERATURE: 98.9 F | HEART RATE: 99 BPM | BODY MASS INDEX: 18.51 KG/M2

## 2025-04-02 DIAGNOSIS — S90.822D BLISTER (NONTHERMAL), LEFT FOOT, SUBSEQUENT ENCOUNTER: ICD-10-CM

## 2025-04-02 DIAGNOSIS — Z98.1 ARTHRODESIS STATUS: Chronic | ICD-10-CM

## 2025-04-02 DIAGNOSIS — L97.512 NON-PRESSURE CHRONIC ULCER OF OTHER PART OF RIGHT FOOT WITH FAT LAYER EXPOSED: ICD-10-CM

## 2025-04-02 DIAGNOSIS — Z98.890 OTHER SPECIFIED POSTPROCEDURAL STATES: Chronic | ICD-10-CM

## 2025-04-02 DIAGNOSIS — M20.42 OTHER HAMMER TOE(S) (ACQUIRED), RIGHT FOOT: ICD-10-CM

## 2025-04-02 DIAGNOSIS — M86.9 OSTEOMYELITIS, UNSPECIFIED: Chronic | ICD-10-CM

## 2025-04-02 DIAGNOSIS — T81.89XD OTHER COMPLICATIONS OF PROCEDURES, NOT ELSEWHERE CLASSIFIED, SUBSEQUENT ENCOUNTER: ICD-10-CM

## 2025-04-02 DIAGNOSIS — M20.41 OTHER HAMMER TOE(S) (ACQUIRED), RIGHT FOOT: ICD-10-CM

## 2025-04-02 DIAGNOSIS — B35.1 TINEA UNGUIUM: ICD-10-CM

## 2025-04-02 PROCEDURE — G0463: CPT

## 2025-04-02 PROCEDURE — 99213 OFFICE O/P EST LOW 20 MIN: CPT

## 2025-04-04 ENCOUNTER — APPOINTMENT (OUTPATIENT)
Dept: WOUND CARE | Facility: HOSPITAL | Age: 20
End: 2025-04-04
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
## 2025-04-07 DIAGNOSIS — Z79.899 OTHER LONG TERM (CURRENT) DRUG THERAPY: ICD-10-CM

## 2025-04-07 DIAGNOSIS — T81.89XD OTHER COMPLICATIONS OF PROCEDURES, NOT ELSEWHERE CLASSIFIED, SUBSEQUENT ENCOUNTER: ICD-10-CM

## 2025-04-07 DIAGNOSIS — M20.41 OTHER HAMMER TOE(S) (ACQUIRED), RIGHT FOOT: ICD-10-CM

## 2025-04-07 DIAGNOSIS — Z91.010 ALLERGY TO PEANUTS: ICD-10-CM

## 2025-04-07 DIAGNOSIS — K59.09 OTHER CONSTIPATION: ICD-10-CM

## 2025-04-07 DIAGNOSIS — Z83.49 FAMILY HISTORY OF OTHER ENDOCRINE, NUTRITIONAL AND METABOLIC DISEASES: ICD-10-CM
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
## 2025-04-07 DIAGNOSIS — Z87.39 PERSONAL HISTORY OF OTHER DISEASES OF THE MUSCULOSKELETAL SYSTEM AND CONNECTIVE TISSUE: ICD-10-CM
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
## 2025-04-07 DIAGNOSIS — S90.822D BLISTER (NONTHERMAL), LEFT FOOT, SUBSEQUENT ENCOUNTER: ICD-10-CM
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
## 2025-04-07 DIAGNOSIS — Z91.012 ALLERGY TO EGGS: ICD-10-CM

## 2025-04-07 DIAGNOSIS — B35.1 TINEA UNGUIUM: ICD-10-CM
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
## 2025-04-07 DIAGNOSIS — Z98.890 OTHER SPECIFIED POSTPROCEDURAL STATES: ICD-10-CM

## 2025-04-11 ENCOUNTER — OUTPATIENT (OUTPATIENT)
Dept: OUTPATIENT SERVICES | Facility: HOSPITAL | Age: 20
LOS: 1 days | End: 2025-04-11
Payer: MEDICAID

## 2025-04-11 ENCOUNTER — APPOINTMENT (OUTPATIENT)
Dept: WOUND CARE | Facility: HOSPITAL | Age: 20
End: 2025-04-11
Payer: MEDICAID

## 2025-04-11 VITALS
HEIGHT: 69 IN | TEMPERATURE: 98.3 F | RESPIRATION RATE: 18 BRPM | SYSTOLIC BLOOD PRESSURE: 131 MMHG | WEIGHT: 125 LBS | DIASTOLIC BLOOD PRESSURE: 84 MMHG | HEART RATE: 100 BPM | BODY MASS INDEX: 18.51 KG/M2 | OXYGEN SATURATION: 99 %

## 2025-04-11 DIAGNOSIS — L97.512 NON-PRESSURE CHRONIC ULCER OF OTHER PART OF RIGHT FOOT WITH FAT LAYER EXPOSED: ICD-10-CM

## 2025-04-11 DIAGNOSIS — Z98.890 OTHER SPECIFIED POSTPROCEDURAL STATES: Chronic | ICD-10-CM

## 2025-04-11 DIAGNOSIS — Z98.1 ARTHRODESIS STATUS: Chronic | ICD-10-CM

## 2025-04-11 DIAGNOSIS — S90.822D BLISTER (NONTHERMAL), LEFT FOOT, SUBSEQUENT ENCOUNTER: ICD-10-CM

## 2025-04-11 DIAGNOSIS — M20.41 OTHER HAMMER TOE(S) (ACQUIRED), RIGHT FOOT: ICD-10-CM

## 2025-04-11 DIAGNOSIS — M20.42 OTHER HAMMER TOE(S) (ACQUIRED), RIGHT FOOT: ICD-10-CM

## 2025-04-11 DIAGNOSIS — B35.1 TINEA UNGUIUM: ICD-10-CM

## 2025-04-11 DIAGNOSIS — M86.9 OSTEOMYELITIS, UNSPECIFIED: Chronic | ICD-10-CM

## 2025-04-11 PROCEDURE — 99213 OFFICE O/P EST LOW 20 MIN: CPT

## 2025-04-11 PROCEDURE — G0463: CPT
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
## 2025-04-14 DIAGNOSIS — Z83.49 FAMILY HISTORY OF OTHER ENDOCRINE, NUTRITIONAL AND METABOLIC DISEASES: ICD-10-CM

## 2025-04-14 DIAGNOSIS — M20.41 OTHER HAMMER TOE(S) (ACQUIRED), RIGHT FOOT: ICD-10-CM

## 2025-04-14 DIAGNOSIS — Z91.012 ALLERGY TO EGGS: ICD-10-CM

## 2025-04-14 DIAGNOSIS — S90.822D BLISTER (NONTHERMAL), LEFT FOOT, SUBSEQUENT ENCOUNTER: ICD-10-CM

## 2025-04-14 DIAGNOSIS — Z87.39 PERSONAL HISTORY OF OTHER DISEASES OF THE MUSCULOSKELETAL SYSTEM AND CONNECTIVE TISSUE: ICD-10-CM

## 2025-04-14 DIAGNOSIS — K59.09 OTHER CONSTIPATION: ICD-10-CM

## 2025-04-14 DIAGNOSIS — Z79.899 OTHER LONG TERM (CURRENT) DRUG THERAPY: ICD-10-CM

## 2025-04-14 DIAGNOSIS — B35.1 TINEA UNGUIUM: ICD-10-CM
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
## 2025-04-14 DIAGNOSIS — Z98.890 OTHER SPECIFIED POSTPROCEDURAL STATES: ICD-10-CM

## 2025-04-14 DIAGNOSIS — Z91.010 ALLERGY TO PEANUTS: ICD-10-CM
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
## 2025-04-23 ENCOUNTER — OUTPATIENT (OUTPATIENT)
Dept: OUTPATIENT SERVICES | Facility: HOSPITAL | Age: 20
LOS: 1 days | End: 2025-04-23
Payer: MEDICAID

## 2025-04-23 ENCOUNTER — APPOINTMENT (OUTPATIENT)
Dept: RADIOLOGY | Facility: CLINIC | Age: 20
End: 2025-04-23
Payer: MEDICAID

## 2025-04-23 ENCOUNTER — APPOINTMENT (OUTPATIENT)
Dept: WOUND CARE | Facility: HOSPITAL | Age: 20
End: 2025-04-23
Payer: MEDICAID

## 2025-04-23 VITALS
SYSTOLIC BLOOD PRESSURE: 116 MMHG | DIASTOLIC BLOOD PRESSURE: 74 MMHG | BODY MASS INDEX: 18.51 KG/M2 | RESPIRATION RATE: 18 BRPM | OXYGEN SATURATION: 99 % | HEIGHT: 69 IN | TEMPERATURE: 98.6 F | HEART RATE: 88 BPM | WEIGHT: 125 LBS

## 2025-04-23 DIAGNOSIS — Z98.890 OTHER SPECIFIED POSTPROCEDURAL STATES: Chronic | ICD-10-CM

## 2025-04-23 DIAGNOSIS — S90.822D BLISTER (NONTHERMAL), LEFT FOOT, SUBSEQUENT ENCOUNTER: ICD-10-CM

## 2025-04-23 DIAGNOSIS — Z98.1 ARTHRODESIS STATUS: Chronic | ICD-10-CM

## 2025-04-23 PROCEDURE — 87075 CULTR BACTERIA EXCEPT BLOOD: CPT

## 2025-04-23 PROCEDURE — 87186 SC STD MICRODIL/AGAR DIL: CPT

## 2025-04-23 PROCEDURE — 87077 CULTURE AEROBIC IDENTIFY: CPT

## 2025-04-23 PROCEDURE — 73630 X-RAY EXAM OF FOOT: CPT | Mod: RT

## 2025-04-23 PROCEDURE — 99213 OFFICE O/P EST LOW 20 MIN: CPT

## 2025-04-23 PROCEDURE — 87070 CULTURE OTHR SPECIMN AEROBIC: CPT

## 2025-04-23 PROCEDURE — G0463: CPT

## 2025-04-23 RX ORDER — AMOXICILLIN AND CLAVULANATE POTASSIUM 875; 125 MG/1; MG/1
875-125 TABLET, COATED ORAL
Qty: 14 | Refills: 0 | Status: COMPLETED | COMMUNITY
Start: 2025-04-23 | End: 2025-04-30

## 2025-04-24 DIAGNOSIS — Z91.010 ALLERGY TO PEANUTS: ICD-10-CM

## 2025-04-24 DIAGNOSIS — Z87.39 PERSONAL HISTORY OF OTHER DISEASES OF THE MUSCULOSKELETAL SYSTEM AND CONNECTIVE TISSUE: ICD-10-CM
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
## 2025-04-24 DIAGNOSIS — Z79.899 OTHER LONG TERM (CURRENT) DRUG THERAPY: ICD-10-CM

## 2025-04-24 DIAGNOSIS — L97.512 NON-PRESSURE CHRONIC ULCER OF OTHER PART OF RIGHT FOOT WITH FAT LAYER EXPOSED: ICD-10-CM

## 2025-04-24 DIAGNOSIS — Z98.890 OTHER SPECIFIED POSTPROCEDURAL STATES: ICD-10-CM

## 2025-04-24 DIAGNOSIS — S90.821A BLISTER (NONTHERMAL), RIGHT FOOT, INITIAL ENCOUNTER: ICD-10-CM
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
## 2025-04-24 DIAGNOSIS — Z83.49 FAMILY HISTORY OF OTHER ENDOCRINE, NUTRITIONAL AND METABOLIC DISEASES: ICD-10-CM

## 2025-04-24 DIAGNOSIS — Z91.012 ALLERGY TO EGGS: ICD-10-CM

## 2025-04-24 DIAGNOSIS — K59.09 OTHER CONSTIPATION: ICD-10-CM
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
## 2025-04-24 LAB
GRAM STN FLD: SIGNIFICANT CHANGE UP
SPECIMEN SOURCE: SIGNIFICANT CHANGE UP

## 2025-04-25 LAB
CULTURE RESULTS: SIGNIFICANT CHANGE UP
SPECIMEN SOURCE: SIGNIFICANT CHANGE UP

## 2025-04-26 LAB
-  CLINDAMYCIN: SIGNIFICANT CHANGE UP
-  ERYTHROMYCIN: SIGNIFICANT CHANGE UP
-  GENTAMICIN: SIGNIFICANT CHANGE UP
-  OXACILLIN: SIGNIFICANT CHANGE UP
-  PENICILLIN: SIGNIFICANT CHANGE UP
-  RIFAMPIN: SIGNIFICANT CHANGE UP
-  TETRACYCLINE: SIGNIFICANT CHANGE UP
-  TRIMETHOPRIM/SULFAMETHOXAZOLE: SIGNIFICANT CHANGE UP
-  VANCOMYCIN: SIGNIFICANT CHANGE UP
GRAM STN FLD: ABNORMAL
METHOD TYPE: SIGNIFICANT CHANGE UP

## 2025-04-29 ENCOUNTER — APPOINTMENT (OUTPATIENT)
Dept: MRI IMAGING | Facility: CLINIC | Age: 20
End: 2025-04-29
Payer: MEDICAID

## 2025-04-29 LAB
CULTURE RESULTS: ABNORMAL
ORGANISM # SPEC MICROSCOPIC CNT: ABNORMAL
ORGANISM # SPEC MICROSCOPIC CNT: ABNORMAL
ORGANISM # SPEC MICROSCOPIC CNT: SIGNIFICANT CHANGE UP
SPECIMEN SOURCE: SIGNIFICANT CHANGE UP

## 2025-04-29 PROCEDURE — 73718 MRI LOWER EXTREMITY W/O DYE: CPT | Mod: RT

## 2025-04-30 ENCOUNTER — APPOINTMENT (OUTPATIENT)
Dept: WOUND CARE | Facility: HOSPITAL | Age: 20
End: 2025-04-30
Payer: MEDICAID

## 2025-04-30 ENCOUNTER — OUTPATIENT (OUTPATIENT)
Dept: OUTPATIENT SERVICES | Facility: HOSPITAL | Age: 20
LOS: 1 days | End: 2025-04-30
Payer: MEDICAID

## 2025-04-30 VITALS
SYSTOLIC BLOOD PRESSURE: 110 MMHG | OXYGEN SATURATION: 99 % | BODY MASS INDEX: 18.51 KG/M2 | RESPIRATION RATE: 18 BRPM | HEIGHT: 69 IN | TEMPERATURE: 99.1 F | WEIGHT: 125 LBS | DIASTOLIC BLOOD PRESSURE: 79 MMHG | HEART RATE: 99 BPM

## 2025-04-30 DIAGNOSIS — M86.9 OSTEOMYELITIS, UNSPECIFIED: Chronic | ICD-10-CM

## 2025-04-30 DIAGNOSIS — Z98.890 OTHER SPECIFIED POSTPROCEDURAL STATES: Chronic | ICD-10-CM

## 2025-04-30 DIAGNOSIS — Z98.1 ARTHRODESIS STATUS: Chronic | ICD-10-CM

## 2025-04-30 DIAGNOSIS — S90.822D BLISTER (NONTHERMAL), LEFT FOOT, SUBSEQUENT ENCOUNTER: ICD-10-CM

## 2025-04-30 PROCEDURE — G0463: CPT

## 2025-04-30 PROCEDURE — 99213 OFFICE O/P EST LOW 20 MIN: CPT

## 2025-05-01 PROBLEM — S90.821D: Status: ACTIVE | Noted: 2025-05-01

## 2025-05-01 PROBLEM — M89.8X7 EXOSTOSIS OF RIGHT FOOT: Status: ACTIVE | Noted: 2025-05-01

## 2025-05-02 DIAGNOSIS — Z91.010 ALLERGY TO PEANUTS: ICD-10-CM

## 2025-05-02 DIAGNOSIS — Z87.39 PERSONAL HISTORY OF OTHER DISEASES OF THE MUSCULOSKELETAL SYSTEM AND CONNECTIVE TISSUE: ICD-10-CM
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
## 2025-05-02 DIAGNOSIS — Z83.49 FAMILY HISTORY OF OTHER ENDOCRINE, NUTRITIONAL AND METABOLIC DISEASES: ICD-10-CM

## 2025-05-02 DIAGNOSIS — Z98.890 OTHER SPECIFIED POSTPROCEDURAL STATES: ICD-10-CM
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
## 2025-05-02 DIAGNOSIS — K59.09 OTHER CONSTIPATION: ICD-10-CM

## 2025-05-02 DIAGNOSIS — L97.512 NON-PRESSURE CHRONIC ULCER OF OTHER PART OF RIGHT FOOT WITH FAT LAYER EXPOSED: ICD-10-CM

## 2025-05-02 DIAGNOSIS — Z79.899 OTHER LONG TERM (CURRENT) DRUG THERAPY: ICD-10-CM

## 2025-05-02 DIAGNOSIS — Z91.012 ALLERGY TO EGGS: ICD-10-CM
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
## 2025-05-02 DIAGNOSIS — S90.821D BLISTER (NONTHERMAL), RIGHT FOOT, SUBSEQUENT ENCOUNTER: ICD-10-CM

## 2025-05-02 DIAGNOSIS — L84 CORNS AND CALLOSITIES: ICD-10-CM

## 2025-05-09 ENCOUNTER — APPOINTMENT (OUTPATIENT)
Dept: WOUND CARE | Facility: HOSPITAL | Age: 20
End: 2025-05-09
Payer: MEDICAID

## 2025-05-09 ENCOUNTER — OUTPATIENT (OUTPATIENT)
Dept: OUTPATIENT SERVICES | Facility: HOSPITAL | Age: 20
LOS: 1 days | End: 2025-05-09
Payer: MEDICAID

## 2025-05-09 VITALS
BODY MASS INDEX: 18.51 KG/M2 | RESPIRATION RATE: 18 BRPM | SYSTOLIC BLOOD PRESSURE: 110 MMHG | HEART RATE: 91 BPM | DIASTOLIC BLOOD PRESSURE: 77 MMHG | WEIGHT: 125 LBS | TEMPERATURE: 99.1 F | HEIGHT: 69 IN | OXYGEN SATURATION: 97 %

## 2025-05-09 DIAGNOSIS — S90.821D BLISTER (NONTHERMAL), RIGHT FOOT, SUBSEQUENT ENCOUNTER: ICD-10-CM

## 2025-05-09 DIAGNOSIS — Z98.1 ARTHRODESIS STATUS: Chronic | ICD-10-CM

## 2025-05-09 DIAGNOSIS — Z98.890 OTHER SPECIFIED POSTPROCEDURAL STATES: Chronic | ICD-10-CM

## 2025-05-09 DIAGNOSIS — M89.8X7 OTHER SPECIFIED DISORDERS OF BONE, ANKLE AND FOOT: ICD-10-CM

## 2025-05-09 DIAGNOSIS — M86.9 OSTEOMYELITIS, UNSPECIFIED: Chronic | ICD-10-CM

## 2025-05-09 DIAGNOSIS — L97.512 NON-PRESSURE CHRONIC ULCER OF OTHER PART OF RIGHT FOOT WITH FAT LAYER EXPOSED: ICD-10-CM

## 2025-05-09 PROCEDURE — G0463: CPT

## 2025-05-09 PROCEDURE — 99213 OFFICE O/P EST LOW 20 MIN: CPT

## 2025-05-12 DIAGNOSIS — Z91.012 ALLERGY TO EGGS: ICD-10-CM
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
## 2025-05-12 DIAGNOSIS — Z98.890 OTHER SPECIFIED POSTPROCEDURAL STATES: ICD-10-CM

## 2025-05-12 DIAGNOSIS — M89.8X7 OTHER SPECIFIED DISORDERS OF BONE, ANKLE AND FOOT: ICD-10-CM

## 2025-05-12 DIAGNOSIS — Z91.010 ALLERGY TO PEANUTS: ICD-10-CM

## 2025-05-12 DIAGNOSIS — Z79.899 OTHER LONG TERM (CURRENT) DRUG THERAPY: ICD-10-CM
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
## 2025-05-12 DIAGNOSIS — Z87.39 PERSONAL HISTORY OF OTHER DISEASES OF THE MUSCULOSKELETAL SYSTEM AND CONNECTIVE TISSUE: ICD-10-CM

## 2025-05-12 DIAGNOSIS — L84 CORNS AND CALLOSITIES: ICD-10-CM

## 2025-05-12 DIAGNOSIS — S90.821D BLISTER (NONTHERMAL), RIGHT FOOT, SUBSEQUENT ENCOUNTER: ICD-10-CM

## 2025-05-12 DIAGNOSIS — L97.512 NON-PRESSURE CHRONIC ULCER OF OTHER PART OF RIGHT FOOT WITH FAT LAYER EXPOSED: ICD-10-CM
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
## 2025-05-12 DIAGNOSIS — K59.09 OTHER CONSTIPATION: ICD-10-CM
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
## 2025-05-12 DIAGNOSIS — Z83.49 FAMILY HISTORY OF OTHER ENDOCRINE, NUTRITIONAL AND METABOLIC DISEASES: ICD-10-CM

## 2025-05-22 ENCOUNTER — OUTPATIENT (OUTPATIENT)
Dept: OUTPATIENT SERVICES | Facility: HOSPITAL | Age: 20
LOS: 1 days | End: 2025-05-22
Payer: MEDICAID

## 2025-05-22 ENCOUNTER — APPOINTMENT (OUTPATIENT)
Dept: WOUND CARE | Facility: HOSPITAL | Age: 20
End: 2025-05-22
Payer: MEDICAID

## 2025-05-22 VITALS
HEART RATE: 108 BPM | BODY MASS INDEX: 18.51 KG/M2 | SYSTOLIC BLOOD PRESSURE: 117 MMHG | DIASTOLIC BLOOD PRESSURE: 76 MMHG | RESPIRATION RATE: 18 BRPM | HEIGHT: 69 IN | WEIGHT: 125 LBS | OXYGEN SATURATION: 98 % | TEMPERATURE: 98.7 F

## 2025-05-22 DIAGNOSIS — Z98.890 OTHER SPECIFIED POSTPROCEDURAL STATES: Chronic | ICD-10-CM

## 2025-05-22 DIAGNOSIS — L97.512 NON-PRESSURE CHRONIC ULCER OF OTHER PART OF RIGHT FOOT WITH FAT LAYER EXPOSED: ICD-10-CM

## 2025-05-22 DIAGNOSIS — M86.9 OSTEOMYELITIS, UNSPECIFIED: Chronic | ICD-10-CM

## 2025-05-22 DIAGNOSIS — Z98.1 ARTHRODESIS STATUS: Chronic | ICD-10-CM

## 2025-05-22 PROCEDURE — 99213 OFFICE O/P EST LOW 20 MIN: CPT

## 2025-05-22 PROCEDURE — G0463: CPT

## 2025-05-23 DIAGNOSIS — M89.8X7 OTHER SPECIFIED DISORDERS OF BONE, ANKLE AND FOOT: ICD-10-CM

## 2025-05-23 DIAGNOSIS — Z98.890 OTHER SPECIFIED POSTPROCEDURAL STATES: ICD-10-CM

## 2025-05-23 DIAGNOSIS — S90.821D BLISTER (NONTHERMAL), RIGHT FOOT, SUBSEQUENT ENCOUNTER: ICD-10-CM

## 2025-05-23 DIAGNOSIS — K59.09 OTHER CONSTIPATION: ICD-10-CM
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
## 2025-05-23 DIAGNOSIS — Z87.39 PERSONAL HISTORY OF OTHER DISEASES OF THE MUSCULOSKELETAL SYSTEM AND CONNECTIVE TISSUE: ICD-10-CM

## 2025-05-23 DIAGNOSIS — L97.512 NON-PRESSURE CHRONIC ULCER OF OTHER PART OF RIGHT FOOT WITH FAT LAYER EXPOSED: ICD-10-CM

## 2025-05-23 DIAGNOSIS — L84 CORNS AND CALLOSITIES: ICD-10-CM
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
## 2025-05-23 DIAGNOSIS — Z91.012 ALLERGY TO EGGS: ICD-10-CM
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
## 2025-05-23 DIAGNOSIS — Z91.010 ALLERGY TO PEANUTS: ICD-10-CM

## 2025-05-23 DIAGNOSIS — Z83.49 FAMILY HISTORY OF OTHER ENDOCRINE, NUTRITIONAL AND METABOLIC DISEASES: ICD-10-CM

## 2025-05-23 DIAGNOSIS — Z79.899 OTHER LONG TERM (CURRENT) DRUG THERAPY: ICD-10-CM
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
## 2025-05-30 ENCOUNTER — OUTPATIENT (OUTPATIENT)
Dept: OUTPATIENT SERVICES | Facility: HOSPITAL | Age: 20
LOS: 1 days | End: 2025-05-30
Payer: MEDICAID

## 2025-05-30 ENCOUNTER — APPOINTMENT (OUTPATIENT)
Dept: WOUND CARE | Facility: HOSPITAL | Age: 20
End: 2025-05-30
Payer: MEDICAID

## 2025-05-30 VITALS
TEMPERATURE: 98.5 F | WEIGHT: 125 LBS | HEIGHT: 69 IN | RESPIRATION RATE: 18 BRPM | DIASTOLIC BLOOD PRESSURE: 71 MMHG | SYSTOLIC BLOOD PRESSURE: 110 MMHG | OXYGEN SATURATION: 98 % | HEART RATE: 98 BPM | BODY MASS INDEX: 18.51 KG/M2

## 2025-05-30 DIAGNOSIS — L97.512 NON-PRESSURE CHRONIC ULCER OF OTHER PART OF RIGHT FOOT WITH FAT LAYER EXPOSED: ICD-10-CM

## 2025-05-30 DIAGNOSIS — M86.9 OSTEOMYELITIS, UNSPECIFIED: Chronic | ICD-10-CM

## 2025-05-30 DIAGNOSIS — S90.821D BLISTER (NONTHERMAL), RIGHT FOOT, SUBSEQUENT ENCOUNTER: ICD-10-CM

## 2025-05-30 DIAGNOSIS — Z98.890 OTHER SPECIFIED POSTPROCEDURAL STATES: Chronic | ICD-10-CM

## 2025-05-30 DIAGNOSIS — Z98.1 ARTHRODESIS STATUS: Chronic | ICD-10-CM

## 2025-05-30 PROCEDURE — G0463: CPT

## 2025-05-30 PROCEDURE — 99213 OFFICE O/P EST LOW 20 MIN: CPT

## 2025-06-01 DIAGNOSIS — Z87.39 PERSONAL HISTORY OF OTHER DISEASES OF THE MUSCULOSKELETAL SYSTEM AND CONNECTIVE TISSUE: ICD-10-CM

## 2025-06-01 DIAGNOSIS — Z91.010 ALLERGY TO PEANUTS: ICD-10-CM
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
## 2025-06-01 DIAGNOSIS — Z83.49 FAMILY HISTORY OF OTHER ENDOCRINE, NUTRITIONAL AND METABOLIC DISEASES: ICD-10-CM
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
## 2025-06-01 DIAGNOSIS — K59.09 OTHER CONSTIPATION: ICD-10-CM

## 2025-06-01 DIAGNOSIS — Z79.899 OTHER LONG TERM (CURRENT) DRUG THERAPY: ICD-10-CM

## 2025-06-01 DIAGNOSIS — Z98.890 OTHER SPECIFIED POSTPROCEDURAL STATES: ICD-10-CM
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
## 2025-06-01 DIAGNOSIS — Z91.012 ALLERGY TO EGGS: ICD-10-CM

## 2025-06-01 DIAGNOSIS — M89.8X7 OTHER SPECIFIED DISORDERS OF BONE, ANKLE AND FOOT: ICD-10-CM

## 2025-06-01 DIAGNOSIS — L84 CORNS AND CALLOSITIES: ICD-10-CM

## 2025-06-01 DIAGNOSIS — S90.821D BLISTER (NONTHERMAL), RIGHT FOOT, SUBSEQUENT ENCOUNTER: ICD-10-CM

## 2025-06-01 DIAGNOSIS — L97.512 NON-PRESSURE CHRONIC ULCER OF OTHER PART OF RIGHT FOOT WITH FAT LAYER EXPOSED: ICD-10-CM

## 2025-06-05 ENCOUNTER — OUTPATIENT (OUTPATIENT)
Dept: OUTPATIENT SERVICES | Facility: HOSPITAL | Age: 20
LOS: 1 days | End: 2025-06-05
Payer: MEDICAID

## 2025-06-05 VITALS
RESPIRATION RATE: 16 BRPM | OXYGEN SATURATION: 97 % | HEIGHT: 69 IN | TEMPERATURE: 98 F | WEIGHT: 117.95 LBS | SYSTOLIC BLOOD PRESSURE: 117 MMHG | DIASTOLIC BLOOD PRESSURE: 88 MMHG | HEART RATE: 100 BPM

## 2025-06-05 DIAGNOSIS — Z98.1 ARTHRODESIS STATUS: Chronic | ICD-10-CM

## 2025-06-05 DIAGNOSIS — M86.9 OSTEOMYELITIS, UNSPECIFIED: Chronic | ICD-10-CM

## 2025-06-05 DIAGNOSIS — Z98.890 OTHER SPECIFIED POSTPROCEDURAL STATES: Chronic | ICD-10-CM

## 2025-06-05 DIAGNOSIS — L97.512 NON-PRESSURE CHRONIC ULCER OF OTHER PART OF RIGHT FOOT WITH FAT LAYER EXPOSED: ICD-10-CM

## 2025-06-05 DIAGNOSIS — M89.8X7 OTHER SPECIFIED DISORDERS OF BONE, ANKLE AND FOOT: ICD-10-CM

## 2025-06-05 DIAGNOSIS — Z01.818 ENCOUNTER FOR OTHER PREPROCEDURAL EXAMINATION: ICD-10-CM

## 2025-06-05 LAB
ALBUMIN SERPL ELPH-MCNC: 4.2 G/DL — SIGNIFICANT CHANGE UP (ref 3.3–5)
ALP SERPL-CCNC: 166 U/L — HIGH (ref 40–120)
ALT FLD-CCNC: 25 U/L — SIGNIFICANT CHANGE UP (ref 12–78)
ANION GAP SERPL CALC-SCNC: 4 MMOL/L — LOW (ref 5–17)
AST SERPL-CCNC: 14 U/L — LOW (ref 15–37)
BILIRUB SERPL-MCNC: 0.9 MG/DL — SIGNIFICANT CHANGE UP (ref 0.2–1.2)
BUN SERPL-MCNC: 21 MG/DL — SIGNIFICANT CHANGE UP (ref 7–23)
CALCIUM SERPL-MCNC: 9.5 MG/DL — SIGNIFICANT CHANGE UP (ref 8.5–10.1)
CHLORIDE SERPL-SCNC: 104 MMOL/L — SIGNIFICANT CHANGE UP (ref 96–108)
CO2 SERPL-SCNC: 30 MMOL/L — SIGNIFICANT CHANGE UP (ref 22–31)
CREAT SERPL-MCNC: 0.85 MG/DL — SIGNIFICANT CHANGE UP (ref 0.5–1.3)
EGFR: 127 ML/MIN/1.73M2 — SIGNIFICANT CHANGE UP
EGFR: 127 ML/MIN/1.73M2 — SIGNIFICANT CHANGE UP
GLUCOSE SERPL-MCNC: 97 MG/DL — SIGNIFICANT CHANGE UP (ref 70–99)
HCT VFR BLD CALC: 51.1 % — HIGH (ref 39–50)
HGB BLD-MCNC: 17.5 G/DL — HIGH (ref 13–17)
MCHC RBC-ENTMCNC: 30.5 PG — SIGNIFICANT CHANGE UP (ref 27–34)
MCHC RBC-ENTMCNC: 34.2 G/DL — SIGNIFICANT CHANGE UP (ref 32–36)
MCV RBC AUTO: 89 FL — SIGNIFICANT CHANGE UP (ref 80–100)
NRBC BLD AUTO-RTO: 0 /100 WBCS — SIGNIFICANT CHANGE UP (ref 0–0)
PLATELET # BLD AUTO: 226 K/UL — SIGNIFICANT CHANGE UP (ref 150–400)
POTASSIUM SERPL-MCNC: 5 MMOL/L — SIGNIFICANT CHANGE UP (ref 3.5–5.3)
POTASSIUM SERPL-SCNC: 5 MMOL/L — SIGNIFICANT CHANGE UP (ref 3.5–5.3)
PROT SERPL-MCNC: 7.9 G/DL — SIGNIFICANT CHANGE UP (ref 6–8.3)
RBC # BLD: 5.74 M/UL — SIGNIFICANT CHANGE UP (ref 4.2–5.8)
RBC # FLD: 12.8 % — SIGNIFICANT CHANGE UP (ref 10.3–14.5)
SODIUM SERPL-SCNC: 138 MMOL/L — SIGNIFICANT CHANGE UP (ref 135–145)
WBC # BLD: 5.61 K/UL — SIGNIFICANT CHANGE UP (ref 3.8–10.5)
WBC # FLD AUTO: 5.61 K/UL — SIGNIFICANT CHANGE UP (ref 3.8–10.5)

## 2025-06-05 PROCEDURE — 85027 COMPLETE CBC AUTOMATED: CPT

## 2025-06-05 PROCEDURE — G0463: CPT

## 2025-06-05 PROCEDURE — 36415 COLL VENOUS BLD VENIPUNCTURE: CPT

## 2025-06-05 PROCEDURE — 80053 COMPREHEN METABOLIC PANEL: CPT

## 2025-06-05 NOTE — H&P PST ADULT - NSICDXPASTMEDICALHX_GEN_ALL_CORE_FT
PAST MEDICAL HISTORY:  Food allergy egg, peanuts    Hashimoto's disease     Hepatomegaly     History of acne     Toe osteomyelitis, right

## 2025-06-05 NOTE — H&P PST ADULT - HISTORY OF PRESENT ILLNESS
18 y/o male with PMH of Hashimotos Thyroiditis, Hepatomegaly, Acne, and Right Toe Osteomyelitis (7/2024) & SHx Right Toe Debridement and Thoracic-Lumbar Fusion (for treatment of Scolosis) for PST having Right Great Toe Exostectomy by Dr. Irwin on 6/10/2025.  He reports chronic ulcer to right toe.  Was seen by provider and recommended for the elective procedure.

## 2025-06-05 NOTE — H&P PST ADULT - NSICDXFAMILYHX_GEN_ALL_CORE_FT
FAMILY HISTORY:  Father  Still living? Unknown  FH: type 2 diabetes, Age at diagnosis: Age Unknown    Mother  Still living? Unknown  FH: anemia, Age at diagnosis: Age Unknown  FH: hypothyroidism, Age at diagnosis: Age Unknown

## 2025-06-05 NOTE — H&P PST ADULT - ATTENDING COMMENTS
PST NP- Patient seen in OR Holding.   Vital signs stable.   Denies any changes in health status since the assessment at PST.  Denies any new health issues at this time.   Verified procedure and site/laterality with the patient.   Assessment unchanged since PST.   NPO since 1015 pm

## 2025-06-05 NOTE — H&P PST ADULT - PROBLEM SELECTOR PLAN 3
Labs CBC and CMP  Medical clearance per surgeon  Pre op and Hibiclens instructions reviewed and given.   No meds to take am of surgery.   Instructed to hold and/or avoid other NSAIDs and OTC supplements. Tylenol is ok. Verbalized understanding

## 2025-06-05 NOTE — H&P PST ADULT - PATIENT OPTIMIZED FOR PROCEDURE
No protocol for requested medication.    Medication: Acetaminophen-codeine  Last office visit date: 4-19-24  Next office visit date:6/21/24  Pharmacy: [facility] - [address]    Order pended, routed to clinician for review.      No

## 2025-06-05 NOTE — H&P PST ADULT - MUSCULOSKELETAL
mild antalgic gait noted/normal/ROM intact/strength 5/5 bilateral upper extremities/strength 5/5 bilateral lower extremities negative

## 2025-06-05 NOTE — H&P PST ADULT - NSICDXPROCEDURE_GEN_ALL_CORE_FT
PROCEDURES:  Osteotomy, toe, with partial phalanx excision 05-Jun-2025 15:26:40 Exostectomy - Right Great Toe Freida Alamo

## 2025-06-06 PROBLEM — M41.9 SCOLIOSIS, UNSPECIFIED: Chronic | Status: INACTIVE | Noted: 2020-07-08 | Resolved: 2025-06-05

## 2025-06-06 PROBLEM — Z87.2 PERSONAL HISTORY OF DISEASES OF THE SKIN AND SUBCUTANEOUS TISSUE: Chronic | Status: ACTIVE | Noted: 2025-06-05

## 2025-06-06 PROBLEM — R16.0 HEPATOMEGALY, NOT ELSEWHERE CLASSIFIED: Chronic | Status: ACTIVE | Noted: 2025-06-05

## 2025-06-06 PROBLEM — E06.3 AUTOIMMUNE THYROIDITIS: Chronic | Status: ACTIVE | Noted: 2025-06-05

## 2025-06-07 ENCOUNTER — OUTPATIENT (OUTPATIENT)
Dept: OUTPATIENT SERVICES | Facility: HOSPITAL | Age: 20
LOS: 1 days | End: 2025-06-07
Payer: MEDICAID

## 2025-06-07 ENCOUNTER — APPOINTMENT (OUTPATIENT)
Dept: WOUND CARE | Facility: HOSPITAL | Age: 20
End: 2025-06-07
Payer: MEDICAID

## 2025-06-07 DIAGNOSIS — M86.9 OSTEOMYELITIS, UNSPECIFIED: Chronic | ICD-10-CM

## 2025-06-07 DIAGNOSIS — Z79.899 OTHER LONG TERM (CURRENT) DRUG THERAPY: ICD-10-CM
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
## 2025-06-07 DIAGNOSIS — L97.512 NON-PRESSURE CHRONIC ULCER OF OTHER PART OF RIGHT FOOT WITH FAT LAYER EXPOSED: ICD-10-CM

## 2025-06-07 DIAGNOSIS — L84 CORNS AND CALLOSITIES: ICD-10-CM

## 2025-06-07 DIAGNOSIS — Z87.39 PERSONAL HISTORY OF OTHER DISEASES OF THE MUSCULOSKELETAL SYSTEM AND CONNECTIVE TISSUE: ICD-10-CM

## 2025-06-07 DIAGNOSIS — Z98.890 OTHER SPECIFIED POSTPROCEDURAL STATES: ICD-10-CM

## 2025-06-07 DIAGNOSIS — Z98.890 OTHER SPECIFIED POSTPROCEDURAL STATES: Chronic | ICD-10-CM

## 2025-06-07 DIAGNOSIS — K59.09 OTHER CONSTIPATION: ICD-10-CM

## 2025-06-07 DIAGNOSIS — Z91.010 ALLERGY TO PEANUTS: ICD-10-CM
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
## 2025-06-07 DIAGNOSIS — M89.8X7 OTHER SPECIFIED DISORDERS OF BONE, ANKLE AND FOOT: ICD-10-CM

## 2025-06-07 DIAGNOSIS — Z98.1 ARTHRODESIS STATUS: Chronic | ICD-10-CM
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
## 2025-06-07 DIAGNOSIS — S90.821D BLISTER (NONTHERMAL), RIGHT FOOT, SUBSEQUENT ENCOUNTER: ICD-10-CM

## 2025-06-07 DIAGNOSIS — Z91.012 ALLERGY TO EGGS: ICD-10-CM

## 2025-06-07 DIAGNOSIS — Z83.49 FAMILY HISTORY OF OTHER ENDOCRINE, NUTRITIONAL AND METABOLIC DISEASES: ICD-10-CM
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
## 2025-06-07 PROCEDURE — 99213 OFFICE O/P EST LOW 20 MIN: CPT

## 2025-06-07 PROCEDURE — G0463: CPT

## 2025-06-10 ENCOUNTER — APPOINTMENT (OUTPATIENT)
Dept: WOUND CARE | Facility: HOSPITAL | Age: 20
End: 2025-06-10

## 2025-06-10 ENCOUNTER — TRANSCRIPTION ENCOUNTER (OUTPATIENT)
Age: 20
End: 2025-06-10

## 2025-06-10 ENCOUNTER — OUTPATIENT (OUTPATIENT)
Dept: OUTPATIENT SERVICES | Facility: HOSPITAL | Age: 20
LOS: 1 days | End: 2025-06-10
Payer: MEDICAID

## 2025-06-10 VITALS
HEIGHT: 68 IN | DIASTOLIC BLOOD PRESSURE: 78 MMHG | TEMPERATURE: 98 F | WEIGHT: 117.95 LBS | HEART RATE: 83 BPM | RESPIRATION RATE: 22 BRPM | SYSTOLIC BLOOD PRESSURE: 112 MMHG

## 2025-06-10 VITALS
TEMPERATURE: 98 F | HEART RATE: 77 BPM | SYSTOLIC BLOOD PRESSURE: 107 MMHG | DIASTOLIC BLOOD PRESSURE: 66 MMHG | RESPIRATION RATE: 14 BRPM | OXYGEN SATURATION: 99 %

## 2025-06-10 DIAGNOSIS — Z98.890 OTHER SPECIFIED POSTPROCEDURAL STATES: Chronic | ICD-10-CM

## 2025-06-10 DIAGNOSIS — Z98.1 ARTHRODESIS STATUS: Chronic | ICD-10-CM

## 2025-06-10 DIAGNOSIS — L97.512 NON-PRESSURE CHRONIC ULCER OF OTHER PART OF RIGHT FOOT WITH FAT LAYER EXPOSED: ICD-10-CM

## 2025-06-10 DIAGNOSIS — M86.9 OSTEOMYELITIS, UNSPECIFIED: Chronic | ICD-10-CM

## 2025-06-10 DIAGNOSIS — M89.8X7 OTHER SPECIFIED DISORDERS OF BONE, ANKLE AND FOOT: ICD-10-CM

## 2025-06-10 DIAGNOSIS — Z01.818 ENCOUNTER FOR OTHER PREPROCEDURAL EXAMINATION: ICD-10-CM

## 2025-06-10 PROCEDURE — 88311 DECALCIFY TISSUE: CPT

## 2025-06-10 PROCEDURE — 76000 FLUOROSCOPY <1 HR PHYS/QHP: CPT

## 2025-06-10 PROCEDURE — 87070 CULTURE OTHR SPECIMN AEROBIC: CPT

## 2025-06-10 PROCEDURE — 88311 DECALCIFY TISSUE: CPT | Mod: 26

## 2025-06-10 PROCEDURE — 87075 CULTR BACTERIA EXCEPT BLOOD: CPT

## 2025-06-10 PROCEDURE — 87077 CULTURE AEROBIC IDENTIFY: CPT

## 2025-06-10 PROCEDURE — 28108 REMOVAL OF TOE LESIONS: CPT | Mod: T5

## 2025-06-10 PROCEDURE — 88304 TISSUE EXAM BY PATHOLOGIST: CPT | Mod: 26

## 2025-06-10 PROCEDURE — 73630 X-RAY EXAM OF FOOT: CPT

## 2025-06-10 PROCEDURE — 28124 PARTIAL REMOVAL OF TOE: CPT | Mod: RT

## 2025-06-10 PROCEDURE — 73630 X-RAY EXAM OF FOOT: CPT | Mod: 26,RT

## 2025-06-10 PROCEDURE — 88304 TISSUE EXAM BY PATHOLOGIST: CPT

## 2025-06-10 DEVICE — SURGICEL NU-KNIT 6 X 9": Type: IMPLANTABLE DEVICE | Site: RIGHT | Status: FUNCTIONAL

## 2025-06-10 RX ORDER — SODIUM CHLORIDE 9 G/1000ML
1000 INJECTION, SOLUTION INTRAVENOUS
Refills: 0 | Status: DISCONTINUED | OUTPATIENT
Start: 2025-06-10 | End: 2025-06-10

## 2025-06-10 RX ORDER — HYDROMORPHONE/SOD CHLOR,ISO/PF 2 MG/10 ML
0.5 SYRINGE (ML) INJECTION
Refills: 0 | Status: DISCONTINUED | OUTPATIENT
Start: 2025-06-10 | End: 2025-06-10

## 2025-06-10 RX ORDER — CEFAZOLIN SODIUM IN 0.9 % NACL 3 G/100 ML
2000 INTRAVENOUS SOLUTION, PIGGYBACK (ML) INTRAVENOUS ONCE
Refills: 0 | Status: COMPLETED | OUTPATIENT
Start: 2025-06-10 | End: 2025-06-10

## 2025-06-10 RX ORDER — ONDANSETRON HCL/PF 4 MG/2 ML
4 VIAL (ML) INJECTION ONCE
Refills: 0 | Status: DISCONTINUED | OUTPATIENT
Start: 2025-06-10 | End: 2025-06-10

## 2025-06-10 NOTE — ASU DISCHARGE PLAN (ADULT/PEDIATRIC) - CARE PROVIDER_API CALL
Jennifer Irwin [facility]  Podiatric Medicine and Surgery  [address] [place] [id number]  Phone: [phone]  Fax: [phone]  Established Patient  Follow Up Time: 1 week

## 2025-06-10 NOTE — ASU DISCHARGE PLAN (ADULT/PEDIATRIC) - ASU DC SPECIAL INSTRUCTIONSFT
Keep dressings clean, dry, and intact until first post-op appointment.  Ice and elevate right lower extremity.   Take pain medication as directed by Dr. Irwin for pain control.   Do not drive a vehicle in post-op bandages and/or surgical shoe.  Remain partial weight bearing to the right heel only as absolutely necessary in surgical shoe.

## 2025-06-10 NOTE — PROCEDURE NOTE - NSINFORMCONSENT_GEN_A_CORE
pre-procedure, checklist and tasks completed, VSS, pt has no c/o voiced, no needs requested, time allotted for questions/ concerns, bed low and locked, siderails up, call light given to pt    Benefits, risks, and possible complications of procedure explained to patient/caregiver who verbalized understanding and gave written consent.

## 2025-06-10 NOTE — ASU DISCHARGE PLAN (ADULT/PEDIATRIC) - FINANCIAL ASSISTANCE
[facility] provides services at a reduced cost to those who are determined to be eligible through [facility]’s financial assistance program. Information regarding [facility]’s financial assistance program can be found by going to [email or web address] or by calling [phone].

## 2025-06-12 ENCOUNTER — OUTPATIENT (OUTPATIENT)
Dept: OUTPATIENT SERVICES | Facility: HOSPITAL | Age: 20
LOS: 1 days | End: 2025-06-12
Payer: MEDICAID

## 2025-06-12 ENCOUNTER — NON-APPOINTMENT (OUTPATIENT)
Age: 20
End: 2025-06-12

## 2025-06-12 ENCOUNTER — APPOINTMENT (OUTPATIENT)
Dept: WOUND CARE | Facility: HOSPITAL | Age: 20
End: 2025-06-12
Payer: MEDICAID

## 2025-06-12 VITALS
TEMPERATURE: 98.3 F | DIASTOLIC BLOOD PRESSURE: 72 MMHG | RESPIRATION RATE: 16 BRPM | WEIGHT: 125 LBS | BODY MASS INDEX: 18.51 KG/M2 | SYSTOLIC BLOOD PRESSURE: 115 MMHG | HEART RATE: 97 BPM | OXYGEN SATURATION: 95 % | HEIGHT: 69 IN

## 2025-06-12 DIAGNOSIS — Z98.890 OTHER SPECIFIED POSTPROCEDURAL STATES: Chronic | ICD-10-CM

## 2025-06-12 DIAGNOSIS — L97.512 NON-PRESSURE CHRONIC ULCER OF OTHER PART OF RIGHT FOOT WITH FAT LAYER EXPOSED: ICD-10-CM

## 2025-06-12 DIAGNOSIS — M86.9 OSTEOMYELITIS, UNSPECIFIED: Chronic | ICD-10-CM

## 2025-06-12 DIAGNOSIS — Z98.1 ARTHRODESIS STATUS: Chronic | ICD-10-CM

## 2025-06-12 PROCEDURE — 99024 POSTOP FOLLOW-UP VISIT: CPT

## 2025-06-12 PROCEDURE — G0463: CPT

## 2025-06-16 ENCOUNTER — APPOINTMENT (OUTPATIENT)
Dept: PODIATRY | Facility: CLINIC | Age: 20
End: 2025-06-16
Payer: MEDICAID

## 2025-06-16 VITALS
DIASTOLIC BLOOD PRESSURE: 70 MMHG | SYSTOLIC BLOOD PRESSURE: 107 MMHG | BODY MASS INDEX: 18.51 KG/M2 | WEIGHT: 125 LBS | OXYGEN SATURATION: 99 % | HEART RATE: 103 BPM | TEMPERATURE: 98.1 F | HEIGHT: 69 IN

## 2025-06-16 DIAGNOSIS — Z91.010 ALLERGY TO PEANUTS: ICD-10-CM

## 2025-06-16 DIAGNOSIS — T81.89XD OTHER COMPLICATIONS OF PROCEDURES, NOT ELSEWHERE CLASSIFIED, SUBSEQUENT ENCOUNTER: ICD-10-CM

## 2025-06-16 DIAGNOSIS — L97.512 NON-PRESSURE CHRONIC ULCER OF OTHER PART OF RIGHT FOOT WITH FAT LAYER EXPOSED: ICD-10-CM

## 2025-06-16 DIAGNOSIS — Z83.49 FAMILY HISTORY OF OTHER ENDOCRINE, NUTRITIONAL AND METABOLIC DISEASES: ICD-10-CM

## 2025-06-16 DIAGNOSIS — Z98.890 OTHER SPECIFIED POSTPROCEDURAL STATES: ICD-10-CM
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
## 2025-06-16 DIAGNOSIS — Z79.899 OTHER LONG TERM (CURRENT) DRUG THERAPY: ICD-10-CM

## 2025-06-16 DIAGNOSIS — Z91.012 ALLERGY TO EGGS: ICD-10-CM

## 2025-06-16 DIAGNOSIS — Z87.39 PERSONAL HISTORY OF OTHER DISEASES OF THE MUSCULOSKELETAL SYSTEM AND CONNECTIVE TISSUE: ICD-10-CM
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
## 2025-06-16 DIAGNOSIS — K59.09 OTHER CONSTIPATION: ICD-10-CM

## 2025-06-16 PROCEDURE — 99024 POSTOP FOLLOW-UP VISIT: CPT

## 2025-06-19 ENCOUNTER — OUTPATIENT (OUTPATIENT)
Dept: OUTPATIENT SERVICES | Facility: HOSPITAL | Age: 20
LOS: 1 days | End: 2025-06-19
Payer: MEDICAID

## 2025-06-19 ENCOUNTER — APPOINTMENT (OUTPATIENT)
Dept: WOUND CARE | Facility: HOSPITAL | Age: 20
End: 2025-06-19
Payer: MEDICAID

## 2025-06-19 VITALS
RESPIRATION RATE: 18 BRPM | HEIGHT: 69 IN | HEART RATE: 95 BPM | SYSTOLIC BLOOD PRESSURE: 125 MMHG | OXYGEN SATURATION: 96 % | DIASTOLIC BLOOD PRESSURE: 81 MMHG | BODY MASS INDEX: 18.51 KG/M2 | WEIGHT: 125 LBS | TEMPERATURE: 98.6 F

## 2025-06-19 DIAGNOSIS — Z98.890 OTHER SPECIFIED POSTPROCEDURAL STATES: Chronic | ICD-10-CM

## 2025-06-19 DIAGNOSIS — Z98.1 ARTHRODESIS STATUS: Chronic | ICD-10-CM

## 2025-06-19 DIAGNOSIS — L97.512 NON-PRESSURE CHRONIC ULCER OF OTHER PART OF RIGHT FOOT WITH FAT LAYER EXPOSED: ICD-10-CM

## 2025-06-19 DIAGNOSIS — M86.9 OSTEOMYELITIS, UNSPECIFIED: Chronic | ICD-10-CM

## 2025-06-19 PROCEDURE — 99024 POSTOP FOLLOW-UP VISIT: CPT

## 2025-06-19 PROCEDURE — G0463: CPT

## 2025-06-23 ENCOUNTER — APPOINTMENT (OUTPATIENT)
Dept: PODIATRY | Facility: CLINIC | Age: 20
End: 2025-06-23
Payer: MEDICAID

## 2025-06-23 VITALS
BODY MASS INDEX: 18.51 KG/M2 | WEIGHT: 125 LBS | DIASTOLIC BLOOD PRESSURE: 78 MMHG | OXYGEN SATURATION: 97 % | HEART RATE: 106 BPM | SYSTOLIC BLOOD PRESSURE: 129 MMHG | HEIGHT: 69 IN

## 2025-06-23 DIAGNOSIS — Z91.012 ALLERGY TO EGGS: ICD-10-CM
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
## 2025-06-23 DIAGNOSIS — Z87.39 PERSONAL HISTORY OF OTHER DISEASES OF THE MUSCULOSKELETAL SYSTEM AND CONNECTIVE TISSUE: ICD-10-CM

## 2025-06-23 DIAGNOSIS — Z79.899 OTHER LONG TERM (CURRENT) DRUG THERAPY: ICD-10-CM

## 2025-06-23 DIAGNOSIS — Z91.010 ALLERGY TO PEANUTS: ICD-10-CM

## 2025-06-23 DIAGNOSIS — Z83.49 FAMILY HISTORY OF OTHER ENDOCRINE, NUTRITIONAL AND METABOLIC DISEASES: ICD-10-CM

## 2025-06-23 DIAGNOSIS — K59.09 OTHER CONSTIPATION: ICD-10-CM

## 2025-06-23 DIAGNOSIS — Z98.890 OTHER SPECIFIED POSTPROCEDURAL STATES: ICD-10-CM

## 2025-06-23 DIAGNOSIS — L97.512 NON-PRESSURE CHRONIC ULCER OF OTHER PART OF RIGHT FOOT WITH FAT LAYER EXPOSED: ICD-10-CM

## 2025-06-23 DIAGNOSIS — T81.89XD OTHER COMPLICATIONS OF PROCEDURES, NOT ELSEWHERE CLASSIFIED, SUBSEQUENT ENCOUNTER: ICD-10-CM

## 2025-06-23 PROCEDURE — 99024 POSTOP FOLLOW-UP VISIT: CPT

## 2025-06-25 ENCOUNTER — APPOINTMENT (OUTPATIENT)
Dept: WOUND CARE | Facility: HOSPITAL | Age: 20
End: 2025-06-25

## 2025-06-26 ENCOUNTER — APPOINTMENT (OUTPATIENT)
Dept: WOUND CARE | Facility: HOSPITAL | Age: 20
End: 2025-06-26
Payer: MEDICAID

## 2025-06-26 ENCOUNTER — OUTPATIENT (OUTPATIENT)
Dept: OUTPATIENT SERVICES | Facility: HOSPITAL | Age: 20
LOS: 1 days | End: 2025-06-26
Payer: MEDICAID

## 2025-06-26 ENCOUNTER — NON-APPOINTMENT (OUTPATIENT)
Age: 20
End: 2025-06-26

## 2025-06-26 VITALS
HEART RATE: 116 BPM | BODY MASS INDEX: 18.51 KG/M2 | TEMPERATURE: 98.6 F | WEIGHT: 125 LBS | SYSTOLIC BLOOD PRESSURE: 111 MMHG | OXYGEN SATURATION: 99 % | HEIGHT: 69 IN | RESPIRATION RATE: 18 BRPM | DIASTOLIC BLOOD PRESSURE: 67 MMHG

## 2025-06-26 DIAGNOSIS — L97.512 NON-PRESSURE CHRONIC ULCER OF OTHER PART OF RIGHT FOOT WITH FAT LAYER EXPOSED: ICD-10-CM

## 2025-06-26 DIAGNOSIS — Z98.890 OTHER SPECIFIED POSTPROCEDURAL STATES: Chronic | ICD-10-CM

## 2025-06-26 DIAGNOSIS — Z98.1 ARTHRODESIS STATUS: Chronic | ICD-10-CM

## 2025-06-26 DIAGNOSIS — M86.9 OSTEOMYELITIS, UNSPECIFIED: Chronic | ICD-10-CM

## 2025-06-26 PROCEDURE — G0463: CPT

## 2025-06-26 PROCEDURE — 99024 POSTOP FOLLOW-UP VISIT: CPT

## 2025-06-29 DIAGNOSIS — Z83.49 FAMILY HISTORY OF OTHER ENDOCRINE, NUTRITIONAL AND METABOLIC DISEASES: ICD-10-CM
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
## 2025-06-29 DIAGNOSIS — T81.89XD OTHER COMPLICATIONS OF PROCEDURES, NOT ELSEWHERE CLASSIFIED, SUBSEQUENT ENCOUNTER: ICD-10-CM

## 2025-06-29 DIAGNOSIS — K59.09 OTHER CONSTIPATION: ICD-10-CM

## 2025-06-29 DIAGNOSIS — Z87.39 PERSONAL HISTORY OF OTHER DISEASES OF THE MUSCULOSKELETAL SYSTEM AND CONNECTIVE TISSUE: ICD-10-CM

## 2025-06-29 DIAGNOSIS — Z79.899 OTHER LONG TERM (CURRENT) DRUG THERAPY: ICD-10-CM

## 2025-06-29 DIAGNOSIS — Z98.890 OTHER SPECIFIED POSTPROCEDURAL STATES: ICD-10-CM

## 2025-06-29 DIAGNOSIS — Z91.010 ALLERGY TO PEANUTS: ICD-10-CM

## 2025-06-29 DIAGNOSIS — Z91.012 ALLERGY TO EGGS: ICD-10-CM

## 2025-06-29 DIAGNOSIS — L97.512 NON-PRESSURE CHRONIC ULCER OF OTHER PART OF RIGHT FOOT WITH FAT LAYER EXPOSED: ICD-10-CM

## 2025-06-30 NOTE — ED ADULT NURSE NOTE - IS THE PATIENT ABLE TO BE SCREENED?
Called patient to offer different appointment time and day, unable to leave VM due to mailbox being full.   Yes

## 2025-07-01 PROBLEM — S90.821A BLISTER (NONTHERMAL), RIGHT FOOT, INITIAL ENCOUNTER: Status: ACTIVE | Noted: 2025-07-01

## 2025-07-02 ENCOUNTER — OUTPATIENT (OUTPATIENT)
Dept: OUTPATIENT SERVICES | Facility: HOSPITAL | Age: 20
LOS: 1 days | End: 2025-07-02
Payer: MEDICAID

## 2025-07-02 ENCOUNTER — APPOINTMENT (OUTPATIENT)
Dept: WOUND CARE | Facility: HOSPITAL | Age: 20
End: 2025-07-02
Payer: MEDICAID

## 2025-07-02 VITALS
RESPIRATION RATE: 18 BRPM | HEIGHT: 69 IN | BODY MASS INDEX: 18.51 KG/M2 | TEMPERATURE: 97.8 F | OXYGEN SATURATION: 99 % | DIASTOLIC BLOOD PRESSURE: 74 MMHG | HEART RATE: 103 BPM | SYSTOLIC BLOOD PRESSURE: 112 MMHG | WEIGHT: 125 LBS

## 2025-07-02 DIAGNOSIS — L97.512 NON-PRESSURE CHRONIC ULCER OF OTHER PART OF RIGHT FOOT WITH FAT LAYER EXPOSED: ICD-10-CM

## 2025-07-02 DIAGNOSIS — M86.9 OSTEOMYELITIS, UNSPECIFIED: Chronic | ICD-10-CM

## 2025-07-02 DIAGNOSIS — Z98.890 OTHER SPECIFIED POSTPROCEDURAL STATES: Chronic | ICD-10-CM

## 2025-07-02 DIAGNOSIS — Z98.1 ARTHRODESIS STATUS: Chronic | ICD-10-CM

## 2025-07-02 PROCEDURE — G0463: CPT

## 2025-07-02 PROCEDURE — 99024 POSTOP FOLLOW-UP VISIT: CPT

## 2025-07-03 DIAGNOSIS — Z87.39 PERSONAL HISTORY OF OTHER DISEASES OF THE MUSCULOSKELETAL SYSTEM AND CONNECTIVE TISSUE: ICD-10-CM

## 2025-07-03 DIAGNOSIS — L97.512 NON-PRESSURE CHRONIC ULCER OF OTHER PART OF RIGHT FOOT WITH FAT LAYER EXPOSED: ICD-10-CM

## 2025-07-03 DIAGNOSIS — T81.89XD OTHER COMPLICATIONS OF PROCEDURES, NOT ELSEWHERE CLASSIFIED, SUBSEQUENT ENCOUNTER: ICD-10-CM
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
## 2025-07-03 DIAGNOSIS — Z91.012 ALLERGY TO EGGS: ICD-10-CM

## 2025-07-03 DIAGNOSIS — Z83.49 FAMILY HISTORY OF OTHER ENDOCRINE, NUTRITIONAL AND METABOLIC DISEASES: ICD-10-CM
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
## 2025-07-03 DIAGNOSIS — Z91.010 ALLERGY TO PEANUTS: ICD-10-CM

## 2025-07-03 DIAGNOSIS — K59.09 OTHER CONSTIPATION: ICD-10-CM

## 2025-07-08 ENCOUNTER — OUTPATIENT (OUTPATIENT)
Dept: OUTPATIENT SERVICES | Facility: HOSPITAL | Age: 20
LOS: 1 days | End: 2025-07-08
Payer: MEDICAID

## 2025-07-08 ENCOUNTER — APPOINTMENT (OUTPATIENT)
Dept: WOUND CARE | Facility: HOSPITAL | Age: 20
End: 2025-07-08
Payer: MEDICAID

## 2025-07-08 VITALS
WEIGHT: 125 LBS | TEMPERATURE: 98.9 F | HEART RATE: 80 BPM | SYSTOLIC BLOOD PRESSURE: 106 MMHG | OXYGEN SATURATION: 98 % | BODY MASS INDEX: 18.51 KG/M2 | HEIGHT: 69 IN | RESPIRATION RATE: 18 BRPM | DIASTOLIC BLOOD PRESSURE: 69 MMHG

## 2025-07-08 DIAGNOSIS — Z98.890 OTHER SPECIFIED POSTPROCEDURAL STATES: Chronic | ICD-10-CM

## 2025-07-08 DIAGNOSIS — L97.512 NON-PRESSURE CHRONIC ULCER OF OTHER PART OF RIGHT FOOT WITH FAT LAYER EXPOSED: ICD-10-CM

## 2025-07-08 DIAGNOSIS — Z98.1 ARTHRODESIS STATUS: Chronic | ICD-10-CM

## 2025-07-08 DIAGNOSIS — M86.9 OSTEOMYELITIS, UNSPECIFIED: Chronic | ICD-10-CM

## 2025-07-08 PROCEDURE — G0463: CPT

## 2025-07-08 PROCEDURE — 99024 POSTOP FOLLOW-UP VISIT: CPT

## 2025-07-09 DIAGNOSIS — Z91.010 ALLERGY TO PEANUTS: ICD-10-CM

## 2025-07-09 DIAGNOSIS — K59.09 OTHER CONSTIPATION: ICD-10-CM

## 2025-07-09 DIAGNOSIS — L97.512 NON-PRESSURE CHRONIC ULCER OF OTHER PART OF RIGHT FOOT WITH FAT LAYER EXPOSED: ICD-10-CM

## 2025-07-09 DIAGNOSIS — Z98.890 OTHER SPECIFIED POSTPROCEDURAL STATES: ICD-10-CM

## 2025-07-09 DIAGNOSIS — T81.89XD OTHER COMPLICATIONS OF PROCEDURES, NOT ELSEWHERE CLASSIFIED, SUBSEQUENT ENCOUNTER: ICD-10-CM
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
## 2025-07-09 DIAGNOSIS — Z91.012 ALLERGY TO EGGS: ICD-10-CM
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
## 2025-07-09 DIAGNOSIS — Z87.39 PERSONAL HISTORY OF OTHER DISEASES OF THE MUSCULOSKELETAL SYSTEM AND CONNECTIVE TISSUE: ICD-10-CM

## 2025-07-09 DIAGNOSIS — Z83.49 FAMILY HISTORY OF OTHER ENDOCRINE, NUTRITIONAL AND METABOLIC DISEASES: ICD-10-CM

## 2025-07-09 DIAGNOSIS — Z79.899 OTHER LONG TERM (CURRENT) DRUG THERAPY: ICD-10-CM

## 2025-07-14 ENCOUNTER — OFFICE (OUTPATIENT)
Dept: URBAN - METROPOLITAN AREA CLINIC 109 | Facility: CLINIC | Age: 20
Setting detail: OPHTHALMOLOGY
End: 2025-07-14
Payer: COMMERCIAL

## 2025-07-14 DIAGNOSIS — H35.52: ICD-10-CM

## 2025-07-14 DIAGNOSIS — H35.353: ICD-10-CM

## 2025-07-14 PROCEDURE — 92014 COMPRE OPH EXAM EST PT 1/>: CPT | Performed by: OPHTHALMOLOGY

## 2025-07-14 PROCEDURE — 92134 CPTRZ OPH DX IMG PST SGM RTA: CPT | Performed by: OPHTHALMOLOGY

## 2025-07-14 PROCEDURE — 92202 OPSCPY EXTND ON/MAC DRAW: CPT | Performed by: OPHTHALMOLOGY

## 2025-07-14 PROCEDURE — 92083 EXTENDED VISUAL FIELD XM: CPT | Performed by: OPHTHALMOLOGY

## 2025-07-14 ASSESSMENT — KERATOMETRY
METHOD_AUTO_MANUAL: AUTO
OS_K1POWER_DIOPTERS: 44.00
OD_AXISANGLE_DEGREES: 096
OS_K2POWER_DIOPTERS: 46.00
OS_AXISANGLE_DEGREES: 078
OD_K1POWER_DIOPTERS: 44.25
OD_K2POWER_DIOPTERS: 46.75

## 2025-07-14 ASSESSMENT — REFRACTION_MANIFEST
OS_SPHERE: -0.50
OD_VA1: 20/25-
OS_CYLINDER: -2.50
OS_CYLINDER: -2.50
OD_SPHERE: -4.25
OD_AXIS: 5
OD_SPHERE: -3.50
OD_CYLINDER: -2.25
OD_CYLINDER: -2.25
OS_VA1: 20/25-
OS_AXIS: 156
OS_VA1: 20/30+1
OS_SPHERE: -1.25
OD_VA1: 20/25
OD_AXIS: 6
OS_AXIS: 165

## 2025-07-14 ASSESSMENT — REFRACTION_AUTOREFRACTION
OD_SPHERE: -4.75
OS_SPHERE: -3.25
OS_CYLINDER: -2.25
OD_AXIS: 10
OD_CYLINDER: -2.50
OS_AXIS: 165

## 2025-07-14 ASSESSMENT — TONOMETRY
OS_IOP_MMHG: 19
OD_IOP_MMHG: 14
OS_IOP_MMHG: 15

## 2025-07-14 ASSESSMENT — CONFRONTATIONAL VISUAL FIELD TEST (CVF)
OS_FINDINGS: FULL
OD_FINDINGS: FULL

## 2025-07-14 ASSESSMENT — VISUAL ACUITY
OD_BCVA: 20/50+2
OS_BCVA: 20/80-1

## 2025-07-16 ENCOUNTER — OUTPATIENT (OUTPATIENT)
Dept: OUTPATIENT SERVICES | Facility: HOSPITAL | Age: 20
LOS: 1 days | End: 2025-07-16
Payer: MEDICAID

## 2025-07-16 ENCOUNTER — APPOINTMENT (OUTPATIENT)
Dept: WOUND CARE | Facility: HOSPITAL | Age: 20
End: 2025-07-16

## 2025-07-16 VITALS
DIASTOLIC BLOOD PRESSURE: 75 MMHG | OXYGEN SATURATION: 98 % | RESPIRATION RATE: 18 BRPM | SYSTOLIC BLOOD PRESSURE: 113 MMHG | HEIGHT: 69 IN | BODY MASS INDEX: 18.51 KG/M2 | HEART RATE: 98 BPM | WEIGHT: 125 LBS | TEMPERATURE: 99.1 F

## 2025-07-16 DIAGNOSIS — Z98.890 OTHER SPECIFIED POSTPROCEDURAL STATES: Chronic | ICD-10-CM

## 2025-07-16 DIAGNOSIS — T81.89XD OTHER COMPLICATIONS OF PROCEDURES, NOT ELSEWHERE CLASSIFIED, SUBSEQUENT ENCOUNTER: ICD-10-CM

## 2025-07-16 DIAGNOSIS — Z98.1 ARTHRODESIS STATUS: Chronic | ICD-10-CM

## 2025-07-16 PROBLEM — L97.519: Status: ACTIVE | Noted: 2025-07-16

## 2025-07-16 PROCEDURE — 99214 OFFICE O/P EST MOD 30 MIN: CPT | Mod: 24

## 2025-07-16 PROCEDURE — G0463: CPT

## 2025-07-17 ENCOUNTER — OFFICE (OUTPATIENT)
Dept: URBAN - METROPOLITAN AREA CLINIC 109 | Facility: CLINIC | Age: 20
Setting detail: OPHTHALMOLOGY
End: 2025-07-17
Payer: COMMERCIAL

## 2025-07-17 DIAGNOSIS — H35.353: ICD-10-CM

## 2025-07-17 PROCEDURE — 92250 FUNDUS PHOTOGRAPHY W/I&R: CPT | Performed by: OPHTHALMOLOGY

## 2025-07-17 PROCEDURE — 92014 COMPRE OPH EXAM EST PT 1/>: CPT | Performed by: OPHTHALMOLOGY

## 2025-07-17 ASSESSMENT — TONOMETRY
OS_IOP_MMHG: 19
OD_IOP_MMHG: 19

## 2025-07-17 ASSESSMENT — CONFRONTATIONAL VISUAL FIELD TEST (CVF)
OS_FINDINGS: CONSTRICTION
OD_FINDINGS: CONSTRICTION

## 2025-07-18 ENCOUNTER — APPOINTMENT (OUTPATIENT)
Dept: WOUND CARE | Facility: HOSPITAL | Age: 20
End: 2025-07-18

## 2025-07-18 ENCOUNTER — OUTPATIENT (OUTPATIENT)
Dept: OUTPATIENT SERVICES | Facility: HOSPITAL | Age: 20
LOS: 1 days | End: 2025-07-18
Payer: MEDICAID

## 2025-07-18 VITALS
OXYGEN SATURATION: 97 % | WEIGHT: 125 LBS | TEMPERATURE: 98.4 F | SYSTOLIC BLOOD PRESSURE: 105 MMHG | HEIGHT: 69 IN | BODY MASS INDEX: 18.51 KG/M2 | DIASTOLIC BLOOD PRESSURE: 70 MMHG | HEART RATE: 88 BPM | RESPIRATION RATE: 18 BRPM

## 2025-07-18 DIAGNOSIS — T81.89XD OTHER COMPLICATIONS OF PROCEDURES, NOT ELSEWHERE CLASSIFIED, SUBSEQUENT ENCOUNTER: ICD-10-CM

## 2025-07-18 DIAGNOSIS — Z98.1 ARTHRODESIS STATUS: Chronic | ICD-10-CM

## 2025-07-18 DIAGNOSIS — Z98.890 OTHER SPECIFIED POSTPROCEDURAL STATES: Chronic | ICD-10-CM

## 2025-07-18 DIAGNOSIS — M86.9 OSTEOMYELITIS, UNSPECIFIED: Chronic | ICD-10-CM

## 2025-07-18 PROCEDURE — 99024 POSTOP FOLLOW-UP VISIT: CPT

## 2025-07-18 PROCEDURE — G0463: CPT

## 2025-07-19 DIAGNOSIS — Z79.899 OTHER LONG TERM (CURRENT) DRUG THERAPY: ICD-10-CM

## 2025-07-19 DIAGNOSIS — Z91.010 ALLERGY TO PEANUTS: ICD-10-CM
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
## 2025-07-19 DIAGNOSIS — L97.512 NON-PRESSURE CHRONIC ULCER OF OTHER PART OF RIGHT FOOT WITH FAT LAYER EXPOSED: ICD-10-CM
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
## 2025-07-19 DIAGNOSIS — Z87.39 PERSONAL HISTORY OF OTHER DISEASES OF THE MUSCULOSKELETAL SYSTEM AND CONNECTIVE TISSUE: ICD-10-CM

## 2025-07-19 DIAGNOSIS — K59.09 OTHER CONSTIPATION: ICD-10-CM

## 2025-07-19 DIAGNOSIS — T81.89XD OTHER COMPLICATIONS OF PROCEDURES, NOT ELSEWHERE CLASSIFIED, SUBSEQUENT ENCOUNTER: ICD-10-CM

## 2025-07-19 DIAGNOSIS — Z91.012 ALLERGY TO EGGS: ICD-10-CM

## 2025-07-19 DIAGNOSIS — Z83.49 FAMILY HISTORY OF OTHER ENDOCRINE, NUTRITIONAL AND METABOLIC DISEASES: ICD-10-CM

## 2025-07-19 DIAGNOSIS — Z98.890 OTHER SPECIFIED POSTPROCEDURAL STATES: ICD-10-CM

## 2025-07-24 ENCOUNTER — APPOINTMENT (OUTPATIENT)
Dept: WOUND CARE | Facility: HOSPITAL | Age: 20
End: 2025-07-24
Payer: MEDICAID

## 2025-07-24 ENCOUNTER — OUTPATIENT (OUTPATIENT)
Dept: OUTPATIENT SERVICES | Facility: HOSPITAL | Age: 20
LOS: 1 days | End: 2025-07-24
Payer: MEDICAID

## 2025-07-24 VITALS
BODY MASS INDEX: 18.51 KG/M2 | DIASTOLIC BLOOD PRESSURE: 82 MMHG | HEART RATE: 86 BPM | WEIGHT: 125 LBS | TEMPERATURE: 98.4 F | HEIGHT: 69 IN | OXYGEN SATURATION: 98 % | SYSTOLIC BLOOD PRESSURE: 121 MMHG | RESPIRATION RATE: 15 BRPM

## 2025-07-24 DIAGNOSIS — M86.9 OSTEOMYELITIS, UNSPECIFIED: Chronic | ICD-10-CM

## 2025-07-24 DIAGNOSIS — Z98.1 ARTHRODESIS STATUS: Chronic | ICD-10-CM

## 2025-07-24 DIAGNOSIS — Z98.890 OTHER SPECIFIED POSTPROCEDURAL STATES: Chronic | ICD-10-CM

## 2025-07-24 DIAGNOSIS — T81.89XD OTHER COMPLICATIONS OF PROCEDURES, NOT ELSEWHERE CLASSIFIED, SUBSEQUENT ENCOUNTER: ICD-10-CM

## 2025-07-24 PROCEDURE — G0463: CPT

## 2025-07-24 PROCEDURE — 99213 OFFICE O/P EST LOW 20 MIN: CPT | Mod: 24

## 2025-07-27 DIAGNOSIS — T81.89XD OTHER COMPLICATIONS OF PROCEDURES, NOT ELSEWHERE CLASSIFIED, SUBSEQUENT ENCOUNTER: ICD-10-CM

## 2025-07-27 DIAGNOSIS — Z91.012 ALLERGY TO EGGS: ICD-10-CM

## 2025-07-27 DIAGNOSIS — Z83.49 FAMILY HISTORY OF OTHER ENDOCRINE, NUTRITIONAL AND METABOLIC DISEASES: ICD-10-CM

## 2025-07-27 DIAGNOSIS — Z87.39 PERSONAL HISTORY OF OTHER DISEASES OF THE MUSCULOSKELETAL SYSTEM AND CONNECTIVE TISSUE: ICD-10-CM
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
## 2025-07-27 DIAGNOSIS — L97.512 NON-PRESSURE CHRONIC ULCER OF OTHER PART OF RIGHT FOOT WITH FAT LAYER EXPOSED: ICD-10-CM

## 2025-07-27 DIAGNOSIS — K59.09 OTHER CONSTIPATION: ICD-10-CM

## 2025-07-27 DIAGNOSIS — Z91.010 ALLERGY TO PEANUTS: ICD-10-CM

## 2025-07-27 DIAGNOSIS — Z98.890 OTHER SPECIFIED POSTPROCEDURAL STATES: ICD-10-CM

## 2025-07-27 DIAGNOSIS — Z79.899 OTHER LONG TERM (CURRENT) DRUG THERAPY: ICD-10-CM
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
## 2025-07-31 ENCOUNTER — OUTPATIENT (OUTPATIENT)
Dept: OUTPATIENT SERVICES | Facility: HOSPITAL | Age: 20
LOS: 1 days | End: 2025-07-31
Payer: MEDICAID

## 2025-07-31 ENCOUNTER — APPOINTMENT (OUTPATIENT)
Dept: WOUND CARE | Facility: HOSPITAL | Age: 20
End: 2025-07-31

## 2025-07-31 VITALS
TEMPERATURE: 98.2 F | BODY MASS INDEX: 18.51 KG/M2 | OXYGEN SATURATION: 97 % | SYSTOLIC BLOOD PRESSURE: 111 MMHG | WEIGHT: 125 LBS | HEIGHT: 69 IN | HEART RATE: 88 BPM | RESPIRATION RATE: 18 BRPM | DIASTOLIC BLOOD PRESSURE: 74 MMHG

## 2025-07-31 DIAGNOSIS — M86.9 OSTEOMYELITIS, UNSPECIFIED: Chronic | ICD-10-CM

## 2025-07-31 DIAGNOSIS — T81.89XD OTHER COMPLICATIONS OF PROCEDURES, NOT ELSEWHERE CLASSIFIED, SUBSEQUENT ENCOUNTER: ICD-10-CM

## 2025-07-31 DIAGNOSIS — Z98.890 OTHER SPECIFIED POSTPROCEDURAL STATES: Chronic | ICD-10-CM

## 2025-07-31 DIAGNOSIS — Z98.1 ARTHRODESIS STATUS: Chronic | ICD-10-CM

## 2025-07-31 PROCEDURE — 99024 POSTOP FOLLOW-UP VISIT: CPT

## 2025-07-31 PROCEDURE — G0463: CPT

## 2025-07-31 ASSESSMENT — REFRACTION_MANIFEST
OS_CYLINDER: -2.50
OS_AXIS: 165
OD_CYLINDER: -2.25
OD_VA1: 20/25-
OD_AXIS: 5
OS_SPHERE: -0.50
OS_VA1: 20/30+1
OD_VA1: 20/25
OD_CYLINDER: -2.25
OD_SPHERE: -4.25
OD_SPHERE: -3.50
OS_CYLINDER: -2.50
OS_SPHERE: -1.25
OS_VA1: 20/25-
OS_AXIS: 156
OD_AXIS: 6

## 2025-07-31 ASSESSMENT — VISUAL ACUITY
OD_BCVA: 20/40
OS_BCVA: 20/100-1

## 2025-07-31 ASSESSMENT — REFRACTION_AUTOREFRACTION
OS_CYLINDER: -2.50
OD_CYLINDER: -2.50
OS_AXIS: 162
OD_SPHERE: -4.75
OD_AXIS: 11
OS_SPHERE: -2.75

## 2025-08-01 DIAGNOSIS — Z83.49 FAMILY HISTORY OF OTHER ENDOCRINE, NUTRITIONAL AND METABOLIC DISEASES: ICD-10-CM

## 2025-08-01 DIAGNOSIS — L97.512 NON-PRESSURE CHRONIC ULCER OF OTHER PART OF RIGHT FOOT WITH FAT LAYER EXPOSED: ICD-10-CM
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
## 2025-08-01 DIAGNOSIS — K59.09 OTHER CONSTIPATION: ICD-10-CM

## 2025-08-01 DIAGNOSIS — Z91.02 FOOD ADDITIVES ALLERGY STATUS: ICD-10-CM

## 2025-08-01 DIAGNOSIS — Z98.890 OTHER SPECIFIED POSTPROCEDURAL STATES: ICD-10-CM

## 2025-08-01 DIAGNOSIS — Z79.899 OTHER LONG TERM (CURRENT) DRUG THERAPY: ICD-10-CM

## 2025-08-01 DIAGNOSIS — Z91.010 ALLERGY TO PEANUTS: ICD-10-CM

## 2025-08-01 DIAGNOSIS — Z87.39 PERSONAL HISTORY OF OTHER DISEASES OF THE MUSCULOSKELETAL SYSTEM AND CONNECTIVE TISSUE: ICD-10-CM

## 2025-08-01 DIAGNOSIS — L84 CORNS AND CALLOSITIES: ICD-10-CM

## 2025-08-01 DIAGNOSIS — T81.89XD OTHER COMPLICATIONS OF PROCEDURES, NOT ELSEWHERE CLASSIFIED, SUBSEQUENT ENCOUNTER: ICD-10-CM

## 2025-08-04 ENCOUNTER — APPOINTMENT (OUTPATIENT)
Dept: OPHTHALMOLOGY | Facility: CLINIC | Age: 20
End: 2025-08-04
Payer: MEDICAID

## 2025-08-04 ENCOUNTER — NON-APPOINTMENT (OUTPATIENT)
Age: 20
End: 2025-08-04

## 2025-08-04 PROCEDURE — 92004 COMPRE OPH EXAM NEW PT 1/>: CPT | Mod: 25

## 2025-08-04 PROCEDURE — 92250 FUNDUS PHOTOGRAPHY W/I&R: CPT

## 2025-08-12 ENCOUNTER — APPOINTMENT (OUTPATIENT)
Dept: WOUND CARE | Facility: HOSPITAL | Age: 20
End: 2025-08-12

## 2025-08-12 ENCOUNTER — OUTPATIENT (OUTPATIENT)
Dept: OUTPATIENT SERVICES | Facility: HOSPITAL | Age: 20
LOS: 1 days | End: 2025-08-12
Payer: MEDICAID

## 2025-08-12 VITALS
HEIGHT: 69 IN | WEIGHT: 125 LBS | OXYGEN SATURATION: 97 % | RESPIRATION RATE: 18 BRPM | SYSTOLIC BLOOD PRESSURE: 111 MMHG | BODY MASS INDEX: 18.51 KG/M2 | DIASTOLIC BLOOD PRESSURE: 75 MMHG | TEMPERATURE: 99.3 F | HEART RATE: 10 BPM

## 2025-08-12 DIAGNOSIS — T81.89XD OTHER COMPLICATIONS OF PROCEDURES, NOT ELSEWHERE CLASSIFIED, SUBSEQUENT ENCOUNTER: ICD-10-CM

## 2025-08-12 DIAGNOSIS — Z98.1 ARTHRODESIS STATUS: Chronic | ICD-10-CM

## 2025-08-12 DIAGNOSIS — M86.9 OSTEOMYELITIS, UNSPECIFIED: Chronic | ICD-10-CM

## 2025-08-12 DIAGNOSIS — Z98.890 OTHER SPECIFIED POSTPROCEDURAL STATES: Chronic | ICD-10-CM

## 2025-08-12 DIAGNOSIS — L84 CORNS AND CALLOSITIES: ICD-10-CM

## 2025-08-12 DIAGNOSIS — L97.512 NON-PRESSURE CHRONIC ULCER OF OTHER PART OF RIGHT FOOT WITH FAT LAYER EXPOSED: ICD-10-CM

## 2025-08-12 PROCEDURE — 99024 POSTOP FOLLOW-UP VISIT: CPT

## 2025-08-12 PROCEDURE — G0463: CPT

## 2025-08-13 DIAGNOSIS — Z87.39 PERSONAL HISTORY OF OTHER DISEASES OF THE MUSCULOSKELETAL SYSTEM AND CONNECTIVE TISSUE: ICD-10-CM
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
## 2025-08-13 DIAGNOSIS — Z98.890 OTHER SPECIFIED POSTPROCEDURAL STATES: ICD-10-CM

## 2025-08-13 DIAGNOSIS — K59.09 OTHER CONSTIPATION: ICD-10-CM

## 2025-08-13 DIAGNOSIS — Z83.49 FAMILY HISTORY OF OTHER ENDOCRINE, NUTRITIONAL AND METABOLIC DISEASES: ICD-10-CM

## 2025-08-13 DIAGNOSIS — L97.512 NON-PRESSURE CHRONIC ULCER OF OTHER PART OF RIGHT FOOT WITH FAT LAYER EXPOSED: ICD-10-CM

## 2025-08-13 DIAGNOSIS — Z79.899 OTHER LONG TERM (CURRENT) DRUG THERAPY: ICD-10-CM

## 2025-08-13 DIAGNOSIS — Z91.010 ALLERGY TO PEANUTS: ICD-10-CM

## 2025-08-13 DIAGNOSIS — Z91.012 ALLERGY TO EGGS: ICD-10-CM

## 2025-08-13 DIAGNOSIS — T81.89XD OTHER COMPLICATIONS OF PROCEDURES, NOT ELSEWHERE CLASSIFIED, SUBSEQUENT ENCOUNTER: ICD-10-CM

## 2025-08-18 ENCOUNTER — APPOINTMENT (OUTPATIENT)
Dept: WOUND CARE | Facility: HOSPITAL | Age: 20
End: 2025-08-18

## 2025-08-25 ENCOUNTER — APPOINTMENT (OUTPATIENT)
Dept: WOUND CARE | Facility: HOSPITAL | Age: 20
End: 2025-08-25

## 2025-08-25 ENCOUNTER — OUTPATIENT (OUTPATIENT)
Dept: OUTPATIENT SERVICES | Facility: HOSPITAL | Age: 20
LOS: 1 days | End: 2025-08-25
Payer: MEDICAID

## 2025-08-25 VITALS
WEIGHT: 125 LBS | BODY MASS INDEX: 18.51 KG/M2 | TEMPERATURE: 98.5 F | SYSTOLIC BLOOD PRESSURE: 114 MMHG | HEIGHT: 69 IN | OXYGEN SATURATION: 99 % | RESPIRATION RATE: 16 BRPM | DIASTOLIC BLOOD PRESSURE: 74 MMHG | HEART RATE: 92 BPM

## 2025-08-25 DIAGNOSIS — T81.89XD OTHER COMPLICATIONS OF PROCEDURES, NOT ELSEWHERE CLASSIFIED, SUBSEQUENT ENCOUNTER: ICD-10-CM

## 2025-08-25 DIAGNOSIS — L84 CORNS AND CALLOSITIES: ICD-10-CM

## 2025-08-25 DIAGNOSIS — Z98.1 ARTHRODESIS STATUS: Chronic | ICD-10-CM

## 2025-08-25 DIAGNOSIS — Z98.890 OTHER SPECIFIED POSTPROCEDURAL STATES: Chronic | ICD-10-CM

## 2025-08-25 DIAGNOSIS — L97.512 NON-PRESSURE CHRONIC ULCER OF OTHER PART OF RIGHT FOOT WITH FAT LAYER EXPOSED: ICD-10-CM

## 2025-08-25 DIAGNOSIS — M86.9 OSTEOMYELITIS, UNSPECIFIED: Chronic | ICD-10-CM

## 2025-08-25 PROCEDURE — 99024 POSTOP FOLLOW-UP VISIT: CPT

## 2025-08-25 PROCEDURE — G0463: CPT

## 2025-09-02 DIAGNOSIS — Z87.39 PERSONAL HISTORY OF OTHER DISEASES OF THE MUSCULOSKELETAL SYSTEM AND CONNECTIVE TISSUE: ICD-10-CM
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
## 2025-09-02 DIAGNOSIS — Z79.899 OTHER LONG TERM (CURRENT) DRUG THERAPY: ICD-10-CM

## 2025-09-02 DIAGNOSIS — L84 CORNS AND CALLOSITIES: ICD-10-CM

## 2025-09-02 DIAGNOSIS — K59.09 OTHER CONSTIPATION: ICD-10-CM
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
## 2025-09-02 DIAGNOSIS — Z98.890 OTHER SPECIFIED POSTPROCEDURAL STATES: ICD-10-CM

## 2025-09-02 DIAGNOSIS — T81.89XD OTHER COMPLICATIONS OF PROCEDURES, NOT ELSEWHERE CLASSIFIED, SUBSEQUENT ENCOUNTER: ICD-10-CM

## 2025-09-02 DIAGNOSIS — Z83.49 FAMILY HISTORY OF OTHER ENDOCRINE, NUTRITIONAL AND METABOLIC DISEASES: ICD-10-CM

## 2025-09-02 DIAGNOSIS — L97.512 NON-PRESSURE CHRONIC ULCER OF OTHER PART OF RIGHT FOOT WITH FAT LAYER EXPOSED: ICD-10-CM

## 2025-09-02 DIAGNOSIS — Z91.012 ALLERGY TO EGGS: ICD-10-CM

## 2025-09-02 DIAGNOSIS — Z91.010 ALLERGY TO PEANUTS: ICD-10-CM

## 2025-09-17 ENCOUNTER — APPOINTMENT (OUTPATIENT)
Dept: WOUND CARE | Facility: HOSPITAL | Age: 20
End: 2025-09-17
Payer: MEDICAID

## 2025-09-17 VITALS
WEIGHT: 125 LBS | TEMPERATURE: 98.2 F | RESPIRATION RATE: 16 BRPM | SYSTOLIC BLOOD PRESSURE: 103 MMHG | HEIGHT: 69 IN | OXYGEN SATURATION: 98 % | HEART RATE: 99 BPM | DIASTOLIC BLOOD PRESSURE: 69 MMHG | BODY MASS INDEX: 18.51 KG/M2

## 2025-09-17 DIAGNOSIS — L97.512 NON-PRESSURE CHRONIC ULCER OF OTHER PART OF RIGHT FOOT WITH FAT LAYER EXPOSED: ICD-10-CM

## 2025-09-17 DIAGNOSIS — T81.89XD OTHER COMPLICATIONS OF PROCEDURES, NOT ELSEWHERE CLASSIFIED, SUBSEQUENT ENCOUNTER: ICD-10-CM

## 2025-09-17 PROCEDURE — 99214 OFFICE O/P EST MOD 30 MIN: CPT

## (undated) DEVICE — BUR MICROAIRE CARBIDE OVAL 4MM 8 FLUTE

## (undated) DEVICE — BLADE SCALPEL SAFETYLOCK #15

## (undated) DEVICE — NDL HYPO SAFE 18G X 1.5" (PINK)

## (undated) DEVICE — BLADE SCALPEL SAFETYLOCK #10

## (undated) DEVICE — PACK LOWER EXTREMITY NS PLAINVI

## (undated) DEVICE — SAW BLADE LINVATEC SAGITTAL 19.5X41.0X..4MM COARSE

## (undated) DEVICE — VENODYNE/SCD SLEEVE CALF MEDIUM

## (undated) DEVICE — SPECIMEN CONTAINER 4OZ

## (undated) DEVICE — VENODYNE/SCD SLEEVE CALF LARGE

## (undated) DEVICE — PLV/PSP-TOURNIQUET #12 40150710004: Type: DURABLE MEDICAL EQUIPMENT

## (undated) DEVICE — SUT POLYSORB 2-0 30" GS-22 UNDYED

## (undated) DEVICE — PLV/PSP-ESU T7J19648DX: Type: DURABLE MEDICAL EQUIPMENT

## (undated) DEVICE — PREP BETADINE KIT

## (undated) DEVICE — DRSG XEROFORM 1 X 8"

## (undated) DEVICE — DRSG CURITY GAUZE SPONGE 4 X 4" 12-PLY

## (undated) DEVICE — GLV 7 PROTEXIS (WHITE)

## (undated) DEVICE — PREP ALCOHOL PAD MED

## (undated) DEVICE — DRAPE C ARM UNIVERSAL

## (undated) DEVICE — DRSG COMBINE 5X9"

## (undated) DEVICE — SOL IRR POUR NS 0.9% 1000ML

## (undated) DEVICE — SUT MONOSOF 3-0 18" P-14

## (undated) DEVICE — DRSG ACE BANDAGE 6"

## (undated) DEVICE — DRSG COMBINE 5 X 9"

## (undated) DEVICE — PLV-SCD MACHINE: Type: DURABLE MEDICAL EQUIPMENT

## (undated) DEVICE — DRSG KERLIX ROLL LG 4.5"

## (undated) DEVICE — SAW BLADE LINVATEC SAGITTAL MIC 9.5X25.5X0.4MM

## (undated) DEVICE — NDL HYPO SAFE 22G X 1.5" (BLACK)

## (undated) DEVICE — PREP ALCOHOL PAD

## (undated) DEVICE — DRAIN JACKSON PRATT 7MM FLAT FULL NO TROCAR

## (undated) DEVICE — DRSG TELFA 3 X 8

## (undated) DEVICE — SOL IRR POUR H2O 1000ML

## (undated) DEVICE — SOL IRR BAG NS 0.9% 3000ML

## (undated) DEVICE — PLV/PSP-TOURNIQUET #1 3006JAEN: Type: DURABLE MEDICAL EQUIPMENT

## (undated) DEVICE — SYR LUER LOK 10CC

## (undated) DEVICE — SUT MONOSOF 2-0 18" C-15

## (undated) DEVICE — GLV 6.5 PROTEXIS (BLUE)

## (undated) DEVICE — DRAPE 3/4 SHEET W REINFORCEMENT 56X77"

## (undated) DEVICE — SUT POLYSORB 4-0 18" P-12 UNDYED

## (undated) DEVICE — WARMING BLANKET UPPER ADULT

## (undated) DEVICE — FLOORMAT SURGISAFE ABSORBANT WHITE 36" X 72"

## (undated) DEVICE — DRSG KLING 4"

## (undated) DEVICE — DRSG WEBRIL 6"

## (undated) DEVICE — PACKING GAUZE PLAIN 0.5"

## (undated) DEVICE — PLV/PSP-ESU FORCE2 FIL 16736T: Type: DURABLE MEDICAL EQUIPMENT

## (undated) DEVICE — DRAIN RESERVOIR FOR JACKSON PRATT 100CC CARDINAL

## (undated) DEVICE — NDL VITOSS BONE MARROW 8GAX6CM

## (undated) DEVICE — TOURNIQUET CUFF 18" DUAL PORT SINGLE BLADDER LUER LOCK (BLACK)

## (undated) DEVICE — DRSG KERLIX ROLL 4.5"

## (undated) DEVICE — PACKING GAUZE IODOFORM 0.5"

## (undated) DEVICE — KYPHON BONE BIOPSY DEVICE SIZE 2 EXPRESS

## (undated) DEVICE — STAPLER SKIN PROXIMATE

## (undated) DEVICE — PLV/PSP-ESU FORCEFX F8I7418A: Type: DURABLE MEDICAL EQUIPMENT